# Patient Record
Sex: FEMALE | Race: WHITE | NOT HISPANIC OR LATINO | Employment: OTHER | URBAN - METROPOLITAN AREA
[De-identification: names, ages, dates, MRNs, and addresses within clinical notes are randomized per-mention and may not be internally consistent; named-entity substitution may affect disease eponyms.]

---

## 2017-03-10 ENCOUNTER — TRANSCRIBE ORDERS (OUTPATIENT)
Dept: ADMINISTRATIVE | Facility: HOSPITAL | Age: 72
End: 2017-03-10

## 2017-03-10 DIAGNOSIS — N18.31 CHRONIC KIDNEY DISEASE (CKD) STAGE G3A/A1, MODERATELY DECREASED GLOMERULAR FILTRATION RATE (GFR) BETWEEN 45-59 ML/MIN/1.73 SQUARE METER AND ALBUMINURIA CREATININE RATIO LESS THAN 30 MG/G (CMS/H* (HCC): Primary | ICD-10-CM

## 2017-03-20 ENCOUNTER — HOSPITAL ENCOUNTER (OUTPATIENT)
Dept: ULTRASOUND IMAGING | Facility: HOSPITAL | Age: 72
Discharge: HOME | End: 2017-03-20
Attending: INTERNAL MEDICINE | Admitting: INTERNAL MEDICINE

## 2017-03-20 DIAGNOSIS — N18.31 CHRONIC KIDNEY DISEASE (CKD) STAGE G3A/A1, MODERATELY DECREASED GLOMERULAR FILTRATION RATE (GFR) BETWEEN 45-59 ML/MIN/1.73 SQUARE METER AND ALBUMINURIA CREATININE RATIO LESS THAN 30 MG/G (CMS/H* (HCC): ICD-10-CM

## 2017-03-20 PROCEDURE — 76775 US EXAM ABDO BACK WALL LIM: CPT

## 2018-04-23 ENCOUNTER — APPOINTMENT (OUTPATIENT)
Dept: WOMENS IMAGING | Facility: HOSPITAL | Age: 73
End: 2018-04-23

## 2018-04-23 PROCEDURE — 77063 BREAST TOMOSYNTHESIS BI: CPT | Performed by: RADIOLOGY

## 2018-04-23 PROCEDURE — 77067 SCR MAMMO BI INCL CAD: CPT | Performed by: RADIOLOGY

## 2019-01-01 ENCOUNTER — HOSPITAL ENCOUNTER (OUTPATIENT)
Dept: SPEECH THERAPY | Facility: HOSPITAL | Age: 74
Setting detail: THERAPIES SERIES
Discharge: HOME | End: 2019-10-25

## 2019-01-01 ENCOUNTER — INFUSION (OUTPATIENT)
Dept: ONCOLOGY | Facility: HOSPITAL | Age: 74
End: 2019-01-01

## 2019-01-01 ENCOUNTER — HOSPITAL ENCOUNTER (OUTPATIENT)
Dept: SPEECH THERAPY | Facility: HOSPITAL | Age: 74
Setting detail: THERAPIES SERIES
Discharge: HOME | End: 2019-10-04

## 2019-01-01 ENCOUNTER — APPOINTMENT (OUTPATIENT)
Dept: ONCOLOGY | Facility: HOSPITAL | Age: 74
End: 2019-01-01

## 2019-01-01 ENCOUNTER — RADIATION ONCOLOGY WEEKLY ASSESSMENT (OUTPATIENT)
Dept: RADIATION ONCOLOGY | Facility: HOSPITAL | Age: 74
End: 2019-01-01

## 2019-01-01 ENCOUNTER — HOSPITAL ENCOUNTER (OUTPATIENT)
Dept: SPEECH THERAPY | Facility: HOSPITAL | Age: 74
Setting detail: THERAPIES SERIES
Discharge: HOME | End: 2019-10-07

## 2019-01-01 ENCOUNTER — TELEPHONE (OUTPATIENT)
Dept: ONCOLOGY | Facility: CLINIC | Age: 74
End: 2019-01-01

## 2019-01-01 ENCOUNTER — APPOINTMENT (OUTPATIENT)
Dept: PHYSICAL THERAPY | Facility: HOSPITAL | Age: 74
End: 2019-01-01

## 2019-01-01 ENCOUNTER — OFFICE VISIT (OUTPATIENT)
Dept: ONCOLOGY | Facility: CLINIC | Age: 74
End: 2019-01-01

## 2019-01-01 ENCOUNTER — CLINICAL SUPPORT (OUTPATIENT)
Dept: ONCOLOGY | Facility: HOSPITAL | Age: 74
End: 2019-01-01

## 2019-01-01 ENCOUNTER — HOSPITAL ENCOUNTER (OUTPATIENT)
Dept: SPEECH THERAPY | Facility: HOSPITAL | Age: 74
Setting detail: THERAPIES SERIES
Discharge: HOME | End: 2019-09-20

## 2019-01-01 ENCOUNTER — HOSPITAL ENCOUNTER (OUTPATIENT)
Dept: PHYSICAL THERAPY | Facility: HOSPITAL | Age: 74
Setting detail: THERAPIES SERIES
Discharge: HOME | End: 2019-10-21

## 2019-01-01 ENCOUNTER — HOSPITAL ENCOUNTER (OUTPATIENT)
Dept: PET IMAGING | Facility: HOSPITAL | Age: 74
Discharge: HOME | End: 2019-10-29
Admitting: INTERNAL MEDICINE

## 2019-01-01 ENCOUNTER — HOSPITAL ENCOUNTER (OUTPATIENT)
Dept: PHYSICAL THERAPY | Facility: HOSPITAL | Age: 74
Setting detail: THERAPIES SERIES
Discharge: HOME | End: 2019-10-11

## 2019-01-01 ENCOUNTER — APPOINTMENT (OUTPATIENT)
Dept: SPEECH THERAPY | Facility: HOSPITAL | Age: 74
End: 2019-01-01

## 2019-01-01 ENCOUNTER — HOSPITAL ENCOUNTER (OUTPATIENT)
Dept: PHYSICAL THERAPY | Facility: HOSPITAL | Age: 74
Setting detail: THERAPIES SERIES
Discharge: HOME | End: 2019-11-05

## 2019-01-01 ENCOUNTER — HOSPITAL ENCOUNTER (OUTPATIENT)
Dept: PHYSICAL THERAPY | Facility: HOSPITAL | Age: 74
Setting detail: THERAPIES SERIES
Discharge: HOME | End: 2019-10-04

## 2019-01-01 ENCOUNTER — APPOINTMENT (OUTPATIENT)
Dept: LAB | Facility: HOSPITAL | Age: 74
End: 2019-01-01

## 2019-01-01 ENCOUNTER — LAB (OUTPATIENT)
Dept: LAB | Facility: HOSPITAL | Age: 74
End: 2019-01-01

## 2019-01-01 ENCOUNTER — HOSPITAL ENCOUNTER (OUTPATIENT)
Dept: SPEECH THERAPY | Facility: HOSPITAL | Age: 74
Setting detail: THERAPIES SERIES
Discharge: HOME | End: 2019-10-21

## 2019-01-01 ENCOUNTER — HOSPITAL ENCOUNTER (OUTPATIENT)
Dept: SPEECH THERAPY | Facility: HOSPITAL | Age: 74
Setting detail: THERAPIES SERIES
Discharge: HOME | End: 2019-09-27

## 2019-01-01 ENCOUNTER — OFFICE VISIT (OUTPATIENT)
Dept: NEUROSURGERY | Facility: CLINIC | Age: 74
End: 2019-01-01

## 2019-01-01 ENCOUNTER — TRANSCRIBE ORDERS (OUTPATIENT)
Dept: PHYSICAL THERAPY | Facility: HOSPITAL | Age: 74
End: 2019-01-01

## 2019-01-01 ENCOUNTER — TELEPHONE (OUTPATIENT)
Dept: ONCOLOGY | Facility: HOSPITAL | Age: 74
End: 2019-01-01

## 2019-01-01 ENCOUNTER — OFFICE VISIT (OUTPATIENT)
Dept: FAMILY MEDICINE CLINIC | Facility: CLINIC | Age: 74
End: 2019-01-01

## 2019-01-01 ENCOUNTER — APPOINTMENT (OUTPATIENT)
Dept: RADIATION ONCOLOGY | Facility: HOSPITAL | Age: 74
End: 2019-01-01

## 2019-01-01 ENCOUNTER — HOSPITAL ENCOUNTER (OUTPATIENT)
Dept: PHYSICAL THERAPY | Facility: HOSPITAL | Age: 74
Setting detail: THERAPIES SERIES
Discharge: HOME | End: 2019-11-01

## 2019-01-01 ENCOUNTER — HOSPITAL ENCOUNTER (OUTPATIENT)
Dept: PHYSICAL THERAPY | Facility: HOSPITAL | Age: 74
Setting detail: THERAPIES SERIES
Discharge: HOME | End: 2019-11-08

## 2019-01-01 ENCOUNTER — APPOINTMENT (OUTPATIENT)
Dept: GENERAL RADIOLOGY | Facility: HOSPITAL | Age: 74
End: 2019-01-01

## 2019-01-01 ENCOUNTER — HOSPITAL ENCOUNTER (OUTPATIENT)
Facility: HOSPITAL | Age: 74
Setting detail: SURGERY ADMIT
End: 2019-09-06
Attending: THORACIC SURGERY (CARDIOTHORACIC VASCULAR SURGERY) | Admitting: THORACIC SURGERY (CARDIOTHORACIC VASCULAR SURGERY)

## 2019-01-01 ENCOUNTER — HOSPITAL ENCOUNTER (OUTPATIENT)
Dept: SPEECH THERAPY | Facility: HOSPITAL | Age: 74
Setting detail: THERAPIES SERIES
Discharge: HOME | End: 2019-10-11

## 2019-01-01 ENCOUNTER — APPOINTMENT (OUTPATIENT)
Dept: ONCOLOGY | Facility: CLINIC | Age: 74
End: 2019-01-01

## 2019-01-01 ENCOUNTER — DOCUMENTATION (OUTPATIENT)
Dept: ONCOLOGY | Facility: CLINIC | Age: 74
End: 2019-01-01

## 2019-01-01 ENCOUNTER — HOSPITAL ENCOUNTER (OUTPATIENT)
Dept: PHYSICAL THERAPY | Facility: HOSPITAL | Age: 74
Setting detail: THERAPIES SERIES
Discharge: HOME | End: 2019-09-20

## 2019-01-01 ENCOUNTER — HOSPITAL ENCOUNTER (OUTPATIENT)
Dept: SPEECH THERAPY | Facility: HOSPITAL | Age: 74
Setting detail: THERAPIES SERIES
Discharge: HOME | End: 2019-10-18

## 2019-01-01 ENCOUNTER — HOSPITAL ENCOUNTER (OUTPATIENT)
Dept: PHYSICAL THERAPY | Facility: HOSPITAL | Age: 74
Setting detail: THERAPIES SERIES
Discharge: HOME | End: 2019-09-27

## 2019-01-01 ENCOUNTER — HOSPITAL ENCOUNTER (OUTPATIENT)
Dept: SPEECH THERAPY | Facility: HOSPITAL | Age: 74
Setting detail: THERAPIES SERIES
Discharge: HOME | End: 2019-09-17

## 2019-01-01 ENCOUNTER — HOSPITAL ENCOUNTER (OUTPATIENT)
Dept: PHYSICAL THERAPY | Facility: HOSPITAL | Age: 74
Setting detail: THERAPIES SERIES
Discharge: HOME | End: 2019-10-07

## 2019-01-01 ENCOUNTER — HOSPITAL ENCOUNTER (OUTPATIENT)
Dept: MRI IMAGING | Facility: HOSPITAL | Age: 74
Discharge: HOME | End: 2019-10-08
Admitting: PHYSICIAN ASSISTANT

## 2019-01-01 ENCOUNTER — APPOINTMENT (OUTPATIENT)
Dept: PET IMAGING | Facility: HOSPITAL | Age: 74
End: 2019-01-01

## 2019-01-01 ENCOUNTER — HOSPITAL ENCOUNTER (OUTPATIENT)
Dept: PHYSICAL THERAPY | Facility: HOSPITAL | Age: 74
Setting detail: THERAPIES SERIES
Discharge: HOME | End: 2019-09-30

## 2019-01-01 ENCOUNTER — ANESTHESIA EVENT (OUTPATIENT)
Dept: PERIOP | Facility: HOSPITAL | Age: 74
End: 2019-01-01

## 2019-01-01 ENCOUNTER — HOSPITAL ENCOUNTER (OUTPATIENT)
Dept: PET IMAGING | Facility: HOSPITAL | Age: 74
Discharge: HOME | End: 2019-12-18
Admitting: INTERNAL MEDICINE

## 2019-01-01 ENCOUNTER — HOSPITAL ENCOUNTER (OUTPATIENT)
Dept: NEUROLOGY | Facility: HOSPITAL | Age: 74
Discharge: HOME | End: 2019-12-26

## 2019-01-01 ENCOUNTER — DOCUMENTATION (OUTPATIENT)
Dept: RADIATION ONCOLOGY | Facility: HOSPITAL | Age: 74
End: 2019-01-01

## 2019-01-01 ENCOUNTER — ANESTHESIA (OUTPATIENT)
Dept: PERIOP | Facility: HOSPITAL | Age: 74
End: 2019-01-01

## 2019-01-01 ENCOUNTER — HOSPITAL ENCOUNTER (OUTPATIENT)
Dept: PHYSICAL THERAPY | Facility: HOSPITAL | Age: 74
Setting detail: THERAPIES SERIES
Discharge: HOME | End: 2019-10-25

## 2019-01-01 ENCOUNTER — HOSPITAL ENCOUNTER (OUTPATIENT)
Dept: PHYSICAL THERAPY | Facility: HOSPITAL | Age: 74
Setting detail: THERAPIES SERIES
Discharge: HOME | End: 2019-10-29

## 2019-01-01 ENCOUNTER — HOSPITAL ENCOUNTER (OUTPATIENT)
Dept: PHYSICAL THERAPY | Facility: HOSPITAL | Age: 74
Setting detail: THERAPIES SERIES
Discharge: HOME | End: 2019-10-18

## 2019-01-01 ENCOUNTER — HOSPITAL ENCOUNTER (OUTPATIENT)
Dept: PHYSICAL THERAPY | Facility: HOSPITAL | Age: 74
Setting detail: THERAPIES SERIES
Discharge: HOME | End: 2019-11-15

## 2019-01-01 ENCOUNTER — HOSPITAL ENCOUNTER (OUTPATIENT)
Dept: PET IMAGING | Facility: HOSPITAL | Age: 74
End: 2019-01-01

## 2019-01-01 ENCOUNTER — HOSPITAL ENCOUNTER (OUTPATIENT)
Dept: MRI IMAGING | Facility: HOSPITAL | Age: 74
Discharge: HOME | End: 2019-12-26
Admitting: NEUROLOGICAL SURGERY

## 2019-01-01 ENCOUNTER — HOSPITAL ENCOUNTER (OUTPATIENT)
Dept: PET IMAGING | Facility: HOSPITAL | Age: 74
Discharge: HOME | End: 2019-09-12

## 2019-01-01 ENCOUNTER — HOSPITAL ENCOUNTER (OUTPATIENT)
Dept: GENERAL RADIOLOGY | Facility: HOSPITAL | Age: 74
Discharge: HOME | End: 2019-12-30
Admitting: INTERNAL MEDICINE

## 2019-01-01 ENCOUNTER — HOSPITAL ENCOUNTER (OUTPATIENT)
Dept: PET IMAGING | Facility: HOSPITAL | Age: 74
Discharge: HOME | End: 2019-09-16

## 2019-01-01 ENCOUNTER — HOSPITAL ENCOUNTER (OUTPATIENT)
Dept: PET IMAGING | Facility: HOSPITAL | Age: 74
Discharge: HOME | End: 2019-09-16
Admitting: RADIOLOGY

## 2019-01-01 ENCOUNTER — HOSPITAL ENCOUNTER (OUTPATIENT)
Dept: SPEECH THERAPY | Facility: HOSPITAL | Age: 74
Setting detail: THERAPIES SERIES
Discharge: HOME | End: 2019-09-30

## 2019-01-01 VITALS
WEIGHT: 190 LBS | DIASTOLIC BLOOD PRESSURE: 64 MMHG | HEIGHT: 64 IN | BODY MASS INDEX: 32.44 KG/M2 | SYSTOLIC BLOOD PRESSURE: 120 MMHG | HEART RATE: 59 BPM

## 2019-01-01 VITALS
HEART RATE: 72 BPM | BODY MASS INDEX: 31.91 KG/M2 | SYSTOLIC BLOOD PRESSURE: 137 MMHG | TEMPERATURE: 98.4 F | OXYGEN SATURATION: 98 % | WEIGHT: 186 LBS | DIASTOLIC BLOOD PRESSURE: 81 MMHG

## 2019-01-01 VITALS
OXYGEN SATURATION: 96 % | TEMPERATURE: 97.4 F | HEART RATE: 55 BPM | RESPIRATION RATE: 16 BRPM | SYSTOLIC BLOOD PRESSURE: 131 MMHG | DIASTOLIC BLOOD PRESSURE: 65 MMHG

## 2019-01-01 VITALS
HEIGHT: 64 IN | SYSTOLIC BLOOD PRESSURE: 112 MMHG | DIASTOLIC BLOOD PRESSURE: 70 MMHG | RESPIRATION RATE: 18 BRPM | BODY MASS INDEX: 31.16 KG/M2 | OXYGEN SATURATION: 96 % | TEMPERATURE: 98 F | WEIGHT: 182.5 LBS | HEART RATE: 69 BPM

## 2019-01-01 VITALS
OXYGEN SATURATION: 99 % | SYSTOLIC BLOOD PRESSURE: 129 MMHG | HEART RATE: 71 BPM | TEMPERATURE: 97.6 F | DIASTOLIC BLOOD PRESSURE: 77 MMHG | WEIGHT: 187 LBS | BODY MASS INDEX: 32.08 KG/M2

## 2019-01-01 VITALS
HEART RATE: 76 BPM | SYSTOLIC BLOOD PRESSURE: 112 MMHG | WEIGHT: 183 LBS | OXYGEN SATURATION: 99 % | DIASTOLIC BLOOD PRESSURE: 69 MMHG | BODY MASS INDEX: 31.4 KG/M2 | TEMPERATURE: 98.7 F

## 2019-01-01 VITALS
HEART RATE: 61 BPM | SYSTOLIC BLOOD PRESSURE: 134 MMHG | BODY MASS INDEX: 31.6 KG/M2 | DIASTOLIC BLOOD PRESSURE: 80 MMHG | OXYGEN SATURATION: 98 % | TEMPERATURE: 97.6 F | WEIGHT: 184.2 LBS

## 2019-01-01 VITALS
HEART RATE: 82 BPM | SYSTOLIC BLOOD PRESSURE: 118 MMHG | DIASTOLIC BLOOD PRESSURE: 65 MMHG | BODY MASS INDEX: 31.57 KG/M2 | OXYGEN SATURATION: 99 % | WEIGHT: 184 LBS

## 2019-01-01 VITALS
HEART RATE: 77 BPM | OXYGEN SATURATION: 98 % | RESPIRATION RATE: 16 BRPM | TEMPERATURE: 97.5 F | DIASTOLIC BLOOD PRESSURE: 75 MMHG | BODY MASS INDEX: 30.73 KG/M2 | WEIGHT: 180 LBS | HEIGHT: 64 IN | SYSTOLIC BLOOD PRESSURE: 131 MMHG

## 2019-01-01 VITALS
SYSTOLIC BLOOD PRESSURE: 114 MMHG | WEIGHT: 182.4 LBS | BODY MASS INDEX: 31.14 KG/M2 | DIASTOLIC BLOOD PRESSURE: 77 MMHG | HEIGHT: 64 IN | RESPIRATION RATE: 16 BRPM | TEMPERATURE: 98.5 F | OXYGEN SATURATION: 97 % | HEART RATE: 74 BPM

## 2019-01-01 VITALS
HEIGHT: 64 IN | OXYGEN SATURATION: 97 % | DIASTOLIC BLOOD PRESSURE: 62 MMHG | WEIGHT: 188.9 LBS | HEART RATE: 87 BPM | SYSTOLIC BLOOD PRESSURE: 103 MMHG | TEMPERATURE: 98.4 F | BODY MASS INDEX: 32.25 KG/M2 | RESPIRATION RATE: 12 BRPM

## 2019-01-01 VITALS
HEIGHT: 64 IN | SYSTOLIC BLOOD PRESSURE: 118 MMHG | BODY MASS INDEX: 32.08 KG/M2 | HEART RATE: 78 BPM | DIASTOLIC BLOOD PRESSURE: 61 MMHG | WEIGHT: 187.9 LBS | OXYGEN SATURATION: 97 % | TEMPERATURE: 97.7 F | RESPIRATION RATE: 18 BRPM

## 2019-01-01 VITALS
BODY MASS INDEX: 30.97 KG/M2 | WEIGHT: 180.4 LBS | DIASTOLIC BLOOD PRESSURE: 70 MMHG | HEART RATE: 59 BPM | OXYGEN SATURATION: 99 % | SYSTOLIC BLOOD PRESSURE: 114 MMHG

## 2019-01-01 VITALS
OXYGEN SATURATION: 98 % | SYSTOLIC BLOOD PRESSURE: 135 MMHG | DIASTOLIC BLOOD PRESSURE: 72 MMHG | HEART RATE: 87 BPM | BODY MASS INDEX: 32.25 KG/M2 | WEIGHT: 188 LBS

## 2019-01-01 VITALS — SYSTOLIC BLOOD PRESSURE: 108 MMHG | DIASTOLIC BLOOD PRESSURE: 68 MMHG | HEART RATE: 76 BPM

## 2019-01-01 VITALS
HEART RATE: 83 BPM | RESPIRATION RATE: 18 BRPM | BODY MASS INDEX: 31.74 KG/M2 | DIASTOLIC BLOOD PRESSURE: 77 MMHG | WEIGHT: 185.9 LBS | HEIGHT: 64 IN | OXYGEN SATURATION: 98 % | TEMPERATURE: 97.7 F | SYSTOLIC BLOOD PRESSURE: 130 MMHG

## 2019-01-01 VITALS
DIASTOLIC BLOOD PRESSURE: 59 MMHG | OXYGEN SATURATION: 98 % | BODY MASS INDEX: 31.77 KG/M2 | SYSTOLIC BLOOD PRESSURE: 106 MMHG | HEART RATE: 69 BPM | WEIGHT: 185.2 LBS

## 2019-01-01 VITALS
HEART RATE: 80 BPM | SYSTOLIC BLOOD PRESSURE: 133 MMHG | OXYGEN SATURATION: 96 % | DIASTOLIC BLOOD PRESSURE: 76 MMHG | BODY MASS INDEX: 29.85 KG/M2 | TEMPERATURE: 97.8 F | WEIGHT: 174 LBS

## 2019-01-01 VITALS
WEIGHT: 184 LBS | SYSTOLIC BLOOD PRESSURE: 106 MMHG | OXYGEN SATURATION: 99 % | HEART RATE: 76 BPM | BODY MASS INDEX: 31.57 KG/M2 | TEMPERATURE: 98 F | DIASTOLIC BLOOD PRESSURE: 67 MMHG

## 2019-01-01 VITALS
OXYGEN SATURATION: 100 % | WEIGHT: 187 LBS | DIASTOLIC BLOOD PRESSURE: 79 MMHG | BODY MASS INDEX: 32.08 KG/M2 | HEART RATE: 69 BPM | SYSTOLIC BLOOD PRESSURE: 131 MMHG

## 2019-01-01 VITALS
SYSTOLIC BLOOD PRESSURE: 143 MMHG | HEIGHT: 64 IN | DIASTOLIC BLOOD PRESSURE: 79 MMHG | RESPIRATION RATE: 16 BRPM | BODY MASS INDEX: 32.42 KG/M2 | TEMPERATURE: 98 F | OXYGEN SATURATION: 97 % | WEIGHT: 189.9 LBS | HEART RATE: 82 BPM

## 2019-01-01 VITALS
DIASTOLIC BLOOD PRESSURE: 67 MMHG | HEART RATE: 60 BPM | TEMPERATURE: 97.1 F | OXYGEN SATURATION: 98 % | RESPIRATION RATE: 18 BRPM | SYSTOLIC BLOOD PRESSURE: 119 MMHG

## 2019-01-01 VITALS
DIASTOLIC BLOOD PRESSURE: 73 MMHG | TEMPERATURE: 97.5 F | WEIGHT: 187.6 LBS | SYSTOLIC BLOOD PRESSURE: 115 MMHG | OXYGEN SATURATION: 100 % | BODY MASS INDEX: 32.19 KG/M2 | HEART RATE: 71 BPM

## 2019-01-01 VITALS — DIASTOLIC BLOOD PRESSURE: 66 MMHG | SYSTOLIC BLOOD PRESSURE: 120 MMHG | HEART RATE: 73 BPM

## 2019-01-01 VITALS
BODY MASS INDEX: 31.16 KG/M2 | TEMPERATURE: 98.1 F | DIASTOLIC BLOOD PRESSURE: 66 MMHG | OXYGEN SATURATION: 94 % | WEIGHT: 181.6 LBS | HEART RATE: 80 BPM | SYSTOLIC BLOOD PRESSURE: 116 MMHG

## 2019-01-01 VITALS
BODY MASS INDEX: 31.74 KG/M2 | DIASTOLIC BLOOD PRESSURE: 80 MMHG | HEART RATE: 81 BPM | OXYGEN SATURATION: 95 % | WEIGHT: 185 LBS | SYSTOLIC BLOOD PRESSURE: 136 MMHG

## 2019-01-01 VITALS
HEIGHT: 64 IN | BODY MASS INDEX: 31.58 KG/M2 | TEMPERATURE: 98.1 F | RESPIRATION RATE: 16 BRPM | DIASTOLIC BLOOD PRESSURE: 50 MMHG | OXYGEN SATURATION: 97 % | HEART RATE: 79 BPM | SYSTOLIC BLOOD PRESSURE: 100 MMHG | WEIGHT: 185 LBS

## 2019-01-01 VITALS
TEMPERATURE: 97 F | SYSTOLIC BLOOD PRESSURE: 124 MMHG | OXYGEN SATURATION: 95 % | DIASTOLIC BLOOD PRESSURE: 69 MMHG | HEART RATE: 72 BPM | BODY MASS INDEX: 32.25 KG/M2 | WEIGHT: 188 LBS

## 2019-01-01 VITALS
TEMPERATURE: 98 F | WEIGHT: 184 LBS | HEART RATE: 90 BPM | OXYGEN SATURATION: 100 % | BODY MASS INDEX: 31.57 KG/M2 | SYSTOLIC BLOOD PRESSURE: 118 MMHG | DIASTOLIC BLOOD PRESSURE: 69 MMHG

## 2019-01-01 VITALS
WEIGHT: 184.8 LBS | HEIGHT: 64 IN | RESPIRATION RATE: 16 BRPM | BODY MASS INDEX: 31.55 KG/M2 | SYSTOLIC BLOOD PRESSURE: 134 MMHG | HEART RATE: 82 BPM | OXYGEN SATURATION: 98 % | DIASTOLIC BLOOD PRESSURE: 77 MMHG | TEMPERATURE: 98 F

## 2019-01-01 VITALS
HEART RATE: 81 BPM | SYSTOLIC BLOOD PRESSURE: 117 MMHG | RESPIRATION RATE: 14 BRPM | OXYGEN SATURATION: 95 % | DIASTOLIC BLOOD PRESSURE: 68 MMHG | TEMPERATURE: 98.6 F

## 2019-01-01 VITALS
OXYGEN SATURATION: 98 % | SYSTOLIC BLOOD PRESSURE: 144 MMHG | DIASTOLIC BLOOD PRESSURE: 78 MMHG | HEART RATE: 76 BPM | WEIGHT: 186.9 LBS | HEIGHT: 64 IN | BODY MASS INDEX: 31.91 KG/M2 | RESPIRATION RATE: 14 BRPM | TEMPERATURE: 97.9 F

## 2019-01-01 VITALS
DIASTOLIC BLOOD PRESSURE: 55 MMHG | RESPIRATION RATE: 18 BRPM | SYSTOLIC BLOOD PRESSURE: 91 MMHG | TEMPERATURE: 97.4 F | OXYGEN SATURATION: 100 % | HEART RATE: 75 BPM

## 2019-01-01 VITALS — DIASTOLIC BLOOD PRESSURE: 63 MMHG | HEART RATE: 90 BPM | SYSTOLIC BLOOD PRESSURE: 125 MMHG

## 2019-01-01 VITALS
TEMPERATURE: 98.5 F | BODY MASS INDEX: 31.05 KG/M2 | HEART RATE: 89 BPM | OXYGEN SATURATION: 98 % | SYSTOLIC BLOOD PRESSURE: 126 MMHG | WEIGHT: 181 LBS | DIASTOLIC BLOOD PRESSURE: 72 MMHG

## 2019-01-01 VITALS
OXYGEN SATURATION: 97 % | SYSTOLIC BLOOD PRESSURE: 114 MMHG | HEART RATE: 77 BPM | TEMPERATURE: 97.7 F | WEIGHT: 184.7 LBS | BODY MASS INDEX: 31.53 KG/M2 | RESPIRATION RATE: 16 BRPM | DIASTOLIC BLOOD PRESSURE: 71 MMHG | HEIGHT: 64 IN

## 2019-01-01 VITALS
BODY MASS INDEX: 31.57 KG/M2 | DIASTOLIC BLOOD PRESSURE: 62 MMHG | OXYGEN SATURATION: 99 % | WEIGHT: 184 LBS | RESPIRATION RATE: 16 BRPM | TEMPERATURE: 97.6 F | HEART RATE: 67 BPM | SYSTOLIC BLOOD PRESSURE: 100 MMHG

## 2019-01-01 VITALS
WEIGHT: 189 LBS | TEMPERATURE: 98.9 F | SYSTOLIC BLOOD PRESSURE: 133 MMHG | DIASTOLIC BLOOD PRESSURE: 88 MMHG | OXYGEN SATURATION: 95 % | BODY MASS INDEX: 32.43 KG/M2 | HEART RATE: 65 BPM

## 2019-01-01 VITALS
OXYGEN SATURATION: 99 % | SYSTOLIC BLOOD PRESSURE: 80 MMHG | HEART RATE: 99 BPM | TEMPERATURE: 97.7 F | DIASTOLIC BLOOD PRESSURE: 48 MMHG

## 2019-01-01 VITALS
DIASTOLIC BLOOD PRESSURE: 56 MMHG | BODY MASS INDEX: 31.91 KG/M2 | HEART RATE: 87 BPM | WEIGHT: 186 LBS | SYSTOLIC BLOOD PRESSURE: 118 MMHG | OXYGEN SATURATION: 97 %

## 2019-01-01 DIAGNOSIS — C34.90 SMALL CELL LUNG CANCER (HCC): Primary | ICD-10-CM

## 2019-01-01 DIAGNOSIS — Z45.2 ENCOUNTER FOR FITTING AND ADJUSTMENT OF VASCULAR CATHETER: ICD-10-CM

## 2019-01-01 DIAGNOSIS — D49.6 BRAIN TUMOR (HCC): ICD-10-CM

## 2019-01-01 DIAGNOSIS — C34.90 SMALL CELL LUNG CANCER (HCC): ICD-10-CM

## 2019-01-01 DIAGNOSIS — C34.11 MALIGNANT NEOPLASM OF UPPER LOBE OF RIGHT LUNG (HCC): ICD-10-CM

## 2019-01-01 DIAGNOSIS — D49.6 BRAIN TUMOR (HCC): Primary | ICD-10-CM

## 2019-01-01 DIAGNOSIS — T82.898A OTHER COMPLICATION OF VASCULAR DEVICE, INITIAL ENCOUNTER (HCC): Primary | ICD-10-CM

## 2019-01-01 DIAGNOSIS — K21.9 GASTROESOPHAGEAL REFLUX DISEASE WITHOUT ESOPHAGITIS: ICD-10-CM

## 2019-01-01 DIAGNOSIS — Z79.899 HIGH RISK MEDICATION USE: ICD-10-CM

## 2019-01-01 DIAGNOSIS — K20.80 RADIATION ESOPHAGITIS: ICD-10-CM

## 2019-01-01 DIAGNOSIS — C79.31 BRAIN METASTASES: ICD-10-CM

## 2019-01-01 DIAGNOSIS — K20.80 RADIATION ESOPHAGITIS: Primary | ICD-10-CM

## 2019-01-01 DIAGNOSIS — G93.89 BRAIN MASS: ICD-10-CM

## 2019-01-01 DIAGNOSIS — Z98.890 STATUS POST CRANIOTOMY: ICD-10-CM

## 2019-01-01 DIAGNOSIS — R41.841 COGNITIVE COMMUNICATION DEFICIT: ICD-10-CM

## 2019-01-01 DIAGNOSIS — R63.8 DECREASED ORAL INTAKE: ICD-10-CM

## 2019-01-01 DIAGNOSIS — T45.1X5A CHEMOTHERAPY-INDUCED THROMBOCYTOPENIA: ICD-10-CM

## 2019-01-01 DIAGNOSIS — T66.XXXA RADIATION ESOPHAGITIS: ICD-10-CM

## 2019-01-01 DIAGNOSIS — G81.91 HEMIPARESIS OF RIGHT DOMINANT SIDE DUE TO NON-CEREBROVASCULAR ETIOLOGY (HCC): ICD-10-CM

## 2019-01-01 DIAGNOSIS — C34.11 MALIGNANT NEOPLASM OF UPPER LOBE OF RIGHT LUNG (HCC): Primary | ICD-10-CM

## 2019-01-01 DIAGNOSIS — Z09 SURGERY FOLLOW-UP EXAMINATION: ICD-10-CM

## 2019-01-01 DIAGNOSIS — R26.89 FUNCTIONAL GAIT ABNORMALITY: ICD-10-CM

## 2019-01-01 DIAGNOSIS — Z79.899 HIGH RISK MEDICATION USE: Primary | ICD-10-CM

## 2019-01-01 DIAGNOSIS — R91.8 LUNG MASS: Primary | ICD-10-CM

## 2019-01-01 DIAGNOSIS — T66.XXXA RADIATION ESOPHAGITIS: Primary | ICD-10-CM

## 2019-01-01 DIAGNOSIS — Z98.890 STATUS POST CRANIOTOMY: Primary | ICD-10-CM

## 2019-01-01 DIAGNOSIS — Z45.2 ENCOUNTER FOR FITTING AND ADJUSTMENT OF VASCULAR CATHETER: Primary | ICD-10-CM

## 2019-01-01 DIAGNOSIS — C79.31 METASTATIC CANCER TO BRAIN (HCC): Primary | ICD-10-CM

## 2019-01-01 DIAGNOSIS — D69.59 CHEMOTHERAPY-INDUCED THROMBOCYTOPENIA: ICD-10-CM

## 2019-01-01 DIAGNOSIS — R91.8 LUNG MASS: ICD-10-CM

## 2019-01-01 DIAGNOSIS — R21 RASH: ICD-10-CM

## 2019-01-01 DIAGNOSIS — C79.31 BRAIN METASTASES: Primary | ICD-10-CM

## 2019-01-01 DIAGNOSIS — I10 ESSENTIAL HYPERTENSION: ICD-10-CM

## 2019-01-01 DIAGNOSIS — R63.8 DECREASED ORAL INTAKE: Primary | ICD-10-CM

## 2019-01-01 DIAGNOSIS — T82.898A OTHER COMPLICATION OF VASCULAR DEVICE, INITIAL ENCOUNTER (HCC): ICD-10-CM

## 2019-01-01 DIAGNOSIS — R05.9 COUGH: Primary | ICD-10-CM

## 2019-01-01 DIAGNOSIS — Z09 SURGERY FOLLOW-UP EXAMINATION: Primary | ICD-10-CM

## 2019-01-01 DIAGNOSIS — F32.A ANXIETY AND DEPRESSION: ICD-10-CM

## 2019-01-01 DIAGNOSIS — G93.89 BRAIN MASS: Primary | ICD-10-CM

## 2019-01-01 DIAGNOSIS — Z45.2 ENCOUNTER FOR ADJUSTMENT OR MANAGEMENT OF VASCULAR ACCESS DEVICE: Primary | ICD-10-CM

## 2019-01-01 DIAGNOSIS — F41.9 ANXIETY AND DEPRESSION: ICD-10-CM

## 2019-01-01 DIAGNOSIS — R21 RASH: Primary | ICD-10-CM

## 2019-01-01 LAB
ABO + RH BLD: NORMAL
ABO + RH BLD: NORMAL
ABO GROUP BLD: NORMAL
ABO GROUP BLD: NORMAL
ALBUMIN SERPL-MCNC: 3.4 G/DL (ref 3.5–5.2)
ALBUMIN SERPL-MCNC: 3.5 G/DL (ref 3.5–5.2)
ALBUMIN SERPL-MCNC: 3.5 G/DL (ref 3.5–5.2)
ALBUMIN SERPL-MCNC: 3.6 G/DL (ref 3.5–5.2)
ALBUMIN SERPL-MCNC: 3.7 G/DL (ref 3.5–5.2)
ALBUMIN SERPL-MCNC: 3.7 G/DL (ref 3.5–5.2)
ALBUMIN SERPL-MCNC: 3.8 G/DL (ref 3.5–5.2)
ALBUMIN SERPL-MCNC: 3.9 G/DL (ref 3.5–5.2)
ALBUMIN SERPL-MCNC: 3.9 G/DL (ref 3.5–5.2)
ALBUMIN SERPL-MCNC: 4 G/DL (ref 3.5–5.2)
ALBUMIN/GLOB SERPL: 1.2 G/DL (ref 1.1–2.4)
ALBUMIN/GLOB SERPL: 1.3 G/DL (ref 1.1–2.4)
ALBUMIN/GLOB SERPL: 1.4 G/DL (ref 1.1–2.4)
ALBUMIN/GLOB SERPL: 1.5 G/DL (ref 1.1–2.4)
ALP SERPL-CCNC: 102 U/L (ref 38–116)
ALP SERPL-CCNC: 111 U/L (ref 38–116)
ALP SERPL-CCNC: 71 U/L (ref 38–116)
ALP SERPL-CCNC: 72 U/L (ref 38–116)
ALP SERPL-CCNC: 75 U/L (ref 38–116)
ALP SERPL-CCNC: 75 U/L (ref 38–116)
ALP SERPL-CCNC: 76 U/L (ref 38–116)
ALP SERPL-CCNC: 77 U/L (ref 38–116)
ALP SERPL-CCNC: 81 U/L (ref 38–116)
ALP SERPL-CCNC: 81 U/L (ref 38–116)
ALP SERPL-CCNC: 88 U/L (ref 38–116)
ALP SERPL-CCNC: 90 U/L (ref 38–116)
ALP SERPL-CCNC: 96 U/L (ref 38–116)
ALP SERPL-CCNC: 96 U/L (ref 38–116)
ALT SERPL W P-5'-P-CCNC: 11 U/L (ref 0–33)
ALT SERPL W P-5'-P-CCNC: 12 U/L (ref 0–33)
ALT SERPL W P-5'-P-CCNC: 12 U/L (ref 0–33)
ALT SERPL W P-5'-P-CCNC: 13 U/L (ref 0–33)
ALT SERPL W P-5'-P-CCNC: 13 U/L (ref 0–33)
ALT SERPL W P-5'-P-CCNC: 16 U/L (ref 0–33)
ALT SERPL W P-5'-P-CCNC: 16 U/L (ref 0–33)
ALT SERPL W P-5'-P-CCNC: 17 U/L (ref 0–33)
ALT SERPL W P-5'-P-CCNC: 17 U/L (ref 0–33)
ALT SERPL W P-5'-P-CCNC: 18 U/L (ref 0–33)
ALT SERPL W P-5'-P-CCNC: 18 U/L (ref 0–33)
ALT SERPL W P-5'-P-CCNC: 20 U/L (ref 0–33)
ALT SERPL W P-5'-P-CCNC: 21 U/L (ref 0–33)
ALT SERPL W P-5'-P-CCNC: 21 U/L (ref 0–33)
AMYLASE SERPL-CCNC: 66 U/L (ref 28–100)
ANION GAP SERPL CALCULATED.3IONS-SCNC: 10.1 MMOL/L (ref 5–15)
ANION GAP SERPL CALCULATED.3IONS-SCNC: 10.1 MMOL/L (ref 5–15)
ANION GAP SERPL CALCULATED.3IONS-SCNC: 10.5 MMOL/L (ref 5–15)
ANION GAP SERPL CALCULATED.3IONS-SCNC: 10.6 MMOL/L (ref 5–15)
ANION GAP SERPL CALCULATED.3IONS-SCNC: 11 MMOL/L (ref 5–15)
ANION GAP SERPL CALCULATED.3IONS-SCNC: 11.1 MMOL/L (ref 5–15)
ANION GAP SERPL CALCULATED.3IONS-SCNC: 11.7 MMOL/L (ref 5–15)
ANION GAP SERPL CALCULATED.3IONS-SCNC: 11.8 MMOL/L (ref 5–15)
ANION GAP SERPL CALCULATED.3IONS-SCNC: 11.8 MMOL/L (ref 5–15)
ANION GAP SERPL CALCULATED.3IONS-SCNC: 12.1 MMOL/L (ref 5–15)
ANION GAP SERPL CALCULATED.3IONS-SCNC: 12.3 MMOL/L (ref 5–15)
ANION GAP SERPL CALCULATED.3IONS-SCNC: 12.3 MMOL/L (ref 5–15)
ANION GAP SERPL CALCULATED.3IONS-SCNC: 13.5 MMOL/L (ref 5–15)
ANION GAP SERPL CALCULATED.3IONS-SCNC: 13.6 MMOL/L (ref 5–15)
ANION GAP SERPL CALCULATED.3IONS-SCNC: 9.9 MMOL/L (ref 5–15)
ANION GAP SERPL CALCULATED.3IONS-SCNC: 9.9 MMOL/L (ref 5–15)
APTT PPP: 26.9 SECONDS (ref 22.7–35.4)
AST SERPL-CCNC: 13 U/L (ref 0–32)
AST SERPL-CCNC: 14 U/L (ref 0–32)
AST SERPL-CCNC: 15 U/L (ref 0–32)
AST SERPL-CCNC: 16 U/L (ref 0–32)
AST SERPL-CCNC: 17 U/L (ref 0–32)
AST SERPL-CCNC: 18 U/L (ref 0–32)
AST SERPL-CCNC: 18 U/L (ref 0–32)
AST SERPL-CCNC: 19 U/L (ref 0–32)
AST SERPL-CCNC: 21 U/L (ref 0–32)
BACTERIA SPEC AEROBE CULT: NORMAL
BACTERIA UR QL AUTO: ABNORMAL /HPF
BASOPHILS # BLD AUTO: 0.01 10*3/MM3 (ref 0–0.2)
BASOPHILS # BLD AUTO: 0.02 10*3/MM3 (ref 0–0.2)
BASOPHILS # BLD AUTO: 0.03 10*3/MM3 (ref 0–0.2)
BASOPHILS # BLD AUTO: 0.04 10*3/MM3 (ref 0–0.2)
BASOPHILS # BLD AUTO: 0.04 10*3/MM3 (ref 0–0.2)
BASOPHILS # BLD AUTO: 0.06 10*3/MM3 (ref 0–0.2)
BASOPHILS # BLD AUTO: 0.07 10*3/MM3 (ref 0–0.2)
BASOPHILS # BLD AUTO: 0.1 10*3/MM3 (ref 0–0.2)
BASOPHILS NFR BLD AUTO: 0.3 % (ref 0–1.5)
BASOPHILS NFR BLD AUTO: 0.4 % (ref 0–1.5)
BASOPHILS NFR BLD AUTO: 0.5 % (ref 0–1.5)
BASOPHILS NFR BLD AUTO: 0.6 % (ref 0–1.5)
BASOPHILS NFR BLD AUTO: 0.6 % (ref 0–1.5)
BASOPHILS NFR BLD AUTO: 0.7 % (ref 0–1.5)
BASOPHILS NFR BLD AUTO: 0.7 % (ref 0–1.5)
BASOPHILS NFR BLD AUTO: 0.9 % (ref 0–1.5)
BASOPHILS NFR BLD AUTO: 1.1 % (ref 0–1.5)
BASOPHILS NFR BLD AUTO: 1.3 % (ref 0–1.5)
BH BB BLOOD EXPIRATION DATE: NORMAL
BH BB BLOOD EXPIRATION DATE: NORMAL
BH BB BLOOD TYPE BARCODE: 9500
BH BB BLOOD TYPE BARCODE: 9500
BH BB DISPENSE STATUS: NORMAL
BH BB DISPENSE STATUS: NORMAL
BH BB PRODUCT CODE: NORMAL
BH BB PRODUCT CODE: NORMAL
BH BB UNIT NUMBER: NORMAL
BH BB UNIT NUMBER: NORMAL
BILIRUB SERPL-MCNC: 0.2 MG/DL (ref 0.2–1.2)
BILIRUB SERPL-MCNC: 0.3 MG/DL (ref 0.2–1.2)
BILIRUB SERPL-MCNC: 0.3 MG/DL (ref 0.2–1.2)
BILIRUB SERPL-MCNC: 0.4 MG/DL (ref 0.2–1.2)
BILIRUB SERPL-MCNC: 0.5 MG/DL (ref 0.2–1.2)
BILIRUB UR QL STRIP: NEGATIVE
BLD GP AB SCN SERPL QL: NEGATIVE
BLD GP AB SCN SERPL QL: NEGATIVE
BUN BLD-MCNC: 10 MG/DL (ref 6–20)
BUN BLD-MCNC: 11 MG/DL (ref 6–20)
BUN BLD-MCNC: 11 MG/DL (ref 6–20)
BUN BLD-MCNC: 13 MG/DL (ref 6–20)
BUN BLD-MCNC: 14 MG/DL (ref 6–20)
BUN BLD-MCNC: 15 MG/DL (ref 6–20)
BUN BLD-MCNC: 15 MG/DL (ref 6–20)
BUN BLD-MCNC: 17 MG/DL (ref 6–20)
BUN BLD-MCNC: 18 MG/DL (ref 6–20)
BUN BLD-MCNC: 19 MG/DL (ref 6–20)
BUN BLD-MCNC: 20 MG/DL (ref 6–20)
BUN BLD-MCNC: 22 MG/DL (ref 8–23)
BUN BLD-MCNC: 23 MG/DL (ref 6–20)
BUN BLD-MCNC: 9 MG/DL (ref 6–20)
BUN/CREAT SERPL: 10.6 (ref 7.3–30)
BUN/CREAT SERPL: 11.2 (ref 7.3–30)
BUN/CREAT SERPL: 11.5 (ref 7.3–30)
BUN/CREAT SERPL: 11.9 (ref 7.3–30)
BUN/CREAT SERPL: 13.7 (ref 7.3–30)
BUN/CREAT SERPL: 14.3 (ref 7.3–30)
BUN/CREAT SERPL: 15 (ref 7.3–30)
BUN/CREAT SERPL: 16.2 (ref 7.3–30)
BUN/CREAT SERPL: 20.4 (ref 7.3–30)
BUN/CREAT SERPL: 22.4 (ref 7.3–30)
BUN/CREAT SERPL: 22.5 (ref 7.3–30)
BUN/CREAT SERPL: 25.9 (ref 7–25)
BUN/CREAT SERPL: 9.4 (ref 7.3–30)
BUN/CREAT SERPL: 9.8 (ref 7.3–30)
CALCIUM SPEC-SCNC: 8.6 MG/DL (ref 8.5–10.2)
CALCIUM SPEC-SCNC: 8.7 MG/DL (ref 8.5–10.2)
CALCIUM SPEC-SCNC: 8.7 MG/DL (ref 8.6–10.5)
CALCIUM SPEC-SCNC: 8.8 MG/DL (ref 8.5–10.2)
CALCIUM SPEC-SCNC: 8.9 MG/DL (ref 8.5–10.2)
CALCIUM SPEC-SCNC: 8.9 MG/DL (ref 8.5–10.2)
CALCIUM SPEC-SCNC: 9 MG/DL (ref 8.5–10.2)
CALCIUM SPEC-SCNC: 9.1 MG/DL (ref 8.5–10.2)
CALCIUM SPEC-SCNC: 9.1 MG/DL (ref 8.5–10.2)
CALCIUM SPEC-SCNC: 9.2 MG/DL (ref 8.5–10.2)
CALCIUM SPEC-SCNC: 9.3 MG/DL (ref 8.5–10.2)
CALCIUM SPEC-SCNC: 9.4 MG/DL (ref 8.5–10.2)
CALCIUM SPEC-SCNC: 9.5 MG/DL (ref 8.5–10.2)
CHLORIDE SERPL-SCNC: 100 MMOL/L (ref 98–107)
CHLORIDE SERPL-SCNC: 102 MMOL/L (ref 98–107)
CHLORIDE SERPL-SCNC: 102 MMOL/L (ref 98–107)
CHLORIDE SERPL-SCNC: 103 MMOL/L (ref 98–107)
CHLORIDE SERPL-SCNC: 104 MMOL/L (ref 98–107)
CHLORIDE SERPL-SCNC: 104 MMOL/L (ref 98–107)
CHLORIDE SERPL-SCNC: 105 MMOL/L (ref 98–107)
CHLORIDE SERPL-SCNC: 106 MMOL/L (ref 98–107)
CHLORIDE SERPL-SCNC: 106 MMOL/L (ref 98–107)
CHLORIDE SERPL-SCNC: 107 MMOL/L (ref 98–107)
CHLORIDE SERPL-SCNC: 97 MMOL/L (ref 98–107)
CHLORIDE SERPL-SCNC: 97 MMOL/L (ref 98–107)
CLARITY UR: CLEAR
CO2 SERPL-SCNC: 23.4 MMOL/L (ref 22–29)
CO2 SERPL-SCNC: 26.5 MMOL/L (ref 22–29)
CO2 SERPL-SCNC: 26.7 MMOL/L (ref 22–29)
CO2 SERPL-SCNC: 26.7 MMOL/L (ref 22–29)
CO2 SERPL-SCNC: 26.9 MMOL/L (ref 22–29)
CO2 SERPL-SCNC: 26.9 MMOL/L (ref 22–29)
CO2 SERPL-SCNC: 27.2 MMOL/L (ref 22–29)
CO2 SERPL-SCNC: 27.2 MMOL/L (ref 22–29)
CO2 SERPL-SCNC: 27.3 MMOL/L (ref 22–29)
CO2 SERPL-SCNC: 27.5 MMOL/L (ref 22–29)
CO2 SERPL-SCNC: 27.9 MMOL/L (ref 22–29)
CO2 SERPL-SCNC: 27.9 MMOL/L (ref 22–29)
CO2 SERPL-SCNC: 28.1 MMOL/L (ref 22–29)
CO2 SERPL-SCNC: 28.4 MMOL/L (ref 22–29)
CO2 SERPL-SCNC: 29 MMOL/L (ref 22–29)
CO2 SERPL-SCNC: 29.1 MMOL/L (ref 22–29)
COLOR UR: YELLOW
CREAT BLD-MCNC: 0.85 MG/DL (ref 0.57–1)
CREAT BLD-MCNC: 0.85 MG/DL (ref 0.6–1.1)
CREAT BLD-MCNC: 0.91 MG/DL (ref 0.6–1.1)
CREAT BLD-MCNC: 0.92 MG/DL (ref 0.6–1.1)
CREAT BLD-MCNC: 0.96 MG/DL (ref 0.6–1.1)
CREAT BLD-MCNC: 0.98 MG/DL (ref 0.6–1.1)
CREAT BLD-MCNC: 1 MG/DL (ref 0.6–1.1)
CREAT BLD-MCNC: 1.02 MG/DL (ref 0.6–1.1)
CREAT BLD-MCNC: 1.02 MG/DL (ref 0.6–1.1)
CREAT BLD-MCNC: 1.05 MG/DL (ref 0.6–1.1)
CREAT BLD-MCNC: 1.06 MG/DL (ref 0.6–1.1)
CREAT BLD-MCNC: 1.11 MG/DL (ref 0.6–1.1)
CREAT BLD-MCNC: 1.32 MG/DL (ref 0.6–1.1)
CREAT BLD-MCNC: 1.43 MG/DL (ref 0.6–1.1)
CREAT BLDA-MCNC: 0.8 MG/DL (ref 0.6–1.3)
CREAT BLDA-MCNC: 0.9 MG/DL (ref 0.6–1.3)
CREAT BLDA-MCNC: 1 MG/DL (ref 0.6–1.3)
DEPRECATED RDW RBC AUTO: 42.6 FL (ref 37–54)
DEPRECATED RDW RBC AUTO: 43.2 FL (ref 37–54)
DEPRECATED RDW RBC AUTO: 43.8 FL (ref 37–54)
DEPRECATED RDW RBC AUTO: 43.8 FL (ref 37–54)
DEPRECATED RDW RBC AUTO: 44 FL (ref 37–54)
DEPRECATED RDW RBC AUTO: 44.1 FL (ref 37–54)
DEPRECATED RDW RBC AUTO: 44.5 FL (ref 37–54)
DEPRECATED RDW RBC AUTO: 45 FL (ref 37–54)
DEPRECATED RDW RBC AUTO: 45.1 FL (ref 37–54)
DEPRECATED RDW RBC AUTO: 45.8 FL (ref 37–54)
DEPRECATED RDW RBC AUTO: 46.1 FL (ref 37–54)
DEPRECATED RDW RBC AUTO: 47.6 FL (ref 37–54)
DEPRECATED RDW RBC AUTO: 48.3 FL (ref 37–54)
DEPRECATED RDW RBC AUTO: 48.8 FL (ref 37–54)
DEPRECATED RDW RBC AUTO: 50.2 FL (ref 37–54)
DEPRECATED RDW RBC AUTO: 52.9 FL (ref 37–54)
DEPRECATED RDW RBC AUTO: 54.3 FL (ref 37–54)
DEPRECATED RDW RBC AUTO: 55.9 FL (ref 37–54)
DEPRECATED RDW RBC AUTO: 56.1 FL (ref 37–54)
DEPRECATED RDW RBC AUTO: 62.9 FL (ref 37–54)
EOSINOPHIL # BLD AUTO: 0 10*3/MM3 (ref 0–0.4)
EOSINOPHIL # BLD AUTO: 0.02 10*3/MM3 (ref 0–0.4)
EOSINOPHIL NFR BLD AUTO: 0 % (ref 0.3–6.2)
EOSINOPHIL NFR BLD AUTO: 0.2 % (ref 0.3–6.2)
ERYTHROCYTE [DISTWIDTH] IN BLOOD BY AUTOMATED COUNT: 12.9 % (ref 12.3–15.4)
ERYTHROCYTE [DISTWIDTH] IN BLOOD BY AUTOMATED COUNT: 12.9 % (ref 12.3–15.4)
ERYTHROCYTE [DISTWIDTH] IN BLOOD BY AUTOMATED COUNT: 13 % (ref 12.3–15.4)
ERYTHROCYTE [DISTWIDTH] IN BLOOD BY AUTOMATED COUNT: 13.2 % (ref 12.3–15.4)
ERYTHROCYTE [DISTWIDTH] IN BLOOD BY AUTOMATED COUNT: 13.6 % (ref 12.3–15.4)
ERYTHROCYTE [DISTWIDTH] IN BLOOD BY AUTOMATED COUNT: 14 % (ref 12.3–15.4)
ERYTHROCYTE [DISTWIDTH] IN BLOOD BY AUTOMATED COUNT: 14 % (ref 12.3–15.4)
ERYTHROCYTE [DISTWIDTH] IN BLOOD BY AUTOMATED COUNT: 14.1 % (ref 12.3–15.4)
ERYTHROCYTE [DISTWIDTH] IN BLOOD BY AUTOMATED COUNT: 14.6 % (ref 12.3–15.4)
ERYTHROCYTE [DISTWIDTH] IN BLOOD BY AUTOMATED COUNT: 14.8 % (ref 12.3–15.4)
ERYTHROCYTE [DISTWIDTH] IN BLOOD BY AUTOMATED COUNT: 14.9 % (ref 12.3–15.4)
ERYTHROCYTE [DISTWIDTH] IN BLOOD BY AUTOMATED COUNT: 15 % (ref 12.3–15.4)
ERYTHROCYTE [DISTWIDTH] IN BLOOD BY AUTOMATED COUNT: 15.2 % (ref 12.3–15.4)
ERYTHROCYTE [DISTWIDTH] IN BLOOD BY AUTOMATED COUNT: 16.1 % (ref 12.3–15.4)
ERYTHROCYTE [DISTWIDTH] IN BLOOD BY AUTOMATED COUNT: 16.5 % (ref 12.3–15.4)
ERYTHROCYTE [DISTWIDTH] IN BLOOD BY AUTOMATED COUNT: 17.2 % (ref 12.3–15.4)
ERYTHROCYTE [DISTWIDTH] IN BLOOD BY AUTOMATED COUNT: 17.3 % (ref 12.3–15.4)
GFR SERPL CREATININE-BSD FRML MDRD: 36 ML/MIN/1.73
GFR SERPL CREATININE-BSD FRML MDRD: 40 ML/MIN/1.73
GFR SERPL CREATININE-BSD FRML MDRD: 48 ML/MIN/1.73
GFR SERPL CREATININE-BSD FRML MDRD: 51 ML/MIN/1.73
GFR SERPL CREATININE-BSD FRML MDRD: 52 ML/MIN/1.73
GFR SERPL CREATININE-BSD FRML MDRD: 53 ML/MIN/1.73
GFR SERPL CREATININE-BSD FRML MDRD: 53 ML/MIN/1.73
GFR SERPL CREATININE-BSD FRML MDRD: 55 ML/MIN/1.73
GFR SERPL CREATININE-BSD FRML MDRD: 56 ML/MIN/1.73
GFR SERPL CREATININE-BSD FRML MDRD: 57 ML/MIN/1.73
GFR SERPL CREATININE-BSD FRML MDRD: 60 ML/MIN/1.73
GFR SERPL CREATININE-BSD FRML MDRD: 61 ML/MIN/1.73
GFR SERPL CREATININE-BSD FRML MDRD: 66 ML/MIN/1.73
GFR SERPL CREATININE-BSD FRML MDRD: 66 ML/MIN/1.73
GLOBULIN UR ELPH-MCNC: 2.4 GM/DL (ref 1.8–3.5)
GLOBULIN UR ELPH-MCNC: 2.5 GM/DL (ref 1.8–3.5)
GLOBULIN UR ELPH-MCNC: 2.6 GM/DL (ref 1.8–3.5)
GLOBULIN UR ELPH-MCNC: 2.6 GM/DL (ref 1.8–3.5)
GLOBULIN UR ELPH-MCNC: 2.7 GM/DL (ref 1.8–3.5)
GLOBULIN UR ELPH-MCNC: 2.8 GM/DL (ref 1.8–3.5)
GLOBULIN UR ELPH-MCNC: 2.9 GM/DL (ref 1.8–3.5)
GLOBULIN UR ELPH-MCNC: 2.9 GM/DL (ref 1.8–3.5)
GLUCOSE BLD-MCNC: 103 MG/DL (ref 74–124)
GLUCOSE BLD-MCNC: 103 MG/DL (ref 74–124)
GLUCOSE BLD-MCNC: 106 MG/DL (ref 74–124)
GLUCOSE BLD-MCNC: 109 MG/DL (ref 74–124)
GLUCOSE BLD-MCNC: 109 MG/DL (ref 74–124)
GLUCOSE BLD-MCNC: 113 MG/DL (ref 74–124)
GLUCOSE BLD-MCNC: 115 MG/DL (ref 65–99)
GLUCOSE BLD-MCNC: 122 MG/DL (ref 74–124)
GLUCOSE BLD-MCNC: 77 MG/DL (ref 74–124)
GLUCOSE BLD-MCNC: 89 MG/DL (ref 74–124)
GLUCOSE BLD-MCNC: 89 MG/DL (ref 74–124)
GLUCOSE BLD-MCNC: 94 MG/DL (ref 74–124)
GLUCOSE BLD-MCNC: 97 MG/DL (ref 74–124)
GLUCOSE BLD-MCNC: 98 MG/DL (ref 74–124)
GLUCOSE BLDC GLUCOMTR-MCNC: 90 MG/DL (ref 70–130)
GLUCOSE UR STRIP-MCNC: NEGATIVE MG/DL
GRAN CASTS URNS QL MICRO: ABNORMAL /LPF
HCT VFR BLD AUTO: 23.5 % (ref 34–46.6)
HCT VFR BLD AUTO: 24 % (ref 34–46.6)
HCT VFR BLD AUTO: 26.1 % (ref 34–46.6)
HCT VFR BLD AUTO: 26.5 % (ref 34–46.6)
HCT VFR BLD AUTO: 26.5 % (ref 34–46.6)
HCT VFR BLD AUTO: 27.3 % (ref 34–46.6)
HCT VFR BLD AUTO: 27.4 % (ref 34–46.6)
HCT VFR BLD AUTO: 27.8 % (ref 34–46.6)
HCT VFR BLD AUTO: 28.2 % (ref 34–46.6)
HCT VFR BLD AUTO: 29.4 % (ref 34–46.6)
HCT VFR BLD AUTO: 30.6 % (ref 34–46.6)
HCT VFR BLD AUTO: 31.2 % (ref 34–46.6)
HCT VFR BLD AUTO: 31.7 % (ref 34–46.6)
HCT VFR BLD AUTO: 32.6 % (ref 34–46.6)
HCT VFR BLD AUTO: 33 % (ref 34–46.6)
HCT VFR BLD AUTO: 33.4 % (ref 34–46.6)
HCT VFR BLD AUTO: 34.2 % (ref 34–46.6)
HCT VFR BLD AUTO: 38.7 % (ref 34–46.6)
HCT VFR BLD AUTO: 39.2 % (ref 34–46.6)
HCT VFR BLD AUTO: 41.8 % (ref 34–46.6)
HGB BLD-MCNC: 10 G/DL (ref 12–15.9)
HGB BLD-MCNC: 10.4 G/DL (ref 12–15.9)
HGB BLD-MCNC: 10.7 G/DL (ref 12–15.9)
HGB BLD-MCNC: 11.1 G/DL (ref 12–15.9)
HGB BLD-MCNC: 11.2 G/DL (ref 12–15.9)
HGB BLD-MCNC: 11.2 G/DL (ref 12–15.9)
HGB BLD-MCNC: 12.5 G/DL (ref 12–15.9)
HGB BLD-MCNC: 12.7 G/DL (ref 12–15.9)
HGB BLD-MCNC: 13.7 G/DL (ref 12–15.9)
HGB BLD-MCNC: 7.8 G/DL (ref 12–15.9)
HGB BLD-MCNC: 8.1 G/DL (ref 12–15.9)
HGB BLD-MCNC: 8.5 G/DL (ref 12–15.9)
HGB BLD-MCNC: 8.8 G/DL (ref 12–15.9)
HGB BLD-MCNC: 9 G/DL (ref 12–15.9)
HGB BLD-MCNC: 9.2 G/DL (ref 12–15.9)
HGB BLD-MCNC: 9.2 G/DL (ref 12–15.9)
HGB BLD-MCNC: 9.4 G/DL (ref 12–15.9)
HGB BLD-MCNC: 9.5 G/DL (ref 12–15.9)
HGB BLD-MCNC: 9.6 G/DL (ref 12–15.9)
HGB BLD-MCNC: 9.8 G/DL (ref 12–15.9)
HGB UR QL STRIP.AUTO: NEGATIVE
HYALINE CASTS UR QL AUTO: ABNORMAL /LPF
IMM GRANULOCYTES # BLD AUTO: 0 10*3/MM3 (ref 0–0.05)
IMM GRANULOCYTES # BLD AUTO: 0.01 10*3/MM3 (ref 0–0.05)
IMM GRANULOCYTES # BLD AUTO: 0.03 10*3/MM3 (ref 0–0.05)
IMM GRANULOCYTES # BLD AUTO: 0.03 10*3/MM3 (ref 0–0.05)
IMM GRANULOCYTES # BLD AUTO: 0.04 10*3/MM3 (ref 0–0.05)
IMM GRANULOCYTES # BLD AUTO: 0.06 10*3/MM3 (ref 0–0.05)
IMM GRANULOCYTES # BLD AUTO: 0.07 10*3/MM3 (ref 0–0.05)
IMM GRANULOCYTES # BLD AUTO: 0.08 10*3/MM3 (ref 0–0.05)
IMM GRANULOCYTES # BLD AUTO: 0.09 10*3/MM3 (ref 0–0.05)
IMM GRANULOCYTES # BLD AUTO: 0.11 10*3/MM3 (ref 0–0.05)
IMM GRANULOCYTES # BLD AUTO: 0.11 10*3/MM3 (ref 0–0.05)
IMM GRANULOCYTES # BLD AUTO: 0.12 10*3/MM3 (ref 0–0.05)
IMM GRANULOCYTES # BLD AUTO: 1.24 10*3/MM3 (ref 0–0.05)
IMM GRANULOCYTES # BLD AUTO: 1.26 10*3/MM3 (ref 0–0.05)
IMM GRANULOCYTES # BLD AUTO: 3.3 10*3/MM3 (ref 0–0.05)
IMM GRANULOCYTES NFR BLD AUTO: 0 % (ref 0–0.5)
IMM GRANULOCYTES NFR BLD AUTO: 0.3 % (ref 0–0.5)
IMM GRANULOCYTES NFR BLD AUTO: 0.4 % (ref 0–0.5)
IMM GRANULOCYTES NFR BLD AUTO: 0.4 % (ref 0–0.5)
IMM GRANULOCYTES NFR BLD AUTO: 0.7 % (ref 0–0.5)
IMM GRANULOCYTES NFR BLD AUTO: 0.8 % (ref 0–0.5)
IMM GRANULOCYTES NFR BLD AUTO: 0.9 % (ref 0–0.5)
IMM GRANULOCYTES NFR BLD AUTO: 1.1 % (ref 0–0.5)
IMM GRANULOCYTES NFR BLD AUTO: 1.3 % (ref 0–0.5)
IMM GRANULOCYTES NFR BLD AUTO: 1.4 % (ref 0–0.5)
IMM GRANULOCYTES NFR BLD AUTO: 1.6 % (ref 0–0.5)
IMM GRANULOCYTES NFR BLD AUTO: 10.8 % (ref 0–0.5)
IMM GRANULOCYTES NFR BLD AUTO: 15.1 % (ref 0–0.5)
IMM GRANULOCYTES NFR BLD AUTO: 2.3 % (ref 0–0.5)
IMM GRANULOCYTES NFR BLD AUTO: 20.7 % (ref 0–0.5)
IMM GRANULOCYTES NFR BLD AUTO: 3.9 % (ref 0–0.5)
IMM GRANULOCYTES NFR BLD AUTO: 4.6 % (ref 0–0.5)
INR PPP: 1.01 (ref 0.9–1.1)
KETONES UR QL STRIP: NEGATIVE
LEUKOCYTE ESTERASE UR QL STRIP.AUTO: ABNORMAL
LIPASE SERPL-CCNC: 46 U/L (ref 13–60)
LYMPHOCYTES # BLD AUTO: 0.19 10*3/MM3 (ref 0.7–3.1)
LYMPHOCYTES # BLD AUTO: 0.36 10*3/MM3 (ref 0.7–3.1)
LYMPHOCYTES # BLD AUTO: 0.47 10*3/MM3 (ref 0.7–3.1)
LYMPHOCYTES # BLD AUTO: 0.47 10*3/MM3 (ref 0.7–3.1)
LYMPHOCYTES # BLD AUTO: 0.53 10*3/MM3 (ref 0.7–3.1)
LYMPHOCYTES # BLD AUTO: 0.54 10*3/MM3 (ref 0.7–3.1)
LYMPHOCYTES # BLD AUTO: 0.55 10*3/MM3 (ref 0.7–3.1)
LYMPHOCYTES # BLD AUTO: 0.59 10*3/MM3 (ref 0.7–3.1)
LYMPHOCYTES # BLD AUTO: 0.6 10*3/MM3 (ref 0.7–3.1)
LYMPHOCYTES # BLD AUTO: 0.61 10*3/MM3 (ref 0.7–3.1)
LYMPHOCYTES # BLD AUTO: 0.62 10*3/MM3 (ref 0.7–3.1)
LYMPHOCYTES # BLD AUTO: 0.67 10*3/MM3 (ref 0.7–3.1)
LYMPHOCYTES # BLD AUTO: 0.73 10*3/MM3 (ref 0.7–3.1)
LYMPHOCYTES # BLD AUTO: 0.83 10*3/MM3 (ref 0.7–3.1)
LYMPHOCYTES # BLD AUTO: 0.88 10*3/MM3 (ref 0.7–3.1)
LYMPHOCYTES # BLD AUTO: 0.92 10*3/MM3 (ref 0.7–3.1)
LYMPHOCYTES # BLD AUTO: 1.05 10*3/MM3 (ref 0.7–3.1)
LYMPHOCYTES # BLD AUTO: 1.43 10*3/MM3 (ref 0.7–3.1)
LYMPHOCYTES # BLD AUTO: 1.51 10*3/MM3 (ref 0.7–3.1)
LYMPHOCYTES NFR BLD AUTO: 10.3 % (ref 19.6–45.3)
LYMPHOCYTES NFR BLD AUTO: 10.7 % (ref 19.6–45.3)
LYMPHOCYTES NFR BLD AUTO: 11.3 % (ref 19.6–45.3)
LYMPHOCYTES NFR BLD AUTO: 12.9 % (ref 19.6–45.3)
LYMPHOCYTES NFR BLD AUTO: 14.7 % (ref 19.6–45.3)
LYMPHOCYTES NFR BLD AUTO: 14.9 % (ref 19.6–45.3)
LYMPHOCYTES NFR BLD AUTO: 15.2 % (ref 19.6–45.3)
LYMPHOCYTES NFR BLD AUTO: 15.2 % (ref 19.6–45.3)
LYMPHOCYTES NFR BLD AUTO: 15.4 % (ref 19.6–45.3)
LYMPHOCYTES NFR BLD AUTO: 16.6 % (ref 19.6–45.3)
LYMPHOCYTES NFR BLD AUTO: 17 % (ref 19.6–45.3)
LYMPHOCYTES NFR BLD AUTO: 19.6 % (ref 19.6–45.3)
LYMPHOCYTES NFR BLD AUTO: 20.5 % (ref 19.6–45.3)
LYMPHOCYTES NFR BLD AUTO: 21.8 % (ref 19.6–45.3)
LYMPHOCYTES NFR BLD AUTO: 22.8 % (ref 19.6–45.3)
LYMPHOCYTES NFR BLD AUTO: 26 % (ref 19.6–45.3)
LYMPHOCYTES NFR BLD AUTO: 43.9 % (ref 19.6–45.3)
LYMPHOCYTES NFR BLD AUTO: 44.1 % (ref 19.6–45.3)
LYMPHOCYTES NFR BLD AUTO: 8.9 % (ref 19.6–45.3)
MCH RBC QN AUTO: 28.7 PG (ref 26.6–33)
MCH RBC QN AUTO: 29.3 PG (ref 26.6–33)
MCH RBC QN AUTO: 29.5 PG (ref 26.6–33)
MCH RBC QN AUTO: 30 PG (ref 26.6–33)
MCH RBC QN AUTO: 30.2 PG (ref 26.6–33)
MCH RBC QN AUTO: 30.3 PG (ref 26.6–33)
MCH RBC QN AUTO: 30.4 PG (ref 26.6–33)
MCH RBC QN AUTO: 30.6 PG (ref 26.6–33)
MCH RBC QN AUTO: 30.8 PG (ref 26.6–33)
MCH RBC QN AUTO: 30.8 PG (ref 26.6–33)
MCH RBC QN AUTO: 30.9 PG (ref 26.6–33)
MCH RBC QN AUTO: 31 PG (ref 26.6–33)
MCH RBC QN AUTO: 31.7 PG (ref 26.6–33)
MCH RBC QN AUTO: 32.4 PG (ref 26.6–33)
MCH RBC QN AUTO: 32.6 PG (ref 26.6–33)
MCHC RBC AUTO-ENTMCNC: 31.4 G/DL (ref 31.5–35.7)
MCHC RBC AUTO-ENTMCNC: 31.9 G/DL (ref 31.5–35.7)
MCHC RBC AUTO-ENTMCNC: 31.9 G/DL (ref 31.5–35.7)
MCHC RBC AUTO-ENTMCNC: 32.1 G/DL (ref 31.5–35.7)
MCHC RBC AUTO-ENTMCNC: 32.3 G/DL (ref 31.5–35.7)
MCHC RBC AUTO-ENTMCNC: 32.4 G/DL (ref 31.5–35.7)
MCHC RBC AUTO-ENTMCNC: 32.6 G/DL (ref 31.5–35.7)
MCHC RBC AUTO-ENTMCNC: 32.7 G/DL (ref 31.5–35.7)
MCHC RBC AUTO-ENTMCNC: 32.7 G/DL (ref 31.5–35.7)
MCHC RBC AUTO-ENTMCNC: 32.8 G/DL (ref 31.5–35.7)
MCHC RBC AUTO-ENTMCNC: 32.8 G/DL (ref 31.5–35.7)
MCHC RBC AUTO-ENTMCNC: 33.2 G/DL (ref 31.5–35.7)
MCHC RBC AUTO-ENTMCNC: 33.5 G/DL (ref 31.5–35.7)
MCHC RBC AUTO-ENTMCNC: 33.7 G/DL (ref 31.5–35.7)
MCHC RBC AUTO-ENTMCNC: 33.7 G/DL (ref 31.5–35.7)
MCHC RBC AUTO-ENTMCNC: 33.8 G/DL (ref 31.5–35.7)
MCHC RBC AUTO-ENTMCNC: 34 G/DL (ref 31.5–35.7)
MCHC RBC AUTO-ENTMCNC: 34.3 G/DL (ref 31.5–35.7)
MCHC RBC AUTO-ENTMCNC: 34.5 G/DL (ref 31.5–35.7)
MCHC RBC AUTO-ENTMCNC: 35 G/DL (ref 31.5–35.7)
MCV RBC AUTO: 101.1 FL (ref 79–97)
MCV RBC AUTO: 86.9 FL (ref 79–97)
MCV RBC AUTO: 87.5 FL (ref 79–97)
MCV RBC AUTO: 88.3 FL (ref 79–97)
MCV RBC AUTO: 90.1 FL (ref 79–97)
MCV RBC AUTO: 90.1 FL (ref 79–97)
MCV RBC AUTO: 90.8 FL (ref 79–97)
MCV RBC AUTO: 90.9 FL (ref 79–97)
MCV RBC AUTO: 91.3 FL (ref 79–97)
MCV RBC AUTO: 91.5 FL (ref 79–97)
MCV RBC AUTO: 91.9 FL (ref 79–97)
MCV RBC AUTO: 92.3 FL (ref 79–97)
MCV RBC AUTO: 92.7 FL (ref 79–97)
MCV RBC AUTO: 92.7 FL (ref 79–97)
MCV RBC AUTO: 92.9 FL (ref 79–97)
MCV RBC AUTO: 93.4 FL (ref 79–97)
MCV RBC AUTO: 93.9 FL (ref 79–97)
MCV RBC AUTO: 93.9 FL (ref 79–97)
MCV RBC AUTO: 96.7 FL (ref 79–97)
MCV RBC AUTO: 97.2 FL (ref 79–97)
MONOCYTES # BLD AUTO: 0.08 10*3/MM3 (ref 0.1–0.9)
MONOCYTES # BLD AUTO: 0.24 10*3/MM3 (ref 0.1–0.9)
MONOCYTES # BLD AUTO: 0.25 10*3/MM3 (ref 0.1–0.9)
MONOCYTES # BLD AUTO: 0.29 10*3/MM3 (ref 0.1–0.9)
MONOCYTES # BLD AUTO: 0.3 10*3/MM3 (ref 0.1–0.9)
MONOCYTES # BLD AUTO: 0.32 10*3/MM3 (ref 0.1–0.9)
MONOCYTES # BLD AUTO: 0.35 10*3/MM3 (ref 0.1–0.9)
MONOCYTES # BLD AUTO: 0.35 10*3/MM3 (ref 0.1–0.9)
MONOCYTES # BLD AUTO: 0.37 10*3/MM3 (ref 0.1–0.9)
MONOCYTES # BLD AUTO: 0.41 10*3/MM3 (ref 0.1–0.9)
MONOCYTES # BLD AUTO: 0.42 10*3/MM3 (ref 0.1–0.9)
MONOCYTES # BLD AUTO: 0.42 10*3/MM3 (ref 0.1–0.9)
MONOCYTES # BLD AUTO: 0.43 10*3/MM3 (ref 0.1–0.9)
MONOCYTES # BLD AUTO: 0.45 10*3/MM3 (ref 0.1–0.9)
MONOCYTES # BLD AUTO: 0.53 10*3/MM3 (ref 0.1–0.9)
MONOCYTES # BLD AUTO: 0.66 10*3/MM3 (ref 0.1–0.9)
MONOCYTES # BLD AUTO: 0.67 10*3/MM3 (ref 0.1–0.9)
MONOCYTES # BLD AUTO: 0.93 10*3/MM3 (ref 0.1–0.9)
MONOCYTES # BLD AUTO: 1 10*3/MM3 (ref 0.1–0.9)
MONOCYTES NFR BLD AUTO: 10.3 % (ref 5–12)
MONOCYTES NFR BLD AUTO: 11 % (ref 5–12)
MONOCYTES NFR BLD AUTO: 11.7 % (ref 5–12)
MONOCYTES NFR BLD AUTO: 12 % (ref 5–12)
MONOCYTES NFR BLD AUTO: 13.2 % (ref 5–12)
MONOCYTES NFR BLD AUTO: 19 % (ref 5–12)
MONOCYTES NFR BLD AUTO: 2.5 % (ref 5–12)
MONOCYTES NFR BLD AUTO: 21.1 % (ref 5–12)
MONOCYTES NFR BLD AUTO: 23.3 % (ref 5–12)
MONOCYTES NFR BLD AUTO: 25.2 % (ref 5–12)
MONOCYTES NFR BLD AUTO: 27.6 % (ref 5–12)
MONOCYTES NFR BLD AUTO: 4.6 % (ref 5–12)
MONOCYTES NFR BLD AUTO: 6.1 % (ref 5–12)
MONOCYTES NFR BLD AUTO: 6.3 % (ref 5–12)
MONOCYTES NFR BLD AUTO: 7.2 % (ref 5–12)
MONOCYTES NFR BLD AUTO: 7.9 % (ref 5–12)
MONOCYTES NFR BLD AUTO: 8.2 % (ref 5–12)
MONOCYTES NFR BLD AUTO: 9.3 % (ref 5–12)
MONOCYTES NFR BLD AUTO: 9.4 % (ref 5–12)
NEUTROPHILS # BLD AUTO: 0.41 10*3/MM3 (ref 1.7–7)
NEUTROPHILS # BLD AUTO: 0.42 10*3/MM3 (ref 1.7–7)
NEUTROPHILS # BLD AUTO: 0.51 10*3/MM3 (ref 1.7–7)
NEUTROPHILS # BLD AUTO: 1.33 10*3/MM3 (ref 1.7–7)
NEUTROPHILS # BLD AUTO: 1.44 10*3/MM3 (ref 1.7–7)
NEUTROPHILS # BLD AUTO: 1.58 10*3/MM3 (ref 1.7–7)
NEUTROPHILS # BLD AUTO: 1.7 10*3/MM3 (ref 1.7–7)
NEUTROPHILS # BLD AUTO: 1.85 10*3/MM3 (ref 1.7–7)
NEUTROPHILS # BLD AUTO: 10.19 10*3/MM3 (ref 1.7–7)
NEUTROPHILS # BLD AUTO: 2.2 10*3/MM3 (ref 1.7–7)
NEUTROPHILS # BLD AUTO: 2.44 10*3/MM3 (ref 1.7–7)
NEUTROPHILS # BLD AUTO: 2.58 10*3/MM3 (ref 1.7–7)
NEUTROPHILS # BLD AUTO: 2.59 10*3/MM3 (ref 1.7–7)
NEUTROPHILS # BLD AUTO: 3.03 10*3/MM3 (ref 1.7–7)
NEUTROPHILS # BLD AUTO: 5.23 10*3/MM3 (ref 1.7–7)
NEUTROPHILS # BLD AUTO: 5.31 10*3/MM3 (ref 1.7–7)
NEUTROPHILS # BLD AUTO: 5.81 10*3/MM3 (ref 1.7–7)
NEUTROPHILS # BLD AUTO: 7.49 10*3/MM3 (ref 1.7–7)
NEUTROPHILS # BLD AUTO: 7.91 10*3/MM3 (ref 1.7–7)
NEUTROPHILS NFR BLD AUTO: 29.5 % (ref 42.7–76)
NEUTROPHILS NFR BLD AUTO: 33.5 % (ref 42.7–76)
NEUTROPHILS NFR BLD AUTO: 48.4 % (ref 42.7–76)
NEUTROPHILS NFR BLD AUTO: 55.8 % (ref 42.7–76)
NEUTROPHILS NFR BLD AUTO: 58.6 % (ref 42.7–76)
NEUTROPHILS NFR BLD AUTO: 60.8 % (ref 42.7–76)
NEUTROPHILS NFR BLD AUTO: 63.7 % (ref 42.7–76)
NEUTROPHILS NFR BLD AUTO: 64.7 % (ref 42.7–76)
NEUTROPHILS NFR BLD AUTO: 64.9 % (ref 42.7–76)
NEUTROPHILS NFR BLD AUTO: 67.5 % (ref 42.7–76)
NEUTROPHILS NFR BLD AUTO: 69.1 % (ref 42.7–76)
NEUTROPHILS NFR BLD AUTO: 69.5 % (ref 42.7–76)
NEUTROPHILS NFR BLD AUTO: 69.6 % (ref 42.7–76)
NEUTROPHILS NFR BLD AUTO: 69.9 % (ref 42.7–76)
NEUTROPHILS NFR BLD AUTO: 72.9 % (ref 42.7–76)
NEUTROPHILS NFR BLD AUTO: 76.6 % (ref 42.7–76)
NEUTROPHILS NFR BLD AUTO: 79.5 % (ref 42.7–76)
NEUTROPHILS NFR BLD AUTO: 80.6 % (ref 42.7–76)
NEUTROPHILS NFR BLD AUTO: 84 % (ref 42.7–76)
NITRITE UR QL STRIP: NEGATIVE
NRBC BLD AUTO-RTO: 0 /100 WBC (ref 0–0.2)
NRBC BLD AUTO-RTO: 0.2 /100 WBC (ref 0–0.2)
NRBC BLD AUTO-RTO: 0.3 /100 WBC (ref 0–0.2)
NRBC BLD AUTO-RTO: 0.4 /100 WBC (ref 0–0.2)
PH UR STRIP.AUTO: <=5 [PH] (ref 5–8)
PLATELET # BLD AUTO: 111 10*3/MM3 (ref 140–450)
PLATELET # BLD AUTO: 128 10*3/MM3 (ref 140–450)
PLATELET # BLD AUTO: 182 10*3/MM3 (ref 140–450)
PLATELET # BLD AUTO: 220 10*3/MM3 (ref 140–450)
PLATELET # BLD AUTO: 231 10*3/MM3 (ref 140–450)
PLATELET # BLD AUTO: 236 10*3/MM3 (ref 140–450)
PLATELET # BLD AUTO: 242 10*3/MM3 (ref 140–450)
PLATELET # BLD AUTO: 280 10*3/MM3 (ref 140–450)
PLATELET # BLD AUTO: 33 10*3/MM3 (ref 140–450)
PLATELET # BLD AUTO: 57 10*3/MM3 (ref 140–450)
PLATELET # BLD AUTO: 58 10*3/MM3 (ref 140–450)
PLATELET # BLD AUTO: 59 10*3/MM3 (ref 140–450)
PLATELET # BLD AUTO: 61 10*3/MM3 (ref 140–450)
PLATELET # BLD AUTO: 63 10*3/MM3 (ref 140–450)
PLATELET # BLD AUTO: 66 10*3/MM3 (ref 140–450)
PLATELET # BLD AUTO: 66 10*3/MM3 (ref 140–450)
PLATELET # BLD AUTO: 67 10*3/MM3 (ref 140–450)
PLATELET # BLD AUTO: 77 10*3/MM3 (ref 140–450)
PLATELET # BLD AUTO: 91 10*3/MM3 (ref 140–450)
PLATELET # BLD AUTO: 92 10*3/MM3 (ref 140–450)
PMV BLD AUTO: 10.1 FL (ref 6–12)
PMV BLD AUTO: 8.2 FL (ref 6–12)
PMV BLD AUTO: 8.4 FL (ref 6–12)
PMV BLD AUTO: 8.4 FL (ref 6–12)
PMV BLD AUTO: 8.5 FL (ref 6–12)
PMV BLD AUTO: 8.6 FL (ref 6–12)
PMV BLD AUTO: 8.7 FL (ref 6–12)
PMV BLD AUTO: 8.8 FL (ref 6–12)
PMV BLD AUTO: 8.9 FL (ref 6–12)
PMV BLD AUTO: 9 FL (ref 6–12)
PMV BLD AUTO: 9.1 FL (ref 6–12)
PMV BLD AUTO: 9.7 FL (ref 6–12)
PMV BLD AUTO: 9.8 FL (ref 6–12)
POTASSIUM BLD-SCNC: 3.6 MMOL/L (ref 3.5–4.7)
POTASSIUM BLD-SCNC: 3.7 MMOL/L (ref 3.5–4.7)
POTASSIUM BLD-SCNC: 3.9 MMOL/L (ref 3.5–4.7)
POTASSIUM BLD-SCNC: 4 MMOL/L (ref 3.5–4.7)
POTASSIUM BLD-SCNC: 4.1 MMOL/L (ref 3.5–4.7)
POTASSIUM BLD-SCNC: 4.1 MMOL/L (ref 3.5–5.2)
POTASSIUM BLD-SCNC: 4.4 MMOL/L (ref 3.5–4.7)
PROT SERPL-MCNC: 5.8 G/DL (ref 6.3–8)
PROT SERPL-MCNC: 6.1 G/DL (ref 6.3–8)
PROT SERPL-MCNC: 6.1 G/DL (ref 6.3–8)
PROT SERPL-MCNC: 6.2 G/DL (ref 6.3–8)
PROT SERPL-MCNC: 6.2 G/DL (ref 6.3–8)
PROT SERPL-MCNC: 6.3 G/DL (ref 6.3–8)
PROT SERPL-MCNC: 6.4 G/DL (ref 6.3–8)
PROT SERPL-MCNC: 6.5 G/DL (ref 6.3–8)
PROT SERPL-MCNC: 6.6 G/DL (ref 6.3–8)
PROT SERPL-MCNC: 6.6 G/DL (ref 6.3–8)
PROT SERPL-MCNC: 6.7 G/DL (ref 6.3–8)
PROT SERPL-MCNC: 6.7 G/DL (ref 6.3–8)
PROT UR QL STRIP: NEGATIVE
PROTHROMBIN TIME: 13 SECONDS (ref 11.7–14.2)
RBC # BLD AUTO: 2.53 10*6/MM3 (ref 3.77–5.28)
RBC # BLD AUTO: 2.62 10*6/MM3 (ref 3.77–5.28)
RBC # BLD AUTO: 2.63 10*6/MM3 (ref 3.77–5.28)
RBC # BLD AUTO: 2.7 10*6/MM3 (ref 3.77–5.28)
RBC # BLD AUTO: 2.9 10*6/MM3 (ref 3.77–5.28)
RBC # BLD AUTO: 2.94 10*6/MM3 (ref 3.77–5.28)
RBC # BLD AUTO: 2.97 10*6/MM3 (ref 3.77–5.28)
RBC # BLD AUTO: 3.13 10*6/MM3 (ref 3.77–5.28)
RBC # BLD AUTO: 3.13 10*6/MM3 (ref 3.77–5.28)
RBC # BLD AUTO: 3.2 10*6/MM3 (ref 3.77–5.28)
RBC # BLD AUTO: 3.3 10*6/MM3 (ref 3.77–5.28)
RBC # BLD AUTO: 3.34 10*6/MM3 (ref 3.77–5.28)
RBC # BLD AUTO: 3.47 10*6/MM3 (ref 3.77–5.28)
RBC # BLD AUTO: 3.59 10*6/MM3 (ref 3.77–5.28)
RBC # BLD AUTO: 3.63 10*6/MM3 (ref 3.77–5.28)
RBC # BLD AUTO: 3.68 10*6/MM3 (ref 3.77–5.28)
RBC # BLD AUTO: 3.69 10*6/MM3 (ref 3.77–5.28)
RBC # BLD AUTO: 4.23 10*6/MM3 (ref 3.77–5.28)
RBC # BLD AUTO: 4.35 10*6/MM3 (ref 3.77–5.28)
RBC # BLD AUTO: 4.53 10*6/MM3 (ref 3.77–5.28)
RBC # UR: ABNORMAL /HPF
REF LAB TEST METHOD: ABNORMAL
RH BLD: NEGATIVE
RH BLD: NEGATIVE
SODIUM BLD-SCNC: 137 MMOL/L (ref 134–145)
SODIUM BLD-SCNC: 140 MMOL/L (ref 136–145)
SODIUM BLD-SCNC: 141 MMOL/L (ref 134–145)
SODIUM BLD-SCNC: 142 MMOL/L (ref 134–145)
SODIUM BLD-SCNC: 143 MMOL/L (ref 134–145)
SODIUM BLD-SCNC: 144 MMOL/L (ref 134–145)
SODIUM BLD-SCNC: 145 MMOL/L (ref 134–145)
SODIUM BLD-SCNC: 146 MMOL/L (ref 134–145)
SP GR UR STRIP: 1.01 (ref 1–1.03)
SQUAMOUS #/AREA URNS HPF: ABNORMAL /HPF
T&S EXPIRATION DATE: NORMAL
T&S EXPIRATION DATE: NORMAL
T3FREE SERPL-MCNC: 2.63 PG/ML (ref 2–4.4)
T3FREE SERPL-MCNC: 2.89 PG/ML (ref 2–4.4)
T3FREE SERPL-MCNC: 3.28 PG/ML (ref 2–4.4)
T4 FREE SERPL-MCNC: 1.13 NG/DL (ref 0.93–1.7)
T4 FREE SERPL-MCNC: 1.25 NG/DL (ref 0.93–1.7)
T4 FREE SERPL-MCNC: 1.3 NG/DL (ref 0.93–1.7)
TRANS CELLS #/AREA URNS HPF: ABNORMAL /HPF
TSH SERPL DL<=0.05 MIU/L-ACNC: 1.92 UIU/ML (ref 0.27–4.2)
TSH SERPL DL<=0.05 MIU/L-ACNC: 2.24 UIU/ML (ref 0.27–4.2)
TSH SERPL DL<=0.05 MIU/L-ACNC: 4.32 UIU/ML (ref 0.27–4.2)
UNIT  ABO: NORMAL
UNIT  ABO: NORMAL
UNIT  RH: NORMAL
UNIT  RH: NORMAL
UROBILINOGEN UR QL STRIP: ABNORMAL
WBC CASTS #/AREA URNS LPF: ABNORMAL /LPF
WBC NRBC COR # BLD: 0.87 10*3/MM3 (ref 3.4–10.8)
WBC NRBC COR # BLD: 1.39 10*3/MM3 (ref 3.4–10.8)
WBC NRBC COR # BLD: 1.52 10*3/MM3 (ref 3.4–10.8)
WBC NRBC COR # BLD: 11.71 10*3/MM3 (ref 3.4–10.8)
WBC NRBC COR # BLD: 15.98 10*3/MM3 (ref 3.4–10.8)
WBC NRBC COR # BLD: 2.27 10*3/MM3 (ref 3.4–10.8)
WBC NRBC COR # BLD: 2.37 10*3/MM3 (ref 3.4–10.8)
WBC NRBC COR # BLD: 2.63 10*3/MM3 (ref 3.4–10.8)
WBC NRBC COR # BLD: 2.68 10*3/MM3 (ref 3.4–10.8)
WBC NRBC COR # BLD: 2.83 10*3/MM3 (ref 3.4–10.8)
WBC NRBC COR # BLD: 3.17 10*3/MM3 (ref 3.4–10.8)
WBC NRBC COR # BLD: 3.2 10*3/MM3 (ref 3.4–10.8)
WBC NRBC COR # BLD: 3.49 10*3/MM3 (ref 3.4–10.8)
WBC NRBC COR # BLD: 3.72 10*3/MM3 (ref 3.4–10.8)
WBC NRBC COR # BLD: 4.16 10*3/MM3 (ref 3.4–10.8)
WBC NRBC COR # BLD: 6.83 10*3/MM3 (ref 3.4–10.8)
WBC NRBC COR # BLD: 7.32 10*3/MM3 (ref 3.4–10.8)
WBC NRBC COR # BLD: 8.19 10*3/MM3 (ref 3.4–10.8)
WBC NRBC COR # BLD: 8.92 10*3/MM3 (ref 3.4–10.8)
WBC NRBC COR # BLD: 9.79 10*3/MM3 (ref 3.4–10.8)
WBC UR QL AUTO: ABNORMAL /HPF

## 2019-01-01 PROCEDURE — 94799 UNLISTED PULMONARY SVC/PX: CPT

## 2019-01-01 PROCEDURE — 25010000002 ATEZOLIZUMAB 1200 MG/20ML SOLUTION 20 ML VIAL: Performed by: INTERNAL MEDICINE

## 2019-01-01 PROCEDURE — 84481 FREE ASSAY (FT-3): CPT | Performed by: INTERNAL MEDICINE

## 2019-01-01 PROCEDURE — 63710000001 PROCHLORPERAZINE MALEATE PER 5 MG: Performed by: NURSE PRACTITIONER

## 2019-01-01 PROCEDURE — 77386: CPT | Performed by: RADIOLOGY

## 2019-01-01 PROCEDURE — 80053 COMPREHEN METABOLIC PANEL: CPT

## 2019-01-01 PROCEDURE — 77386 CHG INTENSITY MODULATED RADIATION TX DLVR COMPLEX: CPT | Performed by: RADIOLOGY

## 2019-01-01 PROCEDURE — 96375 TX/PRO/DX INJ NEW DRUG ADDON: CPT

## 2019-01-01 PROCEDURE — 85025 COMPLETE CBC W/AUTO DIFF WBC: CPT | Performed by: NURSE PRACTITIONER

## 2019-01-01 PROCEDURE — 97112 NEUROMUSCULAR REEDUCATION: CPT

## 2019-01-01 PROCEDURE — G0515 COGNITIVE SKILLS DEVELOPMENT: HCPCS

## 2019-01-01 PROCEDURE — 0 IOPAMIDOL PER 1 ML: Performed by: INTERNAL MEDICINE

## 2019-01-01 PROCEDURE — 96367 TX/PROPH/DG ADDL SEQ IV INF: CPT | Performed by: NURSE PRACTITIONER

## 2019-01-01 PROCEDURE — 77427 RADIATION TX MANAGEMENT X5: CPT | Performed by: RADIOLOGY

## 2019-01-01 PROCEDURE — 77336 RADIATION PHYSICS CONSULT: CPT | Performed by: RADIOLOGY

## 2019-01-01 PROCEDURE — 36415 COLL VENOUS BLD VENIPUNCTURE: CPT

## 2019-01-01 PROCEDURE — 82565 ASSAY OF CREATININE: CPT

## 2019-01-01 PROCEDURE — 85025 COMPLETE CBC W/AUTO DIFF WBC: CPT

## 2019-01-01 PROCEDURE — A9577 INJ MULTIHANCE: HCPCS | Performed by: PHYSICIAN ASSISTANT

## 2019-01-01 PROCEDURE — 77338 DESIGN MLC DEVICE FOR IMRT: CPT | Performed by: RADIOLOGY

## 2019-01-01 PROCEDURE — 96125 COGNITIVE TEST BY HC PRO: CPT

## 2019-01-01 PROCEDURE — 80048 BASIC METABOLIC PNL TOTAL CA: CPT | Performed by: NURSE PRACTITIONER

## 2019-01-01 PROCEDURE — 77263 THER RADIOLOGY TX PLNG CPLX: CPT | Performed by: RADIOLOGY

## 2019-01-01 PROCEDURE — C1788 PORT, INDWELLING, IMP: HCPCS | Performed by: THORACIC SURGERY (CARDIOTHORACIC VASCULAR SURGERY)

## 2019-01-01 PROCEDURE — 25010000002 ETOPOSIDE 1 GM/50ML SOLUTION 50 ML VIAL: Performed by: NURSE PRACTITIONER

## 2019-01-01 PROCEDURE — 77293 RESPIRATOR MOTION MGMT SIMUL: CPT | Performed by: RADIOLOGY

## 2019-01-01 PROCEDURE — 92507 TX SP LANG VOICE COMM INDIV: CPT | Performed by: SPEECH-LANGUAGE PATHOLOGIST

## 2019-01-01 PROCEDURE — 96360 HYDRATION IV INFUSION INIT: CPT | Performed by: NURSE PRACTITIONER

## 2019-01-01 PROCEDURE — 77014 CHG CT GUIDANCE RADIATION THERAPY FLDS PLACEMENT: CPT | Performed by: RADIOLOGY

## 2019-01-01 PROCEDURE — 76000 FLUOROSCOPY <1 HR PHYS/QHP: CPT

## 2019-01-01 PROCEDURE — 99213 OFFICE O/P EST LOW 20 MIN: CPT | Performed by: NURSE PRACTITIONER

## 2019-01-01 PROCEDURE — 77300 RADIATION THERAPY DOSE PLAN: CPT | Performed by: RADIOLOGY

## 2019-01-01 PROCEDURE — 97110 THERAPEUTIC EXERCISES: CPT

## 2019-01-01 PROCEDURE — 85025 COMPLETE CBC W/AUTO DIFF WBC: CPT | Performed by: INTERNAL MEDICINE

## 2019-01-01 PROCEDURE — 25010000002 PALONOSETRON PER 25 MCG: Performed by: NURSE PRACTITIONER

## 2019-01-01 PROCEDURE — FACE2FACE: Performed by: RADIOLOGY

## 2019-01-01 PROCEDURE — 25010000002 MIDAZOLAM PER 1 MG: Performed by: ANESTHESIOLOGY

## 2019-01-01 PROCEDURE — 77435 SBRT MANAGEMENT: CPT | Performed by: RADIOLOGY

## 2019-01-01 PROCEDURE — 92507 TX SP LANG VOICE COMM INDIV: CPT

## 2019-01-01 PROCEDURE — G0463 HOSPITAL OUTPT CLINIC VISIT: HCPCS | Performed by: NURSE PRACTITIONER

## 2019-01-01 PROCEDURE — 86900 BLOOD TYPING SEROLOGIC ABO: CPT

## 2019-01-01 PROCEDURE — 81001 URINALYSIS AUTO W/SCOPE: CPT | Performed by: RADIOLOGY

## 2019-01-01 PROCEDURE — 99024 POSTOP FOLLOW-UP VISIT: CPT | Performed by: NEUROLOGICAL SURGERY

## 2019-01-01 PROCEDURE — 97535 SELF CARE MNGMENT TRAINING: CPT

## 2019-01-01 PROCEDURE — 96360 HYDRATION IV INFUSION INIT: CPT | Performed by: INTERNAL MEDICINE

## 2019-01-01 PROCEDURE — 0 DIATRIZOATE MEGLUMINE & SODIUM PER 1 ML: Performed by: INTERNAL MEDICINE

## 2019-01-01 PROCEDURE — 96523 IRRIG DRUG DELIVERY DEVICE: CPT

## 2019-01-01 PROCEDURE — 96417 CHEMO IV INFUS EACH ADDL SEQ: CPT | Performed by: NURSE PRACTITIONER

## 2019-01-01 PROCEDURE — 99214 OFFICE O/P EST MOD 30 MIN: CPT | Performed by: NURSE PRACTITIONER

## 2019-01-01 PROCEDURE — 99024 POSTOP FOLLOW-UP VISIT: CPT | Performed by: NURSE PRACTITIONER

## 2019-01-01 PROCEDURE — 96413 CHEMO IV INFUSION 1 HR: CPT | Performed by: NURSE PRACTITIONER

## 2019-01-01 PROCEDURE — 74177 CT ABD & PELVIS W/CONTRAST: CPT

## 2019-01-01 PROCEDURE — 77301 RADIOTHERAPY DOSE PLAN IMRT: CPT | Performed by: RADIOLOGY

## 2019-01-01 PROCEDURE — 96413 CHEMO IV INFUSION 1 HR: CPT | Performed by: INTERNAL MEDICINE

## 2019-01-01 PROCEDURE — 78815 PET IMAGE W/CT SKULL-THIGH: CPT

## 2019-01-01 PROCEDURE — 99213 OFFICE O/P EST LOW 20 MIN: CPT | Performed by: NEUROLOGICAL SURGERY

## 2019-01-01 PROCEDURE — 25010000002 DEXAMETHASONE SODIUM PHOSPHATE 100 MG/10ML SOLUTION: Performed by: INTERNAL MEDICINE

## 2019-01-01 PROCEDURE — 25010000002 CARBOPLATIN PER 50 MG: Performed by: NURSE PRACTITIONER

## 2019-01-01 PROCEDURE — 84443 ASSAY THYROID STIM HORMONE: CPT | Performed by: INTERNAL MEDICINE

## 2019-01-01 PROCEDURE — 96523 IRRIG DRUG DELIVERY DEVICE: CPT | Performed by: INTERNAL MEDICINE

## 2019-01-01 PROCEDURE — 85610 PROTHROMBIN TIME: CPT | Performed by: NURSE PRACTITIONER

## 2019-01-01 PROCEDURE — 63710000001 DIPHENHYDRAMINE PER 50 MG: Performed by: NURSE PRACTITIONER

## 2019-01-01 PROCEDURE — 0JH60WZ INSERTION OF TOTALLY IMPLANTABLE VASCULAR ACCESS DEVICE INTO CHEST SUBCUTANEOUS TISSUE AND FASCIA, OPEN APPROACH: ICD-10-PCS | Performed by: THORACIC SURGERY (CARDIOTHORACIC VASCULAR SURGERY)

## 2019-01-01 PROCEDURE — 99214 OFFICE O/P EST MOD 30 MIN: CPT | Performed by: INTERNAL MEDICINE

## 2019-01-01 PROCEDURE — 82962 GLUCOSE BLOOD TEST: CPT

## 2019-01-01 PROCEDURE — 36561 INSERT TUNNELED CV CATH: CPT | Performed by: THORACIC SURGERY (CARDIOTHORACIC VASCULAR SURGERY)

## 2019-01-01 PROCEDURE — 25010000003 CEFAZOLIN IN DEXTROSE 2-4 GM/100ML-% SOLUTION: Performed by: THORACIC SURGERY (CARDIOTHORACIC VASCULAR SURGERY)

## 2019-01-01 PROCEDURE — 71260 CT THORAX DX C+: CPT

## 2019-01-01 PROCEDURE — 99024 POSTOP FOLLOW-UP VISIT: CPT | Performed by: THORACIC SURGERY (CARDIOTHORACIC VASCULAR SURGERY)

## 2019-01-01 PROCEDURE — G0463 HOSPITAL OUTPT CLINIC VISIT: HCPCS | Performed by: INTERNAL MEDICINE

## 2019-01-01 PROCEDURE — 25010000002 PROPOFOL 10 MG/ML EMULSION: Performed by: NURSE ANESTHETIST, CERTIFIED REGISTERED

## 2019-01-01 PROCEDURE — 25010000002 ALTEPLASE 2 MG RECONSTITUTED SOLUTION: Performed by: NURSE PRACTITIONER

## 2019-01-01 PROCEDURE — 77334 RADIATION TREATMENT AID(S): CPT | Performed by: RADIOLOGY

## 2019-01-01 PROCEDURE — 95816 EEG AWAKE AND DROWSY: CPT | Performed by: PSYCHIATRY & NEUROLOGY

## 2019-01-01 PROCEDURE — 25010000002 ETOPOSIDE 1 GM/50ML SOLUTION 50 ML VIAL: Performed by: INTERNAL MEDICINE

## 2019-01-01 PROCEDURE — 63710000001 PROCHLORPERAZINE MALEATE PER 5 MG: Performed by: INTERNAL MEDICINE

## 2019-01-01 PROCEDURE — 77470 SPECIAL RADIATION TREATMENT: CPT | Performed by: RADIOLOGY

## 2019-01-01 PROCEDURE — 63710000001 DEXAMETHASONE PER 0.25 MG: Performed by: HOSPITALIST

## 2019-01-01 PROCEDURE — 70553 MRI BRAIN STEM W/O & W/DYE: CPT

## 2019-01-01 PROCEDURE — 86850 RBC ANTIBODY SCREEN: CPT | Performed by: NURSE PRACTITIONER

## 2019-01-01 PROCEDURE — 25010000002 PALONOSETRON 0.25 MG/5ML SOLUTION PREFILLED SYRINGE: Performed by: INTERNAL MEDICINE

## 2019-01-01 PROCEDURE — A9577 INJ MULTIHANCE: HCPCS | Performed by: NEUROLOGICAL SURGERY

## 2019-01-01 PROCEDURE — 97162 PT EVAL MOD COMPLEX 30 MIN: CPT

## 2019-01-01 PROCEDURE — 36415 COLL VENOUS BLD VENIPUNCTURE: CPT | Performed by: NURSE PRACTITIONER

## 2019-01-01 PROCEDURE — 25010000002 HEPARIN (PORCINE) PER 1000 UNITS: Performed by: THORACIC SURGERY (CARDIOTHORACIC VASCULAR SURGERY)

## 2019-01-01 PROCEDURE — 25010000002 DEXAMETHASONE SODIUM PHOSPHATE 100 MG/10ML SOLUTION: Performed by: NURSE PRACTITIONER

## 2019-01-01 PROCEDURE — 96377 APPLICATON ON-BODY INJECTOR: CPT | Performed by: NURSE PRACTITIONER

## 2019-01-01 PROCEDURE — 96361 HYDRATE IV INFUSION ADD-ON: CPT | Performed by: NURSE PRACTITIONER

## 2019-01-01 PROCEDURE — 77373 STRTCTC BDY RAD THER TX DLVR: CPT | Performed by: RADIOLOGY

## 2019-01-01 PROCEDURE — 02HV33Z INSERTION OF INFUSION DEVICE INTO SUPERIOR VENA CAVA, PERCUTANEOUS APPROACH: ICD-10-PCS | Performed by: THORACIC SURGERY (CARDIOTHORACIC VASCULAR SURGERY)

## 2019-01-01 PROCEDURE — 25010000002 PEGFILGRASTIM 6 MG/0.6ML PREFILLED SYRINGE KIT: Performed by: NURSE PRACTITIONER

## 2019-01-01 PROCEDURE — 96361 HYDRATE IV INFUSION ADD-ON: CPT

## 2019-01-01 PROCEDURE — 96375 TX/PRO/DX INJ NEW DRUG ADDON: CPT | Performed by: NURSE PRACTITIONER

## 2019-01-01 PROCEDURE — 95816 EEG AWAKE AND DROWSY: CPT

## 2019-01-01 PROCEDURE — 86900 BLOOD TYPING SEROLOGIC ABO: CPT | Performed by: NURSE PRACTITIONER

## 2019-01-01 PROCEDURE — 36430 TRANSFUSION BLD/BLD COMPNT: CPT

## 2019-01-01 PROCEDURE — 0 FLUDEOXYGLUCOSE F18 SOLUTION: Performed by: RADIOLOGY

## 2019-01-01 PROCEDURE — 80053 COMPREHEN METABOLIC PANEL: CPT | Performed by: INTERNAL MEDICINE

## 2019-01-01 PROCEDURE — B5181ZA FLUOROSCOPY OF SUPERIOR VENA CAVA USING LOW OSMOLAR CONTRAST, GUIDANCE: ICD-10-PCS | Performed by: THORACIC SURGERY (CARDIOTHORACIC VASCULAR SURGERY)

## 2019-01-01 PROCEDURE — 84439 ASSAY OF FREE THYROXINE: CPT | Performed by: INTERNAL MEDICINE

## 2019-01-01 PROCEDURE — 85730 THROMBOPLASTIN TIME PARTIAL: CPT | Performed by: NURSE PRACTITIONER

## 2019-01-01 PROCEDURE — 25010000002 IOPAMIDOL 61 % SOLUTION: Performed by: INTERNAL MEDICINE

## 2019-01-01 PROCEDURE — 99215 OFFICE O/P EST HI 40 MIN: CPT | Performed by: INTERNAL MEDICINE

## 2019-01-01 PROCEDURE — 25010000002 FOSAPREPITANT PER 1 MG: Performed by: NURSE PRACTITIONER

## 2019-01-01 PROCEDURE — 86923 COMPATIBILITY TEST ELECTRIC: CPT

## 2019-01-01 PROCEDURE — 77001 FLUOROGUIDE FOR VEIN DEVICE: CPT | Performed by: THORACIC SURGERY (CARDIOTHORACIC VASCULAR SURGERY)

## 2019-01-01 PROCEDURE — 96413 CHEMO IV INFUSION 1 HR: CPT

## 2019-01-01 PROCEDURE — 82150 ASSAY OF AMYLASE: CPT | Performed by: INTERNAL MEDICINE

## 2019-01-01 PROCEDURE — 99215 OFFICE O/P EST HI 40 MIN: CPT | Performed by: NURSE PRACTITIONER

## 2019-01-01 PROCEDURE — 87086 URINE CULTURE/COLONY COUNT: CPT | Performed by: RADIOLOGY

## 2019-01-01 PROCEDURE — P9016 RBC LEUKOCYTES REDUCED: HCPCS

## 2019-01-01 PROCEDURE — 36598 INJ W/FLUOR EVAL CV DEVICE: CPT

## 2019-01-01 PROCEDURE — 86901 BLOOD TYPING SEROLOGIC RH(D): CPT | Performed by: NURSE PRACTITIONER

## 2019-01-01 PROCEDURE — 96361 HYDRATE IV INFUSION ADD-ON: CPT | Performed by: INTERNAL MEDICINE

## 2019-01-01 PROCEDURE — 25010000002 FOSAPREPITANT PER 1 MG: Performed by: INTERNAL MEDICINE

## 2019-01-01 PROCEDURE — 0 GADOBENATE DIMEGLUMINE 529 MG/ML SOLUTION: Performed by: NEUROLOGICAL SURGERY

## 2019-01-01 PROCEDURE — 83690 ASSAY OF LIPASE: CPT | Performed by: INTERNAL MEDICINE

## 2019-01-01 PROCEDURE — 71045 X-RAY EXAM CHEST 1 VIEW: CPT

## 2019-01-01 PROCEDURE — 85027 COMPLETE CBC AUTOMATED: CPT | Performed by: NURSE PRACTITIONER

## 2019-01-01 PROCEDURE — 96367 TX/PROPH/DG ADDL SEQ IV INF: CPT | Performed by: INTERNAL MEDICINE

## 2019-01-01 PROCEDURE — 25010000003 LIDOCAINE 1 % SOLUTION: Performed by: THORACIC SURGERY (CARDIOTHORACIC VASCULAR SURGERY)

## 2019-01-01 PROCEDURE — A9552 F18 FDG: HCPCS | Performed by: RADIOLOGY

## 2019-01-01 PROCEDURE — 80053 COMPREHEN METABOLIC PANEL: CPT | Performed by: NURSE PRACTITIONER

## 2019-01-01 PROCEDURE — 97110 THERAPEUTIC EXERCISES: CPT | Performed by: PHYSICAL THERAPIST

## 2019-01-01 PROCEDURE — 96377 APPLICATON ON-BODY INJECTOR: CPT | Performed by: INTERNAL MEDICINE

## 2019-01-01 PROCEDURE — 99024 POSTOP FOLLOW-UP VISIT: CPT | Performed by: PHYSICIAN ASSISTANT

## 2019-01-01 PROCEDURE — 96417 CHEMO IV INFUS EACH ADDL SEQ: CPT

## 2019-01-01 PROCEDURE — 86850 RBC ANTIBODY SCREEN: CPT

## 2019-01-01 PROCEDURE — 86901 BLOOD TYPING SEROLOGIC RH(D): CPT

## 2019-01-01 PROCEDURE — 25010000002 PEGFILGRASTIM 6 MG/0.6ML PREFILLED SYRINGE KIT: Performed by: INTERNAL MEDICINE

## 2019-01-01 PROCEDURE — 0 GADOBENATE DIMEGLUMINE 529 MG/ML SOLUTION: Performed by: PHYSICIAN ASSISTANT

## 2019-01-01 PROCEDURE — 25010000002 CARBOPLATIN PER 50 MG: Performed by: INTERNAL MEDICINE

## 2019-01-01 PROCEDURE — 97530 THERAPEUTIC ACTIVITIES: CPT

## 2019-01-01 DEVICE — POWERPORT M.R.I. IMPLANTABLE PORT WITH ATTACHABLE 8F CHRONOFLEX OPEN-ENDED SINGLE-LUMEN VENOUS CATHETER INTERMEDIATE KIT (WITH SUTURE PLUGS)
Type: IMPLANTABLE DEVICE | Status: FUNCTIONAL
Brand: POWERPORT M.R.I., CHRONOFLEX

## 2019-01-01 RX ORDER — NALOXONE HCL 0.4 MG/ML
0.2 VIAL (ML) INJECTION AS NEEDED
Status: DISCONTINUED | OUTPATIENT
Start: 2019-01-01 | End: 2019-01-01 | Stop reason: HOSPADM

## 2019-01-01 RX ORDER — LEVOFLOXACIN 500 MG/1
500 TABLET, FILM COATED ORAL DAILY
Qty: 7 TABLET | Refills: 0 | Status: SHIPPED | OUTPATIENT
Start: 2019-01-01 | End: 2019-01-01

## 2019-01-01 RX ORDER — PALONOSETRON 0.05 MG/ML
0.25 INJECTION, SOLUTION INTRAVENOUS ONCE
Status: COMPLETED | OUTPATIENT
Start: 2019-01-01 | End: 2019-01-01

## 2019-01-01 RX ORDER — SODIUM CHLORIDE 9 MG/ML
250 INJECTION, SOLUTION INTRAVENOUS ONCE
Status: COMPLETED | OUTPATIENT
Start: 2019-01-01 | End: 2019-01-01

## 2019-01-01 RX ORDER — PROCHLORPERAZINE MALEATE 10 MG
10 TABLET ORAL ONCE
Status: CANCELLED | OUTPATIENT
Start: 2019-01-01 | End: 2019-01-01

## 2019-01-01 RX ORDER — PROMETHAZINE HYDROCHLORIDE 25 MG/ML
12.5 INJECTION, SOLUTION INTRAMUSCULAR; INTRAVENOUS ONCE AS NEEDED
Status: DISCONTINUED | OUTPATIENT
Start: 2019-01-01 | End: 2019-01-01 | Stop reason: HOSPADM

## 2019-01-01 RX ORDER — DIPHENHYDRAMINE HYDROCHLORIDE 50 MG/ML
12.5 INJECTION INTRAMUSCULAR; INTRAVENOUS
Status: DISCONTINUED | OUTPATIENT
Start: 2019-01-01 | End: 2019-01-01 | Stop reason: HOSPADM

## 2019-01-01 RX ORDER — SODIUM CHLORIDE 0.9 % (FLUSH) 0.9 %
10 SYRINGE (ML) INJECTION AS NEEDED
Status: DISCONTINUED | OUTPATIENT
Start: 2019-01-01 | End: 2019-01-01 | Stop reason: HOSPADM

## 2019-01-01 RX ORDER — FAMOTIDINE 10 MG/ML
20 INJECTION, SOLUTION INTRAVENOUS ONCE
Status: COMPLETED | OUTPATIENT
Start: 2019-01-01 | End: 2019-01-01

## 2019-01-01 RX ORDER — MIDAZOLAM HYDROCHLORIDE 1 MG/ML
2 INJECTION INTRAMUSCULAR; INTRAVENOUS
Status: DISCONTINUED | OUTPATIENT
Start: 2019-01-01 | End: 2019-01-01 | Stop reason: HOSPADM

## 2019-01-01 RX ORDER — FENTANYL CITRATE 50 UG/ML
50 INJECTION, SOLUTION INTRAMUSCULAR; INTRAVENOUS
Status: DISCONTINUED | OUTPATIENT
Start: 2019-01-01 | End: 2019-01-01 | Stop reason: HOSPADM

## 2019-01-01 RX ORDER — SUCRALFATE ORAL 1 G/10ML
1 SUSPENSION ORAL 4 TIMES DAILY
Qty: 420 ML | Refills: 1 | Status: SHIPPED | OUTPATIENT
Start: 2019-01-01 | End: 2019-01-01 | Stop reason: SDUPTHER

## 2019-01-01 RX ORDER — DIPHENHYDRAMINE HYDROCHLORIDE 50 MG/ML
50 INJECTION INTRAMUSCULAR; INTRAVENOUS AS NEEDED
Status: CANCELLED | OUTPATIENT
Start: 2019-01-01

## 2019-01-01 RX ORDER — NALOXONE HYDROCHLORIDE 4 MG/.1ML
SPRAY NASAL
Refills: 0 | COMMUNITY
Start: 2019-01-01 | End: 2020-01-01 | Stop reason: HOSPADM

## 2019-01-01 RX ORDER — PALONOSETRON 0.05 MG/ML
0.25 INJECTION, SOLUTION INTRAVENOUS ONCE
Status: CANCELLED | OUTPATIENT
Start: 2019-01-01

## 2019-01-01 RX ORDER — SODIUM CHLORIDE 0.9 % (FLUSH) 0.9 %
10 SYRINGE (ML) INJECTION AS NEEDED
Status: CANCELLED | OUTPATIENT
Start: 2019-01-01

## 2019-01-01 RX ORDER — OXYCODONE AND ACETAMINOPHEN 7.5; 325 MG/1; MG/1
1 TABLET ORAL ONCE AS NEEDED
Status: DISCONTINUED | OUTPATIENT
Start: 2019-01-01 | End: 2019-01-01 | Stop reason: HOSPADM

## 2019-01-01 RX ORDER — LIDOCAINE AND PRILOCAINE 25; 25 MG/G; MG/G
CREAM TOPICAL AS NEEDED
Qty: 1 EACH | Refills: 1 | Status: SHIPPED | OUTPATIENT
Start: 2019-01-01 | End: 2019-01-01

## 2019-01-01 RX ORDER — DEXAMETHASONE 2 MG/1
2 TABLET ORAL EVERY 8 HOURS SCHEDULED
Status: DISCONTINUED | OUTPATIENT
Start: 2019-01-01 | End: 2019-01-01 | Stop reason: HOSPADM

## 2019-01-01 RX ORDER — FAMOTIDINE 10 MG/ML
20 INJECTION, SOLUTION INTRAVENOUS AS NEEDED
Status: CANCELLED | OUTPATIENT
Start: 2019-01-01

## 2019-01-01 RX ORDER — SODIUM CHLORIDE 9 MG/ML
1000 INJECTION, SOLUTION INTRAVENOUS ONCE
Status: CANCELLED | OUTPATIENT
Start: 2019-01-01

## 2019-01-01 RX ORDER — ACETAMINOPHEN 325 MG/1
650 TABLET ORAL ONCE
Status: COMPLETED | OUTPATIENT
Start: 2019-01-01 | End: 2019-01-01

## 2019-01-01 RX ORDER — SODIUM CHLORIDE 0.9 % (FLUSH) 0.9 %
3 SYRINGE (ML) INJECTION EVERY 12 HOURS SCHEDULED
Status: DISCONTINUED | OUTPATIENT
Start: 2019-01-01 | End: 2019-01-01 | Stop reason: HOSPADM

## 2019-01-01 RX ORDER — PROCHLORPERAZINE MALEATE 5 MG/1
10 TABLET ORAL ONCE
Status: COMPLETED | OUTPATIENT
Start: 2019-01-01 | End: 2019-01-01

## 2019-01-01 RX ORDER — PANTOPRAZOLE SODIUM 40 MG/1
40 TABLET, DELAYED RELEASE ORAL DAILY
Qty: 30 TABLET | Refills: 2 | Status: SHIPPED | OUTPATIENT
Start: 2019-01-01 | End: 2019-01-01 | Stop reason: SDUPTHER

## 2019-01-01 RX ORDER — ONDANSETRON HYDROCHLORIDE 8 MG/1
8 TABLET, FILM COATED ORAL EVERY 8 HOURS PRN
Qty: 30 TABLET | Refills: 2 | Status: SHIPPED | OUTPATIENT
Start: 2019-01-01 | End: 2019-01-01

## 2019-01-01 RX ORDER — SODIUM CHLORIDE 9 MG/ML
1000 INJECTION, SOLUTION INTRAVENOUS ONCE
Status: CANCELLED
Start: 2019-01-01 | End: 2019-01-01

## 2019-01-01 RX ORDER — SODIUM CHLORIDE 0.9 % (FLUSH) 0.9 %
3-10 SYRINGE (ML) INJECTION AS NEEDED
Status: DISCONTINUED | OUTPATIENT
Start: 2019-01-01 | End: 2019-01-01 | Stop reason: HOSPADM

## 2019-01-01 RX ORDER — LEVETIRACETAM 500 MG/1
500 TABLET ORAL EVERY 12 HOURS SCHEDULED
Qty: 60 TABLET | Refills: 2 | Status: SHIPPED | OUTPATIENT
Start: 2019-01-01 | End: 2019-01-01 | Stop reason: SDUPTHER

## 2019-01-01 RX ORDER — TEMAZEPAM 30 MG/1
30 CAPSULE ORAL NIGHTLY PRN
Qty: 15 CAPSULE | Refills: 0 | Status: SHIPPED | OUTPATIENT
Start: 2019-01-01 | End: 2019-01-01

## 2019-01-01 RX ORDER — TEMAZEPAM 7.5 MG/1
30 CAPSULE ORAL NIGHTLY PRN
Status: DISCONTINUED | OUTPATIENT
Start: 2019-01-01 | End: 2019-01-01 | Stop reason: HOSPADM

## 2019-01-01 RX ORDER — SODIUM CHLORIDE 9 MG/ML
250 INJECTION, SOLUTION INTRAVENOUS ONCE
Status: CANCELLED | OUTPATIENT
Start: 2019-01-01

## 2019-01-01 RX ORDER — SUCRALFATE 1 G/1
1 TABLET ORAL 4 TIMES DAILY
COMMUNITY
End: 2019-01-01

## 2019-01-01 RX ORDER — SODIUM CHLORIDE 9 MG/ML
250 INJECTION, SOLUTION INTRAVENOUS AS NEEDED
Status: DISCONTINUED | OUTPATIENT
Start: 2019-01-01 | End: 2019-01-01 | Stop reason: HOSPADM

## 2019-01-01 RX ORDER — SODIUM CHLORIDE, SODIUM LACTATE, POTASSIUM CHLORIDE, CALCIUM CHLORIDE 600; 310; 30; 20 MG/100ML; MG/100ML; MG/100ML; MG/100ML
9 INJECTION, SOLUTION INTRAVENOUS CONTINUOUS
Status: DISCONTINUED | OUTPATIENT
Start: 2019-01-01 | End: 2019-01-01 | Stop reason: HOSPADM

## 2019-01-01 RX ORDER — LIDOCAINE HYDROCHLORIDE 10 MG/ML
INJECTION, SOLUTION INFILTRATION; PERINEURAL AS NEEDED
Status: DISCONTINUED | OUTPATIENT
Start: 2019-01-01 | End: 2019-01-01 | Stop reason: HOSPADM

## 2019-01-01 RX ORDER — SODIUM CHLORIDE 9 MG/ML
1000 INJECTION, SOLUTION INTRAVENOUS ONCE
Status: COMPLETED | OUTPATIENT
Start: 2019-01-01 | End: 2019-01-01

## 2019-01-01 RX ORDER — FLUMAZENIL 0.1 MG/ML
0.2 INJECTION INTRAVENOUS AS NEEDED
Status: DISCONTINUED | OUTPATIENT
Start: 2019-01-01 | End: 2019-01-01 | Stop reason: HOSPADM

## 2019-01-01 RX ORDER — ACETAMINOPHEN 325 MG/1
650 TABLET ORAL EVERY 4 HOURS PRN
Start: 2019-01-01 | End: 2019-01-01

## 2019-01-01 RX ORDER — DIPHENHYDRAMINE HCL 25 MG
25 CAPSULE ORAL ONCE
Status: CANCELLED | OUTPATIENT
Start: 2019-01-01 | End: 2019-01-01

## 2019-01-01 RX ORDER — SODIUM CHLORIDE 9 MG/ML
1000 INJECTION, SOLUTION INTRAVENOUS ONCE
Status: DISCONTINUED | OUTPATIENT
Start: 2019-01-01 | End: 2019-01-01 | Stop reason: HOSPADM

## 2019-01-01 RX ORDER — LIDOCAINE HYDROCHLORIDE 10 MG/ML
0.5 INJECTION, SOLUTION EPIDURAL; INFILTRATION; INTRACAUDAL; PERINEURAL ONCE AS NEEDED
Status: DISCONTINUED | OUTPATIENT
Start: 2019-01-01 | End: 2019-01-01 | Stop reason: HOSPADM

## 2019-01-01 RX ORDER — ACETAMINOPHEN 325 MG/1
650 TABLET ORAL ONCE AS NEEDED
Status: DISCONTINUED | OUTPATIENT
Start: 2019-01-01 | End: 2019-01-01 | Stop reason: HOSPADM

## 2019-01-01 RX ORDER — SODIUM CHLORIDE 9 MG/ML
1000 INJECTION, SOLUTION INTRAVENOUS ONCE
Status: DISCONTINUED | OUTPATIENT
Start: 2019-01-01 | End: 2020-01-01 | Stop reason: HOSPADM

## 2019-01-01 RX ORDER — MIDAZOLAM HYDROCHLORIDE 1 MG/ML
1 INJECTION INTRAMUSCULAR; INTRAVENOUS
Status: DISCONTINUED | OUTPATIENT
Start: 2019-01-01 | End: 2019-01-01 | Stop reason: HOSPADM

## 2019-01-01 RX ORDER — MORPHINE SULFATE 20 MG/ML
15 SOLUTION ORAL EVERY 4 HOURS PRN
Qty: 60 ML | Refills: 0 | Status: SHIPPED | OUTPATIENT
Start: 2019-01-01 | End: 2020-01-01 | Stop reason: HOSPADM

## 2019-01-01 RX ORDER — LEVETIRACETAM 500 MG/1
500 TABLET ORAL EVERY 12 HOURS SCHEDULED
Qty: 60 TABLET | Refills: 2 | Status: SHIPPED | OUTPATIENT
Start: 2019-01-01

## 2019-01-01 RX ORDER — SODIUM CHLORIDE 9 MG/ML
250 INJECTION, SOLUTION INTRAVENOUS AS NEEDED
Status: CANCELLED | OUTPATIENT
Start: 2019-01-01

## 2019-01-01 RX ORDER — PREDNISONE 10 MG/1
30 TABLET ORAL DAILY
Qty: 60 TABLET | Refills: 0 | Status: SHIPPED | OUTPATIENT
Start: 2019-01-01 | End: 2019-01-01

## 2019-01-01 RX ORDER — ACETAMINOPHEN 325 MG/1
650 TABLET ORAL ONCE
Status: CANCELLED | OUTPATIENT
Start: 2019-01-01 | End: 2019-01-01

## 2019-01-01 RX ORDER — PROMETHAZINE HYDROCHLORIDE 25 MG/1
25 SUPPOSITORY RECTAL ONCE AS NEEDED
Status: DISCONTINUED | OUTPATIENT
Start: 2019-01-01 | End: 2019-01-01 | Stop reason: HOSPADM

## 2019-01-01 RX ORDER — PROPOFOL 10 MG/ML
VIAL (ML) INTRAVENOUS CONTINUOUS PRN
Status: DISCONTINUED | OUTPATIENT
Start: 2019-01-01 | End: 2019-01-01 | Stop reason: SURG

## 2019-01-01 RX ORDER — PROMETHAZINE HYDROCHLORIDE 25 MG/ML
6.25 INJECTION, SOLUTION INTRAMUSCULAR; INTRAVENOUS
Status: DISCONTINUED | OUTPATIENT
Start: 2019-01-01 | End: 2019-01-01 | Stop reason: HOSPADM

## 2019-01-01 RX ORDER — PANTOPRAZOLE SODIUM 40 MG/1
40 TABLET, DELAYED RELEASE ORAL DAILY
Qty: 30 TABLET | Refills: 1 | Status: SHIPPED | OUTPATIENT
Start: 2019-01-01 | End: 2019-01-01 | Stop reason: SDUPTHER

## 2019-01-01 RX ORDER — HYDROCODONE BITARTRATE AND ACETAMINOPHEN 7.5; 325 MG/1; MG/1
1 TABLET ORAL ONCE AS NEEDED
Status: DISCONTINUED | OUTPATIENT
Start: 2019-01-01 | End: 2019-01-01 | Stop reason: HOSPADM

## 2019-01-01 RX ORDER — LABETALOL HYDROCHLORIDE 5 MG/ML
5 INJECTION, SOLUTION INTRAVENOUS
Status: DISCONTINUED | OUTPATIENT
Start: 2019-01-01 | End: 2019-01-01 | Stop reason: HOSPADM

## 2019-01-01 RX ORDER — HEPARIN SODIUM (PORCINE) LOCK FLUSH IV SOLN 100 UNIT/ML 100 UNIT/ML
500 SOLUTION INTRAVENOUS AS NEEDED
Status: CANCELLED | OUTPATIENT
Start: 2019-01-01

## 2019-01-01 RX ORDER — HYDROCODONE BITARTRATE AND ACETAMINOPHEN 5; 325 MG/1; MG/1
1 TABLET ORAL EVERY 4 HOURS PRN
Status: DISCONTINUED | OUTPATIENT
Start: 2019-01-01 | End: 2019-01-01

## 2019-01-01 RX ORDER — LOSARTAN POTASSIUM 50 MG/1
50 TABLET ORAL DAILY
Refills: 1 | COMMUNITY
Start: 2019-01-01 | End: 2019-01-01 | Stop reason: SDUPTHER

## 2019-01-01 RX ORDER — CEFAZOLIN SODIUM 2 G/100ML
2 INJECTION, SOLUTION INTRAVENOUS
Status: DISPENSED | OUTPATIENT
Start: 2019-01-01 | End: 2019-01-01

## 2019-01-01 RX ORDER — PANTOPRAZOLE SODIUM 40 MG/1
40 TABLET, DELAYED RELEASE ORAL DAILY
Qty: 90 TABLET | Refills: 1 | Status: SHIPPED | OUTPATIENT
Start: 2019-01-01

## 2019-01-01 RX ORDER — SUCRALFATE 1 G/1
1 TABLET ORAL 4 TIMES DAILY
Qty: 90 TABLET | Refills: 1 | Status: SHIPPED | OUTPATIENT
Start: 2019-01-01 | End: 2019-01-01 | Stop reason: SDUPTHER

## 2019-01-01 RX ORDER — POLYETHYLENE GLYCOL 3350 17 G/17G
17 POWDER, FOR SOLUTION ORAL DAILY PRN
Start: 2019-01-01 | End: 2019-01-01

## 2019-01-01 RX ORDER — PROCHLORPERAZINE MALEATE 10 MG
10 TABLET ORAL ONCE
Status: CANCELLED | OUTPATIENT
Start: 2019-01-01

## 2019-01-01 RX ORDER — POT SOR/HE-CELLULOS/POV/HYALUR
15 GEL IN PACKET (ML) MUCOUS MEMBRANE 3 TIMES DAILY
Qty: 90 EACH | Refills: 2 | Status: SHIPPED | OUTPATIENT
Start: 2019-01-01 | End: 2019-01-01

## 2019-01-01 RX ORDER — FLUOXETINE HYDROCHLORIDE 20 MG/1
20 CAPSULE ORAL DAILY
Qty: 90 CAPSULE | Refills: 1 | Status: SHIPPED | OUTPATIENT
Start: 2019-01-01 | End: 2020-01-01 | Stop reason: SDUPTHER

## 2019-01-01 RX ORDER — SODIUM CHLORIDE 9 MG/ML
75 INJECTION, SOLUTION INTRAVENOUS ONCE
Status: COMPLETED | OUTPATIENT
Start: 2019-01-01 | End: 2019-01-01

## 2019-01-01 RX ORDER — LOSARTAN POTASSIUM 50 MG/1
50 TABLET ORAL
Qty: 30 TABLET | Refills: 1 | Status: SHIPPED | OUTPATIENT
Start: 2019-01-01 | End: 2019-01-01

## 2019-01-01 RX ORDER — PREDNISONE 10 MG/1
40 TABLET ORAL DAILY
Qty: 40 TABLET | Refills: 0 | Status: SHIPPED | OUTPATIENT
Start: 2019-01-01 | End: 2019-01-01 | Stop reason: SDUPTHER

## 2019-01-01 RX ORDER — LOSARTAN POTASSIUM 50 MG/1
50 TABLET ORAL DAILY
Qty: 90 TABLET | Refills: 1 | Status: SHIPPED | OUTPATIENT
Start: 2019-01-01 | End: 2020-01-01

## 2019-01-01 RX ORDER — PSEUDOEPHEDRINE HCL 30 MG
200 TABLET ORAL DAILY PRN
Start: 2019-01-01 | End: 2019-01-01

## 2019-01-01 RX ORDER — ONDANSETRON 2 MG/ML
4 INJECTION INTRAMUSCULAR; INTRAVENOUS ONCE AS NEEDED
Status: DISCONTINUED | OUTPATIENT
Start: 2019-01-01 | End: 2019-01-01 | Stop reason: HOSPADM

## 2019-01-01 RX ORDER — HYDRALAZINE HYDROCHLORIDE 20 MG/ML
5 INJECTION INTRAMUSCULAR; INTRAVENOUS
Status: DISCONTINUED | OUTPATIENT
Start: 2019-01-01 | End: 2019-01-01 | Stop reason: HOSPADM

## 2019-01-01 RX ORDER — FAMOTIDINE 40 MG/1
40 TABLET, FILM COATED ORAL DAILY
Refills: 3 | COMMUNITY
Start: 2019-06-27 | End: 2019-01-01 | Stop reason: HOSPADM

## 2019-01-01 RX ORDER — CEFAZOLIN SODIUM 2 G/100ML
2 INJECTION, SOLUTION INTRAVENOUS EVERY 8 HOURS
Status: DISCONTINUED | OUTPATIENT
Start: 2019-01-01 | End: 2019-01-01 | Stop reason: HOSPADM

## 2019-01-01 RX ORDER — ALBUTEROL SULFATE 90 UG/1
2 AEROSOL, METERED RESPIRATORY (INHALATION) EVERY 4 HOURS PRN
Qty: 1 INHALER | Refills: 0 | Status: SHIPPED | OUTPATIENT
Start: 2019-01-01 | End: 2020-01-01 | Stop reason: SDUPTHER

## 2019-01-01 RX ORDER — HYDROMORPHONE HYDROCHLORIDE 1 MG/ML
0.5 INJECTION, SOLUTION INTRAMUSCULAR; INTRAVENOUS; SUBCUTANEOUS
Status: DISCONTINUED | OUTPATIENT
Start: 2019-01-01 | End: 2019-01-01 | Stop reason: HOSPADM

## 2019-01-01 RX ORDER — PROMETHAZINE HYDROCHLORIDE 25 MG/1
25 TABLET ORAL ONCE AS NEEDED
Status: DISCONTINUED | OUTPATIENT
Start: 2019-01-01 | End: 2019-01-01 | Stop reason: HOSPADM

## 2019-01-01 RX ORDER — LOSARTAN POTASSIUM 50 MG/1
50 TABLET ORAL DAILY
Qty: 30 TABLET | Refills: 2 | Status: SHIPPED | OUTPATIENT
Start: 2019-01-01 | End: 2019-01-01 | Stop reason: SDUPTHER

## 2019-01-01 RX ORDER — EPHEDRINE SULFATE 50 MG/ML
5 INJECTION, SOLUTION INTRAVENOUS ONCE AS NEEDED
Status: DISCONTINUED | OUTPATIENT
Start: 2019-01-01 | End: 2019-01-01 | Stop reason: HOSPADM

## 2019-01-01 RX ORDER — DOCUSATE SODIUM 100 MG/1
200 CAPSULE, LIQUID FILLED ORAL DAILY PRN
Status: DISCONTINUED | OUTPATIENT
Start: 2019-01-01 | End: 2019-01-01 | Stop reason: HOSPADM

## 2019-01-01 RX ORDER — DEXAMETHASONE 2 MG/1
2 TABLET ORAL EVERY 8 HOURS SCHEDULED
Qty: 60 TABLET | Refills: 0 | Status: SHIPPED | OUTPATIENT
Start: 2019-01-01 | End: 2019-01-01

## 2019-01-01 RX ORDER — SUCRALFATE 1 G/1
1 TABLET ORAL
Qty: 120 TABLET | Refills: 0 | Status: SHIPPED | OUTPATIENT
Start: 2019-01-01 | End: 2019-01-01

## 2019-01-01 RX ORDER — DIPHENHYDRAMINE HCL 25 MG
25 CAPSULE ORAL
Status: DISCONTINUED | OUTPATIENT
Start: 2019-01-01 | End: 2019-01-01 | Stop reason: HOSPADM

## 2019-01-01 RX ORDER — SODIUM CHLORIDE 9 MG/ML
500 INJECTION, SOLUTION INTRAVENOUS ONCE
Status: COMPLETED | OUTPATIENT
Start: 2019-01-01 | End: 2019-01-01

## 2019-01-01 RX ORDER — DIPHENHYDRAMINE HCL 25 MG
25 CAPSULE ORAL ONCE
Status: COMPLETED | OUTPATIENT
Start: 2019-01-01 | End: 2019-01-01

## 2019-01-01 RX ADMIN — LEVETIRACETAM 500 MG: 500 TABLET, FILM COATED ORAL at 07:58

## 2019-01-01 RX ADMIN — ETOPOSIDE 190 MG: 20 INJECTION, SOLUTION, CONCENTRATE INTRAVENOUS at 10:31

## 2019-01-01 RX ADMIN — SODIUM CHLORIDE 250 ML: 9 INJECTION, SOLUTION INTRAVENOUS at 12:50

## 2019-01-01 RX ADMIN — ALTEPLASE: 2.2 INJECTION, POWDER, LYOPHILIZED, FOR SOLUTION INTRAVENOUS at 11:16

## 2019-01-01 RX ADMIN — LOSARTAN POTASSIUM 50 MG: 50 TABLET, FILM COATED ORAL at 08:06

## 2019-01-01 RX ADMIN — ARFORMOTEROL TARTRATE 15 MCG: 15 SOLUTION RESPIRATORY (INHALATION) at 06:51

## 2019-01-01 RX ADMIN — PROPOFOL 100 MCG/KG/MIN: 10 INJECTION, EMULSION INTRAVENOUS at 10:00

## 2019-01-01 RX ADMIN — DIPHENHYDRAMINE HYDROCHLORIDE 25 MG: 25 CAPSULE ORAL at 09:36

## 2019-01-01 RX ADMIN — DEXAMETHASONE 2 MG: 2 TABLET ORAL at 06:12

## 2019-01-01 RX ADMIN — ETOPOSIDE 190 MG: 20 INJECTION, SOLUTION, CONCENTRATE INTRAVENOUS at 13:50

## 2019-01-01 RX ADMIN — LEVETIRACETAM 500 MG: 500 TABLET, FILM COATED ORAL at 08:29

## 2019-01-01 RX ADMIN — SODIUM CHLORIDE 100 ML: 9 INJECTION, SOLUTION INTRAVENOUS at 12:02

## 2019-01-01 RX ADMIN — ETOPOSIDE 190 MG: 20 INJECTION, SOLUTION, CONCENTRATE INTRAVENOUS at 13:01

## 2019-01-01 RX ADMIN — PALONOSETRON 0.25 MG: 0.05 INJECTION, SOLUTION INTRAVENOUS at 11:49

## 2019-01-01 RX ADMIN — SODIUM CHLORIDE, PRESERVATIVE FREE 10 ML: 5 INJECTION INTRAVENOUS at 14:30

## 2019-01-01 RX ADMIN — ARFORMOTEROL TARTRATE 15 MCG: 15 SOLUTION RESPIRATORY (INHALATION) at 21:00

## 2019-01-01 RX ADMIN — SODIUM CHLORIDE 250 ML: 900 INJECTION, SOLUTION INTRAVENOUS at 14:58

## 2019-01-01 RX ADMIN — LEVETIRACETAM 500 MG: 500 TABLET, FILM COATED ORAL at 22:15

## 2019-01-01 RX ADMIN — SODIUM CHLORIDE 250 ML: 9 INJECTION, SOLUTION INTRAVENOUS at 13:08

## 2019-01-01 RX ADMIN — ETOPOSIDE 190 MG: 20 INJECTION, SOLUTION, CONCENTRATE INTRAVENOUS at 13:39

## 2019-01-01 RX ADMIN — POLYETHYLENE GLYCOL 3350 17 G: 17 POWDER, FOR SOLUTION ORAL at 08:06

## 2019-01-01 RX ADMIN — Medication 10 ML: at 14:07

## 2019-01-01 RX ADMIN — BUDESONIDE 0.5 MG: 0.5 SUSPENSION RESPIRATORY (INHALATION) at 19:37

## 2019-01-01 RX ADMIN — DOCUSATE SODIUM 200 MG: 100 CAPSULE, LIQUID FILLED ORAL at 21:09

## 2019-01-01 RX ADMIN — PANTOPRAZOLE SODIUM 40 MG: 40 TABLET, DELAYED RELEASE ORAL at 05:26

## 2019-01-01 RX ADMIN — LEVETIRACETAM 500 MG: 500 TABLET, FILM COATED ORAL at 07:43

## 2019-01-01 RX ADMIN — PROCHLORPERAZINE MALEATE 10 MG: 5 TABLET ORAL at 15:15

## 2019-01-01 RX ADMIN — Medication 500 UNITS: at 14:47

## 2019-01-01 RX ADMIN — GADOBENATE DIMEGLUMINE 17 ML: 529 INJECTION, SOLUTION INTRAVENOUS at 13:45

## 2019-01-01 RX ADMIN — DEXAMETHASONE 2 MG: 2 TABLET ORAL at 06:02

## 2019-01-01 RX ADMIN — PROCHLORPERAZINE MALEATE 10 MG: 5 TABLET ORAL at 12:57

## 2019-01-01 RX ADMIN — PEGFILGRASTIM 6 MG: KIT SUBCUTANEOUS at 16:26

## 2019-01-01 RX ADMIN — SODIUM CHLORIDE 1000 ML: 900 INJECTION, SOLUTION INTRAVENOUS at 14:45

## 2019-01-01 RX ADMIN — FLUOXETINE HYDROCHLORIDE 20 MG: 20 CAPSULE ORAL at 17:17

## 2019-01-01 RX ADMIN — MIDAZOLAM 1 MG: 1 INJECTION INTRAMUSCULAR; INTRAVENOUS at 09:24

## 2019-01-01 RX ADMIN — DEXAMETHASONE 2 MG: 2 TABLET ORAL at 22:20

## 2019-01-01 RX ADMIN — SODIUM CHLORIDE 1000 ML: 900 INJECTION, SOLUTION INTRAVENOUS at 12:28

## 2019-01-01 RX ADMIN — Medication 500 UNITS: at 11:33

## 2019-01-01 RX ADMIN — SODIUM CHLORIDE, PRESERVATIVE FREE 500 UNITS: 5 INJECTION INTRAVENOUS at 13:52

## 2019-01-01 RX ADMIN — LOSARTAN POTASSIUM 50 MG: 50 TABLET, FILM COATED ORAL at 08:01

## 2019-01-01 RX ADMIN — DEXAMETHASONE SODIUM PHOSPHATE 12 MG: 10 INJECTION, SOLUTION INTRAMUSCULAR; INTRAVENOUS at 12:33

## 2019-01-01 RX ADMIN — SODIUM CHLORIDE 250 ML: 9 INJECTION, SOLUTION INTRAVENOUS at 11:49

## 2019-01-01 RX ADMIN — Medication 500 UNITS: at 14:40

## 2019-01-01 RX ADMIN — FAMOTIDINE 20 MG: 10 INJECTION INTRAVENOUS at 09:24

## 2019-01-01 RX ADMIN — PANTOPRAZOLE SODIUM 40 MG: 40 TABLET, DELAYED RELEASE ORAL at 06:02

## 2019-01-01 RX ADMIN — ETOPOSIDE 150 MG: 20 INJECTION, SOLUTION, CONCENTRATE INTRAVENOUS at 15:14

## 2019-01-01 RX ADMIN — LOSARTAN POTASSIUM 50 MG: 50 TABLET, FILM COATED ORAL at 08:29

## 2019-01-01 RX ADMIN — CARBOPLATIN 460 MG: 10 INJECTION, SOLUTION INTRAVENOUS at 12:08

## 2019-01-01 RX ADMIN — SODIUM CHLORIDE 250 ML: 9 INJECTION, SOLUTION INTRAVENOUS at 08:50

## 2019-01-01 RX ADMIN — Medication 500 UNITS: at 14:07

## 2019-01-01 RX ADMIN — LEVETIRACETAM 500 MG: 500 TABLET, FILM COATED ORAL at 22:20

## 2019-01-01 RX ADMIN — DEXAMETHASONE 2 MG: 2 TABLET ORAL at 06:09

## 2019-01-01 RX ADMIN — SODIUM CHLORIDE, PRESERVATIVE FREE 10 ML: 5 INJECTION INTRAVENOUS at 15:11

## 2019-01-01 RX ADMIN — Medication 500 UNITS: at 14:56

## 2019-01-01 RX ADMIN — LEVETIRACETAM 500 MG: 500 TABLET, FILM COATED ORAL at 07:47

## 2019-01-01 RX ADMIN — DIATRIZOATE MEGLUMINE AND DIATRIZOATE SODIUM 30 ML: 660; 100 LIQUID ORAL; RECTAL at 12:13

## 2019-01-01 RX ADMIN — FLUOXETINE HYDROCHLORIDE 20 MG: 20 CAPSULE ORAL at 16:16

## 2019-01-01 RX ADMIN — PANTOPRAZOLE SODIUM 40 MG: 40 TABLET, DELAYED RELEASE ORAL at 06:09

## 2019-01-01 RX ADMIN — POLYETHYLENE GLYCOL 3350 17 G: 17 POWDER, FOR SOLUTION ORAL at 07:43

## 2019-01-01 RX ADMIN — ARFORMOTEROL TARTRATE 15 MCG: 15 SOLUTION RESPIRATORY (INHALATION) at 07:49

## 2019-01-01 RX ADMIN — SODIUM CHLORIDE 1000 ML: 900 INJECTION, SOLUTION INTRAVENOUS at 09:58

## 2019-01-01 RX ADMIN — PANTOPRAZOLE SODIUM 40 MG: 40 TABLET, DELAYED RELEASE ORAL at 05:56

## 2019-01-01 RX ADMIN — LEVETIRACETAM 500 MG: 500 TABLET, FILM COATED ORAL at 20:34

## 2019-01-01 RX ADMIN — ARFORMOTEROL TARTRATE 15 MCG: 15 SOLUTION RESPIRATORY (INHALATION) at 19:36

## 2019-01-01 RX ADMIN — LEVETIRACETAM 500 MG: 500 TABLET, FILM COATED ORAL at 08:02

## 2019-01-01 RX ADMIN — FLUOXETINE HYDROCHLORIDE 20 MG: 20 CAPSULE ORAL at 16:05

## 2019-01-01 RX ADMIN — DEXAMETHASONE SODIUM PHOSPHATE 12 MG: 10 INJECTION, SOLUTION INTRAMUSCULAR; INTRAVENOUS at 11:39

## 2019-01-01 RX ADMIN — ARFORMOTEROL TARTRATE 15 MCG: 15 SOLUTION RESPIRATORY (INHALATION) at 20:49

## 2019-01-01 RX ADMIN — SODIUM CHLORIDE 250 ML: 9 INJECTION, SOLUTION INTRAVENOUS at 15:06

## 2019-01-01 RX ADMIN — DEXAMETHASONE 2 MG: 2 TABLET ORAL at 14:28

## 2019-01-01 RX ADMIN — SODIUM CHLORIDE 100 ML: 9 INJECTION, SOLUTION INTRAVENOUS at 12:06

## 2019-01-01 RX ADMIN — LEVETIRACETAM 500 MG: 500 TABLET, FILM COATED ORAL at 20:30

## 2019-01-01 RX ADMIN — SODIUM CHLORIDE 500 ML: 900 INJECTION, SOLUTION INTRAVENOUS at 14:57

## 2019-01-01 RX ADMIN — DEXAMETHASONE 4 MG: 4 TABLET ORAL at 22:15

## 2019-01-01 RX ADMIN — BUDESONIDE 0.5 MG: 0.5 SUSPENSION RESPIRATORY (INHALATION) at 06:53

## 2019-01-01 RX ADMIN — PALONOSETRON 0.25 MG: 0.05 INJECTION, SOLUTION INTRAVENOUS at 08:50

## 2019-01-01 RX ADMIN — GADOBENATE DIMEGLUMINE 17 ML: 529 INJECTION, SOLUTION INTRAVENOUS at 17:17

## 2019-01-01 RX ADMIN — Medication 500 UNITS: at 15:55

## 2019-01-01 RX ADMIN — FLUOXETINE HYDROCHLORIDE 20 MG: 20 CAPSULE ORAL at 15:56

## 2019-01-01 RX ADMIN — SODIUM CHLORIDE 75 ML/HR: 9 INJECTION, SOLUTION INTRAVENOUS at 00:27

## 2019-01-01 RX ADMIN — PALONOSETRON 0.25 MG: 0.25 INJECTION, SOLUTION INTRAVENOUS at 09:44

## 2019-01-01 RX ADMIN — SODIUM CHLORIDE 100 ML: 9 INJECTION, SOLUTION INTRAVENOUS at 08:51

## 2019-01-01 RX ADMIN — BUDESONIDE 0.5 MG: 0.5 SUSPENSION RESPIRATORY (INHALATION) at 20:48

## 2019-01-01 RX ADMIN — BUDESONIDE 0.5 MG: 0.5 SUSPENSION RESPIRATORY (INHALATION) at 12:29

## 2019-01-01 RX ADMIN — DEXAMETHASONE 2 MG: 2 TABLET ORAL at 14:34

## 2019-01-01 RX ADMIN — LEVETIRACETAM 500 MG: 500 TABLET, FILM COATED ORAL at 08:06

## 2019-01-01 RX ADMIN — DEXAMETHASONE 2 MG: 2 TABLET ORAL at 20:34

## 2019-01-01 RX ADMIN — FLUOXETINE HYDROCHLORIDE 20 MG: 20 CAPSULE ORAL at 16:51

## 2019-01-01 RX ADMIN — BUDESONIDE 0.5 MG: 0.5 SUSPENSION RESPIRATORY (INHALATION) at 07:01

## 2019-01-01 RX ADMIN — SODIUM CHLORIDE 500 ML: 9 INJECTION, SOLUTION INTRAVENOUS at 14:02

## 2019-01-01 RX ADMIN — ETOPOSIDE 190 MG: 20 INJECTION, SOLUTION, CONCENTRATE INTRAVENOUS at 12:45

## 2019-01-01 RX ADMIN — PROCHLORPERAZINE MALEATE 10 MG: 5 TABLET ORAL at 13:02

## 2019-01-01 RX ADMIN — SODIUM CHLORIDE, PRESERVATIVE FREE 500 UNITS: 5 INJECTION INTRAVENOUS at 15:41

## 2019-01-01 RX ADMIN — FLUOXETINE HYDROCHLORIDE 20 MG: 20 CAPSULE ORAL at 16:55

## 2019-01-01 RX ADMIN — DEXAMETHASONE 2 MG: 2 TABLET ORAL at 05:59

## 2019-01-01 RX ADMIN — SODIUM CHLORIDE 250 ML: 9 INJECTION, SOLUTION INTRAVENOUS at 15:15

## 2019-01-01 RX ADMIN — Medication 500 UNITS: at 15:11

## 2019-01-01 RX ADMIN — FLUDEOXYGLUCOSE F18 1 DOSE: 300 INJECTION INTRAVENOUS at 08:10

## 2019-01-01 RX ADMIN — DOCUSATE SODIUM 200 MG: 100 CAPSULE, LIQUID FILLED ORAL at 22:15

## 2019-01-01 RX ADMIN — ATEZOLIZUMAB 1200 MG: 1200 INJECTION, SOLUTION INTRAVENOUS at 10:04

## 2019-01-01 RX ADMIN — ACETAMINOPHEN 650 MG: 325 TABLET, FILM COATED ORAL at 09:36

## 2019-01-01 RX ADMIN — DEXAMETHASONE 2 MG: 2 TABLET ORAL at 21:10

## 2019-01-01 RX ADMIN — PALONOSETRON 0.25 MG: 0.05 INJECTION, SOLUTION INTRAVENOUS at 12:00

## 2019-01-01 RX ADMIN — ARFORMOTEROL TARTRATE 15 MCG: 15 SOLUTION RESPIRATORY (INHALATION) at 20:57

## 2019-01-01 RX ADMIN — LEVETIRACETAM 500 MG: 500 TABLET, FILM COATED ORAL at 09:15

## 2019-01-01 RX ADMIN — BUDESONIDE 0.5 MG: 0.5 SUSPENSION RESPIRATORY (INHALATION) at 07:14

## 2019-01-01 RX ADMIN — ARFORMOTEROL TARTRATE 15 MCG: 15 SOLUTION RESPIRATORY (INHALATION) at 07:01

## 2019-01-01 RX ADMIN — BUDESONIDE 0.5 MG: 0.5 SUSPENSION RESPIRATORY (INHALATION) at 07:02

## 2019-01-01 RX ADMIN — SODIUM CHLORIDE 1000 ML: 9 INJECTION, SOLUTION INTRAVENOUS at 10:00

## 2019-01-01 RX ADMIN — IOPAMIDOL 15 ML: 755 INJECTION, SOLUTION INTRAVENOUS at 13:57

## 2019-01-01 RX ADMIN — Medication 10 ML: at 12:23

## 2019-01-01 RX ADMIN — PROCHLORPERAZINE MALEATE 10 MG: 5 TABLET ORAL at 12:50

## 2019-01-01 RX ADMIN — LOSARTAN POTASSIUM 50 MG: 50 TABLET, FILM COATED ORAL at 07:43

## 2019-01-01 RX ADMIN — ARFORMOTEROL TARTRATE 15 MCG: 15 SOLUTION RESPIRATORY (INHALATION) at 07:14

## 2019-01-01 RX ADMIN — TEMAZEPAM 30 MG: 7.5 CAPSULE ORAL at 20:25

## 2019-01-01 RX ADMIN — SODIUM CHLORIDE 250 ML: 9 INJECTION, SOLUTION INTRAVENOUS at 13:02

## 2019-01-01 RX ADMIN — PROCHLORPERAZINE MALEATE 10 MG: 5 TABLET ORAL at 14:31

## 2019-01-01 RX ADMIN — DEXAMETHASONE 2 MG: 2 TABLET ORAL at 15:41

## 2019-01-01 RX ADMIN — DEXAMETHASONE 2 MG: 2 TABLET ORAL at 20:26

## 2019-01-01 RX ADMIN — BUDESONIDE 0.5 MG: 0.5 SUSPENSION RESPIRATORY (INHALATION) at 06:51

## 2019-01-01 RX ADMIN — DEXAMETHASONE SODIUM PHOSPHATE 12 MG: 10 INJECTION, SOLUTION INTRAMUSCULAR; INTRAVENOUS at 11:49

## 2019-01-01 RX ADMIN — DEXAMETHASONE 2 MG: 2 TABLET ORAL at 21:21

## 2019-01-01 RX ADMIN — BUDESONIDE 0.5 MG: 0.5 SUSPENSION RESPIRATORY (INHALATION) at 19:51

## 2019-01-01 RX ADMIN — ARFORMOTEROL TARTRATE 15 MCG: 15 SOLUTION RESPIRATORY (INHALATION) at 07:02

## 2019-01-01 RX ADMIN — LOSARTAN POTASSIUM 50 MG: 50 TABLET, FILM COATED ORAL at 09:15

## 2019-01-01 RX ADMIN — ETOPOSIDE 150 MG: 20 INJECTION, SOLUTION, CONCENTRATE INTRAVENOUS at 13:38

## 2019-01-01 RX ADMIN — DEXAMETHASONE 4 MG: 4 TABLET ORAL at 13:55

## 2019-01-01 RX ADMIN — DOCUSATE SODIUM 200 MG: 100 CAPSULE, LIQUID FILLED ORAL at 22:20

## 2019-01-01 RX ADMIN — Medication 500 UNITS: at 16:29

## 2019-01-01 RX ADMIN — Medication 500 UNITS: at 11:31

## 2019-01-01 RX ADMIN — SODIUM CHLORIDE 250 ML: 9 INJECTION, SOLUTION INTRAVENOUS at 09:44

## 2019-01-01 RX ADMIN — TEMAZEPAM 30 MG: 7.5 CAPSULE ORAL at 22:33

## 2019-01-01 RX ADMIN — DOCUSATE SODIUM 200 MG: 100 CAPSULE, LIQUID FILLED ORAL at 08:29

## 2019-01-01 RX ADMIN — SODIUM CHLORIDE 1000 ML: 900 INJECTION, SOLUTION INTRAVENOUS at 12:55

## 2019-01-01 RX ADMIN — SODIUM CHLORIDE, POTASSIUM CHLORIDE, SODIUM LACTATE AND CALCIUM CHLORIDE 9 ML/HR: 600; 310; 30; 20 INJECTION, SOLUTION INTRAVENOUS at 08:40

## 2019-01-01 RX ADMIN — BUDESONIDE 0.5 MG: 0.5 SUSPENSION RESPIRATORY (INHALATION) at 21:01

## 2019-01-01 RX ADMIN — BUDESONIDE 0.5 MG: 0.5 SUSPENSION RESPIRATORY (INHALATION) at 07:49

## 2019-01-01 RX ADMIN — SODIUM CHLORIDE, PRESERVATIVE FREE 500 UNITS: 5 INJECTION INTRAVENOUS at 14:14

## 2019-01-01 RX ADMIN — PROCHLORPERAZINE MALEATE 10 MG: 5 TABLET ORAL at 13:41

## 2019-01-01 RX ADMIN — POLYETHYLENE GLYCOL 3350 17 G: 17 POWDER, FOR SOLUTION ORAL at 08:29

## 2019-01-01 RX ADMIN — DEXAMETHASONE 4 MG: 4 TABLET ORAL at 05:26

## 2019-01-01 RX ADMIN — LOSARTAN POTASSIUM 50 MG: 50 TABLET, FILM COATED ORAL at 07:47

## 2019-01-01 RX ADMIN — FLUOXETINE HYDROCHLORIDE 20 MG: 20 CAPSULE ORAL at 16:50

## 2019-01-01 RX ADMIN — PANTOPRAZOLE SODIUM 40 MG: 40 TABLET, DELAYED RELEASE ORAL at 06:11

## 2019-01-01 RX ADMIN — ARFORMOTEROL TARTRATE 15 MCG: 15 SOLUTION RESPIRATORY (INHALATION) at 20:27

## 2019-01-01 RX ADMIN — SODIUM CHLORIDE 250 ML: 9 INJECTION, SOLUTION INTRAVENOUS at 12:00

## 2019-01-01 RX ADMIN — LEVETIRACETAM 500 MG: 500 TABLET, FILM COATED ORAL at 21:09

## 2019-01-01 RX ADMIN — Medication 10 ML: at 15:41

## 2019-01-01 RX ADMIN — DIATRIZOATE MEGLUMINE AND DIATRIZOATE SODIUM 30 ML: 660; 100 LIQUID ORAL; RECTAL at 10:40

## 2019-01-01 RX ADMIN — ETOPOSIDE 110 MG: 20 INJECTION, SOLUTION, CONCENTRATE INTRAVENOUS at 12:40

## 2019-01-01 RX ADMIN — ETOPOSIDE 110 MG: 20 INJECTION, SOLUTION, CONCENTRATE INTRAVENOUS at 15:28

## 2019-01-01 RX ADMIN — PEGFILGRASTIM 6 MG: KIT SUBCUTANEOUS at 14:27

## 2019-01-01 RX ADMIN — SODIUM CHLORIDE 100 ML: 9 INJECTION, SOLUTION INTRAVENOUS at 11:08

## 2019-01-01 RX ADMIN — DEXAMETHASONE 2 MG: 2 TABLET ORAL at 20:30

## 2019-01-01 RX ADMIN — DEXAMETHASONE 2 MG: 2 TABLET ORAL at 13:12

## 2019-01-01 RX ADMIN — BUDESONIDE 0.5 MG: 0.5 SUSPENSION RESPIRATORY (INHALATION) at 20:49

## 2019-01-01 RX ADMIN — CARBOPLATIN 270 MG: 10 INJECTION, SOLUTION INTRAVENOUS at 13:53

## 2019-01-01 RX ADMIN — DOCUSATE SODIUM 200 MG: 100 CAPSULE, LIQUID FILLED ORAL at 07:43

## 2019-01-01 RX ADMIN — CARBOPLATIN 340 MG: 10 INJECTION, SOLUTION INTRAVENOUS at 13:35

## 2019-01-01 RX ADMIN — DEXAMETHASONE 4 MG: 4 TABLET ORAL at 15:30

## 2019-01-01 RX ADMIN — ARFORMOTEROL TARTRATE 15 MCG: 15 SOLUTION RESPIRATORY (INHALATION) at 20:48

## 2019-01-01 RX ADMIN — Medication 500 UNITS: at 14:30

## 2019-01-01 RX ADMIN — IOPAMIDOL 85 ML: 612 INJECTION, SOLUTION INTRAVENOUS at 11:25

## 2019-01-01 RX ADMIN — ETOPOSIDE 110 MG: 20 INJECTION, SOLUTION, CONCENTRATE INTRAVENOUS at 14:52

## 2019-01-01 RX ADMIN — LEVETIRACETAM 500 MG: 500 TABLET, FILM COATED ORAL at 21:21

## 2019-01-01 RX ADMIN — DEXAMETHASONE 2 MG: 2 TABLET ORAL at 13:16

## 2019-01-01 RX ADMIN — BUDESONIDE 0.5 MG: 0.5 SUSPENSION RESPIRATORY (INHALATION) at 20:56

## 2019-01-01 RX ADMIN — PANTOPRAZOLE SODIUM 40 MG: 40 TABLET, DELAYED RELEASE ORAL at 06:12

## 2019-01-01 RX ADMIN — SODIUM CHLORIDE 250 ML: 9 INJECTION, SOLUTION INTRAVENOUS at 13:24

## 2019-01-01 RX ADMIN — SODIUM CHLORIDE 500 ML: 9 INJECTION, SOLUTION INTRAVENOUS at 13:48

## 2019-01-01 RX ADMIN — SODIUM CHLORIDE, PRESERVATIVE FREE 10 ML: 5 INJECTION INTRAVENOUS at 11:31

## 2019-01-01 RX ADMIN — SODIUM CHLORIDE, PRESERVATIVE FREE 500 UNITS: 5 INJECTION INTRAVENOUS at 12:23

## 2019-01-01 RX ADMIN — ARFORMOTEROL TARTRATE 15 MCG: 15 SOLUTION RESPIRATORY (INHALATION) at 12:31

## 2019-01-01 RX ADMIN — LEVETIRACETAM 500 MG: 500 TABLET, FILM COATED ORAL at 20:26

## 2019-01-01 RX ADMIN — DOCUSATE SODIUM 200 MG: 100 CAPSULE, LIQUID FILLED ORAL at 07:46

## 2019-01-01 RX ADMIN — SODIUM CHLORIDE 250 ML: 9 INJECTION, SOLUTION INTRAVENOUS at 13:41

## 2019-01-01 RX ADMIN — TEMAZEPAM 15 MG: 7.5 CAPSULE ORAL at 22:15

## 2019-01-01 RX ADMIN — DEXAMETHASONE 4 MG: 4 TABLET ORAL at 05:56

## 2019-01-01 RX ADMIN — TEMAZEPAM 30 MG: 7.5 CAPSULE ORAL at 20:33

## 2019-01-01 RX ADMIN — PROCHLORPERAZINE MALEATE 10 MG: 5 TABLET ORAL at 15:05

## 2019-01-01 RX ADMIN — CEFAZOLIN SODIUM 2 G: 2 INJECTION, SOLUTION INTRAVENOUS at 11:06

## 2019-01-01 RX ADMIN — IOPAMIDOL 85 ML: 612 INJECTION, SOLUTION INTRAVENOUS at 13:11

## 2019-01-01 RX ADMIN — TEMAZEPAM 30 MG: 7.5 CAPSULE ORAL at 21:09

## 2019-01-01 RX ADMIN — CARBOPLATIN 430 MG: 10 INJECTION, SOLUTION INTRAVENOUS at 09:53

## 2019-01-01 RX ADMIN — ETOPOSIDE 190 MG: 20 INJECTION, SOLUTION, CONCENTRATE INTRAVENOUS at 13:24

## 2019-01-01 RX ADMIN — SODIUM CHLORIDE 1000 ML: 900 INJECTION, SOLUTION INTRAVENOUS at 14:30

## 2019-01-01 RX ADMIN — DEXAMETHASONE SODIUM PHOSPHATE 12 MG: 10 INJECTION, SOLUTION INTRAMUSCULAR; INTRAVENOUS at 09:23

## 2019-01-01 RX ADMIN — DOCUSATE SODIUM 200 MG: 100 CAPSULE, LIQUID FILLED ORAL at 08:06

## 2019-01-01 RX ADMIN — ETOPOSIDE 150 MG: 20 INJECTION, SOLUTION, CONCENTRATE INTRAVENOUS at 14:09

## 2019-01-01 RX ADMIN — BUDESONIDE 0.5 MG: 0.5 SUSPENSION RESPIRATORY (INHALATION) at 20:28

## 2019-01-01 RX ADMIN — SODIUM CHLORIDE 500 ML: 900 INJECTION, SOLUTION INTRAVENOUS at 15:07

## 2019-01-01 RX ADMIN — PANTOPRAZOLE SODIUM 40 MG: 40 TABLET, DELAYED RELEASE ORAL at 05:59

## 2019-01-01 RX ADMIN — ARFORMOTEROL TARTRATE 15 MCG: 15 SOLUTION RESPIRATORY (INHALATION) at 19:51

## 2019-01-01 RX ADMIN — TEMAZEPAM 30 MG: 7.5 CAPSULE ORAL at 20:30

## 2019-01-01 RX ADMIN — DEXAMETHASONE 2 MG: 2 TABLET ORAL at 06:11

## 2019-01-01 RX ADMIN — PROCHLORPERAZINE MALEATE 10 MG: 5 TABLET ORAL at 13:13

## 2019-03-04 ENCOUNTER — TRANSCRIBE ORDERS (OUTPATIENT)
Dept: ADMINISTRATIVE | Facility: HOSPITAL | Age: 74
End: 2019-03-04

## 2019-03-04 DIAGNOSIS — J32.4 CHRONIC PANSINUSITIS: Primary | ICD-10-CM

## 2019-03-07 ENCOUNTER — HOSPITAL ENCOUNTER (OUTPATIENT)
Dept: CT IMAGING | Facility: HOSPITAL | Age: 74
Discharge: HOME | End: 2019-03-07
Admitting: OTOLARYNGOLOGY

## 2019-03-07 DIAGNOSIS — J32.4 CHRONIC PANSINUSITIS: ICD-10-CM

## 2019-03-07 PROCEDURE — 70486 CT MAXILLOFACIAL W/O DYE: CPT

## 2019-08-24 ENCOUNTER — APPOINTMENT (OUTPATIENT)
Dept: GENERAL RADIOLOGY | Facility: HOSPITAL | Age: 74
End: 2019-08-24

## 2019-08-24 ENCOUNTER — HOSPITAL ENCOUNTER (INPATIENT)
Facility: HOSPITAL | Age: 74
LOS: 9 days | Discharge: NURSING, REHAB OR LONG-TERM CARE | End: 2019-09-02
Attending: EMERGENCY MEDICINE | Admitting: HOSPITALIST

## 2019-08-24 ENCOUNTER — APPOINTMENT (OUTPATIENT)
Dept: CT IMAGING | Facility: HOSPITAL | Age: 74
End: 2019-08-24

## 2019-08-24 DIAGNOSIS — G93.89 BRAIN MASS: Primary | ICD-10-CM

## 2019-08-24 DIAGNOSIS — R91.8 LUNG MASS: ICD-10-CM

## 2019-08-24 DIAGNOSIS — N39.0 ACUTE UTI: ICD-10-CM

## 2019-08-24 LAB
ALBUMIN SERPL-MCNC: 4.5 G/DL (ref 3.5–5.2)
ALBUMIN/GLOB SERPL: 1.7 G/DL
ALP SERPL-CCNC: 77 U/L (ref 39–117)
ALT SERPL W P-5'-P-CCNC: 14 U/L (ref 1–33)
ANION GAP SERPL CALCULATED.3IONS-SCNC: 9.7 MMOL/L (ref 5–15)
AST SERPL-CCNC: 21 U/L (ref 1–32)
BACTERIA UR QL AUTO: ABNORMAL /HPF
BASOPHILS # BLD AUTO: 0.06 10*3/MM3 (ref 0–0.2)
BASOPHILS NFR BLD AUTO: 0.8 % (ref 0–1.5)
BILIRUB SERPL-MCNC: 0.2 MG/DL (ref 0.2–1.2)
BILIRUB UR QL STRIP: NEGATIVE
BUN BLD-MCNC: 20 MG/DL (ref 8–23)
BUN/CREAT SERPL: 14.3 (ref 7–25)
CALCIUM SPEC-SCNC: 9.5 MG/DL (ref 8.6–10.5)
CHLORIDE SERPL-SCNC: 106 MMOL/L (ref 98–107)
CLARITY UR: CLEAR
CO2 SERPL-SCNC: 29.3 MMOL/L (ref 22–29)
COLOR UR: YELLOW
CREAT BLD-MCNC: 1.4 MG/DL (ref 0.57–1)
DEPRECATED RDW RBC AUTO: 42 FL (ref 37–54)
EOSINOPHIL # BLD AUTO: 0 10*3/MM3 (ref 0–0.4)
EOSINOPHIL NFR BLD AUTO: 0 % (ref 0.3–6.2)
ERYTHROCYTE [DISTWIDTH] IN BLOOD BY AUTOMATED COUNT: 12.7 % (ref 12.3–15.4)
GFR SERPL CREATININE-BSD FRML MDRD: 37 ML/MIN/1.73
GLOBULIN UR ELPH-MCNC: 2.6 GM/DL
GLUCOSE BLD-MCNC: 92 MG/DL (ref 65–99)
GLUCOSE BLDC GLUCOMTR-MCNC: 93 MG/DL (ref 70–130)
GLUCOSE UR STRIP-MCNC: NEGATIVE MG/DL
HCT VFR BLD AUTO: 41.3 % (ref 34–46.6)
HGB BLD-MCNC: 13.3 G/DL (ref 12–15.9)
HGB UR QL STRIP.AUTO: NEGATIVE
HOLD SPECIMEN: NORMAL
HOLD SPECIMEN: NORMAL
HYALINE CASTS UR QL AUTO: ABNORMAL /LPF
IMM GRANULOCYTES # BLD AUTO: 0.02 10*3/MM3 (ref 0–0.05)
IMM GRANULOCYTES NFR BLD AUTO: 0.3 % (ref 0–0.5)
KETONES UR QL STRIP: ABNORMAL
LEUKOCYTE ESTERASE UR QL STRIP.AUTO: ABNORMAL
LYMPHOCYTES # BLD AUTO: 1.72 10*3/MM3 (ref 0.7–3.1)
LYMPHOCYTES NFR BLD AUTO: 24.2 % (ref 19.6–45.3)
MAGNESIUM SERPL-MCNC: 2.3 MG/DL (ref 1.6–2.4)
MCH RBC QN AUTO: 29 PG (ref 26.6–33)
MCHC RBC AUTO-ENTMCNC: 32.2 G/DL (ref 31.5–35.7)
MCV RBC AUTO: 90 FL (ref 79–97)
MONOCYTES # BLD AUTO: 0.55 10*3/MM3 (ref 0.1–0.9)
MONOCYTES NFR BLD AUTO: 7.7 % (ref 5–12)
NEUTROPHILS # BLD AUTO: 4.76 10*3/MM3 (ref 1.7–7)
NEUTROPHILS NFR BLD AUTO: 67 % (ref 42.7–76)
NITRITE UR QL STRIP: NEGATIVE
NRBC BLD AUTO-RTO: 0 /100 WBC (ref 0–0.2)
PH UR STRIP.AUTO: 7.5 [PH] (ref 5–8)
PLATELET # BLD AUTO: 282 10*3/MM3 (ref 140–450)
PMV BLD AUTO: 9.3 FL (ref 6–12)
POTASSIUM BLD-SCNC: 4.2 MMOL/L (ref 3.5–5.2)
PROT SERPL-MCNC: 7.1 G/DL (ref 6–8.5)
PROT UR QL STRIP: NEGATIVE
RBC # BLD AUTO: 4.59 10*6/MM3 (ref 3.77–5.28)
RBC # UR: ABNORMAL /HPF
REF LAB TEST METHOD: ABNORMAL
SODIUM BLD-SCNC: 145 MMOL/L (ref 136–145)
SP GR UR STRIP: 1.02 (ref 1–1.03)
SQUAMOUS #/AREA URNS HPF: ABNORMAL /HPF
TROPONIN T SERPL-MCNC: <0.01 NG/ML (ref 0–0.03)
UROBILINOGEN UR QL STRIP: ABNORMAL
WBC NRBC COR # BLD: 7.11 10*3/MM3 (ref 3.4–10.8)
WBC UR QL AUTO: ABNORMAL /HPF
WHOLE BLOOD HOLD SPECIMEN: NORMAL
WHOLE BLOOD HOLD SPECIMEN: NORMAL

## 2019-08-24 PROCEDURE — 80053 COMPREHEN METABOLIC PANEL: CPT

## 2019-08-24 PROCEDURE — 87186 SC STD MICRODIL/AGAR DIL: CPT | Performed by: NEUROLOGICAL SURGERY

## 2019-08-24 PROCEDURE — 71046 X-RAY EXAM CHEST 2 VIEWS: CPT

## 2019-08-24 PROCEDURE — 82962 GLUCOSE BLOOD TEST: CPT

## 2019-08-24 PROCEDURE — 87077 CULTURE AEROBIC IDENTIFY: CPT | Performed by: NEUROLOGICAL SURGERY

## 2019-08-24 PROCEDURE — 83735 ASSAY OF MAGNESIUM: CPT

## 2019-08-24 PROCEDURE — 85025 COMPLETE CBC W/AUTO DIFF WBC: CPT

## 2019-08-24 PROCEDURE — 70450 CT HEAD/BRAIN W/O DYE: CPT

## 2019-08-24 PROCEDURE — 25010000002 CEFTRIAXONE PER 250 MG: Performed by: EMERGENCY MEDICINE

## 2019-08-24 PROCEDURE — 84484 ASSAY OF TROPONIN QUANT: CPT

## 2019-08-24 PROCEDURE — 81001 URINALYSIS AUTO W/SCOPE: CPT

## 2019-08-24 PROCEDURE — 87086 URINE CULTURE/COLONY COUNT: CPT | Performed by: NEUROLOGICAL SURGERY

## 2019-08-24 PROCEDURE — 99284 EMERGENCY DEPT VISIT MOD MDM: CPT

## 2019-08-24 RX ORDER — SODIUM CHLORIDE 0.9 % (FLUSH) 0.9 %
10 SYRINGE (ML) INJECTION AS NEEDED
Status: DISCONTINUED | OUTPATIENT
Start: 2019-08-24 | End: 2019-09-02 | Stop reason: HOSPADM

## 2019-08-24 RX ORDER — CEFTRIAXONE SODIUM 1 G/50ML
1 INJECTION, SOLUTION INTRAVENOUS ONCE
Status: COMPLETED | OUTPATIENT
Start: 2019-08-24 | End: 2019-08-24

## 2019-08-24 RX ADMIN — CEFTRIAXONE SODIUM 1 G: 1 INJECTION, SOLUTION INTRAVENOUS at 20:45

## 2019-08-25 ENCOUNTER — APPOINTMENT (OUTPATIENT)
Dept: MRI IMAGING | Facility: HOSPITAL | Age: 74
End: 2019-08-25

## 2019-08-25 LAB
ALBUMIN SERPL-MCNC: 3.8 G/DL (ref 3.5–5.2)
ALBUMIN/GLOB SERPL: 1.7 G/DL
ALP SERPL-CCNC: 67 U/L (ref 39–117)
ALT SERPL W P-5'-P-CCNC: 11 U/L (ref 1–33)
ANION GAP SERPL CALCULATED.3IONS-SCNC: 9.6 MMOL/L (ref 5–15)
AST SERPL-CCNC: 17 U/L (ref 1–32)
BILIRUB SERPL-MCNC: 0.4 MG/DL (ref 0.2–1.2)
BUN BLD-MCNC: 17 MG/DL (ref 8–23)
BUN/CREAT SERPL: 17.5 (ref 7–25)
CALCIUM SPEC-SCNC: 8.9 MG/DL (ref 8.6–10.5)
CHLORIDE SERPL-SCNC: 107 MMOL/L (ref 98–107)
CO2 SERPL-SCNC: 26.4 MMOL/L (ref 22–29)
CREAT BLD-MCNC: 0.97 MG/DL (ref 0.57–1)
DEPRECATED RDW RBC AUTO: 41.4 FL (ref 37–54)
ERYTHROCYTE [DISTWIDTH] IN BLOOD BY AUTOMATED COUNT: 12.6 % (ref 12.3–15.4)
GFR SERPL CREATININE-BSD FRML MDRD: 56 ML/MIN/1.73
GLOBULIN UR ELPH-MCNC: 2.3 GM/DL
GLUCOSE BLD-MCNC: 93 MG/DL (ref 65–99)
HCT VFR BLD AUTO: 40 % (ref 34–46.6)
HGB BLD-MCNC: 12.8 G/DL (ref 12–15.9)
MCH RBC QN AUTO: 28.6 PG (ref 26.6–33)
MCHC RBC AUTO-ENTMCNC: 32 G/DL (ref 31.5–35.7)
MCV RBC AUTO: 89.5 FL (ref 79–97)
PLATELET # BLD AUTO: 265 10*3/MM3 (ref 140–450)
PMV BLD AUTO: 9.5 FL (ref 6–12)
POTASSIUM BLD-SCNC: 3.6 MMOL/L (ref 3.5–5.2)
PROT SERPL-MCNC: 6.1 G/DL (ref 6–8.5)
RBC # BLD AUTO: 4.47 10*6/MM3 (ref 3.77–5.28)
SODIUM BLD-SCNC: 143 MMOL/L (ref 136–145)
WBC NRBC COR # BLD: 5.73 10*3/MM3 (ref 3.4–10.8)

## 2019-08-25 PROCEDURE — 80053 COMPREHEN METABOLIC PANEL: CPT | Performed by: HOSPITALIST

## 2019-08-25 PROCEDURE — 25010000002 LEVETIRACETAM IN NACL 0.82% 500 MG/100ML SOLUTION: Performed by: HOSPITALIST

## 2019-08-25 PROCEDURE — 99222 1ST HOSP IP/OBS MODERATE 55: CPT | Performed by: PSYCHIATRY & NEUROLOGY

## 2019-08-25 PROCEDURE — 85027 COMPLETE CBC AUTOMATED: CPT | Performed by: HOSPITALIST

## 2019-08-25 PROCEDURE — 70553 MRI BRAIN STEM W/O & W/DYE: CPT

## 2019-08-25 PROCEDURE — 25010000002 DEXAMETHASONE PER 1 MG: Performed by: HOSPITALIST

## 2019-08-25 PROCEDURE — A9577 INJ MULTIHANCE: HCPCS | Performed by: HOSPITALIST

## 2019-08-25 PROCEDURE — 25010000002 CEFTRIAXONE PER 250 MG: Performed by: HOSPITALIST

## 2019-08-25 PROCEDURE — 0 GADOBENATE DIMEGLUMINE 529 MG/ML SOLUTION: Performed by: HOSPITALIST

## 2019-08-25 RX ORDER — CEFTRIAXONE SODIUM 1 G/50ML
1 INJECTION, SOLUTION INTRAVENOUS EVERY 24 HOURS
Status: DISCONTINUED | OUTPATIENT
Start: 2019-08-25 | End: 2019-08-25

## 2019-08-25 RX ORDER — PANTOPRAZOLE SODIUM 40 MG/1
40 TABLET, DELAYED RELEASE ORAL
Status: DISCONTINUED | OUTPATIENT
Start: 2019-08-25 | End: 2019-08-31

## 2019-08-25 RX ORDER — CEFTRIAXONE SODIUM 1 G/50ML
1 INJECTION, SOLUTION INTRAVENOUS EVERY 24 HOURS
Status: DISCONTINUED | OUTPATIENT
Start: 2019-08-25 | End: 2019-08-31

## 2019-08-25 RX ORDER — DEXAMETHASONE SODIUM PHOSPHATE 10 MG/ML
4 INJECTION INTRAMUSCULAR; INTRAVENOUS EVERY 6 HOURS
Status: DISCONTINUED | OUTPATIENT
Start: 2019-08-25 | End: 2019-08-25

## 2019-08-25 RX ORDER — DEXAMETHASONE SODIUM PHOSPHATE 10 MG/ML
2 INJECTION INTRAMUSCULAR; INTRAVENOUS EVERY 6 HOURS
Status: DISCONTINUED | OUTPATIENT
Start: 2019-08-25 | End: 2019-08-26

## 2019-08-25 RX ORDER — FLUOXETINE HYDROCHLORIDE 20 MG/1
20 CAPSULE ORAL EVERY EVENING
Status: DISCONTINUED | OUTPATIENT
Start: 2019-08-25 | End: 2019-08-31

## 2019-08-25 RX ORDER — LEVETIRACETAM 5 MG/ML
500 INJECTION INTRAVASCULAR EVERY 12 HOURS SCHEDULED
Status: DISCONTINUED | OUTPATIENT
Start: 2019-08-25 | End: 2019-08-29

## 2019-08-25 RX ORDER — SODIUM CHLORIDE 450 MG/100ML
50 INJECTION, SOLUTION INTRAVENOUS CONTINUOUS
Status: DISCONTINUED | OUTPATIENT
Start: 2019-08-25 | End: 2019-08-27

## 2019-08-25 RX ADMIN — CEFTRIAXONE SODIUM 1 G: 1 INJECTION, SOLUTION INTRAVENOUS at 20:50

## 2019-08-25 RX ADMIN — DEXAMETHASONE SODIUM PHOSPHATE 4 MG: 10 INJECTION INTRAMUSCULAR; INTRAVENOUS at 10:16

## 2019-08-25 RX ADMIN — GADOBENATE DIMEGLUMINE 17 ML: 529 INJECTION, SOLUTION INTRAVENOUS at 16:12

## 2019-08-25 RX ADMIN — LEVETIRACETAM 500 MG: 5 INJECTION INTRAVENOUS at 14:00

## 2019-08-25 RX ADMIN — PANTOPRAZOLE SODIUM 40 MG: 40 TABLET, DELAYED RELEASE ORAL at 05:57

## 2019-08-25 RX ADMIN — DEXAMETHASONE SODIUM PHOSPHATE 4 MG: 10 INJECTION INTRAMUSCULAR; INTRAVENOUS at 04:36

## 2019-08-25 RX ADMIN — SODIUM CHLORIDE 75 ML/HR: 4.5 INJECTION, SOLUTION INTRAVENOUS at 03:04

## 2019-08-25 RX ADMIN — LEVETIRACETAM 500 MG: 5 INJECTION INTRAVENOUS at 20:50

## 2019-08-25 RX ADMIN — DEXAMETHASONE SODIUM PHOSPHATE 2 MG: 10 INJECTION INTRAMUSCULAR; INTRAVENOUS at 22:25

## 2019-08-25 RX ADMIN — DEXAMETHASONE SODIUM PHOSPHATE 2 MG: 10 INJECTION INTRAMUSCULAR; INTRAVENOUS at 17:15

## 2019-08-26 ENCOUNTER — APPOINTMENT (OUTPATIENT)
Dept: NUCLEAR MEDICINE | Facility: HOSPITAL | Age: 74
End: 2019-08-26

## 2019-08-26 ENCOUNTER — APPOINTMENT (OUTPATIENT)
Dept: CT IMAGING | Facility: HOSPITAL | Age: 74
End: 2019-08-26

## 2019-08-26 LAB
ALBUMIN SERPL-MCNC: 4 G/DL (ref 3.5–5.2)
ALBUMIN/GLOB SERPL: 1.7 G/DL
ALP SERPL-CCNC: 74 U/L (ref 39–117)
ALT SERPL W P-5'-P-CCNC: 10 U/L (ref 1–33)
ANION GAP SERPL CALCULATED.3IONS-SCNC: 11.6 MMOL/L (ref 5–15)
AST SERPL-CCNC: 14 U/L (ref 1–32)
BASOPHILS # BLD AUTO: 0.01 10*3/MM3 (ref 0–0.2)
BASOPHILS NFR BLD AUTO: 0.1 % (ref 0–1.5)
BILIRUB SERPL-MCNC: 0.2 MG/DL (ref 0.2–1.2)
BUN BLD-MCNC: 18 MG/DL (ref 8–23)
BUN/CREAT SERPL: 20.2 (ref 7–25)
CALCIUM SPEC-SCNC: 8.9 MG/DL (ref 8.6–10.5)
CHLORIDE SERPL-SCNC: 106 MMOL/L (ref 98–107)
CHOLEST SERPL-MCNC: 212 MG/DL (ref 0–200)
CO2 SERPL-SCNC: 23.4 MMOL/L (ref 22–29)
CREAT BLD-MCNC: 0.89 MG/DL (ref 0.57–1)
CRP SERPL-MCNC: 0.36 MG/DL (ref 0–0.5)
DEPRECATED RDW RBC AUTO: 41 FL (ref 37–54)
EOSINOPHIL # BLD AUTO: 0 10*3/MM3 (ref 0–0.4)
EOSINOPHIL NFR BLD AUTO: 0 % (ref 0.3–6.2)
ERYTHROCYTE [DISTWIDTH] IN BLOOD BY AUTOMATED COUNT: 12.6 % (ref 12.3–15.4)
ERYTHROCYTE [SEDIMENTATION RATE] IN BLOOD: 10 MM/HR (ref 0–30)
GFR SERPL CREATININE-BSD FRML MDRD: 62 ML/MIN/1.73
GLOBULIN UR ELPH-MCNC: 2.3 GM/DL
GLUCOSE BLD-MCNC: 133 MG/DL (ref 65–99)
HBA1C MFR BLD: 5.2 % (ref 4.8–5.6)
HCT VFR BLD AUTO: 37.7 % (ref 34–46.6)
HDLC SERPL-MCNC: 45 MG/DL (ref 40–60)
HGB BLD-MCNC: 12.3 G/DL (ref 12–15.9)
IMM GRANULOCYTES # BLD AUTO: 0.04 10*3/MM3 (ref 0–0.05)
IMM GRANULOCYTES NFR BLD AUTO: 0.5 % (ref 0–0.5)
LDLC SERPL CALC-MCNC: 154 MG/DL (ref 0–100)
LDLC/HDLC SERPL: 3.43 {RATIO}
LYMPHOCYTES # BLD AUTO: 0.79 10*3/MM3 (ref 0.7–3.1)
LYMPHOCYTES NFR BLD AUTO: 9.8 % (ref 19.6–45.3)
MCH RBC QN AUTO: 29.1 PG (ref 26.6–33)
MCHC RBC AUTO-ENTMCNC: 32.6 G/DL (ref 31.5–35.7)
MCV RBC AUTO: 89.1 FL (ref 79–97)
MONOCYTES # BLD AUTO: 0.55 10*3/MM3 (ref 0.1–0.9)
MONOCYTES NFR BLD AUTO: 6.8 % (ref 5–12)
NEUTROPHILS # BLD AUTO: 6.65 10*3/MM3 (ref 1.7–7)
NEUTROPHILS NFR BLD AUTO: 82.8 % (ref 42.7–76)
NRBC BLD AUTO-RTO: 0 /100 WBC (ref 0–0.2)
NT-PROBNP SERPL-MCNC: 65.9 PG/ML (ref 5–900)
PLATELET # BLD AUTO: 269 10*3/MM3 (ref 140–450)
PMV BLD AUTO: 9.6 FL (ref 6–12)
POTASSIUM BLD-SCNC: 3.9 MMOL/L (ref 3.5–5.2)
PROT SERPL-MCNC: 6.3 G/DL (ref 6–8.5)
RBC # BLD AUTO: 4.23 10*6/MM3 (ref 3.77–5.28)
SODIUM BLD-SCNC: 141 MMOL/L (ref 136–145)
TRIGL SERPL-MCNC: 63 MG/DL (ref 0–150)
TSH SERPL DL<=0.05 MIU/L-ACNC: 1.1 MIU/ML (ref 0.27–4.2)
VLDLC SERPL-MCNC: 12.6 MG/DL (ref 5–40)
WBC NRBC COR # BLD: 8.04 10*3/MM3 (ref 3.4–10.8)

## 2019-08-26 PROCEDURE — 25010000002 DEXAMETHASONE PER 1 MG: Performed by: PHYSICIAN ASSISTANT

## 2019-08-26 PROCEDURE — 85025 COMPLETE CBC W/AUTO DIFF WBC: CPT | Performed by: HOSPITALIST

## 2019-08-26 PROCEDURE — 99232 SBSQ HOSP IP/OBS MODERATE 35: CPT | Performed by: NURSE PRACTITIONER

## 2019-08-26 PROCEDURE — 25010000002 IOPAMIDOL 61 % SOLUTION: Performed by: HOSPITALIST

## 2019-08-26 PROCEDURE — 85652 RBC SED RATE AUTOMATED: CPT | Performed by: HOSPITALIST

## 2019-08-26 PROCEDURE — 86140 C-REACTIVE PROTEIN: CPT | Performed by: HOSPITALIST

## 2019-08-26 PROCEDURE — 25010000002 DEXAMETHASONE PER 1 MG: Performed by: HOSPITALIST

## 2019-08-26 PROCEDURE — 0 TECHNETIUM MEDRONATE KIT: Performed by: NEUROLOGICAL SURGERY

## 2019-08-26 PROCEDURE — 84443 ASSAY THYROID STIM HORMONE: CPT | Performed by: HOSPITALIST

## 2019-08-26 PROCEDURE — A9503 TC99M MEDRONATE: HCPCS | Performed by: NEUROLOGICAL SURGERY

## 2019-08-26 PROCEDURE — 74177 CT ABD & PELVIS W/CONTRAST: CPT

## 2019-08-26 PROCEDURE — 83880 ASSAY OF NATRIURETIC PEPTIDE: CPT | Performed by: HOSPITALIST

## 2019-08-26 PROCEDURE — 80053 COMPREHEN METABOLIC PANEL: CPT | Performed by: HOSPITALIST

## 2019-08-26 PROCEDURE — 71260 CT THORAX DX C+: CPT

## 2019-08-26 PROCEDURE — 80061 LIPID PANEL: CPT | Performed by: HOSPITALIST

## 2019-08-26 PROCEDURE — 99223 1ST HOSP IP/OBS HIGH 75: CPT | Performed by: INTERNAL MEDICINE

## 2019-08-26 PROCEDURE — 25010000002 CEFTRIAXONE PER 250 MG: Performed by: HOSPITALIST

## 2019-08-26 PROCEDURE — 83036 HEMOGLOBIN GLYCOSYLATED A1C: CPT | Performed by: HOSPITALIST

## 2019-08-26 PROCEDURE — 25010000002 LEVETIRACETAM IN NACL 0.82% 500 MG/100ML SOLUTION: Performed by: HOSPITALIST

## 2019-08-26 PROCEDURE — 99222 1ST HOSP IP/OBS MODERATE 55: CPT | Performed by: RADIOLOGY

## 2019-08-26 PROCEDURE — 99232 SBSQ HOSP IP/OBS MODERATE 35: CPT | Performed by: PSYCHIATRY & NEUROLOGY

## 2019-08-26 PROCEDURE — 78306 BONE IMAGING WHOLE BODY: CPT

## 2019-08-26 RX ORDER — TC 99M MEDRONATE 20 MG/10ML
22 INJECTION, POWDER, LYOPHILIZED, FOR SOLUTION INTRAVENOUS
Status: COMPLETED | OUTPATIENT
Start: 2019-08-26 | End: 2019-08-26

## 2019-08-26 RX ORDER — ACETAMINOPHEN 325 MG/1
650 TABLET ORAL EVERY 4 HOURS PRN
Status: DISCONTINUED | OUTPATIENT
Start: 2019-08-26 | End: 2019-09-02 | Stop reason: HOSPADM

## 2019-08-26 RX ORDER — DEXAMETHASONE SODIUM PHOSPHATE 10 MG/ML
4 INJECTION INTRAMUSCULAR; INTRAVENOUS EVERY 6 HOURS
Status: DISCONTINUED | OUTPATIENT
Start: 2019-08-26 | End: 2019-08-31

## 2019-08-26 RX ADMIN — DEXAMETHASONE SODIUM PHOSPHATE 2 MG: 10 INJECTION INTRAMUSCULAR; INTRAVENOUS at 11:55

## 2019-08-26 RX ADMIN — FLUOXETINE HYDROCHLORIDE 20 MG: 20 CAPSULE ORAL at 17:11

## 2019-08-26 RX ADMIN — DEXAMETHASONE SODIUM PHOSPHATE 4 MG: 10 INJECTION INTRAMUSCULAR; INTRAVENOUS at 17:11

## 2019-08-26 RX ADMIN — CEFTRIAXONE SODIUM 1 G: 1 INJECTION, SOLUTION INTRAVENOUS at 21:23

## 2019-08-26 RX ADMIN — ACETAMINOPHEN 650 MG: 325 TABLET, FILM COATED ORAL at 13:34

## 2019-08-26 RX ADMIN — IOPAMIDOL 85 ML: 612 INJECTION, SOLUTION INTRAVENOUS at 10:45

## 2019-08-26 RX ADMIN — SODIUM CHLORIDE 50 ML/HR: 4.5 INJECTION, SOLUTION INTRAVENOUS at 21:23

## 2019-08-26 RX ADMIN — PANTOPRAZOLE SODIUM 40 MG: 40 TABLET, DELAYED RELEASE ORAL at 05:14

## 2019-08-26 RX ADMIN — Medication 22 MILLICURIE: at 06:45

## 2019-08-26 RX ADMIN — DEXAMETHASONE SODIUM PHOSPHATE 2 MG: 10 INJECTION INTRAMUSCULAR; INTRAVENOUS at 05:14

## 2019-08-26 RX ADMIN — LEVETIRACETAM 500 MG: 5 INJECTION INTRAVENOUS at 21:23

## 2019-08-26 RX ADMIN — LEVETIRACETAM 500 MG: 5 INJECTION INTRAVENOUS at 09:03

## 2019-08-26 RX ADMIN — DEXAMETHASONE SODIUM PHOSPHATE 4 MG: 10 INJECTION INTRAMUSCULAR; INTRAVENOUS at 21:32

## 2019-08-27 ENCOUNTER — APPOINTMENT (OUTPATIENT)
Dept: NEUROLOGY | Facility: HOSPITAL | Age: 74
End: 2019-08-27

## 2019-08-27 ENCOUNTER — APPOINTMENT (OUTPATIENT)
Dept: CT IMAGING | Facility: HOSPITAL | Age: 74
End: 2019-08-27

## 2019-08-27 LAB
ANION GAP SERPL CALCULATED.3IONS-SCNC: 9.9 MMOL/L (ref 5–15)
BACTERIA SPEC AEROBE CULT: ABNORMAL
BASOPHILS # BLD AUTO: 0.01 10*3/MM3 (ref 0–0.2)
BASOPHILS NFR BLD AUTO: 0.1 % (ref 0–1.5)
BUN BLD-MCNC: 17 MG/DL (ref 8–23)
BUN/CREAT SERPL: 18.7 (ref 7–25)
CALCIUM SPEC-SCNC: 9 MG/DL (ref 8.6–10.5)
CHLORIDE SERPL-SCNC: 108 MMOL/L (ref 98–107)
CO2 SERPL-SCNC: 25.1 MMOL/L (ref 22–29)
CREAT BLD-MCNC: 0.91 MG/DL (ref 0.57–1)
DEPRECATED RDW RBC AUTO: 42.3 FL (ref 37–54)
EOSINOPHIL # BLD AUTO: 0 10*3/MM3 (ref 0–0.4)
EOSINOPHIL NFR BLD AUTO: 0 % (ref 0.3–6.2)
ERYTHROCYTE [DISTWIDTH] IN BLOOD BY AUTOMATED COUNT: 12.9 % (ref 12.3–15.4)
GFR SERPL CREATININE-BSD FRML MDRD: 61 ML/MIN/1.73
GLUCOSE BLD-MCNC: 133 MG/DL (ref 65–99)
HCT VFR BLD AUTO: 38.6 % (ref 34–46.6)
HGB BLD-MCNC: 12.4 G/DL (ref 12–15.9)
IMM GRANULOCYTES # BLD AUTO: 0.05 10*3/MM3 (ref 0–0.05)
IMM GRANULOCYTES NFR BLD AUTO: 0.5 % (ref 0–0.5)
INR PPP: 1.07 (ref 0.9–1.1)
LYMPHOCYTES # BLD AUTO: 0.82 10*3/MM3 (ref 0.7–3.1)
LYMPHOCYTES NFR BLD AUTO: 8.5 % (ref 19.6–45.3)
MCH RBC QN AUTO: 28.8 PG (ref 26.6–33)
MCHC RBC AUTO-ENTMCNC: 32.1 G/DL (ref 31.5–35.7)
MCV RBC AUTO: 89.8 FL (ref 79–97)
MONOCYTES # BLD AUTO: 0.38 10*3/MM3 (ref 0.1–0.9)
MONOCYTES NFR BLD AUTO: 4 % (ref 5–12)
NEUTROPHILS # BLD AUTO: 8.35 10*3/MM3 (ref 1.7–7)
NEUTROPHILS NFR BLD AUTO: 86.9 % (ref 42.7–76)
NRBC BLD AUTO-RTO: 0 /100 WBC (ref 0–0.2)
PLATELET # BLD AUTO: 282 10*3/MM3 (ref 140–450)
PMV BLD AUTO: 9.9 FL (ref 6–12)
POTASSIUM BLD-SCNC: 4.2 MMOL/L (ref 3.5–5.2)
PROTHROMBIN TIME: 13.6 SECONDS (ref 11.7–14.2)
RBC # BLD AUTO: 4.3 10*6/MM3 (ref 3.77–5.28)
SODIUM BLD-SCNC: 143 MMOL/L (ref 136–145)
WBC NRBC COR # BLD: 9.61 10*3/MM3 (ref 3.4–10.8)

## 2019-08-27 PROCEDURE — 99222 1ST HOSP IP/OBS MODERATE 55: CPT | Performed by: NURSE PRACTITIONER

## 2019-08-27 PROCEDURE — 99232 SBSQ HOSP IP/OBS MODERATE 35: CPT | Performed by: PSYCHIATRY & NEUROLOGY

## 2019-08-27 PROCEDURE — 99233 SBSQ HOSP IP/OBS HIGH 50: CPT | Performed by: INTERNAL MEDICINE

## 2019-08-27 PROCEDURE — 25010000002 DEXAMETHASONE PER 1 MG: Performed by: PHYSICIAN ASSISTANT

## 2019-08-27 PROCEDURE — 99232 SBSQ HOSP IP/OBS MODERATE 35: CPT | Performed by: PHYSICIAN ASSISTANT

## 2019-08-27 PROCEDURE — 93005 ELECTROCARDIOGRAM TRACING: CPT | Performed by: HOSPITALIST

## 2019-08-27 PROCEDURE — 93010 ELECTROCARDIOGRAM REPORT: CPT | Performed by: INTERNAL MEDICINE

## 2019-08-27 PROCEDURE — 25010000002 LEVETIRACETAM IN NACL 0.82% 500 MG/100ML SOLUTION: Performed by: HOSPITALIST

## 2019-08-27 PROCEDURE — 85025 COMPLETE CBC W/AUTO DIFF WBC: CPT | Performed by: HOSPITALIST

## 2019-08-27 PROCEDURE — 25010000002 CEFTRIAXONE PER 250 MG: Performed by: HOSPITALIST

## 2019-08-27 PROCEDURE — 95816 EEG AWAKE AND DROWSY: CPT

## 2019-08-27 PROCEDURE — 70460 CT HEAD/BRAIN W/DYE: CPT

## 2019-08-27 PROCEDURE — 85610 PROTHROMBIN TIME: CPT | Performed by: PHYSICIAN ASSISTANT

## 2019-08-27 PROCEDURE — 80048 BASIC METABOLIC PNL TOTAL CA: CPT | Performed by: HOSPITALIST

## 2019-08-27 PROCEDURE — 95816 EEG AWAKE AND DROWSY: CPT | Performed by: PSYCHIATRY & NEUROLOGY

## 2019-08-27 PROCEDURE — 0 IOPAMIDOL PER 1 ML: Performed by: HOSPITALIST

## 2019-08-27 RX ADMIN — DEXAMETHASONE SODIUM PHOSPHATE 4 MG: 10 INJECTION INTRAMUSCULAR; INTRAVENOUS at 17:01

## 2019-08-27 RX ADMIN — FLUOXETINE HYDROCHLORIDE 20 MG: 20 CAPSULE ORAL at 17:01

## 2019-08-27 RX ADMIN — LEVETIRACETAM 500 MG: 5 INJECTION INTRAVENOUS at 09:24

## 2019-08-27 RX ADMIN — DEXAMETHASONE SODIUM PHOSPHATE 4 MG: 10 INJECTION INTRAMUSCULAR; INTRAVENOUS at 05:35

## 2019-08-27 RX ADMIN — DEXAMETHASONE SODIUM PHOSPHATE 4 MG: 10 INJECTION INTRAMUSCULAR; INTRAVENOUS at 21:02

## 2019-08-27 RX ADMIN — CEFTRIAXONE SODIUM 1 G: 1 INJECTION, SOLUTION INTRAVENOUS at 21:02

## 2019-08-27 RX ADMIN — PANTOPRAZOLE SODIUM 40 MG: 40 TABLET, DELAYED RELEASE ORAL at 05:35

## 2019-08-27 RX ADMIN — IOPAMIDOL 95 ML: 755 INJECTION, SOLUTION INTRAVENOUS at 08:38

## 2019-08-27 RX ADMIN — DEXAMETHASONE SODIUM PHOSPHATE 4 MG: 10 INJECTION INTRAMUSCULAR; INTRAVENOUS at 10:21

## 2019-08-27 RX ADMIN — LEVETIRACETAM 500 MG: 5 INJECTION INTRAVENOUS at 22:39

## 2019-08-28 ENCOUNTER — ANESTHESIA (OUTPATIENT)
Dept: PERIOP | Facility: HOSPITAL | Age: 74
End: 2019-08-28

## 2019-08-28 ENCOUNTER — APPOINTMENT (OUTPATIENT)
Dept: CT IMAGING | Facility: HOSPITAL | Age: 74
End: 2019-08-28

## 2019-08-28 ENCOUNTER — ANESTHESIA EVENT (OUTPATIENT)
Dept: PERIOP | Facility: HOSPITAL | Age: 74
End: 2019-08-28

## 2019-08-28 LAB
ABO GROUP BLD: NORMAL
ANION GAP SERPL CALCULATED.3IONS-SCNC: 11.1 MMOL/L (ref 5–15)
ANION GAP SERPL CALCULATED.3IONS-SCNC: 11.4 MMOL/L (ref 5–15)
BASOPHILS # BLD AUTO: 0 10*3/MM3 (ref 0–0.2)
BASOPHILS # BLD AUTO: 0.01 10*3/MM3 (ref 0–0.2)
BASOPHILS NFR BLD AUTO: 0 % (ref 0–1.5)
BASOPHILS NFR BLD AUTO: 0.1 % (ref 0–1.5)
BLD GP AB SCN SERPL QL: NEGATIVE
BUN BLD-MCNC: 16 MG/DL (ref 8–23)
BUN BLD-MCNC: 18 MG/DL (ref 8–23)
BUN/CREAT SERPL: 19 (ref 7–25)
BUN/CREAT SERPL: 20.9 (ref 7–25)
CALCIUM SPEC-SCNC: 8.5 MG/DL (ref 8.6–10.5)
CALCIUM SPEC-SCNC: 8.8 MG/DL (ref 8.6–10.5)
CHLORIDE SERPL-SCNC: 108 MMOL/L (ref 98–107)
CHLORIDE SERPL-SCNC: 110 MMOL/L (ref 98–107)
CO2 SERPL-SCNC: 23.6 MMOL/L (ref 22–29)
CO2 SERPL-SCNC: 25.9 MMOL/L (ref 22–29)
CREAT BLD-MCNC: 0.84 MG/DL (ref 0.57–1)
CREAT BLD-MCNC: 0.86 MG/DL (ref 0.57–1)
DEPRECATED RDW RBC AUTO: 41.5 FL (ref 37–54)
DEPRECATED RDW RBC AUTO: 42.3 FL (ref 37–54)
EOSINOPHIL # BLD AUTO: 0 10*3/MM3 (ref 0–0.4)
EOSINOPHIL # BLD AUTO: 0 10*3/MM3 (ref 0–0.4)
EOSINOPHIL NFR BLD AUTO: 0 % (ref 0.3–6.2)
EOSINOPHIL NFR BLD AUTO: 0 % (ref 0.3–6.2)
ERYTHROCYTE [DISTWIDTH] IN BLOOD BY AUTOMATED COUNT: 12.8 % (ref 12.3–15.4)
ERYTHROCYTE [DISTWIDTH] IN BLOOD BY AUTOMATED COUNT: 12.9 % (ref 12.3–15.4)
GFR SERPL CREATININE-BSD FRML MDRD: 65 ML/MIN/1.73
GFR SERPL CREATININE-BSD FRML MDRD: 67 ML/MIN/1.73
GLUCOSE BLD-MCNC: 121 MG/DL (ref 65–99)
GLUCOSE BLD-MCNC: 126 MG/DL (ref 65–99)
GLUCOSE BLDC GLUCOMTR-MCNC: 125 MG/DL (ref 70–130)
HCT VFR BLD AUTO: 37.1 % (ref 34–46.6)
HCT VFR BLD AUTO: 38 % (ref 34–46.6)
HGB BLD-MCNC: 12.1 G/DL (ref 12–15.9)
HGB BLD-MCNC: 12.4 G/DL (ref 12–15.9)
IMM GRANULOCYTES # BLD AUTO: 0.04 10*3/MM3 (ref 0–0.05)
IMM GRANULOCYTES # BLD AUTO: 0.15 10*3/MM3 (ref 0–0.05)
IMM GRANULOCYTES NFR BLD AUTO: 0.5 % (ref 0–0.5)
IMM GRANULOCYTES NFR BLD AUTO: 1.2 % (ref 0–0.5)
LYMPHOCYTES # BLD AUTO: 0.47 10*3/MM3 (ref 0.7–3.1)
LYMPHOCYTES # BLD AUTO: 0.64 10*3/MM3 (ref 0.7–3.1)
LYMPHOCYTES NFR BLD AUTO: 3.7 % (ref 19.6–45.3)
LYMPHOCYTES NFR BLD AUTO: 7.6 % (ref 19.6–45.3)
MCH RBC QN AUTO: 29.2 PG (ref 26.6–33)
MCH RBC QN AUTO: 29.4 PG (ref 26.6–33)
MCHC RBC AUTO-ENTMCNC: 32.6 G/DL (ref 31.5–35.7)
MCHC RBC AUTO-ENTMCNC: 32.6 G/DL (ref 31.5–35.7)
MCV RBC AUTO: 89.6 FL (ref 79–97)
MCV RBC AUTO: 90 FL (ref 79–97)
MONOCYTES # BLD AUTO: 0.27 10*3/MM3 (ref 0.1–0.9)
MONOCYTES # BLD AUTO: 0.66 10*3/MM3 (ref 0.1–0.9)
MONOCYTES NFR BLD AUTO: 3.2 % (ref 5–12)
MONOCYTES NFR BLD AUTO: 5.2 % (ref 5–12)
NEUTROPHILS # BLD AUTO: 11.52 10*3/MM3 (ref 1.7–7)
NEUTROPHILS # BLD AUTO: 7.51 10*3/MM3 (ref 1.7–7)
NEUTROPHILS NFR BLD AUTO: 88.7 % (ref 42.7–76)
NEUTROPHILS NFR BLD AUTO: 89.8 % (ref 42.7–76)
NRBC BLD AUTO-RTO: 0 /100 WBC (ref 0–0.2)
NRBC BLD AUTO-RTO: 0 /100 WBC (ref 0–0.2)
PLATELET # BLD AUTO: 260 10*3/MM3 (ref 140–450)
PLATELET # BLD AUTO: 270 10*3/MM3 (ref 140–450)
PMV BLD AUTO: 9.6 FL (ref 6–12)
PMV BLD AUTO: 9.9 FL (ref 6–12)
POTASSIUM BLD-SCNC: 3.5 MMOL/L (ref 3.5–5.2)
POTASSIUM BLD-SCNC: 4.1 MMOL/L (ref 3.5–5.2)
RBC # BLD AUTO: 4.14 10*6/MM3 (ref 3.77–5.28)
RBC # BLD AUTO: 4.22 10*6/MM3 (ref 3.77–5.28)
RH BLD: NEGATIVE
SODIUM BLD-SCNC: 145 MMOL/L (ref 136–145)
SODIUM BLD-SCNC: 145 MMOL/L (ref 136–145)
T&S EXPIRATION DATE: NORMAL
WBC NRBC COR # BLD: 12.81 10*3/MM3 (ref 3.4–10.8)
WBC NRBC COR # BLD: 8.46 10*3/MM3 (ref 3.4–10.8)

## 2019-08-28 PROCEDURE — 69990 MICROSURGERY ADD-ON: CPT | Performed by: NEUROLOGICAL SURGERY

## 2019-08-28 PROCEDURE — 88307 TISSUE EXAM BY PATHOLOGIST: CPT | Performed by: NEUROLOGICAL SURGERY

## 2019-08-28 PROCEDURE — 25010000002 DEXAMETHASONE PER 1 MG: Performed by: PHYSICIAN ASSISTANT

## 2019-08-28 PROCEDURE — 25010000002 LEVETIRACETAM IN NACL 0.82% 500 MG/100ML SOLUTION: Performed by: HOSPITALIST

## 2019-08-28 PROCEDURE — 88342 IMHCHEM/IMCYTCHM 1ST ANTB: CPT | Performed by: NEUROLOGICAL SURGERY

## 2019-08-28 PROCEDURE — 25010000002 FENTANYL CITRATE (PF) 100 MCG/2ML SOLUTION: Performed by: NURSE ANESTHETIST, CERTIFIED REGISTERED

## 2019-08-28 PROCEDURE — 88334 PATH CONSLTJ SURG CYTO XM EA: CPT | Performed by: NEUROLOGICAL SURGERY

## 2019-08-28 PROCEDURE — 61781 SCAN PROC CRANIAL INTRA: CPT | Performed by: NEUROLOGICAL SURGERY

## 2019-08-28 PROCEDURE — C1713 ANCHOR/SCREW BN/BN,TIS/BN: HCPCS | Performed by: NEUROLOGICAL SURGERY

## 2019-08-28 PROCEDURE — 25010000002 CEFTRIAXONE PER 250 MG: Performed by: HOSPITALIST

## 2019-08-28 PROCEDURE — 25010000002 PROPOFOL 10 MG/ML EMULSION: Performed by: NURSE ANESTHETIST, CERTIFIED REGISTERED

## 2019-08-28 PROCEDURE — 99233 SBSQ HOSP IP/OBS HIGH 50: CPT | Performed by: INTERNAL MEDICINE

## 2019-08-28 PROCEDURE — 25010000002 NEOSTIGMINE PER 0.5 MG: Performed by: NURSE ANESTHETIST, CERTIFIED REGISTERED

## 2019-08-28 PROCEDURE — 80048 BASIC METABOLIC PNL TOTAL CA: CPT | Performed by: NEUROLOGICAL SURGERY

## 2019-08-28 PROCEDURE — 88360 TUMOR IMMUNOHISTOCHEM/MANUAL: CPT | Performed by: NEUROLOGICAL SURGERY

## 2019-08-28 PROCEDURE — 82962 GLUCOSE BLOOD TEST: CPT

## 2019-08-28 PROCEDURE — 80048 BASIC METABOLIC PNL TOTAL CA: CPT | Performed by: HOSPITALIST

## 2019-08-28 PROCEDURE — 25010000002 DEXAMETHASONE PER 1 MG: Performed by: NURSE ANESTHETIST, CERTIFIED REGISTERED

## 2019-08-28 PROCEDURE — 03HY32Z INSERTION OF MONITORING DEVICE INTO UPPER ARTERY, PERCUTANEOUS APPROACH: ICD-10-PCS | Performed by: ANESTHESIOLOGY

## 2019-08-28 PROCEDURE — 86901 BLOOD TYPING SEROLOGIC RH(D): CPT | Performed by: ANESTHESIOLOGY

## 2019-08-28 PROCEDURE — 25810000003 SODIUM CHLORIDE 0.9 % WITH KCL 20 MEQ 20-0.9 MEQ/L-% SOLUTION: Performed by: NEUROLOGICAL SURGERY

## 2019-08-28 PROCEDURE — 85025 COMPLETE CBC W/AUTO DIFF WBC: CPT | Performed by: NEUROLOGICAL SURGERY

## 2019-08-28 PROCEDURE — 25010000002 ONDANSETRON PER 1 MG: Performed by: NURSE ANESTHETIST, CERTIFIED REGISTERED

## 2019-08-28 PROCEDURE — 25010000002 FENTANYL CITRATE (PF) 100 MCG/2ML SOLUTION: Performed by: ANESTHESIOLOGY

## 2019-08-28 PROCEDURE — 00B70ZZ EXCISION OF CEREBRAL HEMISPHERE, OPEN APPROACH: ICD-10-PCS | Performed by: NEUROLOGICAL SURGERY

## 2019-08-28 PROCEDURE — 88331 PATH CONSLTJ SURG 1 BLK 1SPC: CPT | Performed by: NEUROLOGICAL SURGERY

## 2019-08-28 PROCEDURE — 88341 IMHCHEM/IMCYTCHM EA ADD ANTB: CPT | Performed by: NEUROLOGICAL SURGERY

## 2019-08-28 PROCEDURE — 86900 BLOOD TYPING SEROLOGIC ABO: CPT | Performed by: ANESTHESIOLOGY

## 2019-08-28 PROCEDURE — 85025 COMPLETE CBC W/AUTO DIFF WBC: CPT | Performed by: HOSPITALIST

## 2019-08-28 PROCEDURE — 25010000002 PHENYLEPHRINE PER 1 ML: Performed by: NURSE ANESTHETIST, CERTIFIED REGISTERED

## 2019-08-28 PROCEDURE — 61510 CRNEC TREPH EXC BRN TUM STTL: CPT | Performed by: NEUROLOGICAL SURGERY

## 2019-08-28 PROCEDURE — 25010000002 MIDAZOLAM PER 1 MG: Performed by: ANESTHESIOLOGY

## 2019-08-28 PROCEDURE — 70450 CT HEAD/BRAIN W/O DYE: CPT

## 2019-08-28 PROCEDURE — 86850 RBC ANTIBODY SCREEN: CPT | Performed by: ANESTHESIOLOGY

## 2019-08-28 DEVICE — CRANIOFIX 2 TITANIUM CLAMP 11MM
Type: IMPLANTABLE DEVICE | Site: CRANIAL | Status: FUNCTIONAL
Brand: CRANIOFIX2

## 2019-08-28 DEVICE — CRANIOFIX 2 TITANIUM CLAMP 16MM
Type: IMPLANTABLE DEVICE | Site: CRANIAL | Status: FUNCTIONAL
Brand: CRANIOFIX2

## 2019-08-28 DEVICE — DURAGEN® PLUS DURAL REGENERATION MATRIX, 2 IN X 2 IN (5 CM X 5 CM)
Type: IMPLANTABLE DEVICE | Site: BRAIN | Status: FUNCTIONAL
Brand: DURAGEN® PLUS

## 2019-08-28 RX ORDER — SODIUM CHLORIDE 9 MG/ML
INJECTION, SOLUTION INTRAVENOUS AS NEEDED
Status: DISCONTINUED | OUTPATIENT
Start: 2019-08-28 | End: 2019-08-28 | Stop reason: HOSPADM

## 2019-08-28 RX ORDER — DEXAMETHASONE SODIUM PHOSPHATE 10 MG/ML
INJECTION INTRAMUSCULAR; INTRAVENOUS AS NEEDED
Status: DISCONTINUED | OUTPATIENT
Start: 2019-08-28 | End: 2019-08-28 | Stop reason: SURG

## 2019-08-28 RX ORDER — ROCURONIUM BROMIDE 10 MG/ML
INJECTION, SOLUTION INTRAVENOUS AS NEEDED
Status: DISCONTINUED | OUTPATIENT
Start: 2019-08-28 | End: 2019-08-28 | Stop reason: SURG

## 2019-08-28 RX ORDER — FENTANYL CITRATE 50 UG/ML
INJECTION, SOLUTION INTRAMUSCULAR; INTRAVENOUS
Status: COMPLETED | OUTPATIENT
Start: 2019-08-28 | End: 2019-08-28

## 2019-08-28 RX ORDER — HYDRALAZINE HYDROCHLORIDE 20 MG/ML
5 INJECTION INTRAMUSCULAR; INTRAVENOUS
Status: DISCONTINUED | OUTPATIENT
Start: 2019-08-28 | End: 2019-08-28 | Stop reason: HOSPADM

## 2019-08-28 RX ORDER — LIDOCAINE HYDROCHLORIDE 20 MG/ML
INJECTION, SOLUTION INFILTRATION; PERINEURAL AS NEEDED
Status: DISCONTINUED | OUTPATIENT
Start: 2019-08-28 | End: 2019-08-28 | Stop reason: SURG

## 2019-08-28 RX ORDER — SODIUM CHLORIDE 0.9 % (FLUSH) 0.9 %
10 SYRINGE (ML) INJECTION AS NEEDED
Status: DISCONTINUED | OUTPATIENT
Start: 2019-08-28 | End: 2019-08-28 | Stop reason: HOSPADM

## 2019-08-28 RX ORDER — HYDROCODONE BITARTRATE AND ACETAMINOPHEN 7.5; 325 MG/1; MG/1
1 TABLET ORAL ONCE AS NEEDED
Status: DISCONTINUED | OUTPATIENT
Start: 2019-08-28 | End: 2019-08-28 | Stop reason: HOSPADM

## 2019-08-28 RX ORDER — FENTANYL CITRATE 50 UG/ML
INJECTION, SOLUTION INTRAMUSCULAR; INTRAVENOUS AS NEEDED
Status: DISCONTINUED | OUTPATIENT
Start: 2019-08-28 | End: 2019-08-28 | Stop reason: SURG

## 2019-08-28 RX ORDER — METOPROLOL TARTRATE 5 MG/5ML
INJECTION INTRAVENOUS AS NEEDED
Status: DISCONTINUED | OUTPATIENT
Start: 2019-08-28 | End: 2019-08-28 | Stop reason: SURG

## 2019-08-28 RX ORDER — HYDROMORPHONE HYDROCHLORIDE 1 MG/ML
0.5 INJECTION, SOLUTION INTRAMUSCULAR; INTRAVENOUS; SUBCUTANEOUS
Status: DISCONTINUED | OUTPATIENT
Start: 2019-08-28 | End: 2019-08-30

## 2019-08-28 RX ORDER — ONDANSETRON 2 MG/ML
INJECTION INTRAMUSCULAR; INTRAVENOUS AS NEEDED
Status: DISCONTINUED | OUTPATIENT
Start: 2019-08-28 | End: 2019-08-28 | Stop reason: SURG

## 2019-08-28 RX ORDER — MIDAZOLAM HYDROCHLORIDE 1 MG/ML
INJECTION INTRAMUSCULAR; INTRAVENOUS
Status: COMPLETED | OUTPATIENT
Start: 2019-08-28 | End: 2019-08-28

## 2019-08-28 RX ORDER — ALBUTEROL SULFATE 2.5 MG/3ML
2.5 SOLUTION RESPIRATORY (INHALATION) ONCE AS NEEDED
Status: DISCONTINUED | OUTPATIENT
Start: 2019-08-28 | End: 2019-08-28 | Stop reason: HOSPADM

## 2019-08-28 RX ORDER — FENTANYL CITRATE 50 UG/ML
50 INJECTION, SOLUTION INTRAMUSCULAR; INTRAVENOUS
Status: DISCONTINUED | OUTPATIENT
Start: 2019-08-28 | End: 2019-08-28 | Stop reason: HOSPADM

## 2019-08-28 RX ORDER — FLUMAZENIL 0.1 MG/ML
0.2 INJECTION INTRAVENOUS AS NEEDED
Status: DISCONTINUED | OUTPATIENT
Start: 2019-08-28 | End: 2019-08-28 | Stop reason: HOSPADM

## 2019-08-28 RX ORDER — ACETAMINOPHEN 325 MG/1
650 TABLET ORAL ONCE AS NEEDED
Status: DISCONTINUED | OUTPATIENT
Start: 2019-08-28 | End: 2019-08-28 | Stop reason: HOSPADM

## 2019-08-28 RX ORDER — MIDAZOLAM HYDROCHLORIDE 1 MG/ML
2 INJECTION INTRAMUSCULAR; INTRAVENOUS
Status: DISCONTINUED | OUTPATIENT
Start: 2019-08-28 | End: 2019-08-28 | Stop reason: HOSPADM

## 2019-08-28 RX ORDER — PROPOFOL 10 MG/ML
VIAL (ML) INTRAVENOUS AS NEEDED
Status: DISCONTINUED | OUTPATIENT
Start: 2019-08-28 | End: 2019-08-28 | Stop reason: SURG

## 2019-08-28 RX ORDER — EPHEDRINE SULFATE 50 MG/ML
INJECTION, SOLUTION INTRAVENOUS AS NEEDED
Status: DISCONTINUED | OUTPATIENT
Start: 2019-08-28 | End: 2019-08-28 | Stop reason: SURG

## 2019-08-28 RX ORDER — ONDANSETRON 2 MG/ML
4 INJECTION INTRAMUSCULAR; INTRAVENOUS ONCE AS NEEDED
Status: DISCONTINUED | OUTPATIENT
Start: 2019-08-28 | End: 2019-08-28 | Stop reason: HOSPADM

## 2019-08-28 RX ORDER — SODIUM CHLORIDE, SODIUM ACETATE ANHYDROUS, SODIUM GLUCONATE, POTASSIUM CHLORIDE, AND MAGNESIUM CHLORIDE 526; 222; 502; 37; 30 MG/100ML; MG/100ML; MG/100ML; MG/100ML; MG/100ML
IRRIGANT IRRIGATION AS NEEDED
Status: DISCONTINUED | OUTPATIENT
Start: 2019-08-28 | End: 2019-08-28 | Stop reason: HOSPADM

## 2019-08-28 RX ORDER — FAMOTIDINE 10 MG/ML
20 INJECTION, SOLUTION INTRAVENOUS ONCE
Status: COMPLETED | OUTPATIENT
Start: 2019-08-28 | End: 2019-08-28

## 2019-08-28 RX ORDER — HYDROCODONE BITARTRATE AND ACETAMINOPHEN 5; 325 MG/1; MG/1
1 TABLET ORAL EVERY 4 HOURS PRN
Status: DISCONTINUED | OUTPATIENT
Start: 2019-08-28 | End: 2019-09-02 | Stop reason: HOSPADM

## 2019-08-28 RX ORDER — SODIUM CHLORIDE, SODIUM LACTATE, POTASSIUM CHLORIDE, CALCIUM CHLORIDE 600; 310; 30; 20 MG/100ML; MG/100ML; MG/100ML; MG/100ML
9 INJECTION, SOLUTION INTRAVENOUS CONTINUOUS
Status: DISCONTINUED | OUTPATIENT
Start: 2019-08-28 | End: 2019-08-28

## 2019-08-28 RX ORDER — GLYCOPYRROLATE 0.2 MG/ML
INJECTION INTRAMUSCULAR; INTRAVENOUS AS NEEDED
Status: DISCONTINUED | OUTPATIENT
Start: 2019-08-28 | End: 2019-08-28 | Stop reason: SURG

## 2019-08-28 RX ORDER — LABETALOL HYDROCHLORIDE 5 MG/ML
5 INJECTION, SOLUTION INTRAVENOUS
Status: DISCONTINUED | OUTPATIENT
Start: 2019-08-28 | End: 2019-08-28 | Stop reason: HOSPADM

## 2019-08-28 RX ORDER — SODIUM CHLORIDE 0.9 % (FLUSH) 0.9 %
3 SYRINGE (ML) INJECTION EVERY 12 HOURS SCHEDULED
Status: DISCONTINUED | OUTPATIENT
Start: 2019-08-28 | End: 2019-08-28 | Stop reason: HOSPADM

## 2019-08-28 RX ORDER — HYDROMORPHONE HYDROCHLORIDE 1 MG/ML
0.5 INJECTION, SOLUTION INTRAMUSCULAR; INTRAVENOUS; SUBCUTANEOUS
Status: DISCONTINUED | OUTPATIENT
Start: 2019-08-28 | End: 2019-08-28 | Stop reason: HOSPADM

## 2019-08-28 RX ORDER — NALOXONE HCL 0.4 MG/ML
0.4 VIAL (ML) INJECTION
Status: DISCONTINUED | OUTPATIENT
Start: 2019-08-28 | End: 2019-08-30

## 2019-08-28 RX ORDER — NALOXONE HCL 0.4 MG/ML
0.2 VIAL (ML) INJECTION AS NEEDED
Status: DISCONTINUED | OUTPATIENT
Start: 2019-08-28 | End: 2019-08-28 | Stop reason: HOSPADM

## 2019-08-28 RX ORDER — SODIUM CHLORIDE AND POTASSIUM CHLORIDE 150; 900 MG/100ML; MG/100ML
75 INJECTION, SOLUTION INTRAVENOUS CONTINUOUS
Status: DISCONTINUED | OUTPATIENT
Start: 2019-08-28 | End: 2019-08-30

## 2019-08-28 RX ADMIN — FENTANYL CITRATE 50 MCG: 50 INJECTION INTRAMUSCULAR; INTRAVENOUS at 11:15

## 2019-08-28 RX ADMIN — ROCURONIUM BROMIDE 50 MG: 10 INJECTION INTRAVENOUS at 12:03

## 2019-08-28 RX ADMIN — METOPROLOL TARTRATE 5 MG: 1 INJECTION, SOLUTION INTRAVENOUS at 14:35

## 2019-08-28 RX ADMIN — FAMOTIDINE 20 MG: 10 INJECTION INTRAVENOUS at 11:16

## 2019-08-28 RX ADMIN — Medication 1 MG: at 11:15

## 2019-08-28 RX ADMIN — DEXAMETHASONE SODIUM PHOSPHATE 4 MG: 10 INJECTION INTRAMUSCULAR; INTRAVENOUS at 22:12

## 2019-08-28 RX ADMIN — METOPROLOL TARTRATE 5 MG: 1 INJECTION, SOLUTION INTRAVENOUS at 14:32

## 2019-08-28 RX ADMIN — DEXAMETHASONE SODIUM PHOSPHATE 6 MG: 10 INJECTION INTRAMUSCULAR; INTRAVENOUS at 12:35

## 2019-08-28 RX ADMIN — POTASSIUM CHLORIDE AND SODIUM CHLORIDE 100 ML/HR: 900; 150 INJECTION, SOLUTION INTRAVENOUS at 18:47

## 2019-08-28 RX ADMIN — GLYCOPYRROLATE 0.3 MG: 0.2 INJECTION INTRAMUSCULAR; INTRAVENOUS at 14:20

## 2019-08-28 RX ADMIN — METOPROLOL TARTRATE 5 MG: 1 INJECTION, SOLUTION INTRAVENOUS at 14:29

## 2019-08-28 RX ADMIN — EPHEDRINE SULFATE 20 MG: 50 INJECTION INTRAMUSCULAR; INTRAVENOUS; SUBCUTANEOUS at 13:30

## 2019-08-28 RX ADMIN — ONDANSETRON 4 MG: 2 INJECTION INTRAMUSCULAR; INTRAVENOUS at 14:00

## 2019-08-28 RX ADMIN — ROCURONIUM BROMIDE 20 MG: 10 INJECTION INTRAVENOUS at 13:30

## 2019-08-28 RX ADMIN — PHENYLEPHRINE HYDROCHLORIDE 100 MCG: 10 INJECTION INTRAVENOUS at 12:50

## 2019-08-28 RX ADMIN — SODIUM CHLORIDE 5 MG/HR: 9 INJECTION, SOLUTION INTRAVENOUS at 20:35

## 2019-08-28 RX ADMIN — LIDOCAINE HYDROCHLORIDE 100 MG: 20 INJECTION, SOLUTION INFILTRATION; PERINEURAL at 12:03

## 2019-08-28 RX ADMIN — DEXAMETHASONE SODIUM PHOSPHATE 4 MG: 10 INJECTION INTRAMUSCULAR; INTRAVENOUS at 06:18

## 2019-08-28 RX ADMIN — NEOSTIGMINE METHYLSULFATE 4 MG: 1 INJECTION INTRAMUSCULAR; INTRAVENOUS; SUBCUTANEOUS at 14:20

## 2019-08-28 RX ADMIN — LEVETIRACETAM 500 MG: 5 INJECTION INTRAVENOUS at 08:33

## 2019-08-28 RX ADMIN — SODIUM CHLORIDE, POTASSIUM CHLORIDE, SODIUM LACTATE AND CALCIUM CHLORIDE: 600; 310; 30; 20 INJECTION, SOLUTION INTRAVENOUS at 13:55

## 2019-08-28 RX ADMIN — LEVETIRACETAM 500 MG: 5 INJECTION INTRAVENOUS at 20:37

## 2019-08-28 RX ADMIN — FENTANYL CITRATE 50 MCG: 50 INJECTION INTRAMUSCULAR; INTRAVENOUS at 14:45

## 2019-08-28 RX ADMIN — FENTANYL CITRATE 50 MCG: 50 INJECTION INTRAMUSCULAR; INTRAVENOUS at 14:29

## 2019-08-28 RX ADMIN — METOPROLOL TARTRATE 5 MG: 1 INJECTION, SOLUTION INTRAVENOUS at 14:24

## 2019-08-28 RX ADMIN — PHENYLEPHRINE HYDROCHLORIDE 100 MCG: 10 INJECTION INTRAVENOUS at 12:42

## 2019-08-28 RX ADMIN — PROPOFOL 150 MG: 10 INJECTION, EMULSION INTRAVENOUS at 12:03

## 2019-08-28 RX ADMIN — METOPROLOL TARTRATE 5 MG: 1 INJECTION, SOLUTION INTRAVENOUS at 14:42

## 2019-08-28 RX ADMIN — PROPOFOL 50 MG: 10 INJECTION, EMULSION INTRAVENOUS at 12:18

## 2019-08-28 RX ADMIN — CEFTRIAXONE SODIUM 1 G: 1 INJECTION, SOLUTION INTRAVENOUS at 20:43

## 2019-08-28 RX ADMIN — EPHEDRINE SULFATE 10 MG: 50 INJECTION INTRAMUSCULAR; INTRAVENOUS; SUBCUTANEOUS at 13:02

## 2019-08-28 RX ADMIN — PHENYLEPHRINE HYDROCHLORIDE 100 MCG: 10 INJECTION INTRAVENOUS at 12:23

## 2019-08-28 RX ADMIN — SODIUM CHLORIDE, POTASSIUM CHLORIDE, SODIUM LACTATE AND CALCIUM CHLORIDE: 600; 310; 30; 20 INJECTION, SOLUTION INTRAVENOUS at 11:47

## 2019-08-28 RX ADMIN — EPHEDRINE SULFATE 10 MG: 50 INJECTION INTRAMUSCULAR; INTRAVENOUS; SUBCUTANEOUS at 13:26

## 2019-08-28 RX ADMIN — SODIUM CHLORIDE 5 MG/HR: 9 INJECTION, SOLUTION INTRAVENOUS at 15:07

## 2019-08-28 RX ADMIN — FENTANYL CITRATE 50 MCG: 50 INJECTION INTRAMUSCULAR; INTRAVENOUS at 12:00

## 2019-08-28 RX ADMIN — DEXAMETHASONE SODIUM PHOSPHATE 4 MG: 10 INJECTION INTRAMUSCULAR; INTRAVENOUS at 10:10

## 2019-08-28 NOTE — ANESTHESIA PREPROCEDURE EVALUATION
Anesthesia Evaluation     Patient summary reviewed and Nursing notes reviewed   no history of anesthetic complications:  NPO Solid Status: > 8 hours  NPO Liquid Status: > 8 hours           Airway   Mallampati: II  Dental - normal exam     Pulmonary - normal exam   (+) a smoker Former,   Cardiovascular - normal exam    (+) hyperlipidemia,       Neuro/Psych  (+) psychiatric history Depression,     GI/Hepatic/Renal/Endo    (+) obesity,  GERD,      Musculoskeletal     Abdominal    Substance History      OB/GYN          Other                        Anesthesia Plan    ASA 3     general     intravenous induction   Anesthetic plan, all risks, benefits, and alternatives have been provided, discussed and informed consent has been obtained with: patient.

## 2019-08-28 NOTE — ANESTHESIA POSTPROCEDURE EVALUATION
"Patient: Karen Pineda    Procedure Summary     Date:  08/28/19 Room / Location:  [facility] OR  / [facility] MAIN OR    Anesthesia Start:  1147 Anesthesia Stop:  1450    Procedure:  Left frontal craniotomy for tumor (Left Head) Diagnosis:       Brain mass      (Brain mass [G93.9])    Surgeon:  Ruben Thibodeaux MD Provider:  Vladimir Mckay MD    Anesthesia Type:  general ASA Status:  3          Anesthesia Type: general  Last vitals  BP   104/65 (08/28/19 1550)   Temp   36.7 °C (98 °F) (08/28/19 1440)   Pulse   73 (08/28/19 1550)   Resp   12 (08/28/19 1550)     SpO2   91 % (08/28/19 1550)     Post Anesthesia Care and Evaluation    Patient location during evaluation: bedside  Patient participation: complete - patient participated  Level of consciousness: awake and alert  Pain management: adequate  Airway patency: patent  Anesthetic complications: No anesthetic complications    Cardiovascular status: acceptable  Respiratory status: acceptable  Hydration status: acceptable    Comments: /65   Pulse 73   Temp 36.7 °C (98 °F) (Oral)   Resp 12   Ht 162.6 cm (64\")   Wt 83.6 kg (184 lb 4.9 oz)   LMP  (LMP Unknown)   SpO2 91%   Breastfeeding? No   BMI 31.64 kg/m²       "

## 2019-08-28 NOTE — ANESTHESIA PROCEDURE NOTES
Airway  Urgency: elective    Airway not difficult    General Information and Staff    Patient location during procedure: OR  Anesthesiologist: Vladimir Mckay MD  CRNA: Symone Orozco CRNA    Indications and Patient Condition  Indications for airway management: airway protection    Preoxygenated: yes (pt pre-O2 with 100% O2)  Mask difficulty assessment: 2 - vent by mask + OA or adjuvant +/- NMBA (easy BMV )    Final Airway Details  Final airway type: endotracheal airway      Successful airway: ETT  Cuffed: yes   Successful intubation technique: direct laryngoscopy  Facilitating devices/methods: anterior pressure/BURP  Endotracheal tube insertion site: oral  Blade: Kirill  Blade size: 3  ETT size (mm): 7.0  Cormack-Lehane Classification: grade IIb - view of arytenoids or posterior of glottis only  Placement verified by: chest auscultation and capnometry   Cuff volume (mL): 6  Measured from: lips  ETT to lips (cm): 21  Number of attempts at approach: 1    Additional Comments  ATOETx1. No change in dentition.

## 2019-08-28 NOTE — ANESTHESIA PROCEDURE NOTES
Arterial Line      Patient reassessed immediately prior to procedure    Patient location during procedure: pre-op  Start time: 8/28/2019 11:12 AM  Stop Time:8/28/2019 11:19 AM       Line placed for hemodynamic monitoring.  Performed By   Anesthesiologist: Stefano Segura MD  Preanesthetic Checklist  Completed: patient identified, surgical consent, pre-op evaluation, timeout performed, IV checked, risks and benefits discussed and monitors and equipment checked  Arterial Line Prep   Sterile Tech: gloves and cap  Prep: ChloraPrep  Patient monitoring: blood pressure monitoring, continuous pulse oximetry and EKG  Arterial Line Procedure   Laterality:right  Location:  radial artery  Catheter size: 20 G   Guidance: landmark technique  Number of attempts: 1  Successful placement: yes  Post Assessment   Dressing Type: occlusive dressing applied and wrist guard applied.   Complications no  Circ/Move/Sens Assessment: normal and unchanged.   Patient Tolerance: patient tolerated the procedure well with no apparent complications

## 2019-08-29 ENCOUNTER — APPOINTMENT (OUTPATIENT)
Dept: CT IMAGING | Facility: HOSPITAL | Age: 74
End: 2019-08-29

## 2019-08-29 ENCOUNTER — APPOINTMENT (OUTPATIENT)
Dept: MRI IMAGING | Facility: HOSPITAL | Age: 74
End: 2019-08-29

## 2019-08-29 LAB
ANION GAP SERPL CALCULATED.3IONS-SCNC: 7.8 MMOL/L (ref 5–15)
APTT PPP: 27.4 SECONDS (ref 22.7–35.4)
BASOPHILS # BLD AUTO: 0.01 10*3/MM3 (ref 0–0.2)
BASOPHILS NFR BLD AUTO: 0.1 % (ref 0–1.5)
BUN BLD-MCNC: 19 MG/DL (ref 8–23)
BUN/CREAT SERPL: 25 (ref 7–25)
CALCIUM SPEC-SCNC: 8.3 MG/DL (ref 8.6–10.5)
CHLORIDE SERPL-SCNC: 108 MMOL/L (ref 98–107)
CO2 SERPL-SCNC: 24.2 MMOL/L (ref 22–29)
CREAT BLD-MCNC: 0.76 MG/DL (ref 0.57–1)
DEPRECATED RDW RBC AUTO: 42.7 FL (ref 37–54)
EOSINOPHIL # BLD AUTO: 0 10*3/MM3 (ref 0–0.4)
EOSINOPHIL NFR BLD AUTO: 0 % (ref 0.3–6.2)
ERYTHROCYTE [DISTWIDTH] IN BLOOD BY AUTOMATED COUNT: 12.9 % (ref 12.3–15.4)
GFR SERPL CREATININE-BSD FRML MDRD: 75 ML/MIN/1.73
GLUCOSE BLD-MCNC: 114 MG/DL (ref 65–99)
GLUCOSE BLDC GLUCOMTR-MCNC: 108 MG/DL (ref 70–130)
GLUCOSE BLDC GLUCOMTR-MCNC: 121 MG/DL (ref 70–130)
GLUCOSE BLDC GLUCOMTR-MCNC: 96 MG/DL (ref 70–130)
HCT VFR BLD AUTO: 36.4 % (ref 34–46.6)
HGB BLD-MCNC: 11.6 G/DL (ref 12–15.9)
IMM GRANULOCYTES # BLD AUTO: 0.06 10*3/MM3 (ref 0–0.05)
IMM GRANULOCYTES NFR BLD AUTO: 0.6 % (ref 0–0.5)
INR PPP: 1.01 (ref 0.9–1.1)
LYMPHOCYTES # BLD AUTO: 0.57 10*3/MM3 (ref 0.7–3.1)
LYMPHOCYTES NFR BLD AUTO: 5.2 % (ref 19.6–45.3)
MCH RBC QN AUTO: 28.9 PG (ref 26.6–33)
MCHC RBC AUTO-ENTMCNC: 31.9 G/DL (ref 31.5–35.7)
MCV RBC AUTO: 90.5 FL (ref 79–97)
MONOCYTES # BLD AUTO: 0.6 10*3/MM3 (ref 0.1–0.9)
MONOCYTES NFR BLD AUTO: 5.5 % (ref 5–12)
NEUTROPHILS # BLD AUTO: 9.64 10*3/MM3 (ref 1.7–7)
NEUTROPHILS NFR BLD AUTO: 88.6 % (ref 42.7–76)
NRBC BLD AUTO-RTO: 0 /100 WBC (ref 0–0.2)
PLATELET # BLD AUTO: 276 10*3/MM3 (ref 140–450)
PMV BLD AUTO: 10.2 FL (ref 6–12)
POTASSIUM BLD-SCNC: 4 MMOL/L (ref 3.5–5.2)
PROTHROMBIN TIME: 13 SECONDS (ref 11.7–14.2)
RBC # BLD AUTO: 4.02 10*6/MM3 (ref 3.77–5.28)
SODIUM BLD-SCNC: 140 MMOL/L (ref 136–145)
WBC NRBC COR # BLD: 10.88 10*3/MM3 (ref 3.4–10.8)

## 2019-08-29 PROCEDURE — A9577 INJ MULTIHANCE: HCPCS | Performed by: HOSPITALIST

## 2019-08-29 PROCEDURE — 70553 MRI BRAIN STEM W/O & W/DYE: CPT

## 2019-08-29 PROCEDURE — 99232 SBSQ HOSP IP/OBS MODERATE 35: CPT | Performed by: NURSE PRACTITIONER

## 2019-08-29 PROCEDURE — 85730 THROMBOPLASTIN TIME PARTIAL: CPT | Performed by: NEUROLOGICAL SURGERY

## 2019-08-29 PROCEDURE — 99024 POSTOP FOLLOW-UP VISIT: CPT | Performed by: PHYSICIAN ASSISTANT

## 2019-08-29 PROCEDURE — 94640 AIRWAY INHALATION TREATMENT: CPT

## 2019-08-29 PROCEDURE — 94799 UNLISTED PULMONARY SVC/PX: CPT

## 2019-08-29 PROCEDURE — 85025 COMPLETE CBC W/AUTO DIFF WBC: CPT | Performed by: NEUROLOGICAL SURGERY

## 2019-08-29 PROCEDURE — 99231 SBSQ HOSP IP/OBS SF/LOW 25: CPT | Performed by: PSYCHIATRY & NEUROLOGY

## 2019-08-29 PROCEDURE — 97110 THERAPEUTIC EXERCISES: CPT

## 2019-08-29 PROCEDURE — 80048 BASIC METABOLIC PNL TOTAL CA: CPT | Performed by: NEUROLOGICAL SURGERY

## 2019-08-29 PROCEDURE — 25010000002 CEFTRIAXONE PER 250 MG: Performed by: HOSPITALIST

## 2019-08-29 PROCEDURE — 0 GADOBENATE DIMEGLUMINE 529 MG/ML SOLUTION: Performed by: HOSPITALIST

## 2019-08-29 PROCEDURE — 70450 CT HEAD/BRAIN W/O DYE: CPT

## 2019-08-29 PROCEDURE — 92610 EVALUATE SWALLOWING FUNCTION: CPT | Performed by: SPEECH-LANGUAGE PATHOLOGIST

## 2019-08-29 PROCEDURE — 97162 PT EVAL MOD COMPLEX 30 MIN: CPT

## 2019-08-29 PROCEDURE — 25010000002 DEXAMETHASONE PER 1 MG: Performed by: PHYSICIAN ASSISTANT

## 2019-08-29 PROCEDURE — 25010000002 LEVETIRACETAM IN NACL 0.82% 500 MG/100ML SOLUTION: Performed by: HOSPITALIST

## 2019-08-29 PROCEDURE — 25010000003 LEVETIRACETAM IN NACL 0.75% 1000 MG/100ML SOLUTION: Performed by: PSYCHIATRY & NEUROLOGY

## 2019-08-29 PROCEDURE — 85610 PROTHROMBIN TIME: CPT | Performed by: NEUROLOGICAL SURGERY

## 2019-08-29 PROCEDURE — 99233 SBSQ HOSP IP/OBS HIGH 50: CPT | Performed by: INTERNAL MEDICINE

## 2019-08-29 PROCEDURE — 82962 GLUCOSE BLOOD TEST: CPT

## 2019-08-29 PROCEDURE — 25810000003 SODIUM CHLORIDE 0.9 % WITH KCL 20 MEQ 20-0.9 MEQ/L-% SOLUTION: Performed by: NEUROLOGICAL SURGERY

## 2019-08-29 RX ORDER — ARFORMOTEROL TARTRATE 15 UG/2ML
15 SOLUTION RESPIRATORY (INHALATION)
Status: DISCONTINUED | OUTPATIENT
Start: 2019-08-29 | End: 2019-08-31

## 2019-08-29 RX ORDER — LEVETIRACETAM 10 MG/ML
1000 INJECTION INTRAVASCULAR EVERY 12 HOURS SCHEDULED
Status: DISCONTINUED | OUTPATIENT
Start: 2019-08-29 | End: 2019-08-31

## 2019-08-29 RX ORDER — LOSARTAN POTASSIUM 50 MG/1
50 TABLET ORAL
Status: DISCONTINUED | OUTPATIENT
Start: 2019-08-29 | End: 2019-08-31

## 2019-08-29 RX ORDER — BUDESONIDE 0.5 MG/2ML
0.5 INHALANT ORAL
Status: DISCONTINUED | OUTPATIENT
Start: 2019-08-29 | End: 2019-08-31

## 2019-08-29 RX ADMIN — GADOBENATE DIMEGLUMINE 17 ML: 529 INJECTION, SOLUTION INTRAVENOUS at 19:43

## 2019-08-29 RX ADMIN — FLUOXETINE HYDROCHLORIDE 20 MG: 20 CAPSULE ORAL at 16:48

## 2019-08-29 RX ADMIN — ARFORMOTEROL TARTRATE 15 MCG: 15 SOLUTION RESPIRATORY (INHALATION) at 12:14

## 2019-08-29 RX ADMIN — DEXAMETHASONE SODIUM PHOSPHATE 4 MG: 10 INJECTION INTRAMUSCULAR; INTRAVENOUS at 15:40

## 2019-08-29 RX ADMIN — CEFTRIAXONE SODIUM 1 G: 1 INJECTION, SOLUTION INTRAVENOUS at 20:30

## 2019-08-29 RX ADMIN — DEXAMETHASONE SODIUM PHOSPHATE 4 MG: 10 INJECTION INTRAMUSCULAR; INTRAVENOUS at 22:08

## 2019-08-29 RX ADMIN — LEVETIRACETAM 500 MG: 5 INJECTION INTRAVENOUS at 10:16

## 2019-08-29 RX ADMIN — BUDESONIDE 0.5 MG: 0.5 INHALANT RESPIRATORY (INHALATION) at 20:38

## 2019-08-29 RX ADMIN — POTASSIUM CHLORIDE AND SODIUM CHLORIDE 100 ML/HR: 900; 150 INJECTION, SOLUTION INTRAVENOUS at 05:36

## 2019-08-29 RX ADMIN — LEVETIRACETAM 1000 MG: 10 INJECTION INTRAVENOUS at 20:30

## 2019-08-29 RX ADMIN — SODIUM CHLORIDE 10 MG/HR: 9 INJECTION, SOLUTION INTRAVENOUS at 02:03

## 2019-08-29 RX ADMIN — DEXAMETHASONE SODIUM PHOSPHATE 4 MG: 10 INJECTION INTRAMUSCULAR; INTRAVENOUS at 04:01

## 2019-08-29 RX ADMIN — ARFORMOTEROL TARTRATE 15 MCG: 15 SOLUTION RESPIRATORY (INHALATION) at 20:38

## 2019-08-29 RX ADMIN — BUDESONIDE 0.5 MG: 0.5 INHALANT RESPIRATORY (INHALATION) at 12:14

## 2019-08-29 RX ADMIN — DEXAMETHASONE SODIUM PHOSPHATE 4 MG: 10 INJECTION INTRAMUSCULAR; INTRAVENOUS at 10:16

## 2019-08-29 RX ADMIN — SODIUM CHLORIDE 25 MG: 9 INJECTION, SOLUTION INTRAVENOUS at 07:41

## 2019-08-29 RX ADMIN — LOSARTAN POTASSIUM 50 MG: 50 TABLET, FILM COATED ORAL at 10:22

## 2019-08-29 RX ADMIN — SODIUM CHLORIDE 5 MG/HR: 9 INJECTION, SOLUTION INTRAVENOUS at 08:47

## 2019-08-30 LAB
ANION GAP SERPL CALCULATED.3IONS-SCNC: 8.9 MMOL/L (ref 5–15)
BASOPHILS # BLD AUTO: 0 10*3/MM3 (ref 0–0.2)
BASOPHILS NFR BLD AUTO: 0 % (ref 0–1.5)
BUN BLD-MCNC: 18 MG/DL (ref 8–23)
BUN/CREAT SERPL: 22.8 (ref 7–25)
CALCIUM SPEC-SCNC: 8 MG/DL (ref 8.6–10.5)
CHLORIDE SERPL-SCNC: 108 MMOL/L (ref 98–107)
CO2 SERPL-SCNC: 22.1 MMOL/L (ref 22–29)
CREAT BLD-MCNC: 0.79 MG/DL (ref 0.57–1)
CYTO UR: NORMAL
DEPRECATED RDW RBC AUTO: 40.9 FL (ref 37–54)
EOSINOPHIL # BLD AUTO: 0 10*3/MM3 (ref 0–0.4)
EOSINOPHIL NFR BLD AUTO: 0 % (ref 0.3–6.2)
ERYTHROCYTE [DISTWIDTH] IN BLOOD BY AUTOMATED COUNT: 12.4 % (ref 12.3–15.4)
GFR SERPL CREATININE-BSD FRML MDRD: 72 ML/MIN/1.73
GLUCOSE BLD-MCNC: 130 MG/DL (ref 65–99)
GLUCOSE BLDC GLUCOMTR-MCNC: 138 MG/DL (ref 70–130)
HCT VFR BLD AUTO: 36.3 % (ref 34–46.6)
HGB BLD-MCNC: 11.9 G/DL (ref 12–15.9)
IMM GRANULOCYTES # BLD AUTO: 0.11 10*3/MM3 (ref 0–0.05)
IMM GRANULOCYTES NFR BLD AUTO: 1.2 % (ref 0–0.5)
LAB AP CASE REPORT: NORMAL
LAB AP CLINICAL INFORMATION: NORMAL
LAB AP DIAGNOSIS COMMENT: NORMAL
LYMPHOCYTES # BLD AUTO: 0.59 10*3/MM3 (ref 0.7–3.1)
LYMPHOCYTES NFR BLD AUTO: 6.7 % (ref 19.6–45.3)
Lab: NORMAL
MCH RBC QN AUTO: 29.5 PG (ref 26.6–33)
MCHC RBC AUTO-ENTMCNC: 32.8 G/DL (ref 31.5–35.7)
MCV RBC AUTO: 89.9 FL (ref 79–97)
MONOCYTES # BLD AUTO: 0.32 10*3/MM3 (ref 0.1–0.9)
MONOCYTES NFR BLD AUTO: 3.6 % (ref 5–12)
NEUTROPHILS # BLD AUTO: 7.83 10*3/MM3 (ref 1.7–7)
NEUTROPHILS NFR BLD AUTO: 88.5 % (ref 42.7–76)
NRBC BLD AUTO-RTO: 0.1 /100 WBC (ref 0–0.2)
PATH REPORT.FINAL DX SPEC: NORMAL
PATH REPORT.GROSS SPEC: NORMAL
PLATELET # BLD AUTO: 243 10*3/MM3 (ref 140–450)
PMV BLD AUTO: 10.2 FL (ref 6–12)
POTASSIUM BLD-SCNC: 4.4 MMOL/L (ref 3.5–5.2)
RBC # BLD AUTO: 4.04 10*6/MM3 (ref 3.77–5.28)
SODIUM BLD-SCNC: 139 MMOL/L (ref 136–145)
WBC NRBC COR # BLD: 8.85 10*3/MM3 (ref 3.4–10.8)

## 2019-08-30 PROCEDURE — 82962 GLUCOSE BLOOD TEST: CPT

## 2019-08-30 PROCEDURE — 25010000003 LEVETIRACETAM IN NACL 0.75% 1000 MG/100ML SOLUTION: Performed by: PSYCHIATRY & NEUROLOGY

## 2019-08-30 PROCEDURE — 99233 SBSQ HOSP IP/OBS HIGH 50: CPT | Performed by: INTERNAL MEDICINE

## 2019-08-30 PROCEDURE — 97110 THERAPEUTIC EXERCISES: CPT

## 2019-08-30 PROCEDURE — 97112 NEUROMUSCULAR REEDUCATION: CPT

## 2019-08-30 PROCEDURE — 25010000002 DEXAMETHASONE PER 1 MG: Performed by: PHYSICIAN ASSISTANT

## 2019-08-30 PROCEDURE — 99024 POSTOP FOLLOW-UP VISIT: CPT | Performed by: NURSE PRACTITIONER

## 2019-08-30 PROCEDURE — 80048 BASIC METABOLIC PNL TOTAL CA: CPT | Performed by: NEUROLOGICAL SURGERY

## 2019-08-30 PROCEDURE — 25010000002 CEFTRIAXONE PER 250 MG: Performed by: HOSPITALIST

## 2019-08-30 PROCEDURE — 97165 OT EVAL LOW COMPLEX 30 MIN: CPT

## 2019-08-30 PROCEDURE — 85025 COMPLETE CBC W/AUTO DIFF WBC: CPT | Performed by: NEUROLOGICAL SURGERY

## 2019-08-30 PROCEDURE — 94799 UNLISTED PULMONARY SVC/PX: CPT

## 2019-08-30 PROCEDURE — 99231 SBSQ HOSP IP/OBS SF/LOW 25: CPT | Performed by: PSYCHIATRY & NEUROLOGY

## 2019-08-30 PROCEDURE — 25810000003 SODIUM CHLORIDE 0.9 % WITH KCL 20 MEQ 20-0.9 MEQ/L-% SOLUTION: Performed by: HOSPITALIST

## 2019-08-30 RX ADMIN — FLUOXETINE HYDROCHLORIDE 20 MG: 20 CAPSULE ORAL at 16:57

## 2019-08-30 RX ADMIN — ARFORMOTEROL TARTRATE 15 MCG: 15 SOLUTION RESPIRATORY (INHALATION) at 07:08

## 2019-08-30 RX ADMIN — DEXAMETHASONE SODIUM PHOSPHATE 4 MG: 10 INJECTION INTRAMUSCULAR; INTRAVENOUS at 22:26

## 2019-08-30 RX ADMIN — DEXAMETHASONE SODIUM PHOSPHATE 4 MG: 10 INJECTION INTRAMUSCULAR; INTRAVENOUS at 10:20

## 2019-08-30 RX ADMIN — CEFTRIAXONE SODIUM 1 G: 1 INJECTION, SOLUTION INTRAVENOUS at 20:50

## 2019-08-30 RX ADMIN — POTASSIUM CHLORIDE AND SODIUM CHLORIDE 75 ML/HR: 900; 150 INJECTION, SOLUTION INTRAVENOUS at 20:50

## 2019-08-30 RX ADMIN — PANTOPRAZOLE SODIUM 40 MG: 40 TABLET, DELAYED RELEASE ORAL at 07:18

## 2019-08-30 RX ADMIN — DEXAMETHASONE SODIUM PHOSPHATE 4 MG: 10 INJECTION INTRAMUSCULAR; INTRAVENOUS at 16:57

## 2019-08-30 RX ADMIN — LEVETIRACETAM 1000 MG: 10 INJECTION INTRAVENOUS at 22:30

## 2019-08-30 RX ADMIN — POTASSIUM CHLORIDE AND SODIUM CHLORIDE 75 ML/HR: 900; 150 INJECTION, SOLUTION INTRAVENOUS at 11:09

## 2019-08-30 RX ADMIN — DEXAMETHASONE SODIUM PHOSPHATE 4 MG: 10 INJECTION INTRAMUSCULAR; INTRAVENOUS at 04:08

## 2019-08-30 RX ADMIN — BUDESONIDE 0.5 MG: 0.5 INHALANT RESPIRATORY (INHALATION) at 07:08

## 2019-08-30 RX ADMIN — LOSARTAN POTASSIUM 50 MG: 50 TABLET, FILM COATED ORAL at 08:35

## 2019-08-30 RX ADMIN — LEVETIRACETAM 1000 MG: 10 INJECTION INTRAVENOUS at 08:35

## 2019-08-31 LAB
ANION GAP SERPL CALCULATED.3IONS-SCNC: 11.2 MMOL/L (ref 5–15)
BASOPHILS # BLD AUTO: 0.01 10*3/MM3 (ref 0–0.2)
BASOPHILS NFR BLD AUTO: 0.1 % (ref 0–1.5)
BUN BLD-MCNC: 21 MG/DL (ref 8–23)
BUN/CREAT SERPL: 28.4 (ref 7–25)
CALCIUM SPEC-SCNC: 8.8 MG/DL (ref 8.6–10.5)
CHLORIDE SERPL-SCNC: 105 MMOL/L (ref 98–107)
CO2 SERPL-SCNC: 23.8 MMOL/L (ref 22–29)
CREAT BLD-MCNC: 0.74 MG/DL (ref 0.57–1)
DEPRECATED RDW RBC AUTO: 40.7 FL (ref 37–54)
EOSINOPHIL # BLD AUTO: 0 10*3/MM3 (ref 0–0.4)
EOSINOPHIL NFR BLD AUTO: 0 % (ref 0.3–6.2)
ERYTHROCYTE [DISTWIDTH] IN BLOOD BY AUTOMATED COUNT: 12.4 % (ref 12.3–15.4)
GFR SERPL CREATININE-BSD FRML MDRD: 77 ML/MIN/1.73
GLUCOSE BLD-MCNC: 126 MG/DL (ref 65–99)
HCT VFR BLD AUTO: 38.5 % (ref 34–46.6)
HGB BLD-MCNC: 12.6 G/DL (ref 12–15.9)
IMM GRANULOCYTES # BLD AUTO: 0.14 10*3/MM3 (ref 0–0.05)
IMM GRANULOCYTES NFR BLD AUTO: 1.4 % (ref 0–0.5)
LYMPHOCYTES # BLD AUTO: 0.58 10*3/MM3 (ref 0.7–3.1)
LYMPHOCYTES NFR BLD AUTO: 5.7 % (ref 19.6–45.3)
MCH RBC QN AUTO: 29.2 PG (ref 26.6–33)
MCHC RBC AUTO-ENTMCNC: 32.7 G/DL (ref 31.5–35.7)
MCV RBC AUTO: 89.3 FL (ref 79–97)
MONOCYTES # BLD AUTO: 0.5 10*3/MM3 (ref 0.1–0.9)
MONOCYTES NFR BLD AUTO: 4.9 % (ref 5–12)
NEUTROPHILS # BLD AUTO: 8.97 10*3/MM3 (ref 1.7–7)
NEUTROPHILS NFR BLD AUTO: 87.9 % (ref 42.7–76)
NRBC BLD AUTO-RTO: 0 /100 WBC (ref 0–0.2)
PLATELET # BLD AUTO: 260 10*3/MM3 (ref 140–450)
PMV BLD AUTO: 10 FL (ref 6–12)
POTASSIUM BLD-SCNC: 4.3 MMOL/L (ref 3.5–5.2)
RBC # BLD AUTO: 4.31 10*6/MM3 (ref 3.77–5.28)
SODIUM BLD-SCNC: 140 MMOL/L (ref 136–145)
WBC NRBC COR # BLD: 10.2 10*3/MM3 (ref 3.4–10.8)

## 2019-08-31 PROCEDURE — 25010000003 LEVETIRACETAM IN NACL 0.75% 1000 MG/100ML SOLUTION: Performed by: PSYCHIATRY & NEUROLOGY

## 2019-08-31 PROCEDURE — 25010000002 DEXAMETHASONE PER 1 MG: Performed by: HOSPITALIST

## 2019-08-31 PROCEDURE — 80048 BASIC METABOLIC PNL TOTAL CA: CPT | Performed by: NEUROLOGICAL SURGERY

## 2019-08-31 PROCEDURE — 97110 THERAPEUTIC EXERCISES: CPT

## 2019-08-31 PROCEDURE — 25010000002 DEXAMETHASONE PER 1 MG: Performed by: PHYSICIAN ASSISTANT

## 2019-08-31 PROCEDURE — 94799 UNLISTED PULMONARY SVC/PX: CPT

## 2019-08-31 PROCEDURE — 63710000001 DEXAMETHASONE PER 0.25 MG: Performed by: NEUROLOGICAL SURGERY

## 2019-08-31 PROCEDURE — 99233 SBSQ HOSP IP/OBS HIGH 50: CPT | Performed by: INTERNAL MEDICINE

## 2019-08-31 PROCEDURE — 99024 POSTOP FOLLOW-UP VISIT: CPT | Performed by: NEUROLOGICAL SURGERY

## 2019-08-31 PROCEDURE — 85025 COMPLETE CBC W/AUTO DIFF WBC: CPT | Performed by: NEUROLOGICAL SURGERY

## 2019-08-31 RX ORDER — PANTOPRAZOLE SODIUM 40 MG/1
40 TABLET, DELAYED RELEASE ORAL
Status: DISCONTINUED | OUTPATIENT
Start: 2019-09-01 | End: 2019-09-02 | Stop reason: HOSPADM

## 2019-08-31 RX ORDER — ARFORMOTEROL TARTRATE 15 UG/2ML
15 SOLUTION RESPIRATORY (INHALATION)
Status: DISCONTINUED | OUTPATIENT
Start: 2019-08-31 | End: 2019-09-02 | Stop reason: HOSPADM

## 2019-08-31 RX ORDER — LEVETIRACETAM 500 MG/1
1000 TABLET ORAL EVERY 12 HOURS SCHEDULED
Status: DISCONTINUED | OUTPATIENT
Start: 2019-08-31 | End: 2019-08-31

## 2019-08-31 RX ORDER — CEFTRIAXONE SODIUM 1 G/50ML
1 INJECTION, SOLUTION INTRAVENOUS EVERY 24 HOURS
Status: DISCONTINUED | OUTPATIENT
Start: 2019-08-31 | End: 2019-08-31

## 2019-08-31 RX ORDER — LEVETIRACETAM 500 MG/1
500 TABLET ORAL EVERY 12 HOURS SCHEDULED
Status: DISCONTINUED | OUTPATIENT
Start: 2019-08-31 | End: 2019-09-02 | Stop reason: HOSPADM

## 2019-08-31 RX ORDER — DEXAMETHASONE SODIUM PHOSPHATE 10 MG/ML
4 INJECTION INTRAMUSCULAR; INTRAVENOUS EVERY 6 HOURS
Status: DISCONTINUED | OUTPATIENT
Start: 2019-08-31 | End: 2019-08-31

## 2019-08-31 RX ORDER — LOSARTAN POTASSIUM 50 MG/1
50 TABLET ORAL
Status: DISCONTINUED | OUTPATIENT
Start: 2019-08-31 | End: 2019-09-02 | Stop reason: HOSPADM

## 2019-08-31 RX ORDER — LEVETIRACETAM 10 MG/ML
1000 INJECTION INTRAVASCULAR EVERY 12 HOURS SCHEDULED
Status: DISCONTINUED | OUTPATIENT
Start: 2019-08-31 | End: 2019-08-31

## 2019-08-31 RX ORDER — FLUOXETINE HYDROCHLORIDE 20 MG/1
20 CAPSULE ORAL EVERY EVENING
Status: DISCONTINUED | OUTPATIENT
Start: 2019-08-31 | End: 2019-09-02 | Stop reason: HOSPADM

## 2019-08-31 RX ORDER — TEMAZEPAM 15 MG/1
15 CAPSULE ORAL NIGHTLY PRN
Status: DISCONTINUED | OUTPATIENT
Start: 2019-08-31 | End: 2019-09-02 | Stop reason: HOSPADM

## 2019-08-31 RX ORDER — DEXAMETHASONE 4 MG/1
4 TABLET ORAL EVERY 8 HOURS SCHEDULED
Status: DISCONTINUED | OUTPATIENT
Start: 2019-08-31 | End: 2019-09-02 | Stop reason: HOSPADM

## 2019-08-31 RX ORDER — BUDESONIDE 0.5 MG/2ML
0.5 INHALANT ORAL
Status: DISCONTINUED | OUTPATIENT
Start: 2019-08-31 | End: 2019-09-02 | Stop reason: HOSPADM

## 2019-08-31 RX ADMIN — PANTOPRAZOLE SODIUM 40 MG: 40 TABLET, DELAYED RELEASE ORAL at 09:02

## 2019-08-31 RX ADMIN — LOSARTAN POTASSIUM 50 MG: 50 TABLET, FILM COATED ORAL at 09:00

## 2019-08-31 RX ADMIN — DEXAMETHASONE SODIUM PHOSPHATE 4 MG: 10 INJECTION INTRAMUSCULAR; INTRAVENOUS at 05:09

## 2019-08-31 RX ADMIN — LEVETIRACETAM 500 MG: 500 TABLET, FILM COATED ORAL at 21:10

## 2019-08-31 RX ADMIN — DEXAMETHASONE 4 MG: 4 TABLET ORAL at 21:10

## 2019-08-31 RX ADMIN — DEXAMETHASONE SODIUM PHOSPHATE 4 MG: 10 INJECTION INTRAMUSCULAR; INTRAVENOUS at 10:13

## 2019-08-31 RX ADMIN — BUDESONIDE 0.5 MG: 0.5 INHALANT RESPIRATORY (INHALATION) at 12:27

## 2019-08-31 RX ADMIN — FLUOXETINE HYDROCHLORIDE 20 MG: 20 CAPSULE ORAL at 17:24

## 2019-08-31 RX ADMIN — ARFORMOTEROL TARTRATE 15 MCG: 15 SOLUTION RESPIRATORY (INHALATION) at 12:27

## 2019-08-31 RX ADMIN — DEXAMETHASONE SODIUM PHOSPHATE 4 MG: 10 INJECTION INTRAMUSCULAR; INTRAVENOUS at 17:24

## 2019-08-31 RX ADMIN — LEVETIRACETAM 1000 MG: 10 INJECTION INTRAVENOUS at 09:01

## 2019-08-31 RX ADMIN — ARFORMOTEROL TARTRATE 15 MCG: 15 SOLUTION RESPIRATORY (INHALATION) at 19:59

## 2019-08-31 RX ADMIN — TEMAZEPAM 15 MG: 15 CAPSULE ORAL at 21:10

## 2019-08-31 RX ADMIN — BUDESONIDE 0.5 MG: 0.5 INHALANT RESPIRATORY (INHALATION) at 19:59

## 2019-09-01 LAB
ANION GAP SERPL CALCULATED.3IONS-SCNC: 10 MMOL/L (ref 5–15)
BASOPHILS # BLD AUTO: 0 10*3/MM3 (ref 0–0.2)
BASOPHILS NFR BLD AUTO: 0 % (ref 0–1.5)
BUN BLD-MCNC: 22 MG/DL (ref 8–23)
BUN/CREAT SERPL: 27.2 (ref 7–25)
CALCIUM SPEC-SCNC: 8.7 MG/DL (ref 8.6–10.5)
CHLORIDE SERPL-SCNC: 102 MMOL/L (ref 98–107)
CO2 SERPL-SCNC: 25 MMOL/L (ref 22–29)
CREAT BLD-MCNC: 0.81 MG/DL (ref 0.57–1)
DEPRECATED RDW RBC AUTO: 40.6 FL (ref 37–54)
EOSINOPHIL # BLD AUTO: 0 10*3/MM3 (ref 0–0.4)
EOSINOPHIL NFR BLD AUTO: 0 % (ref 0.3–6.2)
ERYTHROCYTE [DISTWIDTH] IN BLOOD BY AUTOMATED COUNT: 12.5 % (ref 12.3–15.4)
GFR SERPL CREATININE-BSD FRML MDRD: 70 ML/MIN/1.73
GLUCOSE BLD-MCNC: 129 MG/DL (ref 65–99)
HCT VFR BLD AUTO: 37.6 % (ref 34–46.6)
HGB BLD-MCNC: 12.3 G/DL (ref 12–15.9)
IMM GRANULOCYTES # BLD AUTO: 0.16 10*3/MM3 (ref 0–0.05)
IMM GRANULOCYTES NFR BLD AUTO: 2.1 % (ref 0–0.5)
LYMPHOCYTES # BLD AUTO: 0.51 10*3/MM3 (ref 0.7–3.1)
LYMPHOCYTES NFR BLD AUTO: 6.6 % (ref 19.6–45.3)
MCH RBC QN AUTO: 29 PG (ref 26.6–33)
MCHC RBC AUTO-ENTMCNC: 32.7 G/DL (ref 31.5–35.7)
MCV RBC AUTO: 88.7 FL (ref 79–97)
MONOCYTES # BLD AUTO: 0.4 10*3/MM3 (ref 0.1–0.9)
MONOCYTES NFR BLD AUTO: 5.2 % (ref 5–12)
NEUTROPHILS # BLD AUTO: 6.64 10*3/MM3 (ref 1.7–7)
NEUTROPHILS NFR BLD AUTO: 86.1 % (ref 42.7–76)
NRBC BLD AUTO-RTO: 0 /100 WBC (ref 0–0.2)
PLATELET # BLD AUTO: 251 10*3/MM3 (ref 140–450)
PMV BLD AUTO: 9.8 FL (ref 6–12)
POTASSIUM BLD-SCNC: 4.3 MMOL/L (ref 3.5–5.2)
RBC # BLD AUTO: 4.24 10*6/MM3 (ref 3.77–5.28)
SODIUM BLD-SCNC: 137 MMOL/L (ref 136–145)
WBC NRBC COR # BLD: 7.71 10*3/MM3 (ref 3.4–10.8)

## 2019-09-01 PROCEDURE — 94799 UNLISTED PULMONARY SVC/PX: CPT

## 2019-09-01 PROCEDURE — 97110 THERAPEUTIC EXERCISES: CPT

## 2019-09-01 PROCEDURE — 99024 POSTOP FOLLOW-UP VISIT: CPT | Performed by: NEUROLOGICAL SURGERY

## 2019-09-01 PROCEDURE — 99233 SBSQ HOSP IP/OBS HIGH 50: CPT | Performed by: INTERNAL MEDICINE

## 2019-09-01 PROCEDURE — 63710000001 DEXAMETHASONE PER 0.25 MG: Performed by: NEUROLOGICAL SURGERY

## 2019-09-01 PROCEDURE — 80048 BASIC METABOLIC PNL TOTAL CA: CPT | Performed by: NEUROLOGICAL SURGERY

## 2019-09-01 PROCEDURE — 85025 COMPLETE CBC W/AUTO DIFF WBC: CPT | Performed by: NEUROLOGICAL SURGERY

## 2019-09-01 RX ORDER — DOCUSATE SODIUM 100 MG/1
200 CAPSULE, LIQUID FILLED ORAL 2 TIMES DAILY
Status: DISCONTINUED | OUTPATIENT
Start: 2019-09-01 | End: 2019-09-02 | Stop reason: HOSPADM

## 2019-09-01 RX ORDER — BISACODYL 10 MG
10 SUPPOSITORY, RECTAL RECTAL DAILY
Status: DISCONTINUED | OUTPATIENT
Start: 2019-09-01 | End: 2019-09-02 | Stop reason: HOSPADM

## 2019-09-01 RX ORDER — POLYETHYLENE GLYCOL 3350 17 G/17G
17 POWDER, FOR SOLUTION ORAL DAILY
Status: DISCONTINUED | OUTPATIENT
Start: 2019-09-01 | End: 2019-09-02 | Stop reason: HOSPADM

## 2019-09-01 RX ADMIN — ARFORMOTEROL TARTRATE 15 MCG: 15 SOLUTION RESPIRATORY (INHALATION) at 22:21

## 2019-09-01 RX ADMIN — ARFORMOTEROL TARTRATE 15 MCG: 15 SOLUTION RESPIRATORY (INHALATION) at 09:41

## 2019-09-01 RX ADMIN — DEXAMETHASONE 4 MG: 4 TABLET ORAL at 06:17

## 2019-09-01 RX ADMIN — PANTOPRAZOLE SODIUM 40 MG: 40 TABLET, DELAYED RELEASE ORAL at 06:17

## 2019-09-01 RX ADMIN — BUDESONIDE 0.5 MG: 0.5 INHALANT RESPIRATORY (INHALATION) at 22:21

## 2019-09-01 RX ADMIN — LOSARTAN POTASSIUM 50 MG: 50 TABLET, FILM COATED ORAL at 08:41

## 2019-09-01 RX ADMIN — LEVETIRACETAM 500 MG: 500 TABLET, FILM COATED ORAL at 08:41

## 2019-09-01 RX ADMIN — DOCUSATE SODIUM 200 MG: 100 CAPSULE, LIQUID FILLED ORAL at 20:43

## 2019-09-01 RX ADMIN — LEVETIRACETAM 500 MG: 500 TABLET, FILM COATED ORAL at 20:43

## 2019-09-01 RX ADMIN — DEXAMETHASONE 4 MG: 4 TABLET ORAL at 20:43

## 2019-09-01 RX ADMIN — FLUOXETINE HYDROCHLORIDE 20 MG: 20 CAPSULE ORAL at 18:17

## 2019-09-01 RX ADMIN — BUDESONIDE 0.5 MG: 0.5 INHALANT RESPIRATORY (INHALATION) at 09:41

## 2019-09-01 RX ADMIN — DEXAMETHASONE 4 MG: 4 TABLET ORAL at 14:35

## 2019-09-01 NOTE — PROGRESS NOTES
POD 3  Covering for Dr. Thibodeaux  S/p left frontal craniotomy for met  Vitals:    08/31/19 1312 08/31/19 1722 08/31/19 1945 08/31/19 1959   BP: 146/73 153/80 159/76    BP Location: Left arm Left arm Left arm    Patient Position: Sitting Sitting Lying    Pulse: 64 66 75 65   Resp: 18 18 18 18   Temp: 97.8 °F (36.6 °C) 98 °F (36.7 °C) 98 °F (36.7 °C)    TempSrc:  Oral Oral    SpO2: 95% 97% 98% 97%   Weight:       Height:       Right arm and hand still weak  Incision looks fine  Will need inpatient rehab  We will wean steroids  PERRLA  AAOx3  Lab Results   Component Value Date    GLUCOSE 126 (H) 08/31/2019    BUN 21 08/31/2019    CREATININE 0.74 08/31/2019    EGFRIFNONA 77 08/31/2019    BCR 28.4 (H) 08/31/2019    K 4.3 08/31/2019    CO2 23.8 08/31/2019    CALCIUM 8.8 08/31/2019    ALBUMIN 4.00 08/26/2019    AST 14 08/26/2019    ALT 10 08/26/2019     WBC   Date Value Ref Range Status   08/31/2019 10.20 3.40 - 10.80 10*3/mm3 Final     RBC   Date Value Ref Range Status   08/31/2019 4.31 3.77 - 5.28 10*6/mm3 Final     Hemoglobin   Date Value Ref Range Status   08/31/2019 12.6 12.0 - 15.9 g/dL Final     Hematocrit   Date Value Ref Range Status   08/31/2019 38.5 34.0 - 46.6 % Final     MCV   Date Value Ref Range Status   08/31/2019 89.3 79.0 - 97.0 fL Final     MCH   Date Value Ref Range Status   08/31/2019 29.2 26.6 - 33.0 pg Final     MCHC   Date Value Ref Range Status   08/31/2019 32.7 31.5 - 35.7 g/dL Final     RDW   Date Value Ref Range Status   08/31/2019 12.4 12.3 - 15.4 % Final     RDW-SD   Date Value Ref Range Status   08/31/2019 40.7 37.0 - 54.0 fl Final     MPV   Date Value Ref Range Status   08/31/2019 10.0 6.0 - 12.0 fL Final     Platelets   Date Value Ref Range Status   08/31/2019 260 140 - 450 10*3/mm3 Final     Neutrophil %   Date Value Ref Range Status   08/31/2019 87.9 (H) 42.7 - 76.0 % Final     Lymphocyte %   Date Value Ref Range Status   08/31/2019 5.7 (L) 19.6 - 45.3 % Final     Monocyte %   Date Value Ref  Range Status   08/31/2019 4.9 (L) 5.0 - 12.0 % Final     Eosinophil %   Date Value Ref Range Status   08/31/2019 0.0 (L) 0.3 - 6.2 % Final     Basophil %   Date Value Ref Range Status   08/31/2019 0.1 0.0 - 1.5 % Final     Immature Grans %   Date Value Ref Range Status   08/31/2019 1.4 (H) 0.0 - 0.5 % Final     Neutrophils, Absolute   Date Value Ref Range Status   08/31/2019 8.97 (H) 1.70 - 7.00 10*3/mm3 Final     Lymphocytes, Absolute   Date Value Ref Range Status   08/31/2019 0.58 (L) 0.70 - 3.10 10*3/mm3 Final     Monocytes, Absolute   Date Value Ref Range Status   08/31/2019 0.50 0.10 - 0.90 10*3/mm3 Final     Eosinophils, Absolute   Date Value Ref Range Status   08/31/2019 0.00 0.00 - 0.40 10*3/mm3 Final     Basophils, Absolute   Date Value Ref Range Status   08/31/2019 0.01 0.00 - 0.20 10*3/mm3 Final     Immature Grans, Absolute   Date Value Ref Range Status   08/31/2019 0.14 (H) 0.00 - 0.05 10*3/mm3 Final     nRBC   Date Value Ref Range Status   08/31/2019 0.0 0.0 - 0.2 /100 WBC Final   MRI OF BRAIN WITH AND WITHOUT CONTRAST 08/29/2019     CLINICAL HISTORY: Postop craniotomy yesterday for resection of brain  metastases.     TECHNIQUE: Axial T1, FLAIR, fat-suppressed T2, axial diffusion gradient  echo T2, sagittal T1 and postcontrast axial fat-suppressed T1, sagittal  and coronal T1 and thin cut 1.5 mm thick axial postcontrast spoiled  gradient echo T1 weighted images were obtained of the entire head. This  is correlated to a preoperative MRI 08/25/2019.     FINDINGS: There has been and interval 6 cm anterior bifrontal craniotomy  for resection of a 3 x 3 x 2.4 cm enhancing mass from the posterior  medial left frontal lobe extending into the left cingulate gyrus. There  is a postoperative resection cavity that measures 1.9 x 1.9 x 1.4 cm.  There is a thin crescent of enhancement along the posterior superior  lateral margin of the resection cavity that measures 11 x 3 x 3 mm which  is suspicious for a thin  crescent of residual enhancing tumor. There is  stable moderate amount of surrounding vasogenic edema tracking  throughout the left frontal white matter into the anterior left parietal  white matter. It tracks up to 6.7 x 3.5 x 4 cm. There is some minimal  diffusion hyperintensity just anterosuperior to the resection cavity in  the posterior superior medial left frontal lobe that tracks up to 2 x  1.4 cm consistent with some cytotoxic edema adjacent to the resection  cavity, postoperative change or small acute infarct in the left anterior  cerebral artery territory. There is some mass effect on the superior  body of the left lateral ventricle and there is focal 4 mm left-to-right  midline shift at the level of the superior aspect of the septum  pellucidum and central falx cerebri. There is a 1.6 x 1.5 x 1.5 cm  rim-enhancing lesion in the anterior superior lateral left frontal lobe  with 2 cm surrounding edema compatible with a metastatic lesion and  there is a 1.1 x 1 x 1 cm rim-enhancing metastasis to the anterior  inferior lateral right frontal lobe extending to the dural surface along  the lateral aspect of the right orbital roof. The ventricles are normal  in size. There is some expected postoperative fluid and a thin rind of  postoperative air in the epidural space deep to the craniotomy flap  between the craniotomy flap and the anterior superior aspect of the  superior sagittal sinus measuring maximally 4-5 mm in thickness.  Otherwise no extra-axial fluid collections are identified. Paranasal  sinuses are clear. There is a small amount of fluid in the mastoid air  cells. Good flow-voids are demonstrated within the cerebral vessels and  in the dural venous sinuses.     IMPRESSION:     1. Patient is status post 6 cm anterior bifrontal craniotomy for  resection of a 3 x 3 x 2.4 cm enhancing mass from the posterior superior  medial left frontal lobe extending into the left cingulate gyrus. There  is postoperative  1.9 x 1.9 x 1.4 cm resection cavity and there is a thin  crescent of enhancement along the posterior superolateral margin of the  resection cavity measuring 11 x 3 x 3 mm which is suspicious for a tiny  crescent of residual enhancing tumor and can be reevaluated on follow-up  exams. There is a stable 6.7 x 3.5 x 4 cm area of vasogenic edema in the  superior left frontal white matter tracking into the anterior left  parietal white matter and there is a new 2 cm area of cytotoxic edema  adjacent to anterosuperior margin of the resection cavity that is either  postoperative or a small acute infarct. There is minimal expected  postoperative fluid and air deep to the craniotomy flap in the epidural  space between the craniotomy flap and the anterior superior sagittal  sinus. There is stable mild mass effect, 4 mm left-to-right midline  shift at the level of the superior falx and septum pellucidum.     2. The remainder of MRI of the head is unchanged. There is a 1.6 x 1.5 x  1.5 cm ring-enhancing metastatic lesion in the anterior superior lateral  left frontal lobe with 2 cm of surrounding vasogenic edema and an  additional 1.1 x 1 x 1 cm metastatic lesion in the anterior-inferior  right frontal lobe extending to the dural surface along the lateral  aspect of the right orbital roof. There is mild small vessel disease in  the cerebral white matter and the remainder of the MRI of the brain is  normal.     This report was finalized on 8/30/2019 11:15 AM by Dr. Saroj Fleming M.D.

## 2019-09-01 NOTE — PLAN OF CARE
Problem: Patient Care Overview  Goal: Plan of Care Review  Outcome: Ongoing (interventions implemented as appropriate)   09/01/19 1846   Plan of Care Review   Progress improving   OTHER   Outcome Summary Stronger today. Still weak on right side, dressing D/I. Ambulates to doorway and back. Continues to deny pain. To inpatient rehab tomorrow.    Coping/Psychosocial   Plan of Care Reviewed With patient;spouse;daughter       Problem: Fall Risk (Adult)  Goal: Identify Related Risk Factors and Signs and Symptoms  Outcome: Ongoing (interventions implemented as appropriate)   09/01/19 1846   Fall Risk (Adult)   Related Risk Factors (Fall Risk) bladder function altered;depression/anxiety;confusion/agitation;gait/mobility problems       Problem: Skin Injury Risk (Adult)  Goal: Identify Related Risk Factors and Signs and Symptoms  Outcome: Ongoing (interventions implemented as appropriate)   09/01/19 1846   Skin Injury Risk (Adult)   Related Risk Factors (Skin Injury Risk) cognitive impairment;mobility impaired       Problem: Craniotomy/Craniectomy/Cranioplasty (Adult)  Goal: Signs and Symptoms of Listed Potential Problems Will be Absent, Minimized or Managed (Craniotomy/Craniectomy/Cranioplasty)  Outcome: Ongoing (interventions implemented as appropriate)   09/01/19 1846   Goal/Outcome Evaluation   Problems Assessed (Craniotomy/Craniectomy/Cranioplasty) bowel motility decreased;neurologic deterioration   Problems Present (Craniotomy/ectomy) bowel motility decreased

## 2019-09-01 NOTE — PROGRESS NOTES
"Daily progress note    Chief complaint  Doing same  No new complaints  Family at bedside    History of present illness  74-year-old white female with history of depression and gastroesophageal reflux disease presented to [facility] emergency room with difficulty thinking occasional confusion followed by right-sided weakness and numbness tingling for last 2 weeks.  Patient denies any headache dizziness lightheadedness chest pain shortness of breath abdominal pain nausea vomiting diarrhea.  Patient denies any fever chills weight loss weight gain.  Patient evaluated in ER with a CT head found to have possible brain mass with vasogenic edema admitted for management.  Patient remained awake and alert has some jerking movement but denies any seizure-like activity.  Patient also denies any vision problems speech problem.  Patient admitted for management.     REVIEW OF SYSTEMS  Remarkable for generalized weakness especially on the right side     PHYSICAL EXAM  Blood pressure 150/69, pulse 69, temperature 97.9 °F (36.6 °C), temperature source Oral, resp. rate 20, height 162.6 cm (64\"), weight 86.6 kg (190 lb 14.7 oz), SpO2 97 %, not currently breastfeeding.    Constitutional: She is oriented to person, place, and time. No distress.   Head: Normocephalic and atraumatic.   Eyes: EOM are normal. Pupils are equal, round, and reactive to light.   Neck: Normal range of motion. Neck supple.   Cardiovascular: Normal rate, regular rhythm and normal heart sounds.   No murmur heard.  Pulmonary/Chest: Effort normal and breath sounds normal. No respiratory distress. She has no decreased breath sounds. She has no wheezes. She has no rhonchi. She has no rales.   Abdominal: Soft. There is no tenderness. There is no rebound and no guarding.   Musculoskeletal: Normal range of motion. She exhibits no edema.   Neurological: She is alert and oriented to person, place, and time. She has normal sensation and normal strength.   Skin: " Skin is warm and dry. No rash noted.   Psychiatric: Mood and affect normal.      LAB RESULTS  Lab Results (last 24 hours)     Procedure Component Value Units Date/Time    Basic Metabolic Panel [[id number]]  (Abnormal) Collected:  09/01/19 0407    Specimen:  Blood Updated:  09/01/19 0511     Glucose 129 mg/dL      BUN 22 mg/dL      Creatinine 0.81 mg/dL      Sodium 137 mmol/L      Potassium 4.3 mmol/L      Chloride 102 mmol/L      CO2 25.0 mmol/L      Calcium 8.7 mg/dL      eGFR Non African Amer 70 mL/min/1.73      BUN/Creatinine Ratio 27.2     Anion Gap 10.0 mmol/L     Narrative:       GFR Normal >60  Chronic Kidney Disease <60  Kidney Failure <15    CBC & Differential [[id number]] Collected:  09/01/19 0407    Specimen:  Blood Updated:  09/01/19 0451    Narrative:       The following orders were created for panel order CBC & Differential.  Procedure                               Abnormality         Status                     ---------                               -----------         ------                     CBC Auto Differential[[id number]]        Abnormal            Final result                 Please view results for these tests on the individual orders.    CBC Auto Differential [[id number]]  (Abnormal) Collected:  09/01/19 0407    Specimen:  Blood Updated:  09/01/19 0451     WBC 7.71 10*3/mm3      RBC 4.24 10*6/mm3      Hemoglobin 12.3 g/dL      Hematocrit 37.6 %      MCV 88.7 fL      MCH 29.0 pg      MCHC 32.7 g/dL      RDW 12.5 %      RDW-SD 40.6 fl      MPV 9.8 fL      Platelets 251 10*3/mm3      Neutrophil % 86.1 %      Lymphocyte % 6.6 %      Monocyte % 5.2 %      Eosinophil % 0.0 %      Basophil % 0.0 %      Immature Grans % 2.1 %      Neutrophils, Absolute 6.64 10*3/mm3      Lymphocytes, Absolute 0.51 10*3/mm3      Monocytes, Absolute 0.40 10*3/mm3      Eosinophils, Absolute 0.00 10*3/mm3      Basophils, Absolute 0.00 10*3/mm3      Immature Grans, Absolute 0.16 10*3/mm3      nRBC 0.0 /100 WBC          Imaging Results (last 24 hours)     ** No results found for the last 24 hours. **          Current Facility-Administered Medications:   •  acetaminophen (TYLENOL) tablet 650 mg, 650 mg, Oral, Q4H PRN, Andres Garcia MD, 650 mg at 08/26/19 1334  •  arformoterol (BROVANA) nebulizer solution 15 mcg, 15 mcg, Nebulization, BID - RT, Femi Giron MD, 15 mcg at 09/01/19 0941  •  budesonide (PULMICORT) nebulizer solution 0.5 mg, 0.5 mg, Nebulization, BID - RT, Femi Giron MD, 0.5 mg at 09/01/19 0941  •  dexamethasone (DECADRON) tablet 4 mg, 4 mg, Oral, Q8H, Kaden Albrecht MD, 4 mg at 09/01/19 0617  •  FLUoxetine (PROzac) capsule 20 mg, 20 mg, Oral, Q PM, Femi Giron MD, 20 mg at 08/31/19 1724  •  HYDROcodone-acetaminophen (NORCO) 5-325 MG per tablet 1 tablet, 1 tablet, Oral, Q4H PRN, Ruben Thibodeaux MD  •  levETIRAcetam (KEPPRA) tablet 500 mg, 500 mg, Oral, Q12H, Kaden Albrecht MD, 500 mg at 09/01/19 0841  •  losartan (COZAAR) tablet 50 mg, 50 mg, Oral, Q24H, Femi Giron MD, 50 mg at 09/01/19 0841  •  pantoprazole (PROTONIX) EC tablet 40 mg, 40 mg, Oral, Q AM, Femi Giron MD, 40 mg at 09/01/19 0617  •  sodium chloride 0.9 % flush 10 mL, 10 mL, Intravenous, PRN, Taran Carias MD  •  [COMPLETED] Insert peripheral IV, , , Once **AND** sodium chloride 0.9 % flush 10 mL, 10 mL, Intravenous, PRN, Taran Carias MD  •  temazepam (RESTORIL) capsule 15 mg, 15 mg, Oral, Nightly PRN, Junior Acuna MD, 15 mg at 08/31/19 2110     ASSESSMENT  3 ring-enhancing brain lesion with vasogenic edema status post craniotomy  Metastatic poorly differentiated carcinoma in the brain  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   Continue IV steroids  Continue home medications  Stress ulcer DVT  prophylaxis  Supportive care  PT/OT  Discussed with family  Discharge to acute rehab in a.lior PEDERSON MD

## 2019-09-01 NOTE — PLAN OF CARE
Problem: Patient Care Overview  Goal: Plan of Care Review   09/01/19 1507   OTHER   Outcome Summary Pt walked in villalobos, Funmi and unsteady. Anticipates d/c to acute inpatient rehab.    Coping/Psychosocial   Plan of Care Reviewed With patient

## 2019-09-01 NOTE — NURSING NOTE
Continue to follow for medical stability and eventual discharge to rehab. Bed available 9/1 and 9/2. Please complete discharge-readmit in Epic when discharging to rehab. Will continue to monitor.    Shana De La Rosa RN  Rehab Admissions Nurse  [phone]

## 2019-09-01 NOTE — PROGRESS NOTES
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  She is currently sitting in a chair.   reports her right arm and right leg function are worse than prior to surgery.  However, her speech is better than prior to surgery.    Past Medical History, Past Surgical History, Social History, Family History have been reviewed and are without significant changes except as mentioned.    Review of Systems   Review of Systems   Constitutional: Negative for activity change.   HENT: Negative for nosebleeds and trouble swallowing.    Respiratory: Negative for shortness of breath and wheezing.    Cardiovascular: Negative for chest pain and palpitations.   Gastrointestinal: Negative for constipation, diarrhea and nausea.   Genitourinary: Negative for dysuria and hematuria.   Musculoskeletal: Negative for arthralgias and myalgias.   Skin: Negative for rash and wound.   Neurological: Positive for weakness. Negative for seizures and syncope.   Hematological: Negative for adenopathy. Does not bruise/bleed easily.   Psychiatric/Behavioral: Negative for confusion.       Medications:  The current medication list was reviewed in the EMR    ALLERGIES:    Allergies   Allergen Reactions   • Sulfa Antibiotics Rash              Vitals:    08/31/19 1945 08/31/19 1959 09/01/19 0624 09/01/19 0841   BP: 159/76  162/79 150/69   BP Location: Left arm  Left arm Left arm   Patient Position: Lying  Lying Sitting   Pulse: 75 65 57 60   Resp: 18 18 18 18   Temp: 98 °F (36.7 °C)  97.9 °F (36.6 °C)    TempSrc: Oral  Oral    SpO2: 98% 97% 93% 95%   Weight:       Height:         Physical Exam    CONSTITUTIONAL:  Vital signs reviewed.  No distress, looks comfortable.  EYES:  Conjunctiva and lids unremarkable.  PERRLA  EARS,NOSE,MOUTH,THROAT:  Ears and nose appear unremarkable.  Lips, teeth, gums appear unremarkable.  RESPIRATORY:  Normal respiratory effort.   Lungs clear to auscultation bilaterally.  CARDIOVASCULAR:  Normal S1, S2.  No murmurs rubs or gallops.  No significant lower extremity edema.  GASTROINTESTINAL: Abdomen appears unremarkable.  Nontender.  No hepatomegaly.  No splenomegaly.  NEURO: cranial nerves 2-12 grossly intact.  No focal deficits.  Decreased strength right side  MUSCULOSKELETAL:  Unremarkable digits/nails.  No cyanosis or clubbing.  SKIN:  Warm.  No rashes.  PSYCHIATRIC:  Normal judgment and insight.  Normal mood and affect.       RECENT LABS:  WBC   Date Value Ref Range Status   09/01/2019 7.71 3.40 - 10.80 10*3/mm3 Final   08/31/2019 10.20 3.40 - 10.80 10*3/mm3 Final   08/30/2019 8.85 3.40 - 10.80 10*3/mm3 Final     Hemoglobin   Date Value Ref Range Status   09/01/2019 12.3 12.0 - 15.9 g/dL Final   08/31/2019 12.6 12.0 - 15.9 g/dL Final   08/30/2019 11.9 (L) 12.0 - 15.9 g/dL Final     Platelets   Date Value Ref Range Status   09/01/2019 251 140 - 450 10*3/mm3 Final   08/31/2019 260 140 - 450 10*3/mm3 Final   08/30/2019 243 140 - 450 10*3/mm3 Final       ASSESSMENT/PLAN:  *Extensive stage small cell lung cancer, RUL, 2.4 x 2.3 x 1.7cm, with metastasis to 3 brain lesions (bilateral frontal lobes.)  · RUL mass first seen on CT 3/28/2016, 2 x 1.3 cm.  Decreased to 1.6 cm on 6/27/2016, and decreased again to 1.3 cm on 12/5/2016.  Therefore, this was felt at the time to be a benign resolving nodule.  · No evidence of disease otherwise  · Noted Dr. Mayo plans SRS to 2 of the 3 brain metastasis.   · Craniotomy for the medial left frontal lobe lesion (3.2 cm) , 8/28/2019  · Because the final diagnosis is small cell, Dr. Chacko does not plan lung resection as we typically treat this with chemoradiation  · Plan SRS to 2 remaining brain lesions (first was resected).  Dr. Mayo only planning whole brain XRT if future brain recurrence.  · As an outpatient will have PET scan.  · Plan concurrent lung mass XRT with  carboplatin, etoposide, Tecentriq,  followed by Tecentriq maintenance for extensive stage small cell lung cancer.  · Usually with small cell I would like to start treatment within a couple of weeks.  However, she is recovering from brain surgery.  Furthermore, this lung lesion has been there for years.  Therefore, weighing risks and benefits I think it is in her best interest to wait almost 4 weeks after craniotomy to begin chemo and radiation.     *Goals of care.  · If the 3 brain lesions are locally treated with stereotactic radiation and resection, and truly no disease is found elsewhere, I think she has a very slim but possible chance of cure.   · If this is small cell lung cancer, it makes the possibility of long-term survival less likely.     *Neurological deficits due to suspected brain metastasis:  · Some mild confusion and right-sided weakness and urinary incontinence x2 weeks prompted ER visit 8/24/2019.  · Dr. Thibodeaux states she has a 5-10% of difficulty with speech or right hemiparesis.    *Tinnitus and decreased hearing.  Not a cisplatin candidate.    *This is Ángela Pryor's mother-in-law (APRN with Dr. Thibodeaux)     Plan  · Noted plans for discharge to rehab either today or tomorrow (per ).  · I sent a message to our office requesting:   Please arrange an appointment (2 units) with me to start carboplatin etoposide Tecentriq on either Monday, September 23   (2 units)     1 week prior to that appointment, needs PET scan, CBC, CMP     Prior to the appointment with me needs chemo teaching     Please arrange a port to be placed through Dr. Chacko     Please arrange for an appointment with Dr. Mayo a few days after PET scan.  Please tell the radiation department the goal is to begin radiation on Monday, September 23     Reviewed and summarized chart including note from Dr. Albrecht stating the patient will need inpatient rehab.

## 2019-09-01 NOTE — THERAPY TREATMENT NOTE
Patient Name: Karen Pineda  : 1945    MRN: [id number]                              Today's Date: 2019       Admit Date: 2019    Visit Dx:     ICD-10-CM ICD-9-CM   1. Brain mass G93.9 348.9   2. Acute UTI N39.0 599.0   3. Lung mass R91.8 786.6     Patient Active Problem List   Diagnosis   • Hemoptysis   • Lung mass   • Cough   • Arthritis   • Brain mass     Past Medical History:   Diagnosis Date   • Arthritis    • Depression    • GERD (gastroesophageal reflux disease)    • Hemoptysis    • Hyperlipidemia    • Nodule of right lung    • Post-menopause    • Sinus problem    • Stress incontinence      Past Surgical History:   Procedure Laterality Date   • BREAST BIOPSY Right    • BRONCHOSCOPY N/A 2016    Procedure: BRONCHOSCOPY WITH ENDOBRONCHIAL ULTRASOUND, NAVIGATION, BRUSHING,BIOPSIES, LAVAGE, AND TRANSBRONCHIAL NEEDLE ASPIRATION;  Surgeon: Js Lewis MD;  Location: [facility] ENDOSCOPY;  Service:    • CRANIOTOMY FOR TUMOR Left 2019    Procedure: Left frontal craniotomy for tumor;  Surgeon: Ruben Thibodeaux MD;  Location: [facility] OR;  Service: Neurosurgery     General Information     Row Name 19 1505          PT Evaluation Time/Intention    Document Type  therapy note (daily note)  -CS     Mode of Treatment  physical therapy  -CS     Row Name 19 1505          General Information    Patient Profile Reviewed?  yes  -CS     Existing Precautions/Restrictions  fall  -CS     Row Name 19 1505          Cognitive Assessment/Intervention- PT/OT    Orientation Status (Cognition)  oriented x 3  -CS       User Key  (r) = Recorded By, (t) = Taken By, (c) = Cosigned By    Initials Name Provider Type    CS Gurvinder Lipscomb, PT Physical Therapist        Mobility     Row Name 19 1505          Bed Mobility Assessment/Treatment    Comment (Bed Mobility)  in chair  -CS     Row Name 19 1505          Sit-Stand Transfer    Sit-Stand Vancleve (Transfers)  minimum  assist (75% patient effort)  -CS     Row Name 09/01/19 1505          Gait/Stairs Assessment/Training    Harvest Level (Gait)  minimum assist (75% patient effort)  -CS     Distance in Feet (Gait)  75  -CS     Deviations/Abnormal Patterns (Gait)  gait speed decreased  -CS     Bilateral Gait Deviations  forward flexed posture  -CS     Comment (Gait/Stairs)  pt unsteady, grabs on to wall/railing while walking because of unsteadiness  -CS       User Key  (r) = Recorded By, (t) = Taken By, (c) = Cosigned By    Initials Name Provider Type    Gurvinder Palacio, PT Physical Therapist        Obj/Interventions    No documentation.       Goals/Plan    No documentation.       Clinical Impression    No documentation.       Outcome Measures     Row Name 09/01/19 1508          How much help from another person do you currently need...    Turning from your back to your side while in flat bed without using bedrails?  3  -CS     Moving from lying on back to sitting on the side of a flat bed without bedrails?  3  -CS     Moving to and from a bed to a chair (including a wheelchair)?  3  -CS     Standing up from a chair using your arms (e.g., wheelchair, bedside chair)?  3  -CS     Climbing 3-5 steps with a railing?  2  -CS     To walk in hospital room?  3  -CS     AM-PAC 6 Clicks Score (PT)  17  -CS     Row Name 09/01/19 1508          Functional Assessment    Outcome Measure Options  AM-PAC 6 Clicks Basic Mobility (PT)  -CS       User Key  (r) = Recorded By, (t) = Taken By, (c) = Cosigned By    Initials Name Provider Type    Gurvinder Palacio, PT Physical Therapist        Physical Therapy Education     Title: PT OT SLP Therapies (Done)     Topic: Physical Therapy (Done)     Point: Mobility training (Done)     Learning Progress Summary           Patient Acceptance, E,TB, VU,NR by CS at 9/1/2019  3:07 PM    Acceptance, E,D, VU,NR by  at 8/31/2019  2:25 PM    Acceptance, E,D, NR by PC at 8/30/2019  3:13 PM    Acceptance, E,TB,D,  VU,NR by  at 8/29/2019  3:48 PM                   Point: Home exercise program (Done)     Learning Progress Summary           Patient Acceptance, E,TB, VU,NR by  at 9/1/2019  3:07 PM    Acceptance, E,D, VU,NR by  at 8/31/2019  2:25 PM    Acceptance, E,D, NR by  at 8/30/2019  3:13 PM    Acceptance, E,TB,D, VU,NR by  at 8/29/2019  3:48 PM                               User Key     Initials Effective Dates Name Provider Type Discipline    PC 04/03/18 -  Mariya Lee, PT Physical Therapist PT     04/03/18 -  Janet Hui, PT Physical Therapist PT     08/19/18 -  Gloria Rocha PTA Physical Therapy Assistant PT     05/14/18 -  Gurvinder Lipscomb, PT Physical Therapist PT              PT Recommendation and Plan     Outcome Summary/Treatment Plan (PT)  Anticipated Discharge Disposition (PT): inpatient rehabilitation facility  Plan of Care Reviewed With: patient  Outcome Summary: Pt walked in villalobos, Funmi and unsteady. Anticipates d/c to acute inpatient rehab.      Time Calculation:   PT Charges     Row Name 09/01/19 1508             Time Calculation    Start Time  1445  -CS      Stop Time  1500  -      Time Calculation (min)  15 min  -      PT Received On  09/01/19  -      PT - Next Appointment  09/02/19  -        User Key  (r) = Recorded By, (t) = Taken By, (c) = Cosigned By    Initials Name Provider Type     Gurvinder Lipscomb, PT Physical Therapist        Therapy Charges for Today     Code Description Service Date Service Provider Modifiers Qty    39781383910 HC PT THER PROC EA 15 MIN 9/1/2019 Gurvinder Lipscomb, PT GP 1          PT G-Codes  Outcome Measure Options: AM-PAC 6 Clicks Basic Mobility (PT)  AM-PAC 6 Clicks Score (PT): 17  AM-PAC 6 Clicks Score (OT): 18    Gurvinder Lipscomb PT  9/1/2019

## 2019-09-01 NOTE — PLAN OF CARE
Problem: Patient Care Overview  Goal: Plan of Care Review   09/01/19 0408   Plan of Care Review   Progress improving   OTHER   Outcome Summary VSS. No acute changes noted overnight. Bed available at BAR.    Coping/Psychosocial   Plan of Care Reviewed With patient;daughter       Problem: Fall Risk (Adult)  Goal: Identify Related Risk Factors and Signs and Symptoms  Outcome: Ongoing (interventions implemented as appropriate)    Goal: Absence of Fall  Outcome: Ongoing (interventions implemented as appropriate)      Problem: Skin Injury Risk (Adult)  Goal: Skin Health and Integrity  Outcome: Ongoing (interventions implemented as appropriate)      Problem: Craniotomy/Craniectomy/Cranioplasty (Adult)  Goal: Signs and Symptoms of Listed Potential Problems Will be Absent, Minimized or Managed (Craniotomy/Craniectomy/Cranioplasty)  Outcome: Ongoing (interventions implemented as appropriate)

## 2019-09-02 ENCOUNTER — HOSPITAL ENCOUNTER (INPATIENT)
Facility: HOSPITAL | Age: 74
LOS: 10 days | Discharge: HOME | End: 2019-09-12
Attending: PHYSICAL MEDICINE & REHABILITATION | Admitting: PHYSICAL MEDICINE & REHABILITATION

## 2019-09-02 VITALS
RESPIRATION RATE: 16 BRPM | OXYGEN SATURATION: 95 % | WEIGHT: 190.92 LBS | HEART RATE: 78 BPM | DIASTOLIC BLOOD PRESSURE: 60 MMHG | HEIGHT: 64 IN | BODY MASS INDEX: 32.59 KG/M2 | TEMPERATURE: 97.6 F | SYSTOLIC BLOOD PRESSURE: 130 MMHG

## 2019-09-02 DIAGNOSIS — R91.8 LUNG MASS: ICD-10-CM

## 2019-09-02 DIAGNOSIS — D49.6 BRAIN TUMOR (HCC): Primary | ICD-10-CM

## 2019-09-02 LAB
ANION GAP SERPL CALCULATED.3IONS-SCNC: 11.4 MMOL/L (ref 5–15)
BASOPHILS # BLD AUTO: 0.01 10*3/MM3 (ref 0–0.2)
BASOPHILS NFR BLD AUTO: 0.1 % (ref 0–1.5)
BUN BLD-MCNC: 20 MG/DL (ref 8–23)
BUN/CREAT SERPL: 26.3 (ref 7–25)
CALCIUM SPEC-SCNC: 8.8 MG/DL (ref 8.6–10.5)
CHLORIDE SERPL-SCNC: 103 MMOL/L (ref 98–107)
CO2 SERPL-SCNC: 25.6 MMOL/L (ref 22–29)
CREAT BLD-MCNC: 0.76 MG/DL (ref 0.57–1)
DEPRECATED RDW RBC AUTO: 40.7 FL (ref 37–54)
EOSINOPHIL # BLD AUTO: 0 10*3/MM3 (ref 0–0.4)
EOSINOPHIL NFR BLD AUTO: 0 % (ref 0.3–6.2)
ERYTHROCYTE [DISTWIDTH] IN BLOOD BY AUTOMATED COUNT: 12.6 % (ref 12.3–15.4)
GFR SERPL CREATININE-BSD FRML MDRD: 75 ML/MIN/1.73
GLUCOSE BLD-MCNC: 132 MG/DL (ref 65–99)
HCT VFR BLD AUTO: 37.6 % (ref 34–46.6)
HGB BLD-MCNC: 12.1 G/DL (ref 12–15.9)
IMM GRANULOCYTES # BLD AUTO: 0.16 10*3/MM3 (ref 0–0.05)
IMM GRANULOCYTES NFR BLD AUTO: 2.1 % (ref 0–0.5)
LYMPHOCYTES # BLD AUTO: 0.6 10*3/MM3 (ref 0.7–3.1)
LYMPHOCYTES NFR BLD AUTO: 8 % (ref 19.6–45.3)
MCH RBC QN AUTO: 28.3 PG (ref 26.6–33)
MCHC RBC AUTO-ENTMCNC: 32.2 G/DL (ref 31.5–35.7)
MCV RBC AUTO: 88.1 FL (ref 79–97)
MONOCYTES # BLD AUTO: 0.44 10*3/MM3 (ref 0.1–0.9)
MONOCYTES NFR BLD AUTO: 5.9 % (ref 5–12)
NEUTROPHILS # BLD AUTO: 6.27 10*3/MM3 (ref 1.7–7)
NEUTROPHILS NFR BLD AUTO: 83.9 % (ref 42.7–76)
NRBC BLD AUTO-RTO: 0 /100 WBC (ref 0–0.2)
PLATELET # BLD AUTO: 243 10*3/MM3 (ref 140–450)
PMV BLD AUTO: 10.1 FL (ref 6–12)
POTASSIUM BLD-SCNC: 4.4 MMOL/L (ref 3.5–5.2)
RBC # BLD AUTO: 4.27 10*6/MM3 (ref 3.77–5.28)
SODIUM BLD-SCNC: 140 MMOL/L (ref 136–145)
WBC NRBC COR # BLD: 7.48 10*3/MM3 (ref 3.4–10.8)

## 2019-09-02 PROCEDURE — 94799 UNLISTED PULMONARY SVC/PX: CPT

## 2019-09-02 PROCEDURE — 97110 THERAPEUTIC EXERCISES: CPT

## 2019-09-02 PROCEDURE — 99233 SBSQ HOSP IP/OBS HIGH 50: CPT | Performed by: INTERNAL MEDICINE

## 2019-09-02 PROCEDURE — 85025 COMPLETE CBC W/AUTO DIFF WBC: CPT | Performed by: NEUROLOGICAL SURGERY

## 2019-09-02 PROCEDURE — 63710000001 DEXAMETHASONE PER 0.25 MG: Performed by: HOSPITALIST

## 2019-09-02 PROCEDURE — 80048 BASIC METABOLIC PNL TOTAL CA: CPT | Performed by: NEUROLOGICAL SURGERY

## 2019-09-02 PROCEDURE — 63710000001 DEXAMETHASONE PER 0.25 MG: Performed by: NEUROLOGICAL SURGERY

## 2019-09-02 RX ORDER — POLYETHYLENE GLYCOL 3350 17 G/17G
17 POWDER, FOR SOLUTION ORAL DAILY
Status: CANCELLED | OUTPATIENT
Start: 2019-09-03

## 2019-09-02 RX ORDER — TEMAZEPAM 7.5 MG/1
15 CAPSULE ORAL NIGHTLY PRN
Status: DISCONTINUED | OUTPATIENT
Start: 2019-09-02 | End: 2019-01-01

## 2019-09-02 RX ORDER — BISACODYL 10 MG
10 SUPPOSITORY, RECTAL RECTAL DAILY PRN
Status: DISCONTINUED | OUTPATIENT
Start: 2019-09-02 | End: 2019-01-01

## 2019-09-02 RX ORDER — HYDROCODONE BITARTRATE AND ACETAMINOPHEN 5; 325 MG/1; MG/1
1 TABLET ORAL EVERY 4 HOURS PRN
Status: CANCELLED | OUTPATIENT
Start: 2019-09-02 | End: 2019-01-01

## 2019-09-02 RX ORDER — PANTOPRAZOLE SODIUM 40 MG/1
40 TABLET, DELAYED RELEASE ORAL
Status: CANCELLED | OUTPATIENT
Start: 2019-09-03

## 2019-09-02 RX ORDER — ACETAMINOPHEN 325 MG/1
650 TABLET ORAL EVERY 4 HOURS PRN
Status: DISCONTINUED | OUTPATIENT
Start: 2019-09-02 | End: 2019-01-01 | Stop reason: HOSPADM

## 2019-09-02 RX ORDER — HYDROCODONE BITARTRATE AND ACETAMINOPHEN 5; 325 MG/1; MG/1
1 TABLET ORAL EVERY 4 HOURS PRN
Status: ACTIVE | OUTPATIENT
Start: 2019-09-02 | End: 2019-01-01

## 2019-09-02 RX ORDER — ACETAMINOPHEN 325 MG/1
650 TABLET ORAL EVERY 4 HOURS PRN
Status: CANCELLED | OUTPATIENT
Start: 2019-09-02

## 2019-09-02 RX ORDER — LANSOPRAZOLE 30 MG/1
30 CAPSULE, DELAYED RELEASE ORAL DAILY
Status: ON HOLD | COMMUNITY
End: 2019-09-02

## 2019-09-02 RX ORDER — ARFORMOTEROL TARTRATE 15 UG/2ML
15 SOLUTION RESPIRATORY (INHALATION)
Status: CANCELLED | OUTPATIENT
Start: 2019-09-02

## 2019-09-02 RX ORDER — POLYETHYLENE GLYCOL 3350 17 G/17G
17 POWDER, FOR SOLUTION ORAL DAILY
Status: DISCONTINUED | OUTPATIENT
Start: 2019-09-03 | End: 2019-01-01 | Stop reason: HOSPADM

## 2019-09-02 RX ORDER — SODIUM CHLORIDE 0.9 % (FLUSH) 0.9 %
10 SYRINGE (ML) INJECTION AS NEEDED
Status: CANCELLED | OUTPATIENT
Start: 2019-09-02

## 2019-09-02 RX ORDER — LOSARTAN POTASSIUM 50 MG/1
50 TABLET ORAL
Status: CANCELLED | OUTPATIENT
Start: 2019-09-03

## 2019-09-02 RX ORDER — FLUOXETINE HYDROCHLORIDE 20 MG/1
20 CAPSULE ORAL DAILY
COMMUNITY
End: 2019-01-01 | Stop reason: SDUPTHER

## 2019-09-02 RX ORDER — PANTOPRAZOLE SODIUM 40 MG/1
40 TABLET, DELAYED RELEASE ORAL
Status: DISCONTINUED | OUTPATIENT
Start: 2019-09-03 | End: 2019-01-01 | Stop reason: HOSPADM

## 2019-09-02 RX ORDER — BISACODYL 10 MG
10 SUPPOSITORY, RECTAL RECTAL DAILY
Status: CANCELLED | OUTPATIENT
Start: 2019-09-03

## 2019-09-02 RX ORDER — BUDESONIDE 0.5 MG/2ML
0.5 INHALANT ORAL
Status: CANCELLED | OUTPATIENT
Start: 2019-09-02

## 2019-09-02 RX ORDER — FLUOXETINE HYDROCHLORIDE 20 MG/1
20 CAPSULE ORAL EVERY EVENING
Status: DISCONTINUED | OUTPATIENT
Start: 2019-09-02 | End: 2019-01-01 | Stop reason: HOSPADM

## 2019-09-02 RX ORDER — TEMAZEPAM 15 MG/1
15 CAPSULE ORAL NIGHTLY PRN
Status: CANCELLED | OUTPATIENT
Start: 2019-09-02 | End: 2019-01-01

## 2019-09-02 RX ORDER — LEVETIRACETAM 500 MG/1
500 TABLET ORAL EVERY 12 HOURS SCHEDULED
Status: DISCONTINUED | OUTPATIENT
Start: 2019-09-02 | End: 2019-01-01 | Stop reason: HOSPADM

## 2019-09-02 RX ORDER — LOSARTAN POTASSIUM 50 MG/1
50 TABLET ORAL
Status: DISCONTINUED | OUTPATIENT
Start: 2019-09-03 | End: 2019-01-01 | Stop reason: HOSPADM

## 2019-09-02 RX ORDER — FLUOXETINE HYDROCHLORIDE 20 MG/1
20 CAPSULE ORAL EVERY EVENING
Status: CANCELLED | OUTPATIENT
Start: 2019-09-02

## 2019-09-02 RX ORDER — DEXAMETHASONE 4 MG/1
4 TABLET ORAL EVERY 8 HOURS SCHEDULED
Status: DISCONTINUED | OUTPATIENT
Start: 2019-09-02 | End: 2019-01-01

## 2019-09-02 RX ORDER — LEVETIRACETAM 500 MG/1
500 TABLET ORAL EVERY 12 HOURS SCHEDULED
Status: CANCELLED | OUTPATIENT
Start: 2019-09-02

## 2019-09-02 RX ORDER — OMEPRAZOLE 40 MG/1
40 CAPSULE, DELAYED RELEASE ORAL DAILY
COMMUNITY
End: 2019-01-01 | Stop reason: HOSPADM

## 2019-09-02 RX ORDER — ARFORMOTEROL TARTRATE 15 UG/2ML
15 SOLUTION RESPIRATORY (INHALATION)
Status: DISCONTINUED | OUTPATIENT
Start: 2019-09-02 | End: 2019-01-01

## 2019-09-02 RX ORDER — BUDESONIDE 0.5 MG/2ML
0.5 INHALANT ORAL
Status: DISCONTINUED | OUTPATIENT
Start: 2019-09-02 | End: 2019-01-01

## 2019-09-02 RX ORDER — DEXAMETHASONE 4 MG/1
4 TABLET ORAL EVERY 8 HOURS SCHEDULED
Status: CANCELLED | OUTPATIENT
Start: 2019-09-02

## 2019-09-02 RX ORDER — DOCUSATE SODIUM 100 MG/1
200 CAPSULE, LIQUID FILLED ORAL 2 TIMES DAILY
Status: CANCELLED | OUTPATIENT
Start: 2019-09-02

## 2019-09-02 RX ORDER — DOCUSATE SODIUM 100 MG/1
200 CAPSULE, LIQUID FILLED ORAL 2 TIMES DAILY
Status: DISCONTINUED | OUTPATIENT
Start: 2019-09-02 | End: 2019-01-01

## 2019-09-02 RX ADMIN — ARFORMOTEROL TARTRATE 15 MCG: 15 SOLUTION RESPIRATORY (INHALATION) at 21:32

## 2019-09-02 RX ADMIN — SODIUM CHLORIDE, PRESERVATIVE FREE 10 ML: 5 INJECTION INTRAVENOUS at 09:46

## 2019-09-02 RX ADMIN — LOSARTAN POTASSIUM 50 MG: 50 TABLET, FILM COATED ORAL at 09:45

## 2019-09-02 RX ADMIN — DEXAMETHASONE 4 MG: 4 TABLET ORAL at 21:12

## 2019-09-02 RX ADMIN — DEXAMETHASONE 4 MG: 4 TABLET ORAL at 06:15

## 2019-09-02 RX ADMIN — BUDESONIDE 0.5 MG: 0.5 INHALANT RESPIRATORY (INHALATION) at 07:28

## 2019-09-02 RX ADMIN — POLYETHYLENE GLYCOL 3350 17 G: 17 POWDER, FOR SOLUTION ORAL at 09:45

## 2019-09-02 RX ADMIN — DEXAMETHASONE 4 MG: 4 TABLET ORAL at 13:24

## 2019-09-02 RX ADMIN — TEMAZEPAM 15 MG: 7.5 CAPSULE ORAL at 21:14

## 2019-09-02 RX ADMIN — LEVETIRACETAM 500 MG: 500 TABLET, FILM COATED ORAL at 09:45

## 2019-09-02 RX ADMIN — BUDESONIDE 0.5 MG: 0.5 SUSPENSION RESPIRATORY (INHALATION) at 21:32

## 2019-09-02 RX ADMIN — DOCUSATE SODIUM 200 MG: 100 CAPSULE, LIQUID FILLED ORAL at 09:45

## 2019-09-02 RX ADMIN — ARFORMOTEROL TARTRATE 15 MCG: 15 SOLUTION RESPIRATORY (INHALATION) at 07:28

## 2019-09-02 RX ADMIN — BISACODYL 10 MG: 10 SUPPOSITORY RECTAL at 11:37

## 2019-09-02 RX ADMIN — DOCUSATE SODIUM 200 MG: 100 CAPSULE, LIQUID FILLED ORAL at 21:12

## 2019-09-02 RX ADMIN — LEVETIRACETAM 500 MG: 500 TABLET, FILM COATED ORAL at 21:12

## 2019-09-02 RX ADMIN — PANTOPRAZOLE SODIUM 40 MG: 40 TABLET, DELAYED RELEASE ORAL at 06:15

## 2019-09-02 RX ADMIN — FLUOXETINE HYDROCHLORIDE 20 MG: 20 CAPSULE ORAL at 16:20

## 2019-09-02 NOTE — DISCHARGE SUMMARY
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  Acute Klebsiella UTI resolved  Anxiety disorder  Depression  Gastroesophageal reflux disease    Discharge medications    Current Facility-Administered Medications:   •  acetaminophen (TYLENOL) tablet 650 mg, 650 mg, Oral, Q4H PRN, Andres Garcia MD, 650 mg at 08/26/19 1334  •  arformoterol (BROVANA) nebulizer solution 15 mcg, 15 mcg, Nebulization, BID - RT, Femi Giron MD, 15 mcg at 09/02/19 0728  •  bisacodyl (DULCOLAX) suppository 10 mg, 10 mg, Rectal, Daily, Ángela Pryor APRN, 10 mg at 09/02/19 1137  •  budesonide (PULMICORT) nebulizer solution 0.5 mg, 0.5 mg, Nebulization, BID - RT, Femi Giron MD, 0.5 mg at 09/02/19 0728  •  dexamethasone (DECADRON) tablet 4 mg, 4 mg, Oral, Q8H, Kaden Albrecht MD, 4 mg at 09/02/19 1324  •  docusate sodium (COLACE) capsule 200 mg, 200 mg, Oral, BID, Ángela Pryor APRN, 200 mg at 09/02/19 0945  •  FLUoxetine (PROzac) capsule 20 mg, 20 mg, Oral, Q PM, Femi Giron MD, 20 mg at 09/01/19 1817  •  HYDROcodone-acetaminophen (NORCO) 5-325 MG per tablet 1 tablet, 1 tablet, Oral, Q4H PRN, Ruben Thibodeaux MD  •  levETIRAcetam (KEPPRA) tablet 500 mg, 500 mg, Oral, Q12H, Kaden Albrecht MD, 500 mg at 09/02/19 0945  •  losartan (COZAAR) tablet 50 mg, 50 mg, Oral, Q24H, Femi Giron MD, 50 mg at 09/02/19 0945  •  pantoprazole (PROTONIX) EC tablet 40 mg, 40 mg, Oral, Q AM, Femi Giron MD, 40 mg at 09/02/19 0615  •  polyethylene glycol (MIRALAX) powder 17 g, 17 g, Oral, Daily, Ángela Pryor APRN, 17 g at 09/02/19 0945  •  sodium chloride 0.9 % flush 10 mL, 10 mL, Intravenous, PRN, Taran Carias MD  •  [COMPLETED] Insert peripheral IV, , , Once **AND** sodium chloride 0.9 % flush 10 mL, 10 mL, Intravenous, PRN, Jv,  Taran CALVILLO MD, 10 mL at 09/02/19 0904  •  temazepam (RESTORIL) capsule 15 mg, 15 mg, Oral, Nightly PRN, Junior Acuna MD, 15 mg at 08/31/19 2110     Consults obtained  Neurosurgery  Neurology  Pulmonary  Hematology oncology  Urology  Cardiothoracic surgery  Radiation oncology    Procedures  Craniotomy with cross total removal of left medial frontal lobe lesions    Hospital course  74-year-old white female with history of depression and gastroesophageal reflux disease admitted to emergency room with confusion difficulty thinking and right-sided weakness numbness and tingling.  Patient work-up in ER revealed brain lesions with vasogenic edema admitted for management.  Patient admitted and followed by neurosurgery and received IV steroids IV Keppra and also found to have UTI treated with Rocephin and cultures came back positive for Klebsiella.  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  As patient has pulmonary lesion no need to pursue as it is probably small cell metastasis.  Patient followed by oncology and they recommend radiation treatment followed by chemo but at this time she needs acute rehab.  Patient steroids has been tapered and Keppra changed to by mouth and she will be discharged to acute rehab.  Patient will get port placement after acute rehab.    Discharge diet regular    Activity per PT OT    Medications as above    Further care per accepting physician in acute rehab and I will follow the patient for medical problems.    RENAY PEDERSON MD

## 2019-09-02 NOTE — PLAN OF CARE
Problem: Patient Care Overview  Goal: Plan of Care Review  Outcome: Ongoing (interventions implemented as appropriate)   09/02/19 1047   Plan of Care Review   Progress improving   OTHER   Outcome Summary Pt amb 90' today with cga/HHA until lob noted and she required min ass to recover. Pt at high risk for falls at this time. Pt was able to participate with standing exercises at railing with BUE support. Pt weight shift to right impaired /[place].   Coping/Psychosocial   Plan of Care Reviewed With patient;spouse

## 2019-09-02 NOTE — PROGRESS NOTES
POD 4  S/p left frontal crani for met  Vitals:    09/01/19 0941 09/01/19 0951 09/01/19 1734 09/01/19 1933   BP:   142/86 150/79   BP Location:    Left arm   Patient Position:   Sitting Sitting   Pulse: 76 69 76 71   Resp: 20 20 20 18   Temp:   97.6 °F (36.4 °C) 98 °F (36.7 °C)   TempSrc:   Oral Oral   SpO2: 97%  94% 94%   Weight:       Height:       covering for Dr. Thibodeaux  Right arm motor function improving.  Should be able to go to rehab tomorrow  Incision fine  Will be getting a Port-A-Cath at some point  Lab Results   Component Value Date    GLUCOSE 129 (H) 09/01/2019    BUN 22 09/01/2019    CREATININE 0.81 09/01/2019    EGFRIFNONA 70 09/01/2019    BCR 27.2 (H) 09/01/2019    K 4.3 09/01/2019    CO2 25.0 09/01/2019    CALCIUM 8.7 09/01/2019    ALBUMIN 4.00 08/26/2019    AST 14 08/26/2019    ALT 10 08/26/2019     WBC   Date Value Ref Range Status   09/01/2019 7.71 3.40 - 10.80 10*3/mm3 Final     RBC   Date Value Ref Range Status   09/01/2019 4.24 3.77 - 5.28 10*6/mm3 Final     Hemoglobin   Date Value Ref Range Status   09/01/2019 12.3 12.0 - 15.9 g/dL Final     Hematocrit   Date Value Ref Range Status   09/01/2019 37.6 34.0 - 46.6 % Final     MCV   Date Value Ref Range Status   09/01/2019 88.7 79.0 - 97.0 fL Final     MCH   Date Value Ref Range Status   09/01/2019 29.0 26.6 - 33.0 pg Final     MCHC   Date Value Ref Range Status   09/01/2019 32.7 31.5 - 35.7 g/dL Final     RDW   Date Value Ref Range Status   09/01/2019 12.5 12.3 - 15.4 % Final     RDW-SD   Date Value Ref Range Status   09/01/2019 40.6 37.0 - 54.0 fl Final     MPV   Date Value Ref Range Status   09/01/2019 9.8 6.0 - 12.0 fL Final     Platelets   Date Value Ref Range Status   09/01/2019 251 140 - 450 10*3/mm3 Final     Neutrophil %   Date Value Ref Range Status   09/01/2019 86.1 (H) 42.7 - 76.0 % Final     Lymphocyte %   Date Value Ref Range Status   09/01/2019 6.6 (L) 19.6 - 45.3 % Final     Monocyte %   Date Value Ref Range Status   09/01/2019 5.2  5.0 - 12.0 % Final     Eosinophil %   Date Value Ref Range Status   09/01/2019 0.0 (L) 0.3 - 6.2 % Final     Basophil %   Date Value Ref Range Status   09/01/2019 0.0 0.0 - 1.5 % Final     Immature Grans %   Date Value Ref Range Status   09/01/2019 2.1 (H) 0.0 - 0.5 % Final     Neutrophils, Absolute   Date Value Ref Range Status   09/01/2019 6.64 1.70 - 7.00 10*3/mm3 Final     Lymphocytes, Absolute   Date Value Ref Range Status   09/01/2019 0.51 (L) 0.70 - 3.10 10*3/mm3 Final     Monocytes, Absolute   Date Value Ref Range Status   09/01/2019 0.40 0.10 - 0.90 10*3/mm3 Final     Eosinophils, Absolute   Date Value Ref Range Status   09/01/2019 0.00 0.00 - 0.40 10*3/mm3 Final     Basophils, Absolute   Date Value Ref Range Status   09/01/2019 0.00 0.00 - 0.20 10*3/mm3 Final     Immature Grans, Absolute   Date Value Ref Range Status   09/01/2019 0.16 (H) 0.00 - 0.05 10*3/mm3 Final     nRBC   Date Value Ref Range Status   09/01/2019 0.0 0.0 - 0.2 /100 WBC Final     MRI OF BRAIN WITH AND WITHOUT CONTRAST 08/29/2019     CLINICAL HISTORY: Postop craniotomy yesterday for resection of brain  metastases.     TECHNIQUE: Axial T1, FLAIR, fat-suppressed T2, axial diffusion gradient  echo T2, sagittal T1 and postcontrast axial fat-suppressed T1, sagittal  and coronal T1 and thin cut 1.5 mm thick axial postcontrast spoiled  gradient echo T1 weighted images were obtained of the entire head. This  is correlated to a preoperative MRI 08/25/2019.     FINDINGS: There has been and interval 6 cm anterior bifrontal craniotomy  for resection of a 3 x 3 x 2.4 cm enhancing mass from the posterior  medial left frontal lobe extending into the left cingulate gyrus. There  is a postoperative resection cavity that measures 1.9 x 1.9 x 1.4 cm.  There is a thin crescent of enhancement along the posterior superior  lateral margin of the resection cavity that measures 11 x 3 x 3 mm which  is suspicious for a thin crescent of residual enhancing tumor.  There is  stable moderate amount of surrounding vasogenic edema tracking  throughout the left frontal white matter into the anterior left parietal  white matter. It tracks up to 6.7 x 3.5 x 4 cm. There is some minimal  diffusion hyperintensity just anterosuperior to the resection cavity in  the posterior superior medial left frontal lobe that tracks up to 2 x  1.4 cm consistent with some cytotoxic edema adjacent to the resection  cavity, postoperative change or small acute infarct in the left anterior  cerebral artery territory. There is some mass effect on the superior  body of the left lateral ventricle and there is focal 4 mm left-to-right  midline shift at the level of the superior aspect of the septum  pellucidum and central falx cerebri. There is a 1.6 x 1.5 x 1.5 cm  rim-enhancing lesion in the anterior superior lateral left frontal lobe  with 2 cm surrounding edema compatible with a metastatic lesion and  there is a 1.1 x 1 x 1 cm rim-enhancing metastasis to the anterior  inferior lateral right frontal lobe extending to the dural surface along  the lateral aspect of the right orbital roof. The ventricles are normal  in size. There is some expected postoperative fluid and a thin rind of  postoperative air in the epidural space deep to the craniotomy flap  between the craniotomy flap and the anterior superior aspect of the  superior sagittal sinus measuring maximally 4-5 mm in thickness.  Otherwise no extra-axial fluid collections are identified. Paranasal  sinuses are clear. There is a small amount of fluid in the mastoid air  cells. Good flow-voids are demonstrated within the cerebral vessels and  in the dural venous sinuses.     IMPRESSION:     1. Patient is status post 6 cm anterior bifrontal craniotomy for  resection of a 3 x 3 x 2.4 cm enhancing mass from the posterior superior  medial left frontal lobe extending into the left cingulate gyrus. There  is postoperative 1.9 x 1.9 x 1.4 cm resection cavity  and there is a thin  crescent of enhancement along the posterior superolateral margin of the  resection cavity measuring 11 x 3 x 3 mm which is suspicious for a tiny  crescent of residual enhancing tumor and can be reevaluated on follow-up  exams. There is a stable 6.7 x 3.5 x 4 cm area of vasogenic edema in the  superior left frontal white matter tracking into the anterior left  parietal white matter and there is a new 2 cm area of cytotoxic edema  adjacent to anterosuperior margin of the resection cavity that is either  postoperative or a small acute infarct. There is minimal expected  postoperative fluid and air deep to the craniotomy flap in the epidural  space between the craniotomy flap and the anterior superior sagittal  sinus. There is stable mild mass effect, 4 mm left-to-right midline  shift at the level of the superior falx and septum pellucidum.     2. The remainder of MRI of the head is unchanged. There is a 1.6 x 1.5 x  1.5 cm ring-enhancing metastatic lesion in the anterior superior lateral  left frontal lobe with 2 cm of surrounding vasogenic edema and an  additional 1.1 x 1 x 1 cm metastatic lesion in the anterior-inferior  right frontal lobe extending to the dural surface along the lateral  aspect of the right orbital roof. There is mild small vessel disease in  the cerebral white matter and the remainder of the MRI of the brain is  normal.

## 2019-09-02 NOTE — PLAN OF CARE
Problem: Patient Care Overview  Goal: Plan of Care Review  Outcome: Ongoing (interventions implemented as appropriate)   09/02/19 0606   Plan of Care Review   Progress improving   OTHER   Outcome Summary Patient ambulating to the BR with assist of one person several times this shift. More talkative at times tonight. calls out for assistance to BR.   Coping/Psychosocial   Plan of Care Reviewed With patient

## 2019-09-02 NOTE — PLAN OF CARE
Problem: Patient Care Overview  Goal: Plan of Care Review  Outcome: Outcome(s) achieved Date Met: 09/02/19 09/02/19 6061   Plan of Care Review   Progress improving   OTHER   Outcome Summary Pt is a 74yr old female POD # 5 of left frontal crani for tumor removal mets from lung ca, small cell. Pt is alert and oriented, up with assist X1 and gait belt. Pt has flat affect. Right side weakness arm/ leg, able to move, lift but poor coordination of R hand and Leg weak. Pt had a BM today after supposit given. Continent of bowel and bladder. Plan to discharge to [facility] acute rehab.    Coping/Psychosocial   Plan of Care Reviewed With patient       Problem: Fall Risk (Adult)  Goal: Identify Related Risk Factors and Signs and Symptoms  Outcome: Outcome(s) achieved Date Met: 09/02/19 09/02/19 1794   Fall Risk (Adult)   Related Risk Factors (Fall Risk) fear of falling;neuro disease/injury;sensory deficits   Signs and Symptoms (Fall Risk) presence of risk factors

## 2019-09-02 NOTE — PROGRESS NOTES
REASON FOR FOLLOWUP/CHIEF COMPLAINT:  Small cell lung cancer with brain metastasis  HISTORY OF PRESENT ILLNESS:    The patient is doing better this morning, able to walk to the bathroom, having good bowel activity. Urination is ongoing. Still weakness on the right side of her body. No obvious pain. Ready to go to rehab today.        Past Medical History, Past Surgical History, Social History, Family History have been reviewed and are without significant changes except as mentioned.    Review of Systems   Review of Systems   Constitutional: Negative for activity change.   HENT: Negative for nosebleeds and trouble swallowing.    Respiratory: Negative for shortness of breath and wheezing.    Cardiovascular: Negative for chest pain and palpitations.   Gastrointestinal: Negative for constipation, diarrhea and nausea.   Genitourinary: Negative for dysuria and hematuria.   Musculoskeletal: Negative for arthralgias and myalgias.   Skin: Negative for rash and wound.   Neurological: Positive for weakness. Negative for seizures and syncope.   Hematological: Negative for adenopathy. Does not bruise/bleed easily.   Psychiatric/Behavioral: Negative for confusion.       Medications:  The current medication list was reviewed in the EMR    ALLERGIES:    Allergies   Allergen Reactions   • Sulfa Antibiotics Rash              Vitals:    09/01/19 2221 09/02/19 0600 09/02/19 0728 09/02/19 0918   BP:  130/75  134/90   BP Location:  Left arm  Left arm   Patient Position:  Lying  Sitting   Pulse: 63 67 58 77   Resp: 20 16 16 16   Temp:  98.7 °F (37.1 °C)  97.6 °F (36.4 °C)   TempSrc:  Oral  Oral   SpO2: 92% 94% 97% 97%   Weight:       Height:         Physical Exam    CONSTITUTIONAL:  Vital signs reviewed.  No distress, looks comfortable.  EYES:  Conjunctiva and lids unremarkable.  PERRLA  EARS,NOSE,MOUTH,THROAT:  Ears and nose appear unremarkable.  Lips, teeth, gums appear unremarkable.  RESPIRATORY:  Normal respiratory effort.   Lungs clear to auscultation bilaterally.  CARDIOVASCULAR:  Normal S1, S2.  No murmurs rubs or gallops.  No significant lower extremity edema.  GASTROINTESTINAL: Abdomen appears unremarkable.  Nontender.  No hepatomegaly.  No splenomegaly.  NEURO: cranial nerves 2-12 grossly intact.  No focal deficits.  Decreased strength right side  MUSCULOSKELETAL:  Unremarkable digits/nails.  No cyanosis or clubbing.  SKIN:  Warm.  No rashes.  PSYCHIATRIC:  Normal judgment and insight.  Normal mood and affect.       RECENT LABS:  WBC   Date Value Ref Range Status   09/02/2019 7.48 3.40 - 10.80 10*3/mm3 Final   09/01/2019 7.71 3.40 - 10.80 10*3/mm3 Final   08/31/2019 10.20 3.40 - 10.80 10*3/mm3 Final     Hemoglobin   Date Value Ref Range Status   09/02/2019 12.1 12.0 - 15.9 g/dL Final   09/01/2019 12.3 12.0 - 15.9 g/dL Final   08/31/2019 12.6 12.0 - 15.9 g/dL Final     Platelets   Date Value Ref Range Status   09/02/2019 243 140 - 450 10*3/mm3 Final   09/01/2019 251 140 - 450 10*3/mm3 Final   08/31/2019 260 140 - 450 10*3/mm3 Final       ASSESSMENT/PLAN:  *Extensive stage small cell lung cancer, RUL, 2.4 x 2.3 x 1.7cm, with metastasis to 3 brain lesions (bilateral frontal lobes.)  · RUL mass first seen on CT 3/28/2016, 2 x 1.3 cm.  Decreased to 1.6 cm on 6/27/2016, and decreased again to 1.3 cm on 12/5/2016.  Therefore, this was felt at the time to be a benign resolving nodule.  · No evidence of disease otherwise  · Noted Dr. Mayo plans SRS to 2 of the 3 brain metastasis.   · Craniotomy for the medial left frontal lobe lesion (3.2 cm) , 8/28/2019  · Because the final diagnosis is small cell, Dr. Chacko does not plan lung resection as we typically treat this with chemoradiation  · Plan SRS to 2 remaining brain lesions (first was resected).  Dr. Mayo only planning whole brain XRT if future brain recurrence.  · As an outpatient will have PET scan.  · Plan concurrent lung mass XRT with  carboplatin, etoposide, Tecentriq, followed  by Tecentriq maintenance for extensive stage small cell lung cancer.  · Usually with small cell I would like to start treatment within a couple of weeks.  However, she is recovering from brain surgery.  Furthermore, this lung lesion has been there for years.  Therefore, weighing risks and benefits I think it is in her best interest to wait almost 4 weeks after craniotomy to begin chemo and radiation.     *Goals of care.  · If the 3 brain lesions are locally treated with stereotactic radiation and resection, and truly no disease is found elsewhere, I think she has a very slim but possible chance of cure.   · If this is small cell lung cancer, it makes the possibility of long-term survival less likely.     *Neurological deficits due to suspected brain metastasis:  · Some mild confusion and right-sided weakness and urinary incontinence x2 weeks prompted ER visit 8/24/2019.  · Dr. Thibodeaux states she has a 5-10% of difficulty with speech or right hemiparesis.    *Tinnitus and decreased hearing.  Not a cisplatin candidate.    *This is Ángela Pryor's mother-in-law (APRN with Dr. Thibodeaux)     Plan  · Noted plans for discharge to rehab either today or tomorrow (per ).  · I sent a message to our office requesting:   Please arrange an appointment (2 units) with me to start carboplatin etoposide Tecentriq on either Monday, September 23   (2 units)     1 week prior to that appointment, needs PET scan, CBC, CMP     Prior to the appointment with me needs chemo teaching     Please arrange a port to be placed through Dr. Chacko     Please arrange for an appointment with Dr. Mayo a few days after PET scan.  Please tell the radiation department the goal is to begin radiation on Monday, September 23     I discussed with Dr. Katz last night in detail all the issues pertinent to the patient's right lung mass. The patient had a previous biopsy that was negative and they just pertain to her brain lesions at this time. I have reviewed  the radiological analysis as well as the MRIs of the brain. I have reviewed the CT scan of the chest. I have reviewed the pathology as well.     Overall, the patient should be ready to go to rehab, eventually initiate radiation therapy to the brain and eventually also initiate her chemotherapy as originally planned by Dr. Katz.     We will follow her in rehab in the next few days.

## 2019-09-02 NOTE — THERAPY TREATMENT NOTE
Patient Name: Karen Pineda  : 1945    MRN: [id number]                              Today's Date: 2019       Admit Date: 2019    Visit Dx:     ICD-10-CM ICD-9-CM   1. Brain mass G93.9 348.9   2. Acute UTI N39.0 599.0   3. Lung mass R91.8 786.6     Patient Active Problem List   Diagnosis   • Hemoptysis   • Lung mass   • Cough   • Arthritis   • Brain mass     Past Medical History:   Diagnosis Date   • Arthritis    • Depression    • GERD (gastroesophageal reflux disease)    • Hemoptysis    • Hyperlipidemia    • Nodule of right lung    • Post-menopause    • Sinus problem    • Stress incontinence      Past Surgical History:   Procedure Laterality Date   • BREAST BIOPSY Right    • BRONCHOSCOPY N/A 2016    Procedure: BRONCHOSCOPY WITH ENDOBRONCHIAL ULTRASOUND, NAVIGATION, BRUSHING,BIOPSIES, LAVAGE, AND TRANSBRONCHIAL NEEDLE ASPIRATION;  Surgeon: Js Lewis MD;  Location: [facility] ENDOSCOPY;  Service:    • CRANIOTOMY FOR TUMOR Left 2019    Procedure: Left frontal craniotomy for tumor;  Surgeon: Ruben Thibodeaux MD;  Location: [facility] OR;  Service: Neurosurgery     General Information     Row Name 19 1028          PT Evaluation Time/Intention    Document Type  therapy note (daily note)  -SV     Mode of Treatment  physical therapy;individual therapy  -SV     Row Name 19 1028          General Information    Patient Profile Reviewed?  yes  -SV       User Key  (r) = Recorded By, (t) = Taken By, (c) = Cosigned By    Initials Name Provider Type    SV Janet Hui, PT Physical Therapist        Mobility     Row Name 19 1042          Bed Mobility Assessment/Treatment    Bed Mobility Assessment/Treatment  supine-sit  -SV     Supine-Sit [place] (Bed Mobility)  contact guard  -SV     Row Name 19 1042          Sit-Stand Transfer    Sit-Stand [place] (Transfers)  contact guard  -SV     Row Name 19 1042          Gait/Stairs Assessment/Training     [place] Level (Gait)  contact guard  -SV     Assistive Device (Gait)  -- HHA  -SV     Distance in Feet (Gait)  90' veers right due to RLE weakness, lob x1 with min asst to recover,   -SV     Comment (Gait/Stairs)  Pt attempted minisquats x3 at rail with BUE, sidestepping left/right , w/s left/right : RLE weakness noted , [place] on right with impaired weight shift  -SV       User Key  (r) = Recorded By, (t) = Taken By, (c) = Cosigned By    Initials Name Provider Type    SV Janet Hui, PT Physical Therapist        Obj/Interventions     Row Name 09/02/19 1045          Static Sitting Balance    Level of [place] (Unsupported Sitting, Static Balance)  supervision  -SV     Sitting Position (Unsupported Sitting, Static Balance)  sitting on edge of bed  -SV     Time Able to Maintain Position (Unsupported Sitting, Static Balance)  45 to 60 seconds  -SV     Row Name 09/02/19 1045          Dynamic Sitting Balance    Level of [place], Reaches Outside Midline (Sitting, Dynamic Balance)  supervision  -SV     Sitting Position, Reaches Outside Midline (Sitting, Dynamic Balance)  sitting on edge of bed  -SV     Row Name 09/02/19 1045          Static Standing Balance    Level of [place] (Supported Standing, Static Balance)  contact guard assist  -SV       User Key  (r) = Recorded By, (t) = Taken By, (c) = Cosigned By    Initials Name Provider Type    SV Janet Hui, PT Physical Therapist        Goals/Plan    No documentation.       Clinical Impression     Row Name 09/02/19 1046          Pain Assessment    Additional Documentation  -- no signs of pain  -SV       User Key  (r) = Recorded By, (t) = Taken By, (c) = Cosigned By    Initials Name Provider Type    Janet Kohler, PT Physical Therapist        Outcome Measures     Row Name 09/02/19 1049          How much help from another person do you currently need...    Turning from your back to your side while in flat bed without using bedrails?  4   -SV     Moving from lying on back to sitting on the side of a flat bed without bedrails?  3  -SV     Moving to and from a bed to a chair (including a wheelchair)?  3  -SV     Standing up from a chair using your arms (e.g., wheelchair, bedside chair)?  3  -SV     Climbing 3-5 steps with a railing?  2  -SV     To walk in hospital room?  3  -SV     AM-PAC 6 Clicks Score (PT)  18  -SV       User Key  (r) = Recorded By, (t) = Taken By, (c) = Cosigned By    Initials Name Provider Type    SV Janet Hui, PT Physical Therapist        Physical Therapy Education     Title: PT OT SLP Therapies (Done)     Topic: Physical Therapy (Done)     Point: Mobility training (Done)     Learning Progress Summary           Patient Eager, E,TB,D, VU,NR by  at 9/2/2019 10:47 AM    Acceptance, E,TB, VU,NR by  at 9/1/2019  3:07 PM    Acceptance, E,D, VU,NR by  at 8/31/2019  2:25 PM    Acceptance, E,D, NR by  at 8/30/2019  3:13 PM    Acceptance, E,TB,D, VU,NR by  at 8/29/2019  3:48 PM   Significant Other Eager, E,TB,D, VU,NR by  at 9/2/2019 10:47 AM                   Point: Home exercise program (Done)     Learning Progress Summary           Patient Eager, E,TB,D, VU,NR by  at 9/2/2019 10:47 AM    Acceptance, E,TB, VU,NR by  at 9/1/2019  3:07 PM    Acceptance, E,D, VU,NR by  at 8/31/2019  2:25 PM    Acceptance, E,D, NR by  at 8/30/2019  3:13 PM    Acceptance, E,TB,D, VU,NR by  at 8/29/2019  3:48 PM   Significant Other Eager, E,TB,D, VU,NR by  at 9/2/2019 10:47 AM                               User Key     Initials Effective Dates Name Provider Type Discipline    PC 04/03/18 -  Mariya Lee, PT Physical Therapist PT    SV 04/03/18 -  Janet uHi, PT Physical Therapist PT     08/19/18 -  Gloria Rocha, PTA Physical Therapy Assistant PT    CS 05/14/18 -  Gurvinder Lipscomb PT Physical Therapist PT              PT Recommendation and Plan     Outcome Summary/Treatment Plan (PT)  Anticipated Discharge Disposition  (PT): skilled nursing facility, inpatient rehabilitation facility  Plan of Care Reviewed With: patient, spouse  Progress: improving  Outcome Summary: Pt amb 90' today with cga/HHA until lob noted and she required min ass to recover.  Pt at high risk for falls at this time. Pt was able to participate with standing exercises at railing with BUE support. Pt weight shift to right impaired /[place].     Time Calculation:   PT Charges     Row Name 09/02/19 1049             Time Calculation    Start Time  1020  -SV      Stop Time  1039  -SV      Time Calculation (min)  19 min  -SV      PT Received On  09/02/19  -SV      PT - Next Appointment  09/03/19  -SV        User Key  (r) = Recorded By, (t) = Taken By, (c) = Cosigned By    Initials Name Provider Type    SV Janet Hui, PT Physical Therapist        Therapy Charges for Today     Code Description Service Date Service Provider Modifiers Qty    31862736632 HC PT THER PROC EA 15 MIN 9/2/2019 Janet Hui, PT GP 1          PT G-Codes  Outcome Measure Options: AM-PAC 6 Clicks Basic Mobility (PT)  AM-PAC 6 Clicks Score (PT): 18  AM-PAC 6 Clicks Score (OT): 18    Janet Hui PT  9/2/2019

## 2019-09-02 NOTE — PROGRESS NOTES
"Daily progress note    Chief complaint  Doing better  No new complaints  Family at bedside    History of present illness  74-year-old white female with history of depression and gastroesophageal reflux disease presented to [facility] emergency room with difficulty thinking occasional confusion followed by right-sided weakness and numbness tingling for last 2 weeks.  Patient denies any headache dizziness lightheadedness chest pain shortness of breath abdominal pain nausea vomiting diarrhea.  Patient denies any fever chills weight loss weight gain.  Patient evaluated in ER with a CT head found to have possible brain mass with vasogenic edema admitted for management.  Patient remained awake and alert has some jerking movement but denies any seizure-like activity.  Patient also denies any vision problems speech problem.  Patient admitted for management.     REVIEW OF SYSTEMS  Unremarkable     PHYSICAL EXAM  Blood pressure 134/90, pulse 77, temperature 97.6 °F (36.4 °C), temperature source Oral, resp. rate 16, height 162.6 cm (64\"), weight 86.6 kg (190 lb 14.7 oz), SpO2 97 %, not currently breastfeeding.    Constitutional: She is oriented to person, place, and time. No distress.   Head: Normocephalic and atraumatic.   Eyes: EOM are normal. Pupils are equal, round, and reactive to light.   Neck: Normal range of motion. Neck supple.   Cardiovascular: Normal rate, regular rhythm and normal heart sounds.   No murmur heard.  Pulmonary/Chest: Effort normal and breath sounds normal. No respiratory distress. She has no decreased breath sounds. She has no wheezes. She has no rhonchi. She has no rales.   Abdominal: Soft. There is no tenderness. There is no rebound and no guarding.   Musculoskeletal: Normal range of motion. She exhibits no edema.   Neurological: She is alert and oriented to person, place, and time. She has normal sensation and normal strength.   Skin: Skin is warm and dry. No rash noted.   Psychiatric: " Mood and affect normal.      LAB RESULTS  Lab Results (last 24 hours)     Procedure Component Value Units Date/Time    Basic Metabolic Panel [[id number]]  (Abnormal) Collected:  09/02/19 0419    Specimen:  Blood Updated:  09/02/19 0535     Glucose 132 mg/dL      BUN 20 mg/dL      Creatinine 0.76 mg/dL      Sodium 140 mmol/L      Potassium 4.4 mmol/L      Chloride 103 mmol/L      CO2 25.6 mmol/L      Calcium 8.8 mg/dL      eGFR Non African Amer 75 mL/min/1.73      BUN/Creatinine Ratio 26.3     Anion Gap 11.4 mmol/L     Narrative:       GFR Normal >60  Chronic Kidney Disease <60  Kidney Failure <15    CBC & Differential [[id number]] Collected:  09/02/19 0419    Specimen:  Blood Updated:  09/02/19 0503    Narrative:       The following orders were created for panel order CBC & Differential.  Procedure                               Abnormality         Status                     ---------                               -----------         ------                     CBC Auto Differential[[id number]]        Abnormal            Final result                 Please view results for these tests on the individual orders.    CBC Auto Differential [[id number]]  (Abnormal) Collected:  09/02/19 0419    Specimen:  Blood Updated:  09/02/19 0503     WBC 7.48 10*3/mm3      RBC 4.27 10*6/mm3      Hemoglobin 12.1 g/dL      Hematocrit 37.6 %      MCV 88.1 fL      MCH 28.3 pg      MCHC 32.2 g/dL      RDW 12.6 %      RDW-SD 40.7 fl      MPV 10.1 fL      Platelets 243 10*3/mm3      Neutrophil % 83.9 %      Lymphocyte % 8.0 %      Monocyte % 5.9 %      Eosinophil % 0.0 %      Basophil % 0.1 %      Immature Grans % 2.1 %      Neutrophils, Absolute 6.27 10*3/mm3      Lymphocytes, Absolute 0.60 10*3/mm3      Monocytes, Absolute 0.44 10*3/mm3      Eosinophils, Absolute 0.00 10*3/mm3      Basophils, Absolute 0.01 10*3/mm3      Immature Grans, Absolute 0.16 10*3/mm3      nRBC 0.0 /100 WBC         Imaging Results (last 24 hours)     ** No results found  for the last 24 hours. **          Current Facility-Administered Medications:   •  acetaminophen (TYLENOL) tablet 650 mg, 650 mg, Oral, Q4H PRN, Andres Garcia MD, 650 mg at 08/26/19 1334  •  arformoterol (BROVANA) nebulizer solution 15 mcg, 15 mcg, Nebulization, BID - RT, Femi Giron MD, 15 mcg at 09/02/19 0728  •  bisacodyl (DULCOLAX) suppository 10 mg, 10 mg, Rectal, Daily, Ángela Pryor APRN, 10 mg at 09/02/19 1137  •  budesonide (PULMICORT) nebulizer solution 0.5 mg, 0.5 mg, Nebulization, BID - RT, Femi Giron MD, 0.5 mg at 09/02/19 0728  •  dexamethasone (DECADRON) tablet 4 mg, 4 mg, Oral, Q8H, Kaden Albrecht MD, 4 mg at 09/02/19 0615  •  docusate sodium (COLACE) capsule 200 mg, 200 mg, Oral, BID, Ángela Pryor APRN, 200 mg at 09/02/19 0945  •  FLUoxetine (PROzac) capsule 20 mg, 20 mg, Oral, Q PM, Femi Giron MD, 20 mg at 09/01/19 1817  •  HYDROcodone-acetaminophen (NORCO) 5-325 MG per tablet 1 tablet, 1 tablet, Oral, Q4H PRN, Ruben Thibodeaux MD  •  levETIRAcetam (KEPPRA) tablet 500 mg, 500 mg, Oral, Q12H, Kaden Albrecht MD, 500 mg at 09/02/19 0945  •  losartan (COZAAR) tablet 50 mg, 50 mg, Oral, Q24H, Femi Giron MD, 50 mg at 09/02/19 0945  •  pantoprazole (PROTONIX) EC tablet 40 mg, 40 mg, Oral, Q AM, Femi Giron MD, 40 mg at 09/02/19 0615  •  polyethylene glycol (MIRALAX) powder 17 g, 17 g, Oral, Daily, Ángela Pryor APRN, 17 g at 09/02/19 0945  •  sodium chloride 0.9 % flush 10 mL, 10 mL, Intravenous, PRN, Taran Carias MD  •  [COMPLETED] Insert peripheral IV, , , Once **AND** sodium chloride 0.9 % flush 10 mL, 10 mL, Intravenous, PRN, Taran Carias MD, 10 mL at 09/02/19 0946  •  temazepam (RESTORIL) capsule 15 mg, 15 mg, Oral, Nightly PRN, Junior Acuna MD, 15 mg at 08/31/19 2110     ASSESSMENT  3 ring-enhancing brain lesion with vasogenic edema status post craniotomy  Metastatic poorly differentiated carcinoma in the brain  Right upper lobe lung mass  probable small cell carcinoma with mets to the brain  Acute Klebsiella UTI resolved  Anxiety disorder  Depression  Gastroesophageal reflux disease    PLAN  Discharge to acute rehab  Discharge summary dictated    RENAY PEDERSON MD

## 2019-09-03 ENCOUNTER — PREP FOR SURGERY (OUTPATIENT)
Dept: OTHER | Facility: HOSPITAL | Age: 74
End: 2019-09-03

## 2019-09-03 DIAGNOSIS — G93.89 BRAIN MASS: ICD-10-CM

## 2019-09-03 DIAGNOSIS — R91.8 LUNG MASS: Primary | ICD-10-CM

## 2019-09-03 DIAGNOSIS — D49.6 BRAIN TUMOR (HCC): ICD-10-CM

## 2019-09-03 PROCEDURE — 97110 THERAPEUTIC EXERCISES: CPT

## 2019-09-03 PROCEDURE — 63710000001 DEXAMETHASONE PER 0.25 MG: Performed by: HOSPITALIST

## 2019-09-03 PROCEDURE — 97535 SELF CARE MNGMENT TRAINING: CPT

## 2019-09-03 PROCEDURE — 99232 SBSQ HOSP IP/OBS MODERATE 35: CPT | Performed by: INTERNAL MEDICINE

## 2019-09-03 PROCEDURE — 99231 SBSQ HOSP IP/OBS SF/LOW 25: CPT | Performed by: NURSE PRACTITIONER

## 2019-09-03 PROCEDURE — 97112 NEUROMUSCULAR REEDUCATION: CPT

## 2019-09-03 PROCEDURE — 96125 COGNITIVE TEST BY HC PRO: CPT

## 2019-09-03 PROCEDURE — 97166 OT EVAL MOD COMPLEX 45 MIN: CPT

## 2019-09-03 PROCEDURE — 94799 UNLISTED PULMONARY SVC/PX: CPT

## 2019-09-03 RX ADMIN — LEVETIRACETAM 500 MG: 500 TABLET, FILM COATED ORAL at 21:01

## 2019-09-03 RX ADMIN — ARFORMOTEROL TARTRATE 15 MCG: 15 SOLUTION RESPIRATORY (INHALATION) at 20:39

## 2019-09-03 RX ADMIN — DOCUSATE SODIUM 200 MG: 100 CAPSULE, LIQUID FILLED ORAL at 07:47

## 2019-09-03 RX ADMIN — LOSARTAN POTASSIUM 50 MG: 50 TABLET, FILM COATED ORAL at 07:44

## 2019-09-03 RX ADMIN — DEXAMETHASONE 4 MG: 4 TABLET ORAL at 21:01

## 2019-09-03 RX ADMIN — PANTOPRAZOLE SODIUM 40 MG: 40 TABLET, DELAYED RELEASE ORAL at 05:36

## 2019-09-03 RX ADMIN — DEXAMETHASONE 4 MG: 4 TABLET ORAL at 14:18

## 2019-09-03 RX ADMIN — ARFORMOTEROL TARTRATE 15 MCG: 15 SOLUTION RESPIRATORY (INHALATION) at 07:14

## 2019-09-03 RX ADMIN — LEVETIRACETAM 500 MG: 500 TABLET, FILM COATED ORAL at 07:44

## 2019-09-03 RX ADMIN — FLUOXETINE HYDROCHLORIDE 20 MG: 20 CAPSULE ORAL at 17:17

## 2019-09-03 RX ADMIN — BUDESONIDE 0.5 MG: 0.5 SUSPENSION RESPIRATORY (INHALATION) at 20:39

## 2019-09-03 RX ADMIN — POLYETHYLENE GLYCOL 3350 17 G: 17 POWDER, FOR SOLUTION ORAL at 07:45

## 2019-09-03 RX ADMIN — DOCUSATE SODIUM 200 MG: 100 CAPSULE, LIQUID FILLED ORAL at 21:01

## 2019-09-03 RX ADMIN — TEMAZEPAM 15 MG: 7.5 CAPSULE ORAL at 21:01

## 2019-09-03 RX ADMIN — DEXAMETHASONE 4 MG: 4 TABLET ORAL at 05:36

## 2019-09-03 RX ADMIN — BUDESONIDE 0.5 MG: 0.5 SUSPENSION RESPIRATORY (INHALATION) at 07:14

## 2019-09-03 NOTE — PROGRESS NOTES
Continued Stay Note  [facility]     Patient Name: Karen Pineda  MRN: [id number]  Today's Date: 9/3/2019    Admit Date: 8/24/2019    Discharge Plan     Row Name 09/03/19 1428       Plan    Final Discharge Disposition Code  62 - inpatient rehab facility    Final Note  Discharged to [facility]        Discharge Codes    No documentation.       Expected Discharge Date and Time     Expected Discharge Date Expected Discharge Time    Sep 2, 2019             Rekha Lester RN

## 2019-09-03 NOTE — PROGRESS NOTES
Orders placed for referral to Dr. Chacko for port placement, referral to Dr. Mayo for goal to start radiation and chemotherapy 9/23/19,CBC and CMP to be done one week prior to appointment with Dr. Katz on 9/23/19 per in-basket message scheduling per Dr. Katz

## 2019-09-04 ENCOUNTER — OFFICE VISIT (OUTPATIENT)
Dept: RADIATION ONCOLOGY | Facility: HOSPITAL | Age: 74
End: 2019-09-04

## 2019-09-04 DIAGNOSIS — D49.6 BRAIN TUMOR (HCC): Primary | ICD-10-CM

## 2019-09-04 PROCEDURE — G0515 COGNITIVE SKILLS DEVELOPMENT: HCPCS

## 2019-09-04 PROCEDURE — 97110 THERAPEUTIC EXERCISES: CPT

## 2019-09-04 PROCEDURE — 99212-NC PR NO CHARGE CBC OFFICE OUTPATIENT VISIT 10 MINUTES: Performed by: RADIOLOGY

## 2019-09-04 PROCEDURE — 77290 THER RAD SIMULAJ FIELD CPLX: CPT | Performed by: RADIOLOGY

## 2019-09-04 PROCEDURE — 77263 THER RADIOLOGY TX PLNG CPLX: CPT | Performed by: RADIOLOGY

## 2019-09-04 PROCEDURE — 77333 RADIATION TREATMENT AID(S): CPT | Performed by: RADIOLOGY

## 2019-09-04 PROCEDURE — 94799 UNLISTED PULMONARY SVC/PX: CPT

## 2019-09-04 PROCEDURE — 77470 SPECIAL RADIATION TREATMENT: CPT | Performed by: RADIOLOGY

## 2019-09-04 PROCEDURE — 97535 SELF CARE MNGMENT TRAINING: CPT

## 2019-09-04 PROCEDURE — 99024 POSTOP FOLLOW-UP VISIT: CPT | Performed by: PHYSICIAN ASSISTANT

## 2019-09-04 PROCEDURE — 99232 SBSQ HOSP IP/OBS MODERATE 35: CPT | Performed by: INTERNAL MEDICINE

## 2019-09-04 PROCEDURE — 63710000001 DEXAMETHASONE PER 0.25 MG: Performed by: HOSPITALIST

## 2019-09-04 RX ADMIN — DEXAMETHASONE 4 MG: 4 TABLET ORAL at 22:30

## 2019-09-04 RX ADMIN — LOSARTAN POTASSIUM 50 MG: 50 TABLET, FILM COATED ORAL at 08:29

## 2019-09-04 RX ADMIN — LEVETIRACETAM 500 MG: 500 TABLET, FILM COATED ORAL at 22:30

## 2019-09-04 RX ADMIN — FLUOXETINE HYDROCHLORIDE 20 MG: 20 CAPSULE ORAL at 17:25

## 2019-09-04 RX ADMIN — BUDESONIDE 0.5 MG: 0.5 SUSPENSION RESPIRATORY (INHALATION) at 07:18

## 2019-09-04 RX ADMIN — BUDESONIDE 0.5 MG: 0.5 SUSPENSION RESPIRATORY (INHALATION) at 20:43

## 2019-09-04 RX ADMIN — ARFORMOTEROL TARTRATE 15 MCG: 15 SOLUTION RESPIRATORY (INHALATION) at 07:18

## 2019-09-04 RX ADMIN — ARFORMOTEROL TARTRATE 15 MCG: 15 SOLUTION RESPIRATORY (INHALATION) at 20:43

## 2019-09-04 RX ADMIN — DEXAMETHASONE 4 MG: 4 TABLET ORAL at 14:48

## 2019-09-04 RX ADMIN — POLYETHYLENE GLYCOL 3350 17 G: 17 POWDER, FOR SOLUTION ORAL at 08:29

## 2019-09-04 RX ADMIN — LEVETIRACETAM 500 MG: 500 TABLET, FILM COATED ORAL at 08:29

## 2019-09-04 RX ADMIN — DOCUSATE SODIUM 200 MG: 100 CAPSULE, LIQUID FILLED ORAL at 08:30

## 2019-09-04 RX ADMIN — DEXAMETHASONE 4 MG: 4 TABLET ORAL at 06:24

## 2019-09-04 RX ADMIN — TEMAZEPAM 15 MG: 7.5 CAPSULE ORAL at 22:30

## 2019-09-04 RX ADMIN — PANTOPRAZOLE SODIUM 40 MG: 40 TABLET, DELAYED RELEASE ORAL at 06:24

## 2019-09-06 NOTE — ANESTHESIA POSTPROCEDURE EVALUATION
Patient: Karen Pineda    Procedure Summary     Date:  09/06/19 Room / Location:  [facility] OR [address] [place] [address] MAIN OR    Anesthesia Start:  0957 Anesthesia Stop:  1055    Procedure:  INSERTION VENOUS ACCESS DEVICE (Right ) Diagnosis:       Brain tumor (CMS/HCC)      Lung mass      (Brain tumor (CMS/HCC) [D49.6])      (Lung mass [R91.8])    Surgeon:  Saroj Chacko III, MD Provider:  Dc Preston MD    Anesthesia Type:  MAC ASA Status:  4          Anesthesia Type: MAC  Last vitals  BP   131/65 (09/06/19 1200)   Temp   36.3 °C (97.4 °F) (09/06/19 1145)   Pulse   55 (09/06/19 1200)   Resp   16 (09/06/19 1200)     SpO2   96 % (09/06/19 1200)     Post Anesthesia Care and Evaluation    Patient location during evaluation: bedside  Patient participation: complete - patient participated  Level of consciousness: sleepy but conscious  Pain score: 0  Pain management: adequate  Airway patency: patent  Anesthetic complications: No anesthetic complications    Cardiovascular status: acceptable  Respiratory status: acceptable  Hydration status: acceptable    Comments: /65   Pulse 55   Temp 36.3 °C (97.4 °F) (Oral)   Resp 16   LMP  (LMP Unknown)   SpO2 96%

## 2019-09-06 NOTE — ANESTHESIA PREPROCEDURE EVALUATION
Anesthesia Evaluation     Patient summary reviewed and Nursing notes reviewed   NPO Solid Status: > 8 hours  NPO Liquid Status: > 8 hours           Airway   Mallampati: II  Neck ROM: full  No difficulty expected  Dental - normal exam     Pulmonary     breath sounds clear to auscultation  (+) a smoker Former,   Cardiovascular     Rhythm: regular    (+) hyperlipidemia,       Neuro/Psych  (+) psychiatric history,       ROS Comment: Recent surgery for brain tumor  GI/Hepatic/Renal/Endo    (+)  GERD,      Musculoskeletal     Abdominal   (+) obese,    Substance History      OB/GYN          Other   (+) arthritis                     Anesthesia Plan    ASA 4     MAC     intravenous induction   Anesthetic plan, all risks, benefits, and alternatives have been provided, discussed and informed consent has been obtained with: patient.

## 2019-09-08 NOTE — PROGRESS NOTES
Ms. 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  She recently underwent craniotomy and resection of the larger of 3 metastases in the brain on 8/28, and is here today to start treatment planning for stereotactic radiosurgery to the 2 intact lesions and SRT to the resection cavity of the third.  A PET scan is planned for next week and when that is available  we will start planning for a combined modality course of therapy to the chest with tentative start date Monday/9/23.    She tolerated mask construction and CT simulation without any difficulty  And we will bring her back Monday 9/9/19 to start her course of therapy to her 3 brain metastases.

## 2019-09-10 NOTE — PROGRESS NOTES
Subjective   Patient ID: Karen Pineda is a 74 y.o. female is here today for post op appointment.Patient had surgery 8.28.19 with Dr Thibodeaux, left frontal crani for tumor. She complains of blurry vision and R knee giving out since surgery.  She denies any incision problems.  Mrs. Pineda is not taking any pain medications.  She takes Decadron 2 mg TID and Keppra BID.     Wound Check   She was originally treated more than 14 days ago.   Brain Tumor   This is a new problem. The current episode started 1 to 4 weeks ago. Pertinent negatives include no headaches.       The following portions of the patient's history were reviewed and updated as appropriate: allergies, current medications, past family history, past medical history, past social history, past surgical history and problem list.    Review of Systems   Eyes: Positive for visual disturbance.   Genitourinary: Negative for difficulty urinating.   Neurological: Negative for dizziness, light-headedness and headaches.   All other systems reviewed and are negative.      Objective   Physical Exam   Constitutional: She is oriented to person, place, and time.   Neurological: She is alert and oriented to person, place, and time.   Incision ok     Neurologic Exam     Mental Status   Oriented to person, place, and time.       Assessment/Plan   Independent Review of Radiographic Studies:      Medical Decision Making:    Ms. Pineda 2 weeks out from a left frontal/vertex craniotomy for resection of a metastases.  Pathology was consistent with small cell cancer.  She was originally discharged to acute rehab and then subsequently discharged home last week.  She is now getting outpatient speech and physical therapy.  She got her PET scan as an outpatient on September 16 which thankfully revealed only the hypermetabolic activity along the known right lung mass.  She has finished 4 out of 5 radiation treatments and has her last treatment scheduled for tomorrow.   She has a follow-up appointment with Dr. Katz next Monday.  She is on Keppra 500mg twice daily and Decadron 2 mg 3 times daily.  She has noticed some occasional blurriness in the right eye since starting radiation and states that her left leg has given out on several occasions.  Overall however she is doing extremely well and denies any headaches or nausea or vomiting or double vision or visual deficits.  Her gait is excellent and at home is often forgetting to use the walker.  She is moving all extremities well.  Her incision is healing very nicely without signs of infection.  I encouraged her to mention the blurry vision tomorrow at her radiation appointment.  She understands that we will defer to the radiation oncologist for any additional directions regarding the steroids.  She will plan on staying on the Keppra until further notice.  She will follow-up in 1 month with a repeat MRI and call sooner with any changes or questions or concerns.       Karen was seen today for post-op.    Diagnoses and all orders for this visit:    Surgery follow-up examination  -     MRI Brain With & Without Contrast; Future      Return in about 4 weeks (around 10/15/2019) for with Dr. Thibodeaux, several days after MRI.

## 2019-09-13 NOTE — PROGRESS NOTES
"  Physician Stereotactic Management Note    Diagnosis:     1. Brain tumor (CMS/HCC)        Doing well pretreatment, no problems.    Treatment Number and Dose:fx 3 15Gy    I was personally present at the machine for the delivery of the above treatment.     I provided direct supervision of the patient's set up, treatment parameters and image guidance. I provided corrections and approval of the above prior to the treatment, in real time. I was present for any adjustments required due to patient motion, target movement or equipment issues.    The ongoing images for localization, tumor tracking, gating and beam's eye view localization images will also be approved.     Notes on treatment course, films, medical progress and plan:    Doing well. Tolerated treatment without difficulty. No problems or questions.    Problem added:  None  Medications added:   None  Ancillary referrals made: None    I have reviewed and marked as \"reviewed\" the current medications, allergies and problem list in the patients EMR.    Patient's Care Team:  Patient Care Team:  Heide Wellington MD as PCP - General  Heide Wellington MD as PCP - Family Medicine  [facility] Consulting Physician (Pulmonary Disease)  Nicolas Mayo MD as Consulting Physician (Radiation Oncology)  Nicolas Katz II, MD as Consulting Physician (Hematology and Oncology)  She Garcia PA-C as Referring Physician (Neurosurgery)      "

## 2019-09-16 NOTE — PROGRESS NOTES
"  Physician Stereotactic Management Note    Diagnosis:     1. Brain tumor (CMS/HCC)        Doing well pretreatment, no problems.    Treatment Number and Dose: Treatment #4/5, resection cavity, 20/25 Gy    I was personally present at the machine for the delivery of the above treatment.     I provided direct supervision of the patient's set up, treatment parameters and image guidance. I provided corrections and approval of the above prior to the treatment, in real time. I was present for any adjustments required due to patient motion, target movement or equipment issues.    The ongoing images for localization, tumor tracking, gating and beam's eye view localization images will also be approved.     Notes on treatment course, films, medical progress and plan:    Doing well. Tolerated treatment without difficulty. No problems or questions.    Problem added:  None  Medications added:   None  Ancillary referrals made: None    I have reviewed and marked as \"reviewed\" the current medications, allergies and problem list in the patients EMR.    Patient's Care Team:  Patient Care Team:  Heide Wellington MD as PCP - General  Heide Wellington MD as PCP - Family Medicine  Js Lewis MD as Consulting Physician (Pulmonary Disease)  Nicolas Mayo MD as Consulting Physician (Radiation Oncology)  Nicolas Katz II, MD as Consulting Physician (Hematology and Oncology)  She Garcia PA-C as Referring Physician (Neurosurgery)      "

## 2019-09-17 PROBLEM — G93.89 BRAIN MASS: Status: RESOLVED | Noted: 2019-08-24 | Resolved: 2019-01-01

## 2019-09-17 PROBLEM — D49.6 BRAIN TUMOR (HCC): Status: RESOLVED | Noted: 2019-09-03 | Resolved: 2019-01-01

## 2019-09-17 PROBLEM — Z09 SURGERY FOLLOW-UP EXAMINATION: Status: ACTIVE | Noted: 2019-01-01

## 2019-09-17 NOTE — PROGRESS NOTES
Subjective     PATIENT NAME:  Karen Pineda  YOB: 1945  PATIENTS AGE:  74 y.o.  PATIENTS SEX:  female  DATE OF SERVICE:  09/17/2019  PROVIDER:  MICHAEL Sharma      ____________________PATIENT EDUCATION____________________    PATIENT EDUCATION:  Today I met with the patient to discuss the chemotherapy regimen recommended for treatment of her lung cancer.  The patient was given explanation of treatment premed side effects including office policy that prohibits patients to drive if sedating medications are administered, MD explanation given regarding benefits, side effects, toxicities and goals of treatment.  The patient received a Chemotherapy/Biotherapy Plan Summary including diagnosis and specific treatment plan.    SIDE EFFECTS:  Common side effects were discussed with the patient her   Discussion included hair loss/discoloration, anemia/fatigue, infection/chills/fever, appetite, bleeding risk/precautions, constipation, diarrhea, mouth sores, taste alteration, loss of appetite,nausea/vomiting, peripheral neuropathy, skin/nail changes, rash, muscle aches/weakness, photosensitivity, weight gain/loss, hearing loss, dizziness,high blood pressure, heart damage, liver damage, lung damage, kidney damage, DVT/PE risk, fluid retention, pleural/pericardial effusion, somnolence, electrolyte/LFT imbalance,    Discussion also included side effects specific to drugs in the treatment plan, specifically.    Reproductive risks were discussed, including appropriate use of birth control and protection during sexual relations.    A total of 45 minutes were spent with the patient, with 100% of time spent in education and counseling.

## 2019-09-17 NOTE — THERAPY EVALUATION
Outpatient Speech Language Pathology   Adult Speech Language Cognitive Initial Evaluation  [facility]     Patient Name: Karen Pineda  : 1945  MRN: [id number]  Today's Date: 2019        Visit Date: 2019   Patient Active Problem List   Diagnosis   • Hemoptysis   • Lung mass   • Cough   • Arthritis   • Brain mass   • Brain tumor (CMS/HCC)        Past Medical History:   Diagnosis Date   • Anxiety    • Arthritis    • Depression    • GERD (gastroesophageal reflux disease)    • H/O Pulmonary nodule    • Hemoptysis    • History of osteopenia    • Hyperlipidemia    • Nodule of right lung    • Post-menopause    • Sinus problem    • Stress incontinence         Past Surgical History:   Procedure Laterality Date   • BREAST BIOPSY Right    • BRONCHOSCOPY N/A 2016    Procedure: BRONCHOSCOPY WITH ENDOBRONCHIAL ULTRASOUND, NAVIGATION, BRUSHING,BIOPSIES, LAVAGE, AND TRANSBRONCHIAL NEEDLE ASPIRATION;  Surgeon: Js Lewis MD;  Location: [facility] ENDOSCOPY;  Service:    • COLONOSCOPY     • CRANIOTOMY FOR TUMOR Left 2019    Procedure: Left frontal craniotomy for tumor;  Surgeon: Ruben Thibodeaux MD;  Location: [facility] MAIN OR;  Service: Neurosurgery   • VENOUS ACCESS DEVICE (PORT) INSERTION Right 2019    Procedure: INSERTION VENOUS ACCESS DEVICE;  Surgeon: Saroj Chacko III, MD;  Location: [facility] MAIN OR;  Service: Thoracic         Visit Dx:    ICD-10-CM ICD-9-CM   1. Brain tumor (CMS/HCC) D49.6 239.6   2. Brain mass G93.9 348.9           SLP SLC Evaluation - 19 1000        Communication Assessment/Intervention    Document Type  evaluation  (Pended)    -AM    Subjective Information  no complaints  (Pended)    -AM    Patient Observations  alert;cooperative;agree to therapy  (Pended)    -AM    Patient Effort  excellent  (Pended)    -AM    Symptoms Noted During/After Treatment  none  (Pended)    -AM       General Information    Patient Profile Reviewed  yes  (Pended)    -AM     "Pertinent History Of Current Problem  Pt. 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 Pt. underwent a craniotomy on 8/28/2019 to remove the largest mass and will recieve radiation therapy and chemotherapy after recovering from craniotomy. Per MD note pt. \"has expressive receptive language defecits, right hemiparesis, motor control defecits.\" Pt. recieved inpatient ST from 9/2/2019 to 9/12/2019 at [facility]. Pt. reports that her goals are to \"be able to coordinate hands\" and improve her memory. Pt. reports that her hobbies include reading, sewing, and cleaning. Pt. reports that she completes crossword puzzles.   (Pended)    -AM    Precautions/Limitations, Vision  for purposes of eval;WFL with corrective lenses  (Pended)    -AM    Precautions/Limitations, Hearing  WFL;for purposes of eval  (Pended)    -AM    Patient Level of Education  High School Graduate   (Pended)    -AM    Prior Level of Function-Communication  WFL  (Pended)    -AM    Plans/Goals Discussed with  patient  (Pended)    -AM    Barriers to Rehab  none identified  (Pended)    -AM    Patient's Goals for Discharge  return to home;functional cognition  (Pended)    -AM    Standardized Assessment Used  CLQT  (Pended)    -AM       Standardized Tests    Cognitive/Memory Tests  CLQT: Cognitive Linguistic Quick Test  (Pended)    -AM       CLQT (The Cognitive Linguistic Quick Test)    Attention Domain Score  184  (Pended)    -AM    Attention Severity Rating  4: WNL  (Pended)    -AM    Memory Domain Score  157  (Pended)    -AM    Memory Severity Rating  4: WNL  (Pended)    -AM    Executive Function Domain Score  22  (Pended)    -AM    Executive Function Severity Rating  4: WNL  (Pended)    -AM    Language Domain Score  29  (Pended)    -AM    Language Severity Rating  4: WNL  (Pended)    -AM    Visuospatial Domain Score  81  (Pended)    -AM    Visuospatial Severity Rating  4: WNL  (Pended)    -AM    Clock Drawing Total Score  11  (Pended)    -AM    " Clock Drawing Severity Rating  WNL  (Pended)    -AM    Composite Severity Rating  4  (Pended)    -AM    Composite Severity Rating Range  4.0 - 3.5: WNL  (Pended)    -AM    CLQT Comments  Pt. scored WNL for all subtests on the CLQT. Pt's scores for executive function and language subtests was borderline close to mild impairment. Pt. scored below the criterion average scores on generative naming. During generative naming task, pt. demonstrated increased difficulty when naming items from concrete and abstract category. Pt. named 6 items in concrete category and 5 items in abstract category. Pt. named majority of items in first 30 seconds of task. In complex maze task, pt. demonstrated difficulty when completing the second complex maze. Pt. completed 3 incorrect attempts when working through the maze. Pt. was observed using decreased planning during maze tasks. In design generation task, pt. was observed preservating designs and copying the example designs. Overall, pt. demonstrated good effort throughtout assessment and was observed experiencing difficulty with memory, attention, and planning. During informal assessment, pt. was oriented to setting, time, date with 100% accuracy. Pt. completed a verbal recall task requiring repetition of words and sentences with 40% accuracy without cues or repetitions and with 70% accuracy when cues and repetitions were provided. Pt. recalled 3 unrelated words following a 10-minute delay with 33% accuracy without cues and with 100% accuracy when semantic and phonemic cues were provided. Pt. completed concrete and abstract convergent naming task with 100% accuracy without cues. Pt. completed functional math problems with 33% accuracy when problems were read to pt. requiring auditory processing and recall of verbal instructions. When pt. read instructions independently (reading comprehension and visual processing) accuracy increased to 100%. Pt. named similarities and differences of  words with 100% accuracy without cues. Pt. completed deductive reasoning task with 90% accuracy without cues and with 100% accuracy when semantic cues were provided. Pt. completed inductive reasoning task with 89% accuracy without cues. Pt. demonstrates difficulty with recall of short paragraphs, memory of words, and completion of functional math problems.   (Pended)    -AM       SLP Clinical Impressions    SLP Diagnosis  Mild cognitive communication defecit  (Pended)    -AM    Rehab Potential/Prognosis  good  (Pended)    -AM    SLC Criteria for Skilled Therapy Interventions Met  yes  (Pended)    -AM    Functional Impact  restrictions in personal and social life;difficulty completing home management task  (Pended)    -AM       Recommendations    Therapy Frequency (SLP SLC)  2 days per week  (Pended)    -AM    Predicted Duration Therapy Intervention (Days)  4 weeks  (Pended)    -AM    Anticipated Dischage Disposition  home  (Pended)    -AM      User Key  (r) = Recorded By, (t) = Taken By, (c) = Cosigned By    Initials Name Provider Type    AM Betty Lehman Speech Therapy Student Speech Therapy Student                         OP SLP Education     Row Name 09/17/19 1043       Education    Barriers to Learning  No barriers identified  (Pended)   -AM    Assessed  Learning needs;Learning motivation;Learning preferences  (Pended)   -AM    Learning Motivation  Strong  (Pended)   -AM    Learning Method  Explanation  (Pended)   -AM    Teaching Response  Verbalized understanding  (Pended)   -AM      User Key  (r) = Recorded By, (t) = Taken By, (c) = Cosigned By    Initials Name Effective Dates    AM Betty Lehman Speech Therapy Student 08/19/19 -           SLP OP Goals     Row Name 09/17/19 1000          Goal Type Needed    Goal Type Needed  Memory;Written Language Comprehension;Probelm Solving  (Pended)   -AM        Written Language Comprehension Goals    Written Language Comprehension LTG's  Patient will be able to  comprehend written material in social/avocational/work setting  (Pended)   -AM     Patient will be able to comprehend written material in social/avocational/work setting  90%:;without cues  (Pended)   -AM     Status: Patient will be able to comprehend written material in social/avocational/work setting  New  (Pended)   -AM     Written Language Comprehension STG's  Patient will improve comprehension of written language skills by answering written questions related to home management/social/work tasks (bills, recipes, tv guide, emails, procedures)  (Pended)   -AM     Patient will improve comprehension of written language skills by answering written questions related to home management/social/work tasks (bills, recipes, tv guide, emails, procedures)  90%:;without cues  (Pended)   -AM     Status: Patient will improve comprehension of written language skills by answering written questions related to home management/social/work tasks (bills, recipes, tv guide, emails, procedures)  New  (Pended)   -AM        Memory Goals    Memory LTG's  Patient will be able to remember information needed to participate in avocational activities  (Pended)   -AM     Status: Patient will be able to remember information needed to participate in avocational activities  New  (Pended)   -AM     Memory STG's  Patient will demonstrate improved ability to recall information by immediately recalling a series of words;Patient’s memory skills will be enhanced as reported by patient by using external memory aides;Patient will demonstrate improved ability to recall information by listening to paragraph and answering yes/no questions  (Pended)   -AM     Patient will demonstrate improved ability to recall information by immediately recalling a series of words  90%:;related;unrelated;with no delay  (Pended)   -AM     Status: Patient will demonstrate improved ability to recall information by immediately recalling a series of words  New  (Pended)   -AM      Patient’s memory skills will be enhanced as reported by patient by using external memory aides  90%:;without cues  (Pended)   -AM     Status: Patient’s memory skills will be enhanced as reported by patient by using external memory aides  New  (Pended)   -AM     Patient will demonstrate improved ability to recall information by listening to paragraph and answering yes/no questions  90%:;without cues  (Pended)   -AM     Status: Patient will demonstrate improved ability to recall information by listening to paragraph and answering yes/no questions  New  (Pended)   -AM        Problem Solving Goals    Problem Solving LTG's  Patient will be able to engage in avocational activities requiring high level cognitive skills  (Pended)   -AM     Patient will be able to engage in avocational activities requiring high level cognitive skills  Independently  (Pended)   -AM     Status: Patient will be able to engage in avocational activities requiring high level cognitive skills  New  (Pended)   -AM     Problem Solving STG's  Patient will improve ability to analyze problems and determine solutions by completing a visual representation/filling in a chart by following  written directions;Patient will improve ability to analyze problems and determine solutions by completing functional math problems  (Pended)   -AM     Patient will improve ability to analyze problems and determine solutions by completing a visual representation/filling in a chart by following  written directions  90%:;without cues  (Pended)   -AM     Status: Patient will improve ability to analyze problems and determine solutions by completing a visual representation/filling in a chart by following  written directions  New  (Pended)   -AM     Patient will improve ability to analyze problems and determine solutions by completing functional math problems  90%:;without cues  (Pended)   -AM     Status: Patient will improve ability to analyze problems and determine solutions  by completing functional math problems  New  (Pended)   -AM       User Key  (r) = Recorded By, (t) = Taken By, (c) = Cosigned By    Initials Name Provider Type    AM Betty Lehman Speech Therapy Student Speech Therapy Student          OP SLP Assessment/Plan - 09/17/19 1041        SLP Assessment    Functional Problems  Speech Language- Adult/Cognition  (Pended)    -AM    Impact on Function: Adult Speech Language/Cognition  Trouble learning or remembering new information;Restrictions in personal and social life;Difficulty participating in avocational activities  (Pended)    -AM    Clinical Impression: Speech Language-Adult/Congnition  Mild:;Cognitive Communication Impairment  (Pended)    -AM    Please refer to paper survey for additional self-reported information  Yes  (Pended)    -AM    Please refer to items scanned into chart for additional diagnostic informaiton and handouts as provided by clinician  Yes  (Pended)    -AM    Prognosis  Good (comment)  (Pended)    -AM    Patient/caregiver participated in establishment of treatment plan and goals  Yes  (Pended)    -AM    Patient would benefit from skilled therapy intervention  Yes  (Pended)    -AM       SLP Plan    Frequency  2x a week  (Pended)    -AM    Duration  4 weeks   (Pended)    -AM    Planned CPT's?  SLP INDIVIDUAL SPEECH THERAPY: 48442;SLP DEVEL COG SKILLS (PER 15 MINUTES): 09921  (Pended)    -AM    Expected Duration Therapy Session - minutes  45-60 minutes  (Pended)    -AM      User Key  (r) = Recorded By, (t) = Taken By, (c) = Cosigned By    Initials Name Provider Type    AM Betty Lehman Speech Therapy Student Speech Therapy Student             SLP Outcome Measures (last 72 hours)      SLP Outcome Measures     Row Name 09/17/19 1000             SLP Outcome Measures    Outcome Measure Used?  Adult NOMS  (Pended)   -AM         Adult FCM Scores    FCM Chosen  Memory  (Pended)   -AM      Memory FCM Score  4  (Pended)   -AM        User Key  (r) = Recorded  By, (t) = Taken By, (c) = Cosigned By    Initials Name Effective Dates    Betty Camar[place], Speech Therapy Student 08/19/19 -              Time Calculation:   SLP Start Time: (P) 0900  SLP Stop Time: (P) 1000  SLP Time Calculation (min): (P) 60 min    Therapy Charges for Today     Code Description Service Date Service Provider Modifiers Qty    [id number] HC ST STD COG PERF TEST PER HOUR 9/17/2019 Betty Lehman, Speech Therapy Student GN 1                   Betty Lehman, Speech Therapy Student  9/17/2019

## 2019-09-18 PROBLEM — C79.31 BRAIN METASTASES: Status: ACTIVE | Noted: 2019-01-01

## 2019-09-18 NOTE — PROGRESS NOTES
Physician Weekly Management Note    Diagnosis:  No diagnosis found.    RT Details:   3 targets:      Treatment #5/5, resection cavity   -treatment #1/1 intact metastasis #1        treatment 1/1 intact metastasis #2    Notes on Treatment course, Films, Medical progress:  Overall has demonstrated good tolerance, no headache, no neuro signs or symptoms ,no hair loss.  Will get CT simulation  of the chest today to start on 9/23.    Weekly Management:  Medication reviewed?   Yes  New medications given?   No  Problemlist reviewed?   Yes  Problem added?   No       Technical aspects reviewed:  Weekly OBI approved?   Yes  Weekly port films approved?   Yes  Change requests noted on port film?   No  Patient setup and plan reviewed?   Yes    Chart Reviewed:  Continue current treatment plan?   Yes  Treatment plan change requested?   No  CBC reviewed?   No  Concurrent Chemo?   No    Objective     Toxicities:   As above     Review of Systems   As above    Vitals:    09/18/19 1322   BP: 114/70   Pulse: 59   SpO2: 99%       No flowsheet data found.    Physical Exam  As above    Problem Summary List  Diagnosis:  No diagnosis found.  Pathology:       Past Medical History:   Diagnosis Date   • Anxiety    • Arthritis    • Depression    • GERD (gastroesophageal reflux disease)    • H/O Pulmonary nodule    • Hemoptysis    • History of osteopenia    • Hyperlipidemia    • Nodule of right lung    • Post-menopause    • Sinus problem    • Stress incontinence          Past Surgical History:   Procedure Laterality Date   • BREAST BIOPSY Right    • BRONCHOSCOPY N/A 4/21/2016    Procedure: BRONCHOSCOPY WITH ENDOBRONCHIAL ULTRASOUND, NAVIGATION, BRUSHING,BIOPSIES, LAVAGE, AND TRANSBRONCHIAL NEEDLE ASPIRATION;  Surgeon: Js Lewis MD;  Location: [facility] ENDOSCOPY;  Service:    • COLONOSCOPY     • CRANIOTOMY FOR TUMOR Left 8/28/2019    Procedure: Left frontal craniotomy for tumor;  Surgeon: Ruben Thibodeaux MD;  Location: [facility] MAIN OR;   Service: Neurosurgery   • VENOUS ACCESS DEVICE (PORT) INSERTION Right 9/6/2019    Procedure: INSERTION VENOUS ACCESS DEVICE;  Surgeon: Saroj Chacko III, MD;  Location: [facility] OR;  Service: Thoracic         Current Outpatient Medications on File Prior to Visit   Medication Sig Dispense Refill   • acetaminophen (TYLENOL) 325 MG tablet Take 2 tablets by mouth Every 4 (Four) Hours As Needed for Mild Pain .     • dexamethasone (DECADRON) 2 MG tablet Take 1 tablet by mouth Every 8 (Eight) Hours. Or as directed by Radiation Oncology/Medical Oncology (do not stop abruptly) 60 tablet 0   • docusate sodium 100 MG capsule Take 200 mg by mouth Daily As Needed for Constipation.     • FLUoxetine (PROzac) 20 MG capsule Take 20 mg by mouth Daily.     • levETIRAcetam (KEPPRA) 500 MG tablet Take 1 tablet by mouth Every 12 (Twelve) Hours. 60 tablet 2   • losartan (COZAAR) 50 MG tablet Take 1 tablet by mouth Daily. 30 tablet 1   • ondansetron (ZOFRAN) 8 MG tablet Take 1 tablet by mouth Every 8 (Eight) Hours As Needed for Nausea or Vomiting. 30 tablet 2   • pantoprazole (PROTONIX) 40 MG EC tablet Take 1 tablet by mouth Daily. 30 tablet 1   • polyethylene glycol (MIRALAX) packet Take 17 g by mouth Daily As Needed (constipation).     • temazepam (RESTORIL) 30 MG capsule Take 1 capsule by mouth At Night As Needed for Sleep for up to 15 days. 15 capsule 0     No current facility-administered medications on file prior to visit.        Allergies   Allergen Reactions   • Sulfa Antibiotics Rash         Primary care MD:    Heide Wellington MD    Oncologist:   Nelson Katz MD    Seen and approved by:  Nicolas Mayo MD  09/18/2019

## 2019-09-19 PROBLEM — Z45.2 ENCOUNTER FOR FITTING AND ADJUSTMENT OF VASCULAR CATHETER: Status: ACTIVE | Noted: 2019-01-01

## 2019-09-19 PROBLEM — C34.90 SMALL CELL LUNG CANCER (HCC): Status: ACTIVE | Noted: 2019-01-01

## 2019-09-19 NOTE — PROGRESS NOTES
.     REASONS FOR FOLLOWUP: Extensive stage small cell lung cancer    HISTORY OF PRESENT ILLNESS:  The patient is a 74 y.o. year old female  who is here for follow-up with the above-mentioned history.    No new problems since hospital discharge.  Working with physical therapy.  Gradually improving strength of right arm and right leg.  Is in a walker for mobility.  Remains a little slow to speak but this seems to gradually be improving as well.    Denies shortness of breath or pain    Past Medical History:   Diagnosis Date   • Anxiety    • Arthritis    • Brain metastases (CMS/HCC) 9/18/2019   • Depression    • GERD (gastroesophageal reflux disease)    • H/O Pulmonary nodule    • Hemoptysis    • History of osteopenia    • Hyperlipidemia    • Nodule of right lung    • Post-menopause    • Sinus problem    • Stress incontinence      Past Surgical History:   Procedure Laterality Date   • BREAST BIOPSY Right    • BRONCHOSCOPY N/A 4/21/2016    Procedure: BRONCHOSCOPY WITH ENDOBRONCHIAL ULTRASOUND, NAVIGATION, BRUSHING,BIOPSIES, LAVAGE, AND TRANSBRONCHIAL NEEDLE ASPIRATION;  Surgeon: Js Lewis MD;  Location: [facility] ENDOSCOPY;  Service:    • COLONOSCOPY     • CRANIOTOMY FOR TUMOR Left 8/28/2019    Procedure: Left frontal craniotomy for tumor;  Surgeon: Ruben Thibodeaux MD;  Location: [facility] OR;  Service: Neurosurgery   • VENOUS ACCESS DEVICE (PORT) INSERTION Right 9/6/2019    Procedure: INSERTION VENOUS ACCESS DEVICE;  Surgeon: Saroj Chacko III, MD;  Location: [facility] OR;  Service: Thoracic       MEDICATIONS    Current Outpatient Medications:   •  acetaminophen (TYLENOL) 325 MG tablet, Take 2 tablets by mouth Every 4 (Four) Hours As Needed for Mild Pain ., Disp: , Rfl:   •  dexamethasone (DECADRON) 2 MG tablet, Take 1 tablet by mouth Every 8 (Eight) Hours. Or as directed by Radiation Oncology/Medical Oncology (do not stop abruptly), Disp: 60 tablet, Rfl: 0  •  docusate sodium 100 MG capsule, Take  200 mg by mouth Daily As Needed for Constipation., Disp: , Rfl:   •  FLUoxetine (PROzac) 20 MG capsule, Take 20 mg by mouth Daily., Disp: , Rfl:   •  levETIRAcetam (KEPPRA) 500 MG tablet, Take 1 tablet by mouth Every 12 (Twelve) Hours., Disp: 60 tablet, Rfl: 2  •  losartan (COZAAR) 50 MG tablet, Take 1 tablet by mouth Daily., Disp: 30 tablet, Rfl: 1  •  ondansetron (ZOFRAN) 8 MG tablet, Take 1 tablet by mouth Every 8 (Eight) Hours As Needed for Nausea or Vomiting., Disp: 30 tablet, Rfl: 2  •  pantoprazole (PROTONIX) 40 MG EC tablet, Take 1 tablet by mouth Daily., Disp: 30 tablet, Rfl: 1  •  polyethylene glycol (MIRALAX) packet, Take 17 g by mouth Daily As Needed (constipation)., Disp: , Rfl:   •  temazepam (RESTORIL) 30 MG capsule, Take 1 capsule by mouth At Night As Needed for Sleep for up to 15 days., Disp: 15 capsule, Rfl: 0  No current facility-administered medications for this visit.     Facility-Administered Medications Ordered in Other Visits:   •  CARBOplatin (PARAPLATIN) 460 mg in sodium chloride 0.9 % 296 mL chemo IVPB, 460 mg, Intravenous, Once, Nicolas Katz II, MD  •  dexamethasone (DECADRON) IVPB 12 mg, 12 mg, Intravenous, Once, Nicolas Katz II, MD  •  etoposide (TOPOSAR) 190 mg in sodium chloride 0.9 % 509.5 mL chemo IVPB, 100 mg/m2 (Treatment Plan Recorded), Intravenous, Once, Nicolas Katz II, MD  •  FOSAPREPITANT 150 MG/100ML NORMAL SALINE (CBC) IVPB 100 mL 100 mL, 150 mg, Intravenous, Once, Nicolas Katz II, MD, Last Rate: 200 mL/hr at 09/23/19 1108, 100 mL at 09/23/19 1108  •  heparin flush (porcine) 100 UNIT/ML injection 500 Units, 500 Units, Intravenous, PRN, Nicolas Katz II, MD    ALLERGIES:     Allergies   Allergen Reactions   • Sulfa Antibiotics Rash       SOCIAL HISTORY:       Social History     Socioeconomic History   • Marital status:      Spouse name: Juan   • Number of children: Not on file   • Years of education: Not on file   • Highest education level: Not on file  "  Occupational History     Employer: RETIRED   Tobacco Use   • Smoking status: Former Smoker     Packs/day: 1.00     Years: 50.00     Pack years: 50.00     Last attempt to quit: 2014     Years since quittin.7   • Smokeless tobacco: Never Used   Substance and Sexual Activity   • Alcohol use: No   • Drug use: No   • Sexual activity: Defer         FAMILY HISTORY:  Family History   Problem Relation Age of Onset   • Heart failure Mother    • Coronary artery disease Mother    • Depression Mother    • Heart disease Mother    • Leukemia Father    • Bone cancer Father    • Cancer Paternal Aunt    • Cancer Paternal Uncle        REVIEW OF SYSTEMS:  Review of Systems   Constitutional: Negative for activity change.   HENT: Negative for nosebleeds and trouble swallowing.    Respiratory: Negative for shortness of breath and wheezing.    Cardiovascular: Negative for chest pain and palpitations.   Gastrointestinal: Negative for constipation, diarrhea and nausea.   Genitourinary: Negative for dysuria and hematuria.   Musculoskeletal: Negative for arthralgias and myalgias.   Skin: Negative for rash and wound.   Neurological: Positive for speech difficulty and weakness. Negative for seizures and syncope.   Hematological: Negative for adenopathy. Does not bruise/bleed easily.   Psychiatric/Behavioral: Negative for confusion.            Vitals:    19 0742   BP: 112/70   Pulse: 69   Resp: 18   Temp: 98 °F (36.7 °C)   TempSrc: Oral   SpO2: 96%   Weight: 82.8 kg (182 lb 8 oz)   Height: 162.6 cm (64.02\")   PainSc: 0-No pain     Current Status 2019   ECOG score 1        PHYSICAL EXAM:    CONSTITUTIONAL:  Vital signs reviewed.  No distress, looks comfortable.  EYES:  Conjunctiva and lids unremarkable.  PERRLA  EARS,NOSE,MOUTH,THROAT:  Ears and nose appear unremarkable.  Lips, teeth, gums appear unremarkable.  RESPIRATORY:  Normal respiratory effort.  Lungs clear to auscultation bilaterally.  CARDIOVASCULAR:  Normal S1, S2.  No " murmurs rubs or gallops.  No significant lower extremity edema.  GASTROINTESTINAL: Abdomen appears unremarkable.  Nontender.  No hepatomegaly.  No splenomegaly.  LYMPHATIC:  No cervical, supraclavicular, axillary lymphadenopathy.  SKIN:  Warm.  No rashes.  PSYCHIATRIC:  Normal judgment and insight.  Normal mood and affect.  NEURO: Right arm and right leg slightly weaker than the left side.  Verbally appropriate but slightly slow to respond.    RECENT LABS:        WBC   Date/Time Value Ref Range Status   09/23/2019 08:08 AM 6.83 3.40 - 10.80 10*3/mm3 Final     Hemoglobin   Date/Time Value Ref Range Status   09/23/2019 08:08 AM 12.7 12.0 - 15.9 g/dL Final     Platelets   Date/Time Value Ref Range Status   09/23/2019 08:08  140 - 450 10*3/mm3 Final       Assessment/Plan   Small cell lung cancer (CMS/HCC)  - CBC & Differential  - Comprehensive Metabolic Panel  - CBC & Differential  - Comprehensive Metabolic Panel  - CT Chest With Contrast  - CT Abdomen Pelvis With Contrast    *Extensive stage small cell lung cancer, RUL, 2.4 x 2.3 x 1.7cm, with metastasis to 3 brain lesions (bilateral frontal lobes.)  · RUL mass first seen on CT 3/28/2016, 2 x 1.3 cm.  Decreased to 1.6 cm on 6/27/2016, and decreased again to 1.3 cm on 12/5/2016.  Therefore, this was felt at the time to be a benign resolving nodule.  · During August 2019 hospitalization, found to have brain metastasis from small cell lung cancer.  RUL mass and 3 brain lesions.  No evidence of disease otherwise  · Craniotomy for the medial left frontal lobe lesion (3.2 cm) , 8/28/2019  · Because the final diagnosis is small cell, Dr. Chacko does not plan lung resection as we typically treat this with chemoradiation  · SRS to 2 remaining brain lesions (first was resected) and to resection cavity of the first lesion, completed 9/18/2019.  Dr. Mayo only planning whole brain XRT if future brain recurrence.  · Usually with small cell I would like to start chemo XRT  within a couple of weeks.  However, she is recovering from brain surgery.  Furthermore, this lung lesion has been there for years.  Therefore, weighing risks and benefits I think it is in her best interest to wait almost 4 weeks after craniotomy to begin chemo and radiation.  · PET 9/16/2019: 2.5 cm RUL mass with SUV 15.8.  No hypermetabolic LAD or distant disease.  · PET images personally viewed by me.  · Today, 9/19/2019, begin lung mass XRT with  carboplatin, etoposide, Tecentriq, followed by Tecentriq maintenance for extensive stage small cell lung cancer.     *Goals of care.  · If the 3 brain lesions are locally treated with stereotactic radiation and resection, and truly no disease is found elsewhere, I think she has a very slim but possible chance of cure.   · If this is small cell lung cancer, it makes the possibility of long-term survival less likely.     *Neurological deficits due to suspected brain metastasis:  · Some mild confusion and right-sided weakness and urinary incontinence x2 weeks prompted ER visit 8/24/2019.  · Dr. Thibodeaux states she has a 5-10% of difficulty with speech or right hemiparesis.     *Tinnitus and decreased hearing.  Not a cisplatin candidate.    *Port placed 9/6/2019     *This is Ángela Pryor's mother-in-law (APRN with Dr. Thibodeaux)     Plan  · Begin carboplatin etoposide Tecentriq today  · Begin XRT today  · CBC and CMP with MD or APRN 2 weeks   · APRN with cycle 2 in 3 weeks  · MD with cycle 3 in 6 weeks.  CT scan 1 week prior (scheduled this today)  · Noted follow-up MRI brain has been scheduled for 10/8/2019 by her other physicians.     assisted with history.  Dr. Mayo's notes reviewed and summarized.

## 2019-09-20 NOTE — THERAPY TREATMENT NOTE
"Outpatient Speech Language Pathology   Adult Speech Language Cognitive Treatment Note  [facility]     Patient Name: Karen Pineda  : 1945  MRN: [id number]  Today's Date: 2019         Visit Date: 2019   Patient Active Problem List   Diagnosis   • Hemoptysis   • Lung mass   • Cough   • Arthritis   • Surgery follow-up examination   • Brain metastases (CMS/HCC)   • Small cell lung cancer (CMS/HCC)   • High risk medication use   • Encounter for fitting and adjustment of vascular catheter          Visit Dx:    ICD-10-CM ICD-9-CM   1. Brain tumor (CMS/HCC) D49.6 239.6   2. Brain mass G93.9 348.9   3. Cognitive communication deficit R41.841 799.52       SLP SLC Evaluation - 19 1000        Communication Assessment/Intervention    Document Type  evaluation   -KA (r) AM (t) KA (c)    Subjective Information  no complaints   -KA (r) AM (t) KA (c)    Patient Observations  alert;cooperative;agree to therapy   -KA (r) AM (t) KA (c)    Patient Effort  excellent   -KA (r) AM (t) KA (c)    Symptoms Noted During/After Treatment  none   -KA (r) AM (t) KA (c)       General Information    Patient Profile Reviewed  yes   -KA (r) AM (t) KA (c)    Pertinent History Of Current Problem  Pt. 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 Pt. underwent a craniotomy on 2019 to remove the largest mass and will recieve radiation therapy and chemotherapy after recovering from craniotomy. Per MD note pt. \"has expressive receptive language defecits, right hemiparesis, motor control defecits.\" Pt. recieved inpatient ST from 2019 to 2019 at [facility]. Pt. reports that her goals are to \"be able to coordinate hands\" and improve her memory. Pt. reports that her hobbies include reading, sewing, and cleaning. Pt. reports that she completes crossword puzzles.    -KA (r) AM (t) KA (c)    Precautions/Limitations, Vision  for purposes of eval;WFL with corrective lenses   -KA (r) AM (t) KA (c)    " Precautions/Limitations, Hearing  WFL;for purposes of eval   -KA (r) AM (t) KA (c)    Patient Level of Education  High School Graduate    -KA (r) AM (t) KA (c)    Prior Level of Function-Communication  WFL   -KA (r) AM (t) KA (c)    Plans/Goals Discussed with  patient   -KA (r) AM (t) KA (c)    Barriers to Rehab  none identified   -KA (r) AM (t) KA (c)    Patient's Goals for Discharge  return to home;functional cognition   -KA (r) AM (t) KA (c)    Standardized Assessment Used  CLQT   -KA (r) AM (t) KA (c)       Standardized Tests    Cognitive/Memory Tests  CLQT: Cognitive Linguistic Quick Test   -KA (r) AM (t) KA (c)       CLQT (The Cognitive Linguistic Quick Test)    Attention Domain Score  184   -KA (r) AM (t) KA (c)    Attention Severity Rating  4: WNL   -KA (r) AM (t) KA (c)    Memory Domain Score  157   -KA (r) AM (t) KA (c)    Memory Severity Rating  4: WNL   -KA (r) AM (t) KA (c)    Executive Function Domain Score  22   -KA (r) AM (t) KA (c)    Executive Function Severity Rating  4: WNL   -KA (r) AM (t) KA (c)    Language Domain Score  29   -KA (r) AM (t) KA (c)    Language Severity Rating  4: WNL   -KA (r) AM (t) KA (c)    Visuospatial Domain Score  81   -KA (r) AM (t) KA (c)    Visuospatial Severity Rating  4: WNL   -KA (r) AM (t) KA (c)    Clock Drawing Total Score  11   -KA (r) AM (t) KA (c)    Clock Drawing Severity Rating  WNL   -KA (r) AM (t) KA (c)    Composite Severity Rating  4   -KA (r) AM (t) KA (c)    Composite Severity Rating Range  4.0 - 3.5: WNL   -KA (r) AM (t) KA (c)    CLQT Comments  Pt. scored WNL for all subtests on the CLQT. Pt's scores for executive function and language subtests was borderline close to mild impairment. Pt. scored below the criterion average scores on generative naming. During generative naming task, pt. demonstrated increased difficulty when naming items from concrete and abstract category. Pt. named 6 items in concrete category and 5 items in abstract category. Pt.  named majority of items in first 30 seconds of task. In complex maze task, pt. demonstrated difficulty when completing the second complex maze. Pt. completed 3 incorrect attempts when working through the maze. Pt. was observed using decreased planning during maze tasks. In design generation task, pt. was observed preservating designs and copying the example designs. Overall, pt. demonstrated good effort throughtout assessment and was observed experiencing difficulty with memory, attention, and planning. During informal assessment, pt. was oriented to setting, time, date with 100% accuracy. Pt. completed a verbal recall task requiring repetition of words and sentences with 40% accuracy without cues or repetitions and with 70% accuracy when cues and repetitions were provided. Pt. recalled 3 unrelated words following a 10-minute delay with 33% accuracy without cues and with 100% accuracy when semantic and phonemic cues were provided. Pt. completed concrete and abstract convergent naming task with 100% accuracy without cues. Pt. completed functional math problems with 33% accuracy when problems were read to pt. requiring auditory processing and recall of verbal instructions. When pt. read instructions independently (reading comprehension and visual processing) accuracy increased to 100%. Pt. named similarities and differences of words with 100% accuracy without cues. Pt. completed deductive reasoning task with 90% accuracy without cues and with 100% accuracy when semantic cues were provided. Pt. completed inductive reasoning task with 89% accuracy without cues. Pt. demonstrates difficulty with recall of short paragraphs, memory of words, and completion of functional math problems.    -KA (r) AM (t) KA (c)       SLP Clinical Impressions    SLP Diagnosis  Mild cognitive communication defecit   -KA (r) AM (t) KA (c)    Rehab Potential/Prognosis  good   -KA (r) AM (t) KA (c)    [facility] Criteria for Skilled Therapy Interventions  Met  yes   -KA (r) AM (t) PJ (c)    Functional Impact  restrictions in personal and social life;difficulty completing home management task   -KA (r) AM (t) PJ (c)       Recommendations    Therapy Frequency (SLP SLC)  2 days per week   -KA (r) AM (t) PJ (c)    Predicted Duration Therapy Intervention (Days)  4 weeks   -KA (r) AM (t) PJ (c)    Anticipated Dischage Disposition  home   -KA (r) AM (t) PJ (c)      User Key  (r) = Recorded By, (t) = Taken By, (c) = Cosigned By    Initials Name Provider Type    Robby Pena MA,CCC-SLP Speech and Language Pathologist    Betty Camarillo, Speech Therapy Student Speech Therapy Student                        SLP OP Goals     Row Name 09/20/19 1200          Goal Type Needed    Goal Type Needed  Verbal Expression  (Pended)   -AM        Subjective Comments    Subjective Comments  Pt. was pleasent and cooperative.   (Pended)   -AM        Subjective Pain    Able to rate subjective pain?  yes  (Pended)   -AM     Pre-Treatment Pain Level  0  (Pended)   -AM     Post-Treatment Pain Level  0  (Pended)   -AM        Written Language Comprehension Goals    Patient will improve comprehension of written language skills by answering written questions related to home management/social/work tasks (bills, recipes, tv guide, emails, procedures)  90%:;without cues  (Pended)   -AM     Status: Patient will improve comprehension of written language skills by answering written questions related to home management/social/work tasks (bills, recipes, tv guide, emails, procedures)  New  (Pended)   -AM     Comments: Patient will improve comprehension of written language skills by answering written questions related to home management/social/work tasks (bills, recipes, tv guide, emails, procedures)  Pt. completd a perscription label task with 90% accuracy without cues and with 100% accuracy when cues were provided.   (Pended)   -AM        Verbal Expression Goals    Verbal Expression LTG's  Patient will  be able to use verbal expressive language skills to communicate effectively in social/avocational/work setting  (Pended)   -AM     Patient will be able to use verbal expressive language skills to communicate effectively in social/avocational/work setting  90%:;without cues  (Pended)   -AM     Status: Patient will be able to use verbal expressive language skills to communicate effectively in social/avocational/work setting  New  (Pended)   -AM     Verbal Expression STG's  Patient will improve verbal expressive language skills by completing divergent naming tasks  (Pended)   -AM     Patient will improve verbal expressive language skills by completing divergent naming tasks  90%:;without cues  (Pended)   -AM     Status: Patient will improve verbal expressive language skills by completing divergent naming tasks  New  (Pended)   -AM     Comments: Patient will improve verbal expressive language skills by completing divergent naming tasks  Pt. completed a divergent naming task in which pt. Named an average of 8 items per category within 60 seconds. Pt. was observed naming majority of the items within the first 30 seconds of each trial.   (Pended)   -AM        Memory Goals    Patient will demonstrate improved ability to recall information by immediately recalling a series of words  90%:;related;unrelated;with no delay  (Pended)   -AM     Status: Patient will demonstrate improved ability to recall information by immediately recalling a series of words  New  (Pended)   -AM     Comments: Patient will demonstrate improved ability to recall information by immediately recalling a series of words  Pt. completed a word list retention task in which pt. identified the correct placement of a word with 40% accuracy without repetitions and with 100% accuracy when repetitions were provided. Pt. was given 2 errands to remember throughout session. Pt. recalled 2 errands immedeately, following a 5-minute delay, 10-minute delay, and  20-minute delay with 100% accuracy without cues.   (Pended)   -AM        Problem Solving Goals    Problem Solving STG's  --  (Pended)   -AM     Patient will improve ability to analyze problems and determine solutions by completing a visual representation/filling in a chart by following  written directions  90%:;without cues  (Pended)   -AM     Status: Patient will improve ability to analyze problems and determine solutions by completing a visual representation/filling in a chart by following  written directions  New  (Pended)   -AM     Comments: Patient will improve ability to analyze problems and determine solutions by completing a visual representation/filling in a chart by following  written directions  Pt. completed a simple deduction puzzle requiring attention, organization, and planning skills with 70% accuracy without cues and with 100% accuracy when min cues were provided. SLP cued pt. to cross out completed items during task.   (Pended)   -AM     Patient will improve ability to analyze problems and determine solutions by completing functional math problems  90%:;without cues  (Pended)   -AM     Status: Patient will improve ability to analyze problems and determine solutions by completing functional math problems  New  (Pended)   -AM     Comments: Patient will improve ability to analyze problems and determine solutions by completing functional math problems  Pt. completed a functional math task in which pt. achieved 90% accuracy without cues and 100% accuracy when mod cues were provided. Pt. completed a time management task in which pt. achieved 60% accuracy without cues and 100% accuracy when mod cues were provided.   (Pended)   -AM       User Key  (r) = Recorded By, (t) = Taken By, (c) = Cosigned By    Initials Name Provider Type    Betty Camarillo, Speech Therapy Student Speech Therapy Student                         Time Calculation:   SLP Start Time: (P) 1100  SLP Stop Time: (P) 1153  SLP Time  Calculation (min): (P) 53 min    Therapy Charges for Today     Code Description Service Date Service Provider Modifiers Qty    [id number]  ST TREATMENT SPEECH 4 9/20/2019 Betty Lehmna, Speech Therapy Student GN 1                   Betty Lehman, Speech Therapy Student  9/20/2019

## 2019-09-20 NOTE — THERAPY EVALUATION
.Outpatient Physical Therapy Neuro Initial Evaluation  [facility]     Patient Name: Karen Pineda  : 1945  MRN: [id number]  Today's Date: 2019      Visit Date: 2019    Patient Active Problem List   Diagnosis   • Hemoptysis   • Lung mass   • Cough   • Arthritis   • Surgery follow-up examination   • Brain metastases (CMS/HCC)   • Small cell lung cancer (CMS/HCC)   • High risk medication use   • Encounter for fitting and adjustment of vascular catheter        Past Medical History:   Diagnosis Date   • Anxiety    • Arthritis    • Brain metastases (CMS/HCC) 2019   • Depression    • GERD (gastroesophageal reflux disease)    • H/O Pulmonary nodule    • Hemoptysis    • History of osteopenia    • Hyperlipidemia    • Nodule of right lung    • Post-menopause    • Sinus problem    • Stress incontinence         Past Surgical History:   Procedure Laterality Date   • BREAST BIOPSY Right    • BRONCHOSCOPY N/A 2016    Procedure: BRONCHOSCOPY WITH ENDOBRONCHIAL ULTRASOUND, NAVIGATION, BRUSHING,BIOPSIES, LAVAGE, AND TRANSBRONCHIAL NEEDLE ASPIRATION;  Surgeon: Js Lewis MD;  Location: [facility] ENDOSCOPY;  Service:    • COLONOSCOPY     • CRANIOTOMY FOR TUMOR Left 2019    Procedure: Left frontal craniotomy for tumor;  Surgeon: Ruben Thibodeaux MD;  Location: [facility] OR;  Service: Neurosurgery   • VENOUS ACCESS DEVICE (PORT) INSERTION Right 2019    Procedure: INSERTION VENOUS ACCESS DEVICE;  Surgeon: Saroj Chacko III, MD;  Location: [facility] OR;  Service: Thoracic         Visit Dx:     ICD-10-CM ICD-9-CM   1. Status post craniotomy Z98.890 V45.89   2. Brain tumor (CMS/HCC) D49.6 239.6   3. Hemiparesis of right dominant side due to non-cerebrovascular etiology (CMS/HCC) G81.91 342.91   4. Functional gait abnormality R26.89 781.2       Patient History     Row Name 19 1015             History    Chief Complaint  Difficulty Walking;Other 1 (comment) R knee gives  "away   -LB      Brief Description of Current Complaint  Pt developed right sided weakness and came to ER 8/24/19 with history of right sided weakness for ~ 2 weeks. Pt was diagnosed with brain tumor metatasis from lung small cell cancer. Pt. underwent a left frontal craniotomy on 8/28/2019 to remove the largest mass and has started daily radiation. Pt is scheduled to start chemotherapy on 9/23/19.  -LB      Previous treatment for THIS PROBLEM  Rehabilitation 9/2/19 - 9/12/19  -LB      Patient/Caregiver Goals  Other (comment) \"Walk with a walker.\" \"Right knee stronger.\"   -LB      Hand Dominance  right-handed  -LB      Occupation/sports/leisure activities  reading   -LB      Patient seeing anyone else for problem(s)?  ST   -LB      Related/Recent Hospitalizations  Yes  -LB      Date of Hospitalization  08/24/19  -LB      Surgery/Hospitalization  craniotomy 8/28/19  -LB         Pain     Pain at Present  0  -LB         Fall Risk Assessment    Any falls in the past year:  No  -LB      Previous Functional Level  Ambulation  -LB      Level of Assistance:  Pt was independent with ambulation without a device.  -LB         Services    Are you currently receiving Home Health services  No  -LB         Daily Activities    Primary Language  English  -LB      Are you able to read  Yes  -LB      Are you able to write  Yes  -LB      How does patient learn best?  Listening;Reading  -LB      Teaching needs identified  Falls Prevention;Home Safety  -LB      Patient is concerned about/has problems with  Walking  -LB      Does patient have problems with the following?  None  -LB      Pt Participated in POC and Goals  Yes  -LB         Safety    Are you being hurt, hit, or frightened by anyone at home or in your life?  No  -LB      Are you being neglected by a caregiver  No  -LB        User Key  (r) = Recorded By, (t) = Taken By, (c) = Cosigned By    Initials Name Provider Type    Ángela Cramer, PT Physical Therapist              PT " "Neuro     Row Name 09/20/19 1015             Subjective Comments    Subjective Comments  \"My right knee gives way.\" \"Haven't fallen.\" When ask what percentage of  the time does she use her rollator in the home pt stated, \"50%\".   -LB         Precautions and Contraindications    Precautions/Limitations  fall precautions  -LB      Precautions  starting chemotherapy on 9/23/19  -LB         Subjective Pain    Able to rate subjective pain?  yes  -LB      Pre-Treatment Pain Level  0  -LB      Post-Treatment Pain Level  0  -LB         Home Living    Living Arrangements  house  -LB      Home Accessibility  stairs to enter home  -LB      Number of Stairs, Main Entrance  two  -LB      Home Equipment  Rollator transport w/c   -LB      Living Environment Comment  Pt lives with her .   -LB         Vision-Basic Assessment    Current Vision  Wears glasses all the time  -LB         Cognition    Overall Cognitive Status  WFL followed commands throughout PT eval, slow to respond   -LB      Orientation Level  Oriented to place;Oriented to situation;Oriented to person  -LB      Safety Judgment  Decreased awareness of need for safety  -LB      Deficits  Fully aware of deficits  -LB      Comments  Pt was advised to use her rollator with ambulation at home at discharge from rehab.   -LB         Proprioception    Proprioception  intact bilateral LE's   -LB         Posture/Observations    Posture- WNL  Posture is WNL  -LB         General ROM    GENERAL ROM COMMENTS  AROM WNL bilateral UE's and LE's   -LB         MMT (Manual Muscle Testing)    Rt Upper Ext  Rt Shoulder WNL;Rt Elbow/Forearm WNL;Rt Wrist WNL  -LB      Lt Upper Ext  Lt Shoulder WNL;Lt Elbow/Forearm WNL;Lt Wrist WNL  -LB      Rt Lower Ext  Rt Hip Flexion;Rt Hip Extension;Rt Hip ABduction;Rt Knee Extension;Rt Knee Flexion;Rt Ankle Plantarflexion;Rt Ankle Dorsiflexion;Rt Ankle Subtalar Inversion;Rt Ankle Subtalar Eversion  -LB      Lt Lower Ext  Lt Hip WNL;Lt Knee WNL;Lt " Ankle WNL  -LB         MMT Right Lower Ext    Rt Hip Flexion MMT, Gross Movement  (4+/5) good plus  -LB      Rt Hip Extension MMT, Gross Movement  other (see comments) moderate resistance in sidelying  -LB      Rt Hip ABduction MMT, Gross Movement  (5/5) normal  -LB      Rt Knee Extension MMT, Gross Movement  (5/5) normal  -LB      Rt Knee Flexion MMT, Gross Movement  other (see comments) minimal resistance in sidelying  -LB      Rt Ankle Plantarflexion MMT, Gross Movement  (3/5) fair  -LB      Rt Ankle Dorsiflexion MMT, Gross Movement  (5/5) normal  -LB      Rt Ankle Subtalar Inversion MT, Gross Movement  (4+/5) good plus  -LB      Rt Ankle Subtalar Eversion MMT, Gross Movement  (4+/5) good plus  -LB      Rt Lower Extremity Comments   Pt was not positioned prone.  -LB         Bed Mobility Assessment/Treatment    Bed Mobility Assessment/Treatment  rolling left;rolling right;supine-sit;sit-supine  -LB      Rolling Left [place] (Bed Mobility)  independent  -LB      Rolling Right [place] (Bed Mobility)  independent  -LB      Supine-Sit [place] (Bed Mobility)  independent  -LB      Sit-Supine [place] (Bed Mobility)  independent  -LB         Transfers    Sit-Stand [place] (Transfers)  independent from an armless chair   -LB      Stand-Sit [place] (Transfers)  independent to an armless chair  -LB      Transfers, Sit-Stand-Sit, Assist Device  other (see comments) no device and rollator   -LB      Comment (Transfers)  Pt was able to come to stand from an armless chair without use of the UE's but pt was advised to use UE's   -LB         Gait/Stairs Assessment/Training    [place] Level (Gait)  stand by assist;contact guard  -LB      Assistive Device (Gait)  -- no device  -LB      Distance in Feet (Gait)  100'   -LB      Deviations/Abnormal Patterns (Gait)  other (see comments) none noted   -LB      [place] Level (Stairs)  contact guard  -LB      Assistive Device (Stairs)  -- none  -LB       Handrail Location (Stairs)  none  -LB      Number of Steps (Stairs)  4  -LB      Ascending Technique (Stairs)  step-over-step  -LB      Descending Technique (Stairs)  step-over-step  -LB      Negotiation (Ramp)  ramp independence;ramp assistive device  -LB      [place] Level (Ramp)  conditional independence  -LB      Assistive Device (Ramp)  walker, 4-wheeled  -LB      Comment (Gait/Stairs)  Pt ambulated into PT conditional independent with her rollator.   see DGI   -LB         Balance Skills Training    [place]  30 seconds with eyes opened and closed  -LB      Balance Comments  see KELLEY   -LB        User Key  (r) = Recorded By, (t) = Taken By, (c) = Cosigned By    Initials Name Provider Type    LB Ángela Lewis, PT Physical Therapist                  Therapy Education  Education Details: PT advised pt ot use her rollator with ambulation especially with her upcoming chemotherapy.  Given: Fall prevention and home safety  How Provided: Verbal  Provided to: Patient, Caregiver  Level of Understanding: Verbalized    PT OP Goals     Row Name 09/20/19 1015          PT Short Term Goals    STG Date to Achieve  10/18/19  -LB     STG 1  Pt to increase strength right knee flexors to maximal resistance in sitting.  -LB     STG 2  Pt to ambulate conditional independent with least restricitive device in her home.   -LB     STG 3  Pt to ambulate ~ 200' x 3 without a device with multiple head turns with SBA.   -LB     STG 4  Pt to be independent with HEP with emphasis on right LE strengthening.   -LB     STG 5  Pt to report decreased right knee buckling.   -LB        Long Term Goals    LTG Date to Achieve  11/15/19  -LB     LTG 1  Pt to be increase strength right plantarflexors to 4/5.  -LB     LTG 2  Pt to ambulate independently without a device in her home environment.   -LB     LTG 3  Pt to ambulate with least restricitve device conditional independent in the community.   -LB     LTG 4  Pt to score a a 22/24 on the  Dynamic Gait Index to reduce risk of falls.  -LB     LTG 5  Pt to ascend and descend 2 steps iwthout a rail independently with or without a device.   -LB        Time Calculation    PT Goal Re-Cert Due Date  10/18/19  -LB       User Key  (r) = Recorded By, (t) = Taken By, (c) = Cosigned By    Initials Name Provider Type    LB Ángela Lewis, PT Physical Therapist          PT Assessment/Plan     Row Name 09/20/19 1617 09/20/19 1615       PT Assessment    Functional Limitations  --  Performance in self-care ADL;Performance in leisure activities;Decreased safety during functional activities  -LB    Impairments  --  Balance;Gait;Muscle strength  -LB    Assessment Comments  Pt was diagnosed with brain tumor metatasis from lung small cell cancer. Pt. underwent a left frontal craniotomy on 8/28/2019 to remove the largest mass and has started daily radiation. Pt is scheduled to start chemotherapy on 9/23/19. Pt demonstrating weakness in her right hip extensors, knee flexors and plantarflexors. Pt demonstrating minimal deficits with gait and balance as indicated by the Dynamic Gait Index. Pt score was a 50/56 on the KELLEY which is WNL for her age. Pt reports she is not consistently using her rollator at home. PT strongly advised pt to use her rollator for fall prevention. Pt's balance and gait will need ongoing evaluation as pt is receiving radiaiton 5 x week and begins her chemotherapy next week.      -LB  --    Please refer to paper survey for additional self-reported information  --  Yes  -LB    Rehab Potential  --  Good  -LB    Patient/caregiver participated in establishment of treatment plan and goals  --  Yes  -LB    Patient would benefit from skilled therapy intervention  --  Yes  -LB       PT Plan    PT Frequency  --  1x/week;2x/week  -LB    Predicted Duration of Therapy Intervention (Therapy Eval)  --  8 weeks   -LB    Planned CPT's?  --  PT EVAL MOD COMPLELITY: 94967;PT NEUROMUSC RE-EDUCATION EA 15 MIN: 85432  -LB     "  User Key  (r) = Recorded By, (t) = Taken By, (c) = Cosigned By    Initials Name Provider Type    Ángela Cramer, PT Physical Therapist             OP Exercises     Row Name 09/20/19 1015             Subjective Comments    Subjective Comments  \"My right knee gives way.\" \"Haven't fallen.\" When ask what percentage of  the time does she use her rollator in the home pt stated, \"50%\".   -LB         Subjective Pain    Able to rate subjective pain?  yes  -LB      Pre-Treatment Pain Level  0  -LB      Post-Treatment Pain Level  0  -LB        User Key  (r) = Recorded By, (t) = Taken By, (c) = Cosigned By    Initials Name Provider Type    Ángela Cramer, PT Physical Therapist                      Outcome Measure Options: Manuel Balance, Dynamic Gait Index  Manuel Balance Scale  Sitting to Standing: able to stand without using hands and stabilize independently  Standing Unsupported: able to stand safely for 2 minutes  Sitting with Back Unsupported but Feet Supported on Floor or on Stool: able to sit safely and securely for 2 minutes  Standing to Sitting: sits safely with minimal use of hands  Transfers: able to transfer safely with minor use of hands  Standing Unsupported with Eyes Closed: able to stand 10 seconds safely  Standing Unsupported with Feet Together: able to place feet together independently and stand 1 minute safely  Reaching Forward with Outstretched Arm While Standing: can reach forward confidently 25 cm (10 inches)   Object From the Floor From a Standing Position: able to  object safely and easily  Turning to Look Behind Over Left and Right Shoulders While Standing: looks behind from both sides and weight shifts well  Turn 360 Degrees: able to turn 360 degrees safely one side only 4 seconds or less  Place Alternate Foot on Step or Stool While Standing Unsupported: able to complete 4 steps without aid with supervision  Standing Unsupported with One Foot in Front: able to place foot ahead " independently and hold 30 seconds  Standing on One Leg: able to lift leg independently and hold >/equal to 3 seconds  Manuel Total Score: 50  Dynamic Gait Index (DGI)  Gait Level Surface: Normal: walks 20’, no assistive devices, good speed, no evidence for imbalance, normal gait pattern  Change in Gait Speed: Normal: Able to smoothly change walking speed without loss of balance or gait deviation. Shows significant difference in walking speeds between normal, fast and slow paces.  Gait with Horizontal Head Turns: Mild impairment: Performs head turns smoothly with slight change in gait velocity, i.e. minor disruption to smooth gait path or uses walking aid.  Gait with Vertical Head Turns: Mild impairment: Performs head turns smoothly with slight change in gait velocity, i.e. minor disruption to smooth gait path or uses walking aid.  Gait and Pivot Turn: Normal: Pivot turns safely within 3 seconds and stops quickly with no loss of balance.  Step Over Obstacle: Moderate impairment: Is able to step over box but must stop, then step over. May require verbal cueing.  Step Around Obstacles: Normal: Is able to walk safely around cones safely without changing gait speed, no evidence of imbalance.  Steps: Mild impairment: Alternating feet, must use rail.  Dynamic Gait Index Score: 19  Dynamic Gait Index Comments: performed without a device    Time Calculation:   Start Time: 1015  Stop Time: 1100  Time Calculation (min): 45 min  Total Timed Code Minutes- PT: 45 minute(s)   Therapy Charges for Today     Code Description Service Date Service Provider Modifiers Qty    [id number]  PT EVAL MOD COMPLEXITY 4 9/20/2019 Ángela Lewis, PT GP 1          PT G-Codes  Outcome Measure Options: Manuel Balance, Dynamic Gait Index  Manuel Total Score: 50         Ángela Lewis, PT  9/20/2019

## 2019-09-26 NOTE — PROGRESS NOTES
Physician Weekly Management Note    Diagnosis:     Diagnosis Plan   1. Small cell lung cancer (CMS/HCC)         RT Details:  Treatment #4/30      Concurrent carboplatin, etoposide, Tecentriq    Notes on Treatment course, Films, Medical progress:  No treatment related issues.  She has occasional sudden intermittent weakness in the right lower extremity, no obvious causative factors prior to these events.  She is using a walker.    Weekly Management:  Medication reviewed?   Yes  New medications given?   No  Problemlist reviewed?   Yes  Problem added?   No       Technical aspects reviewed:  Weekly OBI approved?   Yes  Weekly port films approved?   Yes  Change requests noted on port film?   No  Patient setup and plan reviewed?   Yes    Chart Reviewed:  Continue current treatment plan?   Yes  Treatment plan change requested?   No  CBC reviewed?   Yes  Concurrent Chemo?   Yes    Objective     Toxicities:   As above     Review of Systems   As above    Vitals:    09/26/19 1519   BP: 106/59   Pulse: 69   SpO2: 98%       Current Status 9/25/2019   ECOG score 1       Physical Exam  As above      Problem Summary List    Diagnosis:     Diagnosis Plan   1. Small cell lung cancer (CMS/HCC)       Pathology:       Past Medical History:   Diagnosis Date   • Anxiety    • Arthritis    • Brain metastases (CMS/HCC) 9/18/2019   • Depression    • GERD (gastroesophageal reflux disease)    • H/O Pulmonary nodule    • Hemoptysis    • History of osteopenia    • Hyperlipidemia    • Nodule of right lung    • Post-menopause    • Sinus problem    • Stress incontinence          Past Surgical History:   Procedure Laterality Date   • BREAST BIOPSY Right    • BRONCHOSCOPY N/A 4/21/2016    Procedure: BRONCHOSCOPY WITH ENDOBRONCHIAL ULTRASOUND, NAVIGATION, BRUSHING,BIOPSIES, LAVAGE, AND TRANSBRONCHIAL NEEDLE ASPIRATION;  Surgeon: Js Lewis MD;  Location: [facility] ENDOSCOPY;  Service:    • COLONOSCOPY     • CRANIOTOMY FOR TUMOR Left 8/28/2019     Procedure: Left frontal craniotomy for tumor;  Surgeon: Ruben Thibodeaux MD;  Location: [facility] OR;  Service: Neurosurgery   • VENOUS ACCESS DEVICE (PORT) INSERTION Right 9/6/2019    Procedure: INSERTION VENOUS ACCESS DEVICE;  Surgeon: Saroj Chacko III, MD;  Location: [facility] OR;  Service: Thoracic         Current Outpatient Medications on File Prior to Visit   Medication Sig Dispense Refill   • acetaminophen (TYLENOL) 325 MG tablet Take 2 tablets by mouth Every 4 (Four) Hours As Needed for Mild Pain .     • dexamethasone (DECADRON) 2 MG tablet Take 1 tablet by mouth Every 8 (Eight) Hours. Or as directed by Radiation Oncology/Medical Oncology (do not stop abruptly) 60 tablet 0   • docusate sodium 100 MG capsule Take 200 mg by mouth Daily As Needed for Constipation.     • FLUoxetine (PROzac) 20 MG capsule Take 20 mg by mouth Daily.     • levETIRAcetam (KEPPRA) 500 MG tablet Take 1 tablet by mouth Every 12 (Twelve) Hours. 60 tablet 2   • losartan (COZAAR) 50 MG tablet Take 1 tablet by mouth Daily. 30 tablet 1   • ondansetron (ZOFRAN) 8 MG tablet Take 1 tablet by mouth Every 8 (Eight) Hours As Needed for Nausea or Vomiting. 30 tablet 2   • pantoprazole (PROTONIX) 40 MG EC tablet Take 1 tablet by mouth Daily. 30 tablet 1   • polyethylene glycol (MIRALAX) packet Take 17 g by mouth Daily As Needed (constipation).     • temazepam (RESTORIL) 30 MG capsule Take 1 capsule by mouth At Night As Needed for Sleep for up to 15 days. 15 capsule 0     Current Facility-Administered Medications on File Prior to Visit   Medication Dose Route Frequency Provider Last Rate Last Dose   • [DISCONTINUED] heparin flush (porcine) 100 UNIT/ML injection 500 Units  500 Units Intravenous PRN Nicolas Katz II, MD   500 Units at 09/25/19 1430   • [DISCONTINUED] sodium chloride 0.9 % flush 10 mL  10 mL Intravenous PRN Nicolas Katz II, MD   10 mL at 09/25/19 1430       Allergies   Allergen Reactions   • Sulfa Antibiotics Rash        Primary care MD:    Heide Wellington MD    Oncologist:    N Code MD    Seen and approved by:  Nicolas Mayo MD  09/26/2019

## 2019-09-27 NOTE — THERAPY TREATMENT NOTE
Outpatient Speech Language Pathology   Adult Speech Language Cognitive Treatment Note  [facility]     Patient Name: Karen Pineda  : 1945  MRN: [id number]  Today's Date: 2019         Visit Date: 2019   Patient Active Problem List   Diagnosis   • Hemoptysis   • Lung mass   • Cough   • Arthritis   • Surgery follow-up examination   • Brain metastases (CMS/HCC)   • Small cell lung cancer (CMS/HCC)   • High risk medication use   • Encounter for fitting and adjustment of vascular catheter          Visit Dx:    ICD-10-CM ICD-9-CM   1. Brain tumor (CMS/HCC) D49.6 239.6   2. Brain mass G93.9 348.9   3. Cognitive communication deficit R41.841 799.52                         SLP OP Goals     Row Name 19 1400          Subjective Comments    Subjective Comments  Patient is pleasant and cooperative.   -KA        Subjective Pain    Able to rate subjective pain?  yes  -KA     Pre-Treatment Pain Level  0  -KA     Post-Treatment Pain Level  0  -KA        Written Language Comprehension Goals    Patient will improve comprehension of written language skills by answering written questions related to home management/social/work tasks (bills, recipes, tv guide, emails, procedures)  90%:;without cues  -KA     Status: Patient will improve comprehension of written language skills by answering written questions related to home management/social/work tasks (bills, recipes, tv guide, emails, procedures)  -- ongoing  -KA     Comments: Patient will improve comprehension of written language skills by answering written questions related to home management/social/work tasks (bills, recipes, tv guide, emails, procedures)  Answering questions incorporating time in schedule task=60% without cues   -KA        Verbal Expression Goals    Patient will improve verbal expressive language skills by completing divergent naming tasks  90%:;without cues  -KA     Status: Patient will improve verbal expressive language skills by  completing divergent naming tasks  Progressing as expected  -KA     Comments: Patient will improve verbal expressive language skills by completing divergent naming tasks  Pt. completed a divergent naming task in which pt. named 11 items per cconcrete category within 60 seconds.   -KA        Memory Goals    Patient will demonstrate improved ability to recall information by immediately recalling a series of words  90%:;related;unrelated;with no delay  -KA     Status: Patient will demonstrate improved ability to recall information by immediately recalling a series of words  -- ongoing  -KA     Comments: Patient will demonstrate improved ability to recall information by immediately recalling a series of words  Delayed recall of three errands 10, 15 and 20 minute delay=100% without cues Immediate recalll in picture scene task =80% without cues.   -KA        Problem Solving Goals    Patient will improve ability to analyze problems and determine solutions by completing a visual representation/filling in a chart by following  written directions  90%:;without cues  -KA     Status: Patient will improve ability to analyze problems and determine solutions by completing a visual representation/filling in a chart by following  written directions  Progressing as expected  -KA     Comments: Patient will improve ability to analyze problems and determine solutions by completing a visual representation/filling in a chart by following  written directions  In two separate complex deductive reasoning puzzle incorporating process of elimination 60% without cues on first task and 100% on second with average of 80%.   -KA     Patient will improve ability to analyze problems and determine solutions by completing functional math problems  90%:;without cues  -KA     Status: Patient will improve ability to analyze problems and determine solutions by completing functional math problems  Progressing as expected  -KA     Comments: Patient will  improve ability to analyze problems and determine solutions by completing functional math problems  Functional math word problems=90% without cues and 100% with min cues   -PJ       User Key  (r) = Recorded By, (t) = Taken By, (c) = Cosigned By    Initials Name Provider Type    Robby Pena MA,CCC-SLP Speech and Language Pathologist                         Time Calculation:   SLP Start Time: 0952  SLP Stop Time: 1045  SLP Time Calculation (min): 53 min    Therapy Charges for Today     Code Description Service Date Service Provider Modifiers Qty    [id number] [facility] TREATMENT SPEECH 4 9/27/2019 Robby Smith MA,CCC-SLP GN 1                   Robby Smiht MA,CCC-SLP  9/27/2019

## 2019-09-27 NOTE — THERAPY TREATMENT NOTE
"    Outpatient Physical Therapy Neuro Treatment Note  [facility]     Patient Name: Karen Pineda  : 1945  MRN: [id number]  Today's Date: 2019      Visit Date: 2019    Visit Dx:    ICD-10-CM ICD-9-CM   1. Status post craniotomy Z98.890 V45.89   2. Brain tumor (CMS/HCC) D49.6 239.6   3. Hemiparesis of right dominant side due to non-cerebrovascular etiology (CMS/HCC) G81.91 342.91   4. Functional gait abnormality R26.89 781.2       Patient Active Problem List   Diagnosis   • Hemoptysis   • Lung mass   • Cough   • Arthritis   • Surgery follow-up examination   • Brain metastases (CMS/HCC)   • Small cell lung cancer (CMS/HCC)   • High risk medication use   • Encounter for fitting and adjustment of vascular catheter           PT Neuro     Row Name 19 1000             Subjective Comments    Subjective Comments  When ask how she was doing with all her radiation and chemotherapy appts pt reported , \"Doing good.\" \"My right knee still luis angel.\" per pt and  he has been using her rollator all the time. During ambulation with L HHA pt reported , \"My right knee is getting ready to buckle.\" (PT did not ntoice any buckling at that time)   -LB         Precautions and Contraindications    Precautions/Limitations  fall precautions  -LB      Precautions  radiation therapy M-F, chemo therapy started 19 (3 x week) then off   -LB         Subjective Pain    Able to rate subjective pain?  yes  -LB      Pre-Treatment Pain Level  0  -LB      Post-Treatment Pain Level  0  -LB         Home Living    Living Arrangements  house  -LB      Home Accessibility  stairs to enter home  -LB      Number of Stairs, Main Entrance  two no rail   -LB         Vision-Basic Assessment    Current Vision  Wears glasses all the time  -LB         Cognition    Overall Cognitive Status  WFL  -LB      Orientation Level  Oriented to place;Oriented to situation;Oriented to person  -LB      Safety Judgment  Decreased " awareness of need for safety  -LB      Deficits  Fully aware of deficits  -LB      Comments  Pt using her rollator at home as instructed by IP and OP PT.   -LB         Bed Mobility Assessment/Treatment    Comment (Bed Mobility)  not performed   -LB         Transfers    Sit-Stand [place] (Transfers)  verbal cues;supervision  -LB      Stand-Sit [place] (Transfers)  verbal cues;supervision  -LB      Transfers, Sit-Stand-Sit, Assist Device  other (see comments) rollator   -LB      Transfer, Safety Issues  sequencing ability decreased  -LB      Comment (Transfers)  Pt was able to come to stand with UE's on her on the rollator. PT advised pt to always push from her chair seat or armrests.   -LB         Gait/Stairs Assessment/Training    [place] Level (Gait)  minimum assist (75% patient effort) R HHA, near end of first walk pt reported she felt R knee w  -LB      Assistive Device (Gait)  -- no device  -LB      Distance in Feet (Gait)  100' x 3   (Significant)  near end of 1st walk, she felt R knee was going to buckle  -LB      Deviations/Abnormal Patterns (Gait)  other (see comments) none noted   -LB      [place] Level (Stairs)  not tested  -LB      Comment (Gait/Stairs)  PT did not visualize any buckling or hyperextension of R knee during ambulation.  -LB         Balance Skills Training    Standing-Level of Assistance  Contact guard  -LB      Static Standing Balance Support  No upper extremity supported  -LB      Standing-Balance Activities  Compliant surfaces;Standing on 1/2 roll  -LB      Gait Balance-Level of Assistance  Contact guard;Minimum assistance  -LB      Gait Balance Support  Left upper extremity supported  -LB      Gait Balance Activities  tandem;side-stepping  -LB        User Key  (r) = Recorded By, (t) = Taken By, (c) = Cosigned By    Initials Name Provider Type    Ángela Cramer, PT Physical Therapist                  PT Assessment/Plan     Row Name 09/27/19 1204          PT  "Assessment    Assessment Comments  Pt and  reporting she underwent chemotherapy and radiation this week without any problems. Pt reporting her R knee still luis angel and she noticed it getting ready to buckle during ambulation with L HHA ambulation. PT did not visualize any buckling. Again pt advised to continue with use of her rollator.   -LB       User Key  (r) = Recorded By, (t) = Taken By, (c) = Cosigned By    Initials Name Provider Type    Ángela Cramer PT Physical Therapist             OP Exercises     Row Name 09/27/19 1015 09/27/19 1000          Subjective Comments    Subjective Comments  --  When ask how she was doing with all her radiation and chemotherapy appts pt reported , \"Doing good.\" \"My right knee still luis angel.\" per pt and  he has been using her rollator all the time. During ambulation with L HHA pt reported , \"My right knee is getting ready to buckle.\" (PT did not ntoice any buckling at that time)   -LB        Subjective Pain    Able to rate subjective pain?  --  yes  -LB     Pre-Treatment Pain Level  --  0  -LB     Post-Treatment Pain Level  --  0  -LB        Exercise 1    Exercise Name 1  knee flexion in standing within ll bars   -LB  --     Cueing 1  Verbal;Tactile  -LB  --     Reps 1  7 each LE   -LB  --        Exercise 2    Exercise Name 2  bilateral plantarflexion standing on incline of ll bars with bilateral UE support  -LB  --     Cueing 2  Verbal;Tactile  -LB  --     Reps 2  12  -LB  --       User Key  (r) = Recorded By, (t) = Taken By, (c) = Cosigned By    Initials Name Provider Type    Ángela Cramer PT Physical Therapist                          Therapy Education  Education Details: Instructed  in HHA on the L if the situation occurred she couldn't use her rollator with ambulation.  Given: Mobility training  How Provided: Verbal, Demonstration  Provided to: Patient, Caregiver  Level of Understanding: Teach back education performed, Verbalized, " Demonstrated              Time Calculation:   Start Time: 1015  Stop Time: 1047  Time Calculation (min): 32 min  Total Timed Code Minutes- PT: 32 minute(s)   Therapy Charges for Today     Code Description Service Date Service Provider Modifiers Qty    67084614602 [facility] PT NEUROMUSC RE EDUCATION EA 15 MIN 9/27/2019 Ángela Lewis, PT GP 2                    Ángela Lewis, PT  9/27/2019

## 2019-10-04 PROBLEM — T45.1X5A CHEMOTHERAPY-INDUCED THROMBOCYTOPENIA: Status: ACTIVE | Noted: 2019-01-01

## 2019-10-04 PROBLEM — R21 RASH: Status: ACTIVE | Noted: 2019-01-01

## 2019-10-04 PROBLEM — D69.59 CHEMOTHERAPY-INDUCED THROMBOCYTOPENIA: Status: ACTIVE | Noted: 2019-01-01

## 2019-10-04 NOTE — TELEPHONE ENCOUNTER
----- Message from Sofia Dubon sent at 10/4/2019  1:17 PM EDT -----  Pt daughter in law is her mother in law has a rash all over          [phone] rama

## 2019-10-04 NOTE — THERAPY TREATMENT NOTE
Outpatient Speech Language Pathology   Adult Speech Language Cognitive Treatment Note  [facility]     Patient Name: Karen Pineda  : 1945  MRN: [id number]  Today's Date: 10/4/2019         Visit Date: 10/04/2019   Patient Active Problem List   Diagnosis   • Hemoptysis   • Lung mass   • Cough   • Arthritis   • Surgery follow-up examination   • Brain metastases (CMS/HCC)   • Small cell lung cancer (CMS/HCC)   • High risk medication use   • Encounter for fitting and adjustment of vascular catheter          Visit Dx:    ICD-10-CM ICD-9-CM   1. Brain tumor (CMS/HCC) D49.6 239.6   2. Brain mass G93.89 348.89   3. Cognitive communication deficit R41.841 799.52                         SLP OP Goals     Row Name 10/04/19 1200          Subjective Comments    Subjective Comments  Pt. was pleasent and cooperative.   (Pended)   -AM        Subjective Pain    Able to rate subjective pain?  yes  (Pended)   -AM     Pre-Treatment Pain Level  0  (Pended)   -AM     Post-Treatment Pain Level  0  (Pended)   -AM        Verbal Expression Goals    Patient will improve verbal expressive language skills by completing divergent naming tasks  90%:;without cues  (Pended)   -AM     Status: Patient will improve verbal expressive language skills by completing divergent naming tasks  --  (Pended)  Ongoing  -AM     Comments: Patient will improve verbal expressive language skills by completing divergent naming tasks  Pt. completed a divergent naming task in which pt. named an average of 7 items per concrete category within 60 seconds.   (Pended)   -AM        Memory Goals    Patient will demonstrate improved ability to recall information by immediately recalling a series of words  90%:;related;unrelated;with no delay  (Pended)   -AM     Status: Patient will demonstrate improved ability to recall information by immediately recalling a series of words  Progressing as expected  (Pended)   -AM     Comments: Patient will demonstrate  improved ability to recall information by immediately recalling a series of words  Pt. completed delayed recall of 3 functional errands immediately with 83% accuracy without cues and 100% accuracy with cues and following a 10-minute, 15-minute, and 20-minute delay with 100% accuracy without cues.   (Pended)   -AM        Problem Solving Goals    Patient will improve ability to analyze problems and determine solutions by completing a visual representation/filling in a chart by following  written directions  90%:;without cues  (Pended)   -AM     Status: Patient will improve ability to analyze problems and determine solutions by completing a visual representation/filling in a chart by following  written directions  --  (Pended)  Ongoing  -AM     Comments: Patient will improve ability to analyze problems and determine solutions by completing a visual representation/filling in a chart by following  written directions  Pt. completed a moderately complex deductive reasoning puzzle incoorporating attention to detail and process of elimination with 70% accuracy without cues and 100% accuracy when mod cues were provided.  (Pended)   -AM     Patient will improve ability to analyze problems and determine solutions by completing functional math problems  90%:;without cues  (Pended)   -AM     Status: Patient will improve ability to analyze problems and determine solutions by completing functional math problems  Progressing as expected  (Pended)   -AM     Comments: Patient will improve ability to analyze problems and determine solutions by completing functional math problems  Pt. completed a moderately complex functional math task (more difficult than those in previous sessions) in which pt. achieved 78% accuracy without cues and 100% accuracy when mod to max cues were provided. Pt. completed a time management task in which pt. achieved 90% accuracy without cues and 100% accuracy when min cues were provided.   (Pended)   -AM        User Key  (r) = Recorded By, (t) = Taken By, (c) = Cosigned By    Initials Name Provider Type    Betty Camarillo, Speech Therapy Student Speech Therapy Student                         Time Calculation:   SLP Start Time: (P) 1100  SLP Stop Time: (P) 1155  SLP Time Calculation (min): (P) 55 min    Therapy Charges for Today     Code Description Service Date Service Provider Modifiers Qty    [id number]  [facility] TREATMENT SPEECH 4 10/4/2019 Betty Lehman, Speech Therapy Student GN 1                   Betty Lehman, Speech Therapy Student  10/4/2019

## 2019-10-04 NOTE — PROGRESS NOTES
Pt walked in from seeing Dr. Mayo in radiology for a Rash. Pt has a rash on bilaterally arms, truck and back. She states it itches. She feels like she may have a UTI. Urine sample collected by radiology. Per Dr. Code pt is to be seen by an NP. Dr. Ross will see pt. Pt added to his schedule.       Lab Results   Component Value Date    WBC 8.19 10/04/2019    HGB 9.8 (L) 10/04/2019    HCT 31.2 (L) 10/04/2019    MCV 93.4 10/04/2019    PLT 61 (L) 10/04/2019    CBC reviewed with Dr. Ross. No new orders at this time. Pt denies any bleeding. Reviewed S/S of bleeding and when to seek meidcal attention.Copy of labs given to pt and f/u appt reviewed. Pt is instructed to call the office with any concerns or new symptoms prior to next visit. Pt vu

## 2019-10-04 NOTE — TELEPHONE ENCOUNTER
Pt sent to office by radiation.  Pt with rash at arms, chest, back.  Complaining of itching with rash.  Dr. Katz called and order received for NP to see.   NP unable to see.  Dr. Ross has   Agreed to see patient.  UA sent from radiation department.  Scheduling to add to Dr. Ross's schedule.

## 2019-10-04 NOTE — PROGRESS NOTES
Subjective   Karen Pineda is a 74 y.o. female.  She is seen today with a chief complaint of a rash and thrombocytopenia    History of Present Illness  This is a very pleasant 74-year-old woman followed by Dr. Katz in our practice for extensive stage small cell lung cancer.  She initiated systemic therapy with carboplatin/-16 and atezolizumab 9/23/2019 in conjunction with radiation therapy to the chest.  Approximately 7 days after receiving chemotherapy the patient began to develop a pruritic, maculopapular rash affecting the bilateral arms, chest and to a lesser degree scalp.  She also has a nonraised rash but erythema on the upper abdomen.  She has been applying a topical over-the-counter hydrocortisone with little improvement in the rash.  She has no oral mucositis or ulcers.  She denies a fever.  She was sent from radiation for review of the rash.  She denies recent changes to detergents, shampoos, etc.      Review of Systems   Constitutional: Negative.    Respiratory: Negative.    Skin: Positive for rash.   Neurological: Negative.    Hematological: Negative.        Objective   Physical Exam  There is a rather confluent maculopapular rash affecting majority of both arms left greater than right and also fairly confluent across the upper chest.  There is one small macule on the left temple.  There is redness across the abdomen but no raised rash there.  No oral mucositis.    Assessment/Plan   This is a 74-year-old lady with extensive stage small cell lung cancer who recently started systemic therapy with carboplatin/-16 and atezolizumab 9/23/2019 presenting with a rather diffuse rash affecting the arms and upper chest primarily.  Although a bit early, I suspect this may be an immunotherapy related rash.  Since the rash is at least grade 2, I recommended an oral prednisone 0.5 mg/kg or 40 mg daily with breakfast until she is seen next week for reassessment of the rash.  I also prescribed a medium  potency topical steroid to apply 3 times a day to the most affected areas.  I recommended that she also use a topical emollient to moisturize the skin several times a day.  She is scheduled to be seen again Monday for reassessment.    She also has moderate thrombocytopenia from her recent chemotherapy with a platelet count of 61,000.  She has no clinical bleeding.  We will recheck her CBC also Monday.

## 2019-10-05 NOTE — PROGRESS NOTES
Physician Weekly Management Note    Diagnosis:     Diagnosis Plan   1. Small cell lung cancer (CMS/HCC)  Urinalysis With Culture If Indicated - Urine, Clean Catch    Urinalysis With Culture If Indicated - Urine, Clean Catch    Urinalysis, Microscopic Only - Urine, Clean Catch    Urinalysis, Microscopic Only - Urine, Clean Catch    Urine Culture - Urine,    Urine Culture - Urine, Urine, Clean Catch       RT Details:  Treatment #10/30     Mediastinum right upper lobe    Concurrent carboplatin, -16, Tecentriq    Notes on Treatment course, Films, Medical progress:  Overall doing well, she has developed the expected fatigue, no new shortness of breath, she reports some urinary symptoms, for which we checked a urinalysis  which came back negative.  She also has a widespread rash on upper body and arms, itchy, I recommended she be checked on upstairs with the CBC group.    Weekly Management:  Medication reviewed?   Yes  New medications given?   No  Problemlist reviewed?   Yes  Problem added?   No       Technical aspects reviewed:  Weekly OBI approved?   Yes  Weekly port films approved?   Yes  Change requests noted on port film?   No  Patient setup and plan reviewed?   Yes    Chart Reviewed:  Continue current treatment plan?   Yes  Treatment plan change requested?   No  CBC reviewed?   Yes  Concurrent Chemo?   Yes    Objective     Toxicities:   As above     Review of Systems   As above    Vitals:    10/04/19 1402   BP: 124/69   Pulse: 72   Temp: 97 °F (36.1 °C)   SpO2: 95%       Current Status 10/4/2019   ECOG score 0       Physical Exam  As above      Problem Summary List    Diagnosis:     Diagnosis Plan   1. Small cell lung cancer (CMS/HCC)  Urinalysis With Culture If Indicated - Urine, Clean Catch    Urinalysis With Culture If Indicated - Urine, Clean Catch    Urinalysis, Microscopic Only - Urine, Clean Catch    Urinalysis, Microscopic Only - Urine, Clean Catch    Urine Culture - Urine,    Urine Culture - Urine, Urine,  Clean Catch     Pathology:      Past Medical History:   Diagnosis Date   • Anxiety    • Arthritis    • Brain metastases (CMS/HCC) 9/18/2019   • Depression    • GERD (gastroesophageal reflux disease)    • H/O Pulmonary nodule    • Hemoptysis    • History of osteopenia    • Hyperlipidemia    • Nodule of right lung    • Post-menopause    • Sinus problem    • Stress incontinence          Past Surgical History:   Procedure Laterality Date   • BREAST BIOPSY Right    • BRONCHOSCOPY N/A 4/21/2016    Procedure: BRONCHOSCOPY WITH ENDOBRONCHIAL ULTRASOUND, NAVIGATION, BRUSHING,BIOPSIES, LAVAGE, AND TRANSBRONCHIAL NEEDLE ASPIRATION;  Surgeon: Js Lewis MD;  Location: [facility] ENDOSCOPY;  Service:    • COLONOSCOPY     • CRANIOTOMY FOR TUMOR Left 8/28/2019    Procedure: Left frontal craniotomy for tumor;  Surgeon: Ruben Thibodeaux MD;  Location: [facility] OR;  Service: Neurosurgery   • VENOUS ACCESS DEVICE (PORT) INSERTION Right 9/6/2019    Procedure: INSERTION VENOUS ACCESS DEVICE;  Surgeon: Saroj Chacko III, MD;  Location: [facility] OR;  Service: Thoracic         Current Outpatient Medications on File Prior to Visit   Medication Sig Dispense Refill   • FLUoxetine (PROzac) 20 MG capsule Take 20 mg by mouth Daily.     • levETIRAcetam (KEPPRA) 500 MG tablet Take 1 tablet by mouth Every 12 (Twelve) Hours. 60 tablet 2   • pantoprazole (PROTONIX) 40 MG EC tablet Take 1 tablet by mouth Daily. 30 tablet 1     No current facility-administered medications on file prior to visit.        Allergies   Allergen Reactions   • Sulfa Antibiotics Rash       Primary care MD:    Heide Wellington MD    Oncologist:   Nelson Katz MD    Seen and approved by:  Nicolas Mayo MD  10/04/2019

## 2019-10-07 NOTE — THERAPY TREATMENT NOTE
"    Outpatient Physical Therapy Neuro Treatment Note  [facility]     Patient Name: Karen Pineda  : 1945  MRN: [id number]  Today's Date: 10/7/2019      Visit Date: 10/07/2019    Visit Dx:    ICD-10-CM ICD-9-CM   1. Status post craniotomy Z98.890 V45.89   2. Brain tumor (CMS/HCC) D49.6 239.6   3. Hemiparesis of right dominant side due to non-cerebrovascular etiology (CMS/HCC) G81.91 342.91   4. Functional gait abnormality R26.89 781.2       Patient Active Problem List   Diagnosis   • Hemoptysis   • Lung mass   • Cough   • Arthritis   • Surgery follow-up examination   • Brain metastases (CMS/HCC)   • Small cell lung cancer (CMS/HCC)   • High risk medication use   • Encounter for fitting and adjustment of vascular catheter   • Rash   • Chemotherapy-induced thrombocytopenia           PT Neuro     Row Name 10/07/19 1410             Subjective Comments    Subjective Comments  Pt seen after completing ST session and pt seated in chair. Pt's  and ST reporting she fell when walking when finished with ST.. \"I didn't hurt anything.\" \"I think my foot caught (pointing to her left foot) Pt reporting she was put back on steroids due to a rash from the chemo and it was making her shaky. PT's  reports they have been using the walker at night to and from the bathroom.  Pt's  reporting she has radiation after PT today. PT ask pt if she wanted to have PT this afternoon and pt stated \"Yes.\" PT re-evaluated pt and pt denied any discomofrt with ROM of UE and LE or with MMT B LE's. Pt's  reported they saw the nurse practioner this morning and they reduced her steroids in half (starting tomorrow).  Pt and  report her right knee did not buckle and her L foot did not drag over the weekend. (see observations)  -LB         Precautions and Contraindications    Precautions/Limitations  fall precautions  -LB      Precautions  radiation therapy M-F, chemo therapy started 19 (3 x week) then " "off   -LB         Subjective Pain    Able to rate subjective pain?  yes  -LB      Pre-Treatment Pain Level  0  -LB      Post-Treatment Pain Level  0  -LB      Subjective Pain Comment  \"I don't have any pain.\" ( reported prior and after PT)   -LB         Posture/Observations    Observations  -- no redness of bruising noted on her UE's or legs/ankles   -LB      Posture/Observations Comments  Vitals -- checked prior to PT 02 sat 98%, HR 79  BP R arm 141/78  -LB         General ROM    GENERAL ROM COMMENTS  AROM WNL B UE's and LE's with no complaints of pain   -LB         MMT (Manual Muscle Testing)    Lt Lower Ext  Lt Ankle Dorsiflexion;Lt Ankle Subtalar Inversion;Lt Ankle Subtalar Eversion;Lt Knee Extension  -LB         MMT Right Lower Ext    Rt Knee Extension MMT, Gross Movement  (5/5) normal  -LB      Rt Ankle Dorsiflexion MMT, Gross Movement  (5/5) normal  -LB      Rt Ankle Subtalar Inversion MT, Gross Movement  (4+/5) good plus  -LB      Rt Ankle Subtalar Eversion MMT, Gross Movement  (4+/5) good plus  -LB         MMT Left Lower Ext    Lt Knee Extension MMT, Gross Movement  (5/5) normal  -LB      Lt Ankle Dorsiflexion MMT, Gross Movement  (5/5) normal  -LB      Lt Ankle Subtalar Inversion MMT, Gross Movement  (4+/5) good plus  -LB      Lt Ankle Subtalar Eversion MMT, Gross Movement  (4+/5) good plus  -LB         Bed Mobility Assessment/Treatment    Comment (Bed Mobility)  not performed   -LB         Transfers    Sit-Stand [place] (Transfers)  conditional independence  -LB      Stand-Sit [place] (Transfers)  conditional independence  -LB      Transfers, Sit-Stand-Sit, Assist Device  other (see comments) no device  -LB      Comment (Transfers)  Pt using UE's lightly with to stand with = wt bilateral LE's.   -LB         Gait/Stairs Assessment/Training    [place] Level (Gait)  contact guard  -LB      Assistive Device (Gait)  other (see comments) no device  -LB      Distance in Feet (Gait)  175' x 3 " "without a device  -LB      Deviations/Abnormal Patterns (Gait)  -- none noted during session, no L foot drag or R knee buckling  -LB      [place] Level (Stairs)  not tested  -LB      Comment (Gait/Stairs)  Offered pt to borrow a walker to take to her radiation appointment but pt reported she would just hold her  hand. PT reviewed with pt's  R HHA with ambulation and loaned pt a gait belt.   -LB         Balance Skills Training    Standing-Level of Assistance  Contact guard  -LB      Static Standing Balance Support  No upper extremity supported  -LB      Standing-Balance Activities  Compliant surfaces;Standing on 1/2 roll;Feet together standing on 4\" foam w/ 1 foot forward w/ eyes opened/closed   -LB        User Key  (r) = Recorded By, (t) = Taken By, (c) = Cosigned By    Initials Name Provider Type    Ángela Cramer PT Physical Therapist                  PT Assessment/Plan     Row Name 10/07/19 6610          PT Assessment    Assessment Comments  Pt had a fall after finishing ST. Pt reporting she was not hurt. Pt talked with pt and  in great detail. Pt feels her left foot caught when walking. Pt stating repeatedly she was not injured and wanted to work in PT. Pt's vitals wer checked and stable. Pt vocalizing that they had statrted her on steroids on Friday and it make her feel shaky. PT completed a re-evaluation of ROM and strengh and no limitations noted. Pt demonstrated no left foot drag or right knee buckling during PT session. Pt reported no discomfort during sessin and gait patttern was not antalgic. PT recommended pt return to use of her walker. PT offered to loan pt a rolling walker until they returned home but pt preferred to hold her  hand.    -LB       User Key  (r) = Recorded By, (t) = Taken By, (c) = Cosigned By    Initials Name Provider Type    Ángela Cramer PT Physical Therapist             OP Exercises     Row Name 10/07/19 9183             Subjective Comments " "   Subjective Comments  Pt seen after completing ST session and pt seated in chair. Pt's  and ST reporting she fell when walking when finished with ST.. \"I didn't hurt anything.\" \"I think my foot caught (pointing to her left foot) Pt reporting she was put back on steroids due to a rash from the chemo and it was making her shaky. PT's  reports they have been using the walker at night to and from the bathroom.  Pt's  reporting she has radiation after PT today. PT ask pt if she wanted to have PT this afternoon and pt stated \"Yes.\" PT re-evaluated pt and pt denied any discomofrt with ROM of UE and LE or with MMT B LE's. Pt's  reported they saw the nurse practioner this morning and they reduced her steroids in half (starting tomorrow).  Pt and  report her right knee did not buckle and her L foot did not drag over the weekend. (see observations)  -LB         Subjective Pain    Able to rate subjective pain?  yes  -LB      Pre-Treatment Pain Level  0  -LB      Post-Treatment Pain Level  0  -LB      Subjective Pain Comment  \"I don't have any pain.\" ( reported prior and after PT)   -LB         Exercise 2    Exercise Name 2  bilateral plantarflexion standing on incline of ll bars with bilateral UE support  -LB      Cueing 2  Verbal;Tactile  -LB      Reps 2  15  -LB        User Key  (r) = Recorded By, (t) = Taken By, (c) = Cosigned By    Initials Name Provider Type    Ángela Cramer, PT Physical Therapist                          Therapy Education  Education Details: Advised pt and  to return to use of the rolling walker. Pt did not want to borrow a walker but use her 's hand. PT reviewed with pt and  R HHA with ambulation and the importance of use of a gait belt (pt given a gait belt). Recommened pt and  [facility] and opportunities including possible music therapy during chemo.    Given: Fall prevention and " home safety, Mobility training  How Provided: Verbal, Demonstration  Provided to: Patient, Caregiver  Level of Understanding: Teach back education performed, Verbalized, Demonstrated              Time Calculation:   Start Time: 1410  Stop Time: 1447  Time Calculation (min): 37 min  Total Timed Code Minutes- PT: 37 minute(s)   Therapy Charges for Today     Code Description Service Date Service Provider Modifiers Qty    69901703295 HC PT NEUROMUSC RE EDUCATION EA 15 MIN 10/7/2019 Ángela Lewis, PT GP 2                    Ángela Lewis, PT  10/7/2019

## 2019-10-07 NOTE — THERAPY TREATMENT NOTE
"Outpatient Speech Language Pathology   Adult Speech Language Cognitive Treatment Note  [facility]     Patient Name: Karen Pineda  : 1945  MRN: [id number]  Today's Date: 10/7/2019         Visit Date: 10/07/2019   Patient Active Problem List   Diagnosis   • Hemoptysis   • Lung mass   • Cough   • Arthritis   • Surgery follow-up examination   • Brain metastases (CMS/HCC)   • Small cell lung cancer (CMS/HCC)   • High risk medication use   • Encounter for fitting and adjustment of vascular catheter   • Rash   • Chemotherapy-induced thrombocytopenia          Visit Dx:    ICD-10-CM ICD-9-CM   1. Brain tumor (CMS/HCC) D49.6 239.6   2. Brain mass G93.89 348.89   3. Cognitive communication deficit R41.841 799.52                         SLP OP Goals     Row Name 10/07/19 1500          Subjective Comments    Subjective Comments  Pt. was pleasent and cooperative throughout the session. Pt. reports that she began taking steroids again on Friday (10/4/2019) and has since reported feeling \"jittery\" and having \"difficulty concentrating.\"  Discussed with patient if she felt her steroids where impacting her performance, concentration and attention may need to consider holding ST, pt vebralized understanding. Patient demonstrated increased difficulty with processing and speed today requiring more repetitions of questions and instructions.  (Pended)   -AM        Written Language Comprehension Goals    Patient will improve comprehension of written language skills by answering written questions related to home management/social/work tasks (bills, recipes, tv guide, emails, procedures)  90%:;without cues  (Pended)   -AM     Status: Patient will improve comprehension of written language skills by answering written questions related to home management/social/work tasks (bills, recipes, tv guide, emails, procedures)  New  (Pended)   -AM     Comments: Patient will improve comprehension of written language skills by " answering written questions related to home management/social/work tasks (bills, recipes, tv guide, emails, procedures)  Pt. completed a functional advertisement reading (movie theatre) task in which questions are presented auditorily and pt. is required to read and select answers. Pt. answered questions with 30% accuracy without cues and 90% accuracy when mod to max cues were provided. Pt. completed a paragraph inferencing task in which pt. achieved 80% accuracy without cues.    (Pended)   -AM        Verbal Expression Goals    Patient will improve verbal expressive language skills by completing divergent naming tasks  90%:;without cues  (Pended)   -AM     Status: Patient will improve verbal expressive language skills by completing divergent naming tasks  --  (Pended)  Ongoing  -AM     Comments: Patient will improve verbal expressive language skills by completing divergent naming tasks  Pt. completed a divergent naming task in which pt. named an average of 6 items per concrete category within 60 seconds.   (Pended)   -AM        Memory Goals    Patient will demonstrate improved ability to recall information by immediately recalling a series of words  90%:;related;unrelated;with no delay  (Pended)   -AM     Status: Patient will demonstrate improved ability to recall information by immediately recalling a series of words  Progressing as expected  (Pended)   -AM     Comments: Patient will demonstrate improved ability to recall information by immediately recalling a series of words  Pt. completed delayed recall of 3-functional errands immediately, following a 10-minute delay, and following a 20-minute delay with 100% accuracy without cues.   (Pended)   -AM        Problem Solving Goals    Status: Patient will improve ability to analyze problems and determine solutions by completing a visual representation/filling in a chart by following  written directions  Progressing as expected  (Pended)   -AM     Comments: Patient  will improve ability to analyze problems and determine solutions by completing a visual representation/filling in a chart by following  written directions  Pt. completed a moderately complex deductive reasoning puzzle incoorporating attention to detail and process of elimination with 58% accuracy without cues and 100% accuracy when mod to max cues and were provided.   (Pended)   -AM     Comments: Patient will improve ability to analyze problems and determine solutions by completing functional math problems  Pt. completed a functional math task with 60% accuracy without cues and 100% accuracy when mod cues were provided.    (Pended)   -AM       User Key  (r) = Recorded By, (t) = Taken By, (c) = Cosigned By    Initials Name Provider Type    Betty Camarillo, Speech Therapy Student Speech Therapy Student                         Time Calculation:   SLP Start Time: (P) 1300  SLP Stop Time: (P) 1355  SLP Time Calculation (min): (P) 55 min    Therapy Charges for Today     Code Description Service Date Service Provider Modifiers Qty    [id number]  ST TREATMENT SPEECH 4 10/7/2019 Betty Lehman, Speech Therapy Student GN 1                   Betty Lehman Speech Therapy Student  10/7/2019

## 2019-10-07 NOTE — PROGRESS NOTES
.     REASONS FOR FOLLOWUP: Extensive stage small cell lung cancer    HISTORY OF PRESENT ILLNESS:  The patient is a 74 y.o. year old female with the above mentioned history, who returns to the office today for two week toxicity check. She initiated therapy with Carboplatin, VP16, atezolizumab two weeks ago, 9/23/2019. She reports today she tolerating chemotherapy reasonably well. She denies nausea or vomiting. She denies changes in her bowel or bladder habits. She denies worsening shortness of breath or cough.   She did develop a rash 10 days following therapy. This was evaluated by Dr. Ross on Friday, 10/4/2019 and felt to be related to immunotherapy. She was started on Prednisone 40mg daily along with topical steroids. Thankfully she reports today the rash is improved. She does continue to have some redness and itching on her arms. Her spouse reports the rash is significantly improved. She has tolerated prednisone reasonably well without insomnia. She does have some agitation and feeling jittery. She is not a diabetic.    She continue on radiation therapy daily. She denies fevers or chills, signs or symptoms of bleeding.       Past Medical History:   Diagnosis Date   • Anxiety    • Arthritis    • Brain metastases (CMS/HCC) 9/18/2019   • Depression    • GERD (gastroesophageal reflux disease)    • H/O Pulmonary nodule    • Hemoptysis    • History of osteopenia    • Hyperlipidemia    • Nodule of right lung    • Post-menopause    • Sinus problem    • Stress incontinence      Past Surgical History:   Procedure Laterality Date   • BREAST BIOPSY Right    • BRONCHOSCOPY N/A 4/21/2016    Procedure: BRONCHOSCOPY WITH ENDOBRONCHIAL ULTRASOUND, NAVIGATION, BRUSHING,BIOPSIES, LAVAGE, AND TRANSBRONCHIAL NEEDLE ASPIRATION;  Surgeon: Js Lewis MD;  Location: [facility] ENDOSCOPY;  Service:    • COLONOSCOPY     • CRANIOTOMY FOR TUMOR Left 8/28/2019    Procedure: Left frontal craniotomy for tumor;  Surgeon: Morelia  Ruben VELOZ MD;  Location: [facility] OR;  Service: Neurosurgery   • VENOUS ACCESS DEVICE (PORT) INSERTION Right 2019    Procedure: INSERTION VENOUS ACCESS DEVICE;  Surgeon: Saroj Chacko III, MD;  Location: [facility] OR;  Service: Thoracic       MEDICATIONS    Current Outpatient Medications:   •  FLUoxetine (PROzac) 20 MG capsule, Take 20 mg by mouth Daily., Disp: , Rfl:   •  levETIRAcetam (KEPPRA) 500 MG tablet, Take 1 tablet by mouth Every 12 (Twelve) Hours., Disp: 60 tablet, Rfl: 2  •  pantoprazole (PROTONIX) 40 MG EC tablet, Take 1 tablet by mouth Daily., Disp: 30 tablet, Rfl: 1  •  predniSONE (DELTASONE) 10 MG tablet, Take 3 tablets by mouth Daily., Disp: 60 tablet, Rfl: 0  •  triamcinolone (KENALOG) 0.1 % ointment, Apply  topically to the appropriate area as directed 3 (Three) Times a Day., Disp: 30 g, Rfl: 0    ALLERGIES:     Allergies   Allergen Reactions   • Sulfa Antibiotics Rash       SOCIAL HISTORY:       Social History     Socioeconomic History   • Marital status:      Spouse name: Juan   • Number of children: Not on file   • Years of education: Not on file   • Highest education level: Not on file   Occupational History     Employer: RETIRED   Tobacco Use   • Smoking status: Former Smoker     Packs/day: 1.00     Years: 50.00     Pack years: 50.00     Last attempt to quit:      Years since quittin.7   • Smokeless tobacco: Never Used   Substance and Sexual Activity   • Alcohol use: No   • Drug use: No   • Sexual activity: Defer         FAMILY HISTORY:  Family History   Problem Relation Age of Onset   • Heart failure Mother    • Coronary artery disease Mother    • Depression Mother    • Heart disease Mother    • Leukemia Father    • Bone cancer Father    • Cancer Paternal Aunt    • Cancer Paternal Uncle      I have reviewed the patient's medical history in detail and updated the computerized patient record.     REVIEW OF SYSTEMS:  Review of Systems   Constitutional: Positive  "for fatigue. Negative for activity change, appetite change and fever.   HENT: Negative for nosebleeds and trouble swallowing.    Respiratory: Negative for shortness of breath and wheezing.    Cardiovascular: Negative for chest pain and palpitations.   Gastrointestinal: Negative for constipation, diarrhea and nausea.   Genitourinary: Negative for dysuria and hematuria.   Musculoskeletal: Negative for arthralgias and myalgias.   Skin: Positive for rash. Negative for wound.   Neurological: Positive for speech difficulty and weakness. Negative for seizures and syncope.   Hematological: Negative for adenopathy. Does not bruise/bleed easily.   Psychiatric/Behavioral: Positive for agitation. Negative for confusion.   Review of systems 10/07/2019  unchanged from previous office visit except as updated.           Vitals:    10/07/19 1020   BP: 143/79   Pulse: 82   Resp: 16   Temp: 98 °F (36.7 °C)   TempSrc: Oral   SpO2: 97%   Weight: 86.1 kg (189 lb 14.4 oz)   Height: 162.6 cm (64.02\")   PainSc: 0-No pain     Current Status 10/7/2019   ECOG score 1        PHYSICAL EXAM:    CONSTITUTIONAL:  Vital signs reviewed.  No distress, looks comfortable.  SKIN: Residual erythematous rash on arms, it is not raised, it is nearly resolved on the chest.  EYES:  Conjunctiva and lids unremarkable.  PERRLA  EARS,NOSE,MOUTH,THROAT:  Ears and nose appear unremarkable.  Lips, teeth, gums appear unremarkable.  RESPIRATORY:  Normal respiratory effort.  Lungs clear to auscultation bilaterally.  CARDIOVASCULAR:  Normal S1, S2.  No murmurs rubs or gallops.  No significant lower extremity edema.  GASTROINTESTINAL: Abdomen appears unremarkable.  Nontender.  No hepatomegaly.  No splenomegaly. Bowel sounds present.  LYMPHATIC:  No cervical, supraclavicular, axillary lymphadenopathy.   SKIN:  Warm.  No rashes.  PSYCHIATRIC:  Normal judgment and insight.  Normal mood and affect.  NEURO: Right arm and right leg slightly weaker than the left side.  Verbally " appropriate but slightly slow to respond.  Physical exam 10/07/2019 unchanged from previous office visit except as updated.      RECENT LABS:  Results from last 7 days   Lab Units 10/07/19  1007 10/04/19  1554   WBC 10*3/mm3 15.98* 8.19   NEUTROS ABS 10*3/mm3 10.19* 5.31   HEMOGLOBIN g/dL 10.0* 9.8*   HEMATOCRIT % 30.6* 31.2*   PLATELETS 10*3/mm3 63* 61*     Results from last 7 days   Lab Units 10/07/19  1007 10/04/19  1554   SODIUM mmol/L 146* 144   POTASSIUM mmol/L 4.0 4.4   CHLORIDE mmol/L 106 106   CO2 mmol/L 29.0 27.5   BUN mg/dL 15 15   CREATININE mg/dL 1.00 1.05   CALCIUM mg/dL 8.9 8.8   ALBUMIN g/dL 3.70 3.40*   BILIRUBIN mg/dL 0.2 0.2   ALK PHOS U/L 111 96   ALT (SGPT) U/L 18 21   AST (SGOT) U/L 16 17   GLUCOSE mg/dL 113 97           Assessment/Plan   Small cell lung cancer (CMS/HCC)    Brain metastases (CMS/HCC)    Rash    Chemotherapy-induced thrombocytopenia    *Extensive stage small cell lung cancer, RUL, 2.4 x 2.3 x 1.7cm, with metastasis to 3 brain lesions (bilateral frontal lobes.)  · RUL mass first seen on CT 3/28/2016, 2 x 1.3 cm.  Decreased to 1.6 cm on 6/27/2016, and decreased again to 1.3 cm on 12/5/2016.  Therefore, this was felt at the time to be a benign resolving nodule.  · During August 2019 hospitalization, found to have brain metastasis from small cell lung cancer.  RUL mass and 3 brain lesions.  No evidence of disease otherwise  · Craniotomy for the medial left frontal lobe lesion (3.2 cm) , 8/28/2019  · Because the final diagnosis is small cell, Dr. Chacko does not plan lung resection as we typically treat this with chemoradiation  · SRS to 2 remaining brain lesions (first was resected) and to resection cavity of the first lesion, completed 9/18/2019.  Dr. Mayo only planning whole brain XRT if future brain recurrence.  · Usually with small cell I would like to start chemo XRT within a couple of weeks.  However, she is recovering from brain surgery.  Furthermore, this lung lesion has  been there for years.  Therefore, weighing risks and benefits I think it is in her best interest to wait almost 4 weeks after craniotomy to begin chemo and radiation.  · PET 9/16/2019: 2.5 cm RUL mass with SUV 15.8.  No hypermetabolic LAD or distant disease.  · 9/19/2019, initiated  lung mass XRT with  carboplatin, etoposide, Tecentriq, followed by Tecentriq maintenance for extensive stage small cell lung cancer.     *Goals of care.  · If the 3 brain lesions are locally treated with stereotactic radiation and resection, and truly no disease is found elsewhere, I think she has a very slim but possible chance of cure.   · If this is small cell lung cancer, it makes the possibility of long-term survival less likely.     *Neurological deficits due to suspected brain metastasis:  · Some mild confusion and right-sided weakness and urinary incontinence x2 weeks prompted ER visit 8/24/2019.  · Dr. Tihbodeaux states she has a 5-10% of difficulty with speech or right hemiparesis.     *Port placed 9/6/2019     *This is Ángela Pryor's mother-in-law (APRN with Dr. Thibodeaux)    * Maculopapular rash  · Likely secondary to Tecentriq  · Improved with oral steroids, 0.5mg/kg 40mg daily.  · Taper prednisone to 30mg daily.  · In one week at follow up, pending improvement in rash, we will determine if she is a candidate for further immunotherapy.     *Thrombocytopenia.  · Without signs or symptoms of bleeding.  · She is not anticoagulated.  · Continue to monitor      Plan:  1. Taper prednisone to 30mg daily.   2. Continue topical Kenalog cream  3. Continue Protonix for gi prophylaxis while on steroids.   4. Continue radiation under the direction of radiation oncology.   5. Return in 1 week for CBC, CMP and NP follow up. She is due for cycle 2 Carboplatin, VP16, Tecentriq. (we will discuss with Dr. Katz at that time if the patient will receive further immunotherapy)  6. CT scans in 3 weeks to evaluate response to 2 cycles of  chemo/immunotherapy.  7. MD follow up in 4 weeks for scan review and continued therapy.   8. Noted follow-up MRI brain has been scheduled for 10/8/2019 by her other physicians.  9. The patient and her  understand to call if the rash worsens with steroid taper.     Gayla Emanuel, APRN  10/07/2019

## 2019-10-08 NOTE — PROGRESS NOTES
Subjective   Patient ID: Karen Pineda is a 74 y.o. female is here today for follow-up with a new Brain MRI. She is 2 months out from a left frontal crani for tumor. She has been having a slight headache, dizziness and her vision is blurry.    History of Present Illness    This patient returns today.  She is doing well.  She is still having a little trouble with weakness in her leg but is otherwise doing a lot better.  Her mental status is a lot better than it was preoperatively.    The following portions of the patient's history were reviewed and updated as appropriate: allergies, current medications, past family history, past medical history, past social history, past surgical history and problem list.    Review of Systems   Eyes: Positive for visual disturbance.   Respiratory: Negative for chest tightness and shortness of breath.    Cardiovascular: Negative for chest pain.   Neurological: Positive for dizziness and headaches.   All other systems reviewed and are negative.      Objective   Physical Exam   Constitutional: She is oriented to person, place, and time. She appears well-developed and well-nourished.   Neurological: She is oriented to person, place, and time.     Neurologic Exam     Mental Status   Oriented to person, place, and time.       Assessment/Plan   Independent Review of Radiographic Studies:      I reviewed her MRI of the brain done on 8 October.  This shows that both of the satellite lesions are smaller.  There is otherwise been no change in the MRI.  The area where we remove the tumor is also unchanged.    Medical Decision Making:      I told the patient and her  about the MRI.  I recommended that we check another MRI in about 3 months.    Karen was seen today for post-op.    Diagnoses and all orders for this visit:    Surgery follow-up examination  -     MRI Brain With & Without Contrast; Future      Return in about 3 months (around 1/15/2020).

## 2019-10-11 NOTE — PROGRESS NOTES
Physician Weekly Management Note    Diagnosis:     Diagnosis Plan   1. Small cell lung cancer (CMS/HCC)         RT Details:    Treatment #15/30 concurrent carbo -16   Tecentriq    Notes on Treatment course, Films, Medical progress:    Describes some pain with swallowing and will RX Fleener's.  She has an ulcerated lesion in the roof of her mouth recommended to try Orajel to start, if no help then possibly Kenalog in [place].  Denies new shortness of breath, cough, energy level low.  Continue as planned.    Weekly Management:  Medication reviewed?   Yes  New medications given?   No  Problemlist reviewed?   Yes  Problem added?   No       Technical aspects reviewed:  Weekly OBI approved?   Yes  Weekly port films approved?   Yes  Change requests noted on port film?   No  Patient setup and plan reviewed?   Yes    Chart Reviewed:  Continue current treatment plan?   Yes  Treatment plan change requested?   No  CBC reviewed?   Yes  Concurrent Chemo?   Yes    Objective     Toxicities:     As above     Review of Systems     As above    Vitals:    10/11/19 1449   BP: 135/72   Pulse: 87   SpO2: 98%         Current Status 10/7/2019   ECOG score 1       Physical Exam    As above      Problem Summary List    Diagnosis:     Diagnosis Plan   1. Small cell lung cancer (CMS/HCC)       Pathology:         Past Medical History:   Diagnosis Date   • Anxiety    • Arthritis    • Brain metastases (CMS/HCC) 9/18/2019   • Depression    • GERD (gastroesophageal reflux disease)    • H/O Pulmonary nodule    • Hemoptysis    • History of osteopenia    • Hyperlipidemia    • Nodule of right lung    • Post-menopause    • Sinus problem    • Stress incontinence            Past Surgical History:   Procedure Laterality Date   • BREAST BIOPSY Right    • BRONCHOSCOPY N/A 4/21/2016    Procedure: BRONCHOSCOPY WITH ENDOBRONCHIAL ULTRASOUND, NAVIGATION, BRUSHING,BIOPSIES, LAVAGE, AND TRANSBRONCHIAL NEEDLE ASPIRATION;  Surgeon: Js Lewis MD;   Location: [facility] ENDOSCOPY;  Service:    • COLONOSCOPY     • CRANIOTOMY FOR TUMOR Left 8/28/2019    Procedure: Left frontal craniotomy for tumor;  Surgeon: Ruben Thibodeaux MD;  Location: [facility] MAIN OR;  Service: Neurosurgery   • VENOUS ACCESS DEVICE (PORT) INSERTION Right 9/6/2019    Procedure: INSERTION VENOUS ACCESS DEVICE;  Surgeon: Saroj Chacko III, MD;  Location: [facility] MAIN OR;  Service: Thoracic           Current Outpatient Medications on File Prior to Visit   Medication Sig Dispense Refill   • FLUoxetine (PROzac) 20 MG capsule Take 20 mg by mouth Daily.     • levETIRAcetam (KEPPRA) 500 MG tablet Take 1 tablet by mouth Every 12 (Twelve) Hours. 60 tablet 2   • pantoprazole (PROTONIX) 40 MG EC tablet Take 1 tablet by mouth Daily. 30 tablet 1   • predniSONE (DELTASONE) 10 MG tablet Take 3 tablets by mouth Daily. 60 tablet 0   • triamcinolone (KENALOG) 0.1 % ointment Apply  topically to the appropriate area as directed 3 (Three) Times a Day. 30 g 0     No current facility-administered medications on file prior to visit.        Allergies   Allergen Reactions   • Sulfa Antibiotics Rash           Primary care MD:    Heide Wellington MD    Oncologist:    Nelson Katz MD     Seen and approved by:  Nicolas Mayo MD  10/11/2019

## 2019-10-11 NOTE — THERAPY TREATMENT NOTE
Outpatient Speech Language Pathology   Adult Speech Language Cognitive Treatment Note  [facility]     Patient Name: Karen Pineda  : 1945  MRN: [id number]  Today's Date: 10/11/2019         Visit Date: 10/11/2019   Patient Active Problem List   Diagnosis   • Hemoptysis   • Lung mass   • Cough   • Arthritis   • Surgery follow-up examination   • Brain metastases (CMS/HCC)   • Small cell lung cancer (CMS/HCC)   • High risk medication use   • Encounter for fitting and adjustment of vascular catheter   • Rash   • Chemotherapy-induced thrombocytopenia          Visit Dx:    ICD-10-CM ICD-9-CM   1. Brain tumor (CMS/HCC) D49.6 239.6   2. Brain mass G93.89 348.89   3. Cognitive communication deficit R41.841 799.52                         SLP OP Goals     Row Name 10/11/19 1200          Subjective Comments    Subjective Comments  Patient is pleasant and cooperative. Overall improvement today with processing speed today.   -KA        Subjective Pain    Able to rate subjective pain?  yes  -KA     Pre-Treatment Pain Level  0  -KA     Post-Treatment Pain Level  0  -KA        Written Language Comprehension Goals    Patient will improve comprehension of written language skills by answering written questions related to home management/social/work tasks (bills, recipes, tv guide, emails, procedures)  90%:;without cues  -KA     Status: Patient will improve comprehension of written language skills by answering written questions related to home management/social/work tasks (bills, recipes, tv guide, emails, procedures)  Progressing as expected  -KA     Comments: Patient will improve comprehension of written language skills by answering written questions related to home management/social/work tasks (bills, recipes, tv guide, emails, procedures)  Inferences about paragraphs 90% without cues. Answering questions on functional movie theater task incorporating functional math skills 80% without cues.   -KA        Verbal  Expression Goals    Patient will improve verbal expressive language skills by completing divergent naming tasks  90%:;without cues  -KA     Status: Patient will improve verbal expressive language skills by completing divergent naming tasks  Progressing as expected  -KA     Comments: Patient will improve verbal expressive language skills by completing divergent naming tasks  Pt. completed a divergent naming task in which pt. named an average of 8 items per concrete category within 60 seconds.   -KA        Memory Goals    Patient will demonstrate improved ability to recall information by immediately recalling a series of words  90%:;related;unrelated;with no delay  -KA     Status: Patient will demonstrate improved ability to recall information by immediately recalling a series of words  Progressing as expected  -KA     Comments: Patient will demonstrate improved ability to recall information by immediately recalling a series of words  Delayed recall of three functional errands after 15 and 25 minute delays 100% without cues.   -KA        Problem Solving Goals    Patient will improve ability to analyze problems and determine solutions by completing a visual representation/filling in a chart by following  written directions  90%:;without cues  -KA     Status: Patient will improve ability to analyze problems and determine solutions by completing a visual representation/filling in a chart by following  written directions  Progressing as expected  -KA     Comments: Patient will improve ability to analyze problems and determine solutions by completing a visual representation/filling in a chart by following  written directions  Patient completed moderate complex deductive reasoning puzzle incorporating process of elimination 83% without cues   -KA     Patient will improve ability to analyze problems and determine solutions by completing functional math problems  90%:;without cues  -KA     Status: Patient will improve ability  to analyze problems and determine solutions by completing functional math problems  Progressing as expected  -PJ     Comments: Patient will improve ability to analyze problems and determine solutions by completing functional math problems  75% without cues with functional math word problems mild difficulty with determining time in head requiring min to mod cues.   -PJ       User Key  (r) = Recorded By, (t) = Taken By, (c) = Cosigned By    Initials Name Provider Type    Robby Pena MA,CCC-SLP Speech and Language Pathologist                         Time Calculation:   SLP Start Time: 1100  SLP Stop Time: 1153  SLP Time Calculation (min): 53 min    Therapy Charges for Today     Code Description Service Date Service Provider Modifiers Qty    [id number] HC ST TREATMENT SPEECH 4 10/11/2019 Robby Smith MA,CCC-SLP GN 1                   Robby Smith MA,CCC-SLP  10/11/2019

## 2019-10-14 NOTE — PROGRESS NOTES
REASONS FOR FOLLOW-UP: Extensive stage small cell lung cancer    HISTORY OF PRESENT ILLNESS:  The patient is a 74 y.o. year old female with the above mentioned history, who returns the office today in anticipation of her second cycle of carboplatin, -16, atezolizumab.  She continues on radiation therapy.  The patient did develop a extensive rash following cycle 1 which was thought to be related to immunotherapy.  She was started on prednisone 0.5 mg/kg at 40 mg 10/4/2019.  She was then decreased to 30 mg 10/7/2019.  Thankfully, the patient reports today the rash is nearly resolved.  She denies shortness of breath or chest pain.  She does continue to have intermittent headaches and blurred vision, she reports today this is unchanged.  She denies fevers or chills, signs or symptoms of infection.  She denies signs or symptoms of bleeding.      Past Medical History:   Diagnosis Date   • Anxiety    • Arthritis    • Brain metastases (CMS/HCC) 9/18/2019   • Depression    • GERD (gastroesophageal reflux disease)    • H/O Pulmonary nodule    • Hemoptysis    • History of osteopenia    • Hyperlipidemia    • Nodule of right lung    • Post-menopause    • Sinus problem    • Stress incontinence      Past Surgical History:   Procedure Laterality Date   • BREAST BIOPSY Right    • BRONCHOSCOPY N/A 4/21/2016    Procedure: BRONCHOSCOPY WITH ENDOBRONCHIAL ULTRASOUND, NAVIGATION, BRUSHING,BIOPSIES, LAVAGE, AND TRANSBRONCHIAL NEEDLE ASPIRATION;  Surgeon: Js Lewis MD;  Location: [facility] ENDOSCOPY;  Service:    • COLONOSCOPY     • CRANIOTOMY FOR TUMOR Left 8/28/2019    Procedure: Left frontal craniotomy for tumor;  Surgeon: Ruben Thibodeaux MD;  Location: [facility] OR;  Service: Neurosurgery   • VENOUS ACCESS DEVICE (PORT) INSERTION Right 9/6/2019    Procedure: INSERTION VENOUS ACCESS DEVICE;  Surgeon: Saroj Chacko III, MD;  Location: [facility] OR;  Service: Thoracic       MEDICATIONS    Current Outpatient  Medications:   •  aluminum-magnesium hydroxide 200-200 MG/5ML suspension, diphenhydrAMINE 12.5 MG/5ML liquid, Lidocaine Viscous HCl 2 % solution, nystatin [id number] UNIT/ML suspension, Swish and swallow 10 mL 4 (Four) Times a Day Before Meals & at Bedtime., Disp: 400 mL, Rfl: 2  •  FLUoxetine (PROzac) 20 MG capsule, Take 20 mg by mouth Daily., Disp: , Rfl:   •  levETIRAcetam (KEPPRA) 500 MG tablet, Take 1 tablet by mouth Every 12 (Twelve) Hours., Disp: 60 tablet, Rfl: 2  •  losartan (COZAAR) 50 MG tablet, Take 50 mg by mouth Daily., Disp: , Rfl: 1  •  pantoprazole (PROTONIX) 40 MG EC tablet, Take 1 tablet by mouth Daily., Disp: 30 tablet, Rfl: 1  •  predniSONE (DELTASONE) 10 MG tablet, Take 3 tablets by mouth Daily., Disp: 60 tablet, Rfl: 0  •  triamcinolone (KENALOG) 0.1 % ointment, Apply  topically to the appropriate area as directed 3 (Three) Times a Day., Disp: 30 g, Rfl: 0  No current facility-administered medications for this visit.     Facility-Administered Medications Ordered in Other Visits:   •  heparin flush (porcine) 100 UNIT/ML injection 500 Units, 500 Units, Intravenous, PRN, Nicolas Katz II, MD, 500 Units at 10/14/19 1133  •  sodium chloride 0.9 % flush 10 mL, 10 mL, Intravenous, PRN, Nicolas Katz II, MD    ALLERGIES:     Allergies   Allergen Reactions   • Sulfa Antibiotics Rash       SOCIAL HISTORY:       Social History     Socioeconomic History   • Marital status:      Spouse name: Juan   • Number of children: Not on file   • Years of education: Not on file   • Highest education level: Not on file   Occupational History     Employer: RETIRED   Tobacco Use   • Smoking status: Former Smoker     Packs/day: 1.00     Years: 50.00     Pack years: 50.00     Last attempt to quit:      Years since quittin.7   • Smokeless tobacco: Never Used   Substance and Sexual Activity   • Alcohol use: No   • Drug use: No   • Sexual activity: Defer         FAMILY HISTORY:  Family History   Problem  "Relation Age of Onset   • Heart failure Mother    • Coronary artery disease Mother    • Depression Mother    • Heart disease Mother    • Leukemia Father    • Bone cancer Father    • Cancer Paternal Aunt    • Cancer Paternal Uncle      I have reviewed the patient's medical history in detail and updated the computerized patient record.     REVIEW OF SYSTEMS:  Review of Systems   Constitutional: Positive for fatigue. Negative for activity change, appetite change and fever.   HENT: Negative for nosebleeds and trouble swallowing.    Respiratory: Negative for shortness of breath and wheezing.    Cardiovascular: Negative for chest pain and palpitations.   Gastrointestinal: Negative for constipation, diarrhea and nausea.   Genitourinary: Negative for dysuria and hematuria.   Musculoskeletal: Negative for arthralgias and myalgias.   Skin: Positive for rash (much improved, nearly resolved.). Negative for wound.   Neurological: Positive for speech difficulty and weakness. Negative for seizures and syncope.   Hematological: Negative for adenopathy. Does not bruise/bleed easily.   Psychiatric/Behavioral: Negative for agitation and confusion.   Review of systems 10/14/2019 unchanged from previous office visit except as updated.           Vitals:    10/14/19 0749   BP: 118/61   Pulse: 78   Resp: 18   Temp: 97.7 °F (36.5 °C)   TempSrc: Oral   SpO2: 97%   Weight: 85.2 kg (187 lb 14.4 oz)   Height: 162.6 cm (64.02\")   PainSc: 0-No pain     Current Status 10/14/2019   ECOG score 1        PHYSICAL EXAM:    CONSTITUTIONAL:  Vital signs reviewed.  No distress, looks comfortable.  SKIN: Previous erythematous rash is completely resolved on the arms and abdomen, minimal rash remaining on the chest.  EYES:  Conjunctiva and lids unremarkable.  PERRLA  EARS,NOSE,MOUTH,THROAT:  Ears and nose appear unremarkable.  Lips, teeth, gums appear unremarkable.  RESPIRATORY:  Normal respiratory effort.  Lungs clear to auscultation bilaterally.  Benign " Mediport in the chest wall  CARDIOVASCULAR:  Normal S1, S2.  No murmurs rubs or gallops.  No significant lower extremity edema.  GASTROINTESTINAL: Abdomen appears unremarkable.  Nontender.  No hepatomegaly.  No splenomegaly. Bowel sounds present.  LYMPHATIC:  No cervical, supraclavicular, axillary lymphadenopathy.   SKIN:  Warm.  No rashes.  PSYCHIATRIC:  Normal judgment and insight.  Normal mood and affect.  NEURO: Right arm and right leg slightly weaker than the left side.  Verbally appropriate but slightly slow to respond.  Physical exam 10/14/2019 unchanged from previous office visit except as updated.      RECENT LABS:  Results from last 7 days   Lab Units 10/14/19  0754   WBC 10*3/mm3 11.71*   NEUTROS ABS 10*3/mm3 7.91*   HEMOGLOBIN g/dL 10.4*   HEMATOCRIT % 32.6*   PLATELETS 10*3/mm3 280     Results from last 7 days   Lab Units 10/14/19  0804 10/08/19  1646   SODIUM mmol/L 143  --    POTASSIUM mmol/L 3.6  --    CHLORIDE mmol/L 104  --    CO2 mmol/L 26.7  --    BUN mg/dL 18  --    CREATININE mg/dL 1.11* 0.80   CALCIUM mg/dL 8.7  --    ALBUMIN g/dL 3.60  --    BILIRUBIN mg/dL 0.2  --    ALK PHOS U/L 102  --    ALT (SGPT) U/L 16  --    AST (SGOT) U/L 16  --    GLUCOSE mg/dL 103  --            Assessment/Plan   There are no diagnoses linked to this encounter.  *Extensive stage small cell lung cancer, RUL, 2.4 x 2.3 x 1.7cm, with metastasis to 3 brain lesions (bilateral frontal lobes.)  · RUL mass first seen on CT 3/28/2016, 2 x 1.3 cm.  Decreased to 1.6 cm on 6/27/2016, and decreased again to 1.3 cm on 12/5/2016.  Therefore, this was felt at the time to be a benign resolving nodule.  · During August 2019 hospitalization, found to have brain metastasis from small cell lung cancer.  RUL mass and 3 brain lesions.  No evidence of disease otherwise  · Craniotomy for the medial left frontal lobe lesion (3.2 cm) , 8/28/2019  · Because the final diagnosis is small cell, Dr. Chacko does not plan lung resection as we  typically treat this with chemoradiation  · SRS to 2 remaining brain lesions (first was resected) and to resection cavity of the first lesion, completed 9/18/2019.  Dr. Mayo only planning whole brain XRT if future brain recurrence.  · Usually with small cell I would like to start chemo XRT within a couple of weeks.  However, she is recovering from brain surgery.  Furthermore, this lung lesion has been there for years.  Therefore, weighing risks and benefits I think it is in her best interest to wait almost 4 weeks after craniotomy to begin chemo and radiation.  · PET 9/16/2019: 2.5 cm RUL mass with SUV 15.8.  No hypermetabolic LAD or distant disease.  · 9/19/2019, initiated  lung mass XRT with  carboplatin, etoposide, Tecentriq, followed by Tecentriq maintenance for extensive stage small cell lung cancer.  · Cycle 2 given today 10/14/2019 without Tecentriq due to immune mediated dermatitis     *Goals of care.  · If the 3 brain lesions are locally treated with stereotactic radiation and resection, and truly no disease is found elsewhere, I think she has a very slim but possible chance of cure.   · If this is small cell lung cancer, it makes the possibility of long-term survival less likely.     *Neurological deficits due to suspected brain metastasis:  · Some mild confusion and right-sided weakness and urinary incontinence x2 weeks prompted ER visit 8/24/2019.  · Dr. Thibodeaux states she has a 5-10% of difficulty with speech or right hemiparesis.  · Follow-up with Dr. Thibodeaux 10/15/2019     *Port placed 9/6/2019     *This is Ángela Pryor's mother-in-law (APRN with Dr. Thibodeaux)    *Immune mediated dermatitis   · Improved with oral steroids, 0.5mg/kg 40mg daily.  Originally initiated 10/4/2019  · Rash has now essentially resolved.  We will taper prednisone to discontinue as outlined below.  · Tecentriq will be held today given that the patient continues on steroids.  She will likely not received Tecentriq in the future due to  the extensiveness of her rash    *Thrombocytopenia.  · Without signs or symptoms of bleeding.  · Platelet count julianne of 61,000 following cycle 1  · Platelets improved today, adequate for treatment.     Plan:  1. Proceed with carboplatin -16 today.    2. Tecentriq will be held due to immune mediated dermatitis  3. Taper prednisone to 20 mg x 5 days, then 10 mg x 5 days, then 5 mg x 5 days then discontinue  4. Continue topical Kenalog cream  5. Continue Protonix for gi prophylaxis while on steroids.   6. Continue radiation under the direction of radiation oncology.   7. CT scans in 2 weeks to evaluate response to 2 cycles of chemo/immunotherapy.  8. MD follow up in 3 weeks for scan review and continued therapy.   9. The patient will likely not be a candidate for further immunotherapy given the extensiveness of her immune mediated rash.  This was discussed today with Dr. Katz who is in agreement.    Gayla Emanuel, APRN  10/14/2019

## 2019-10-14 NOTE — PROGRESS NOTES
Called by MICHAEL Martin, who will be seeing the patient today.  Chart reviewed and case discussed with her.  Patient had a significant rash, thought to be due to Tecentriq after first cycle.  Was seen by Dr. Ross of our office and started on 0.5mg/kg (40 mg) prednisone.  Gayla saw her 4 days later and the rash had improved but not resolved.  She tapered the prednisone to 30 mg daily, on 10/7/2019.    Rash is completely resolved today, but patient remains on prednisone 30 mg daily.    She will not receive Tecentriq today.  Likely, we will not to try any more Tecentriq as I would expect a significant rash once again.

## 2019-10-15 NOTE — PROGRESS NOTES
Pt port remained accessed due to treatment expected on 10/16/19 and pt not wanting to have it accessed again tomorrow.  Pt educated on importance of keeping site clean, dry and intact.  Also educated on importance of keeping area sterile and free from microbial contaminants.  Nurse explained that port will be de-accessed after next treatment and pt verbalized understanding.  Pt and  given other options to wrap area of port site for showering purposes.

## 2019-10-16 NOTE — PROGRESS NOTES
Patient here for day 3 Etoposide and c/o tenderness in left bottom of arm. No redness, no swelling noted. Discussed with MICHAEL Gabriel. Will continue to monitor for now. Told patient to watch it and to let us know if tenderness gets worse.

## 2019-10-18 NOTE — THERAPY PROGRESS REPORT/RE-CERT
.Outpatient Physical Therapy Neuro Progress Note  [facility]     Patient Name: Karen Pineda  : 1945  MRN: [id number]  Today's Date: 10/18/2019      Visit Date: 10/18/2019    Patient Active Problem List   Diagnosis   • Hemoptysis   • Lung mass   • Cough   • Arthritis   • Surgery follow-up examination   • Brain metastases (CMS/HCC)   • Small cell lung cancer (CMS/HCC)   • High risk medication use   • Encounter for fitting and adjustment of vascular catheter   • Rash   • Chemotherapy-induced thrombocytopenia        Past Medical History:   Diagnosis Date   • Anxiety    • Arthritis    • Brain metastases (CMS/HCC) 2019   • Depression    • GERD (gastroesophageal reflux disease)    • H/O Pulmonary nodule    • Hemoptysis    • History of osteopenia    • Hyperlipidemia    • Nodule of right lung    • Post-menopause    • Sinus problem    • Stress incontinence         Past Surgical History:   Procedure Laterality Date   • BREAST BIOPSY Right    • BRONCHOSCOPY N/A 2016    Procedure: BRONCHOSCOPY WITH ENDOBRONCHIAL ULTRASOUND, NAVIGATION, BRUSHING,BIOPSIES, LAVAGE, AND TRANSBRONCHIAL NEEDLE ASPIRATION;  Surgeon: Js Lewis MD;  Location: [facility] ENDOSCOPY;  Service:    • COLONOSCOPY     • CRANIOTOMY FOR TUMOR Left 2019    Procedure: Left frontal craniotomy for tumor;  Surgeon: Ruben Thibodeaux MD;  Location: [facility] OR;  Service: Neurosurgery   • VENOUS ACCESS DEVICE (PORT) INSERTION Right 2019    Procedure: INSERTION VENOUS ACCESS DEVICE;  Surgeon: Saroj Chacko III, MD;  Location: [facility] OR;  Service: Thoracic         Visit Dx:     ICD-10-CM ICD-9-CM   1. Status post craniotomy Z98.890 V45.89   2. Brain tumor (CMS/HCC) D49.6 239.6   3. Hemiparesis of right dominant side due to non-cerebrovascular etiology (CMS/HCC) G81.91 342.91   4. Functional gait abnormality R26.89 781.2               PT Neuro     Row Name 10/18/19 0931             Subjective Comments     "Subjective Comments  At beginning of session pt reported, \"Can't walk very good.\" \"Feel weaker.\" My right knee luis angel an shakes.\" Pt denied any left foot drag or falls. \"If I didn't have ahold of something I think I'd fall.\" Pt reporting she uses the rollator when she goes out but not in the s=house.\" When PT mention a cane pt reported she would rather use her walker. Pt and  report when they go out to dinner she prefers just to hold his hand.    -LB         Precautions and Contraindications    Precautions/Limitations  fall precautions  -LB      Precautions  radiation therapy M-F, chemo therapy started 9/23/19 (3 x week) then off a few weeks, pt reports she had chemo week of 10/114/19  -LB         Subjective Pain    Able to rate subjective pain?  yes  -LB      Pre-Treatment Pain Level  0  -LB      Post-Treatment Pain Level  0  -LB      Subjective Pain Comment  No complaints of pain before, during or after PT.   -LB         Vision-Basic Assessment    Current Vision  Wears glasses all the time  -LB         Cognition    Overall Cognitive Status  WFL  -LB      Orientation Level  Oriented to place;Oriented to situation;Oriented to person  -LB      Deficits  Fully aware of deficits  -LB      Comments  Pt recommended pt use her rollator within her home and in the community. Pt prefers holding her 's arm when going out to dinner.   -LB         Posture/Observations    Posture- WNL  Posture is WNL  -LB         MMT Right Lower Ext    Rt Hip Flexion MMT, Gross Movement  (4+/5) good plus  -LB      Rt Hip Extension MMT, Gross Movement  other (see comments) maximal resistance in sidelying  -LB      Rt Hip ABduction MMT, Gross Movement  (4/5) good  -LB      Rt Knee Extension MMT, Gross Movement  (5/5) normal  -LB      Rt Knee Flexion MMT, Gross Movement  other (see comments) moderate resistance in sidelying and sitting   -LB      Rt Ankle Plantarflexion MMT, Gross Movement  (4/5) good  -LB      Rt Ankle Dorsiflexion " "MMT, Gross Movement  (5/5) normal  -LB      Rt Ankle Subtalar Inversion MT, Gross Movement  (4+/5) good plus  -LB      Rt Ankle Subtalar Eversion MMT, Gross Movement  (4/5) good  -LB      Rt Lower Extremity Comments   Ptwas not positioned prone.   -LB         Transfers    Sit-Stand Sproul (Transfers)  independent  -LB      Stand-Sit Sproul (Transfers)  independent  -LB      Transfers, Sit-Stand-Sit, Assist Device  other (see comments) no device  -LB      Transfer, Safety Issues  sequencing ability decreased  -LB      Comment (Transfers)  CAR TRANSFERS -- pt demonstrating how she was getting out of the car this am when she felt the R leg was weak . Pt placed her R LE out of the car and then started to pivot onto it while pulling on the door with L LE in the car.  PT instructed pt and  in technique to and from car with sitting with both feet on the ground ~ 6\" apart to prevent pistoning on the R knee.  Pt was able to come to stand from an armless chair without use of UE's during KELLEY balance test. Pt was advised to elier her hands lightly.   -LB         Gait/Stairs Assessment/Training    Sproul Level (Gait)  stand by assist;contact guard  -LB      Assistive Device (Gait)  -- no device  -LB      Distance in Feet (Gait)  250' x 2 through doorways at various speeds  -LB      Deviations/Abnormal Patterns (Gait)  other (see comments) none noted, no L foot dragging or R knee buckling   -LB      Comment (Gait/Stairs)  See DGI   Reviewed with pt and  ambulation without a device with HHA on either side and then with pt holding her 's arm. Pt and  reported they felt most steady with her holding onto his arm. (Pt's  was more stable in this position.   -LB         Balance Skills Training    Balance Comments  see KELLEY   -LB        User Key  (r) = Recorded By, (t) = Taken By, (c) = Cosigned By    Initials Name Provider Type    Ángela Cramer, PT Physical Therapist    "               Therapy Education  Education Details: Advised pt to use her rollator at home due to her feeling unstable when her right knee luis angel. Instructed pt and  in ambulation while hoilding his arm as pt strongly prefers not to take her walker when they go out to eat. Instructed in in car transfers. Reviewed with pt the improvement with outcome measures. Ask pt and  to monitor when her knee luis angel to determine a pattern.   Given: Mobility training, Fall prevention and home safety, Other (comment), HEP(added sidestepping at a counter to HEP )  How Provided: Verbal, Demonstration  Provided to: Patient, Caregiver  Level of Understanding: Teach back education performed, Verbalized, Demonstrated    PT OP Goals     Row Name 10/18/19 0931          PT Short Term Goals    STG Date to Achieve  11/15/19  -LB     STG 1  Pt to increase strength right knee flexors to maximal resistance in sitting.  -LB     STG 1 Progress  Progressing;Ongoing  -LB     STG 1 Progress Comments  moderate resistance in sidelying and sitting   -LB     STG 2  Pt to ambulate conditional independent with least restricitive device in her home.   -LB     STG 2 Progress  Met  -LB     STG 2 Progress Comments  Pt prefers the rollator verses a cane.   -LB     STG 3  Pt to ambulate ~ 200' x 3 without a device with multiple head turns with SBA.   -LB     STG 3 Progress  Progressing;Ongoing  -LB     STG 3 Progress Comments  250' x 2   -LB     STG 4  Pt to be independent with HEP with emphasis on right LE strengthening.   -LB     STG 4 Progress  Met  -LB     STG 5  Pt to report decreased right knee buckling.   -LB     STG 5 Progress  Ongoing  -LB     STG 5 Progress Comments  Pt and  report right knee luis angel at times.   -LB        Long Term Goals    LTG Date to Achieve  12/06/19  -LB     LTG 1  Pt to be increase strength right plantarflexors to 4/5.  -LB     LTG 1 Progress  Met  -LB     LTG 2  Pt to ambulate independently without a  device in her home environment.   -LB     LTG 2 Progress  Ongoing  -LB     LTG 3  Pt to ambulate with least restricitve device conditional independent in the community.   -LB     LTG 3 Progress  Progressing  -LB     LTG 4  Pt to score a a 22/24 on the Dynamic Gait Index to reduce risk of falls.  -LB     LTG 4 Progress  Progressing;Ongoing  -LB     LTG 4 Progress Comments  Pt scored a 21/24.   -LB     LTG 5  Pt to ascend and descend 2 steps iwthout a rail independently with or without a device.   -LB     LTG 5 Progress  Goal Revised  -LB     LTG 5 Progress Comments  Pt to ascend  and descend 2 steps with a cane or minimal HHA.   -LB        Time Calculation    PT Goal Re-Cert Due Date  11/17/19  -LB       User Key  (r) = Recorded By, (t) = Taken By, (c) = Cosigned By    Initials Name Provider Type    Ángela Cramer, PT Physical Therapist          PT Assessment/Plan     Row Name 10/18/19 4860          PT Assessment    Assessment Comments  Pt and  reporting continued buckling of her right knee. PT has not noted any R knee buckling during sessions. Pt did report her R knee felt weak when getting out of the car. PT reviewed her technique and educated pt on safer technique with equal weight on her LE's. Pt denies any left foot drag. Pt's strength in her R plantraflexors has improved. Pt's R abductors are are a little weaker. Pt's score on her outcome measures improved one point. Pt is frustrated with her right knee buckling and reports feels she may fall. Therefore, PT advised pt to use her rollator within her home and the community. Pt prefers to hold onto her  when going out to dinner verses the walker. Pt and  were safe with pt holding onto her 's arm. PT advised pt to note when her right knee luis angel. Pt would benefit from skilled PT with emphasis on gait, balance and R LE strenghtening.    -LB        PT Plan    PT Frequency  2x/week  -LB     Predicted Duration of Therapy Intervention  "(Therapy Eval)  6 weeks   -LB       User Key  (r) = Recorded By, (t) = Taken By, (c) = Cosigned By    Initials Name Provider Type    Ángela Cramer PT Physical Therapist             OP Exercises     Row Name 10/18/19 0931             Subjective Comments    Subjective Comments  At beginning of session pt reported, \"Can't walk very good.\" \"Feel weaker.\" My right knee luis angel an shakes.\" Pt denied any left foot drag or falls. \"If I didn't have ahold of something I think I'd fall.\" Pt reporting she uses the rollator when she goes out but not in the s=house.\" When PT mention a cane pt reported she would rather use her walker. Pt and  report when they go out to dinner she prefers just to hold his hand.    -LB         Subjective Pain    Able to rate subjective pain?  yes  -LB      Pre-Treatment Pain Level  0  -LB      Post-Treatment Pain Level  0  -LB      Subjective Pain Comment  No complaints of pain before, during or after PT.   -LB         Exercise 2    Exercise Name 2  bilateral plantarflexion standing on incline of ll bars with bilateral UE support  -LB      Cueing 2  Verbal  -LB      Reps 2  15  -LB         Exercise 5    Exercise Name 5  sidestepping with light UE support   -LB      Cueing 5  Verbal  -LB      Additional Comments  advised pt to perform at home at the counter.  -LB        User Key  (r) = Recorded By, (t) = Taken By, (c) = Cosigned By    Initials Name Provider Type    Ángela Cramer PT Physical Therapist                      Outcome Measure Options: Manuel Balance, Dynamic Gait Index  Manuel Balance Scale  Sitting to Standing: able to stand without using hands and stabilize independently  Standing Unsupported: able to stand safely for 2 minutes  Sitting with Back Unsupported but Feet Supported on Floor or on Stool: able to sit safely and securely for 2 minutes  Standing to Sitting: sits safely with minimal use of hands  Transfers: able to transfer safely with minor use of hands  Standing " Unsupported with Eyes Closed: able to stand 10 seconds safely  Standing Unsupported with Feet Together: able to place feet together independently and stand 1 minute safely  Reaching Forward with Outstretched Arm While Standing: can reach forward confidently 25 cm (10 inches)   Object From the Floor From a Standing Position: able to  object safely and easily  Turning to Look Behind Over Left and Right Shoulders While Standing: looks behind from both sides and weight shifts well  Turn 360 Degrees: able to turn 360 degrees safely in 4 seconds or less  Place Alternate Foot on Step or Stool While Standing Unsupported: able to stand independently and complete 8 steps in > 20 seconds  Standing Unsupported with One Foot in Front: able to place foot ahead independently and hold 30 seconds  Standing on One Leg: tries to lift leg unable to hold 3 seconds but remains standing independently  Manuel Total Score: 51  Dynamic Gait Index (DGI)  Gait Level Surface: Normal: walks 20’, no assistive devices, good speed, no evidence for imbalance, normal gait pattern  Change in Gait Speed: Normal: Able to smoothly change walking speed without loss of balance or gait deviation. Shows significant difference in walking speeds between normal, fast and slow paces.  Gait with Horizontal Head Turns: Normal: Performs head turns smoothly with no change in gait.  Gait with Vertical Head Turns: Mild impairment: Performs head turns smoothly with slight change in gait velocity, i.e. minor disruption to smooth gait path or uses walking aid.  Gait and Pivot Turn: Normal: Pivot turns safely within 3 seconds and stops quickly with no loss of balance.  Step Over Obstacle: Moderate impairment: Is able to step over box but must stop, then step over. May require verbal cueing.  Step Around Obstacles: Normal: Is able to walk safely around cones safely without changing gait speed, no evidence of imbalance.  Steps: Mild impairment: Alternating feet,  must use rail.  Dynamic Gait Index Score: 20  Dynamic Gait Index Comments: performed without a device    Time Calculation:   Start Time: 0931  Stop Time: 1015  Time Calculation (min): 44 min  Total Timed Code Minutes- PT: 44 minute(s)   Therapy Charges for Today     Code Description Service Date Service Provider Modifiers Qty    [id number] HC PT NEUROMUSC RE EDUCATION EA 15 MIN 10/18/2019 Ángela Lewis, PT GP 3          PT G-Codes  Outcome Measure Options: Manuel Balance, Dynamic Gait Index  Manuel Total Score: 51         Ángela Lewis, PT  10/18/2019

## 2019-10-18 NOTE — PROGRESS NOTES
Physician Weekly Management Note    Diagnosis:    1. Small cell lung cancer (CMS/HCC)    2. Brain metastases (CMS/HCC)      Reason for Visit:  Radiation (20/30)    Concurrent Chemo:   Yes    Notes on Treatment course, Films, Medical progress and Plan:  Doing fairly well. Pain in left fore arm. Nothing on exam. No warmth, erythema. Muscle may be slightly full there - no cord, etc. Leaning on walker with contralat arm in same location but she denies this. Told her to watch for warmth, erythema, talked thru infection vs blood clot. Let us know status on Monday. Also with reflux sxs. Has pantoprazole daily. Add maalox/mylanta - refuses prescription now. No other problems or questions, cont on.    Performance Status: (1) Restricted in physically strenuous activity, ambulatory and able to do work of light nature    ROS - Other than as listed above, as follows:  Constitutional - Normal - no complaints of fatigue, denies lack of appetite, fever, night sweats or change in weight.  Neck - Normal - denies neck masses, muscle weakness, neck pain, decreased range of motion or swelling.  Cardiovascular - Normal - denies arrhythmias, chest pain, dyspnea, edema, orthopnea or palpitations.  Respiratory - Normal - denies cough, dyspnea, hemoptysis, hiccoughs, pleuritic chest pain or wheezing.  Gastrointestinal - Normal - no complaints of constipation, abdominal pain, diarrhea, heartburn/dyspepsia, hematemesis, hemorrhoids, melena or GI bleeding, nausea, pain or cramping or vomiting.    PHYSICAL EXAM - Other than listed above, as follows:  Vitals:    10/18/19 1453   BP: 118/56   Pulse: 87   SpO2: 97%   Weight: 84.4 kg (186 lb)   PainSc:   2   PainLoc: Arm  Comment: left       Constitutional - Normal - no evidence of impaired alertness, inadequate appearance, premature or advanced chronologic age, uncooperativeness, altered mood, affect or disorientation.  Neck - Normal - no evidence of tender or enlarged lymph nodes, neck  "abnormalities, restricted range of motion or enlarged thyroid.  Chest - Normal - no evidence of chest abnormalities, tender or enlarged lymph nodes.  Respiratory - Normal - no evidence of abnormal breat sounds, chest abnormalities on palpation and chest abnormalities on percussion and normal breath sounds.  Hematologic/Lymphatic - Normal - no evidence of tender or enlarged axillary lymph nodes nor tender or enlarged neck nodes.    Problem added:  No problems updated.  Medications added: No orders of the defined types were placed in this encounter.    Ancillary referrals made: No orders of the defined types were placed in this encounter.      Technical aspects reviewed:  Weekly OBI approved if applicable?  Yes  Weekly port films approved?  Yes  Change requests noted if applicable?  Yes  Patient setup and plan reviewed?  Yes    Chart Reviewed:  Continue current treatment plan?  Yes  Treatment plan change requested?  No    I have reviewed and marked as \"reviewed\" the current medications, allergies and problem list in the patients EMR.    I have reviewed the patient's medical, surgical  history in detail, reviewed any pertinent lab work  and updated the computerized patient record if needed.    Patient's Care Team:  Patient Care Team:  Heide Wellington MD as PCP - General  Heide Wellington MD as PCP - Family Medicine  Js Lewis MD as Consulting Physician (Pulmonary Disease)  Nicolas Mayo MD as Consulting Physician (Radiation Oncology)  Nicolas Katz II, MD as Consulting Physician (Hematology and Oncology)  She Garcia PA-C as Referring Physician (Neurosurgery)  "

## 2019-10-18 NOTE — THERAPY TREATMENT NOTE
Outpatient Speech Language Pathology   Adult Speech Language Cognitive Treatment Note  [facility]     Patient Name: Karen Pineda  : 1945  MRN: [id number]  Today's Date: 10/18/2019         Visit Date: 10/18/2019   Patient Active Problem List   Diagnosis   • Hemoptysis   • Lung mass   • Cough   • Arthritis   • Surgery follow-up examination   • Brain metastases (CMS/HCC)   • Small cell lung cancer (CMS/HCC)   • High risk medication use   • Encounter for fitting and adjustment of vascular catheter   • Rash   • Chemotherapy-induced thrombocytopenia          Visit Dx:    ICD-10-CM ICD-9-CM   1. Brain tumor (CMS/HCC) D49.6 239.6   2. Brain mass G93.89 348.89   3. Cognitive communication deficit R41.841 799.52                         SLP OP Goals     Row Name 10/18/19 1200 10/18/19 0931       Subjective Comments    Subjective Comments  Patient is pleasant and cooperative  -KA  --       Subjective Pain    Able to rate subjective pain?  yes  -KA  --    Pre-Treatment Pain Level  0  -KA  --    Post-Treatment Pain Level  0  -KA  --       Written Language Comprehension Goals    Patient will improve comprehension of written language skills by answering written questions related to home management/social/work tasks (bills, recipes, tv guide, emails, procedures)  90%:;without cues  -KA  --    Status: Patient will improve comprehension of written language skills by answering written questions related to home management/social/work tasks (bills, recipes, tv guide, emails, procedures)  Progressing as expected  -KA  --    Comments: Patient will improve comprehension of written language skills by answering written questions related to home management/social/work tasks (bills, recipes, tv guide, emails, procedures)  More detailed task and moderately complex time task 60% without cues. Inferences about paragraph 80% without cues and 100% with min cues.   -KA  --       Verbal Expression Goals    Patient will improve  verbal expressive language skills by completing divergent naming tasks  90%:;without cues  -KA  --    Status: Patient will improve verbal expressive language skills by completing divergent naming tasks  Progressing as expected  -KA  --    Comments: Patient will improve verbal expressive language skills by completing divergent naming tasks  average of 5 in abstract divergent naming task without cues and 8 with cues   -KA  --       Problem Solving Goals    Patient will improve ability to analyze problems and determine solutions by completing a visual representation/filling in a chart by following  written directions  90%:;without cues  -KA  --    Status: Patient will improve ability to analyze problems and determine solutions by completing a visual representation/filling in a chart by following  written directions  Progressing as expected  -KA  --    Comments: Patient will improve ability to analyze problems and determine solutions by completing a visual representation/filling in a chart by following  written directions  80% without cues improvement today with moderate complex deductive reasoning puzzle   -KA  --    Patient will improve ability to analyze problems and determine solutions by completing functional math problems  90%:;without cues  -KA  --    Status: Patient will improve ability to analyze problems and determine solutions by completing functional math problems  Progressing as expected  -KA  --    Comments: Patient will improve ability to analyze problems and determine solutions by completing functional math problems  In time worksheet task with attention to detail patient had to answer questions based on information and time on metro, pt performed 60% without cues with reduced attention to detail. In balancing checkbook task (reported difficulty completing at home when tired) pt able to solve the math in her head and write down with 100%, mild difficulty with organization of task requiring min cues. In  functional math word problems=72% without cues and 100% with min cues -PJ  --       Time Calculation    PT Goal Re-Cert Due Date  --  11/17/19  -[place]      User Key  (r) = Recorded By, (t) = Taken By, (c) = Cosigned By    Initials Name Provider Type    Ángela Cramer, PT Physical Therapist    Robby Pena MA,CCC-SLP Speech and Language Pathologist                         Time Calculation:   SLP Start Time: 1100  SLP Stop Time: 1153  SLP Time Calculation (min): 53 min    Therapy Charges for Today     Code Description Service Date Service Provider Modifiers Qty    [id number]  ST TREATMENT SPEECH 4 10/18/2019 Robby Smith MA,CCC-SLP GN 1                   Robby Smith MA,CCC-SLP  10/18/2019

## 2019-10-21 NOTE — THERAPY TREATMENT NOTE
Outpatient Speech Language Pathology   Adult Speech Language Cognitive Treatment Note  [facility]     Patient Name: Karen Pineda  : 1945  MRN: [id number]  Today's Date: 10/21/2019         Visit Date: 10/21/2019   Patient Active Problem List   Diagnosis   • Hemoptysis   • Lung mass   • Cough   • Arthritis   • Surgery follow-up examination   • Brain metastases (CMS/HCC)   • Small cell lung cancer (CMS/HCC)   • High risk medication use   • Encounter for fitting and adjustment of vascular catheter   • Rash   • Chemotherapy-induced thrombocytopenia          Visit Dx:    ICD-10-CM ICD-9-CM   1. Brain tumor (CMS/HCC) D49.6 239.6   2. Brain mass G93.89 348.89   3. Cognitive communication deficit R41.841 799.52                         SLP OP Goals     Row Name 10/21/19 1500          Subjective Comments    Subjective Comments  Patient is pleasant and cooperative. Discussed possible d/c with patient due to pt's consistent progress and functional performance today.   -KA        Subjective Pain    Able to rate subjective pain?  yes  -KA     Pre-Treatment Pain Level  0  -KA     Post-Treatment Pain Level  0  -KA        Written Language Comprehension Goals    Patient will improve comprehension of written language skills by answering written questions related to home management/social/work tasks (bills, recipes, tv guide, emails, procedures)  90%:;without cues  -KA     Status: Patient will improve comprehension of written language skills by answering written questions related to home management/social/work tasks (bills, recipes, tv guide, emails, procedures)  Progressing as expected  -KA     Comments: Patient will improve comprehension of written language skills by answering written questions related to home management/social/work tasks (bills, recipes, tv guide, emails, procedures)  Answering questions on functional movie theater task asking questions on functional math and attention to detail=80% without cues.  Inferences about paragraphs 100% without cues.   -KA        Memory Goals    Patient will demonstrate improved ability to recall information by immediately recalling a series of words  90%:;related;unrelated;with no delay  -KA     Status: Patient will demonstrate improved ability to recall information by immediately recalling a series of words  Achieved  -KA     Comments: Patient will demonstrate improved ability to recall information by immediately recalling a series of words  Delayed recall of three unrelated words after 10 and 30 minute delay 100% without cues. `  -KA     Patient will demonstrate improved ability to recall information by listening to paragraph and answering yes/no questions  90%:;without cues  -KA     Status: Patient will demonstrate improved ability to recall information by listening to paragraph and answering yes/no questions  Progressing as expected  -KA     Comments: Patient will demonstrate improved ability to recall information by listening to paragraph and answering yes/no questions  90% without cues for recall of detail at the paragraph level without cues or multiple choice   -KA        Problem Solving Goals    Patient will improve ability to analyze problems and determine solutions by completing a visual representation/filling in a chart by following  written directions  90%:;without cues  -KA     Status: Patient will improve ability to analyze problems and determine solutions by completing a visual representation/filling in a chart by following  written directions  Progressing as expected  -KA     Comments: Patient will improve ability to analyze problems and determine solutions by completing a visual representation/filling in a chart by following  written directions  90% without cues progress today  -KA     Patient will improve ability to analyze problems and determine solutions by completing functional math problems  90%:;without cues  -KA     Status: Patient will improve ability to  analyze problems and determine solutions by completing functional math problems  Progressing as expected  -KA     Comments: Patient will improve ability to analyze problems and determine solutions by completing functional math problems  In time and management task 100% without cues, checkbook task including organization and computing math 100% without cues.q  -KA       User Key  (r) = Recorded By, (t) = Taken By, (c) = Cosigned By    Initials Name Provider Type    Robby Pena MA,CCC-SLP Speech and Language Pathologist                         Time Calculation:   SLP Start Time: 1300  SLP Stop Time: 1400  SLP Time Calculation (min): 60 min    Therapy Charges for Today     Code Description Service Date Service Provider Modifiers Qty    30212948600 HC ST TREATMENT SPEECH 4 10/21/2019 Robby Smith MA,CCC-SLP GN 1                   Robby Smith MA,CCC-SLP  10/21/2019

## 2019-10-21 NOTE — THERAPY TREATMENT NOTE
"    Outpatient Physical Therapy Neuro Treatment Note  [facility]     Patient Name: Karen Pineda  : 1945  MRN: [id number]  Today's Date: 10/21/2019      Visit Date: 10/21/2019    Visit Dx:    ICD-10-CM ICD-9-CM   1. Status post craniotomy Z98.890 V45.89   2. Brain tumor (CMS/HCC) D49.6 239.6   3. Hemiparesis of right dominant side due to non-cerebrovascular etiology (CMS/HCC) G81.91 342.91   4. Functional gait abnormality R26.89 781.2       Patient Active Problem List   Diagnosis   • Hemoptysis   • Lung mass   • Cough   • Arthritis   • Surgery follow-up examination   • Brain metastases (CMS/HCC)   • Small cell lung cancer (CMS/HCC)   • High risk medication use   • Encounter for fitting and adjustment of vascular catheter   • Rash   • Chemotherapy-induced thrombocytopenia           PT Neuro     Row Name 10/21/19 1430             Subjective Comments    Subjective Comments  \"We are going to have to cut it short i have radiation at 3:15.\" Pt's SLP reported pt noted her right knee buckling when they were leaving the ST room. Pt reported her right knee buckled and termbled. Pt reports her  said he would get her a cane but she doesn't want one.   -LB         Precautions and Contraindications    Precautions/Limitations  fall precautions  -LB      Precautions  radiation therapy M-F, chemo therapy started 19 (3 x week) then off a few weeks, pt reports she had chemo week of 10/114/19  -LB         Subjective Pain    Able to rate subjective pain?  yes  -LB      Pre-Treatment Pain Level  0  -LB      Post-Treatment Pain Level  0  -LB         Vision-Basic Assessment    Current Vision  Wears glasses all the time  -LB         Cognition    Overall Cognitive Status  WFL  -LB      Orientation Level  Oriented to place;Oriented to situation;Oriented to person  -LB      Safety Judgment  Decreased awareness of need for safety  -LB      Deficits  Fully aware of deficits  -LB         Posture/Observations    " Posture- WNL  Posture is WNL  -LB         Transfers    Sit-Stand Dietrich (Transfers)  independent  -LB      Stand-Sit Dietrich (Transfers)  independent  -LB      Transfers, Sit-Stand-Sit, Assist Device  other (see comments) no device and rollator   -LB         Gait/Stairs Assessment/Training    Dietrich Level (Gait)  stand by assist;contact guard  -LB      Assistive Device (Gait)  -- no device   -LB      Distance in Feet (Gait)  200' x 3 indoors on level surface, 150' outdoors on concrete and descending low grade ramp  -LB      Pattern (Gait)  step-through  -LB      Deviations/Abnormal Patterns (Gait)  other (see comments) none noted, no L foot dragging or R knee buckling   -LB      Dietrich Level (Stairs)  contact guard  -LB      Assistive Device (Stairs)  -- none   -LB      Number of Steps (Stairs)  curb   -LB      Comment (Gait/Stairs)  Pt reports her and her  did fine walking into the restuarant with him holding her hand.   -LB         Balance Skills Training    Standing-Level of Assistance  Contact guard  -LB      Static Standing Balance Support  No upper extremity supported  -LB      Standing-Balance Activities  Semi-tandum raising and lowering a ball in all planes   -LB      Gait Balance-Level of Assistance  Contact guard  -LB      Gait Balance Support  No upper extremity supported  -LB      Gait Balance Activities  side-stepping  -LB      Balance Comments  Tandem walking with CGA to minimal.   -LB        User Key  (r) = Recorded By, (t) = Taken By, (c) = Cosigned By    Initials Name Provider Type    Ángela Cramer, PT Physical Therapist                  PT Assessment/Plan     Row Name 10/21/19 8358          PT Assessment    Assessment Comments  Pt reporting an episode of right knee buckling when leaving ST room while ambulating with a rollator. Pt denied any falls and did not demonstrate any right knee buckling or foot drag.   -LB       User Key  (r) = Recorded By, (t) = Taken By,  "(c) = Cosigned By    Initials Name Provider Type    Ángela Cramer, PT Physical Therapist             OP Exercises     Row Name 10/21/19 1430             Subjective Comments    Subjective Comments  \"We are going to have to cut it short i have radiation at 3:15.\" Pt's SLP reported pt noted her right knee buckling when they were leaving the ST room. Pt reported her right knee buckled and termbled. Pt reports her  said he would get her a cane but she doesn't want one.   -LB         Subjective Pain    Able to rate subjective pain?  yes  -LB      Pre-Treatment Pain Level  0  -LB      Post-Treatment Pain Level  0  -LB         Exercise 1    Exercise Name 1  knee flexion in standing within ll bars   -LB      Cueing 1  Verbal  -LB      Reps 1  10 each LE   -LB         Exercise 2    Exercise Name 2  bilateral plantarflexion standing on incline of ll bars with bilateral UE support  -LB      Cueing 2  Verbal  -LB      Reps 2  10  -LB        User Key  (r) = Recorded By, (t) = Taken By, (c) = Cosigned By    Initials Name Provider Type    Ángela Cramer PT Physical Therapist                          Therapy Education  Education Details: Advised pt to discuss her knee buckling with Dr. Burt this afternoon.  Given: Other (comment)  How Provided: Verbal  Provided to: Patient, Caregiver  Level of Understanding: Verbalized              Time Calculation:   Start Time: 1430  Stop Time: 1505  Time Calculation (min): 35 min  Total Timed Code Minutes- PT: 35 minute(s)   Therapy Charges for Today     Code Description Service Date Service Provider Modifiers Qty    69994385178 HC PT NEUROMUSC RE EDUCATION EA 15 MIN 10/21/2019 Ángela Lewis, PT GP 2                    Ángela Lewis, PT  10/21/2019     "

## 2019-10-22 NOTE — TELEPHONE ENCOUNTER
----- Message from Rekha Minaya sent at 10/22/2019  9:17 AM EDT -----  [phone]  Having issues, acid reflux and irritation. Not sure if from the chemo or radiation.    Spoke with pt. She states she has had terrible acid reflux and has tried everything and nothing helps. She is taking Protonix as ordered,  mylanta PRN, tums PRN, alvina's magic mouthwash and none of those help. Spoke with mendez harvey, order for carafate liquid 10ml 4 times per day. Order sent to pt's pharmacy. Pt notified.

## 2019-10-23 NOTE — TELEPHONE ENCOUNTER
After insurance pays on Carafate suspension.  patient still pays $273.00 For a 7 day supply. Changed to the tablet form. Patient can chew tablet or dissolve in 2 tsp of water. Called patient with instructions.  BRODIE

## 2019-10-24 NOTE — PROGRESS NOTES
Physician Weekly Management Note    Diagnosis:     Diagnosis Plan   1. Small cell lung cancer (CMS/HCC)         RT Details:  Treatment #24/30   Concurrent chemotherapy.    Notes on Treatment course, Films, Medical progress:  Significant esophageal pain with swallowing food only.  We reviewed and  readjusted the way she is taking her Bronwyn's Magic and I have added Hycet 7.5.    Weekly Management:  Medication reviewed?   Yes  New medications given?   Yes  Problemlist reviewed?   Yes  Problem added?   No       Technical aspects reviewed:  Weekly OBI approved?   Yes  Weekly port films approved?   Yes  Change requests noted on port film?   No  Patient setup and plan reviewed?   Yes    Chart Reviewed:  Continue current treatment plan?   Yes  Treatment plan change requested?   No  CBC reviewed?   No  Concurrent Chemo?   Yes    Objective     Toxicities:   As above     Review of Systems   As above    Vitals:    10/24/19 1533   BP: 118/65   Pulse: 82   SpO2: 99%       Current Status 10/14/2019   ECOG score 1       Physical Exam  As above      Problem Summary List    Diagnosis:     Diagnosis Plan   1. Small cell lung cancer (CMS/HCC)       Pathology:       Past Medical History:   Diagnosis Date   • Anxiety    • Arthritis    • Brain metastases (CMS/HCC) 9/18/2019   • Depression    • GERD (gastroesophageal reflux disease)    • H/O Pulmonary nodule    • Hemoptysis    • History of osteopenia    • Hyperlipidemia    • Nodule of right lung    • Post-menopause    • Sinus problem    • Stress incontinence          Past Surgical History:   Procedure Laterality Date   • BREAST BIOPSY Right    • BRONCHOSCOPY N/A 4/21/2016    Procedure: BRONCHOSCOPY WITH ENDOBRONCHIAL ULTRASOUND, NAVIGATION, BRUSHING,BIOPSIES, LAVAGE, AND TRANSBRONCHIAL NEEDLE ASPIRATION;  Surgeon: Js Lewis MD;  Location: [facility] ENDOSCOPY;  Service:    • COLONOSCOPY     • CRANIOTOMY FOR TUMOR Left 8/28/2019    Procedure: Left frontal craniotomy for tumor;   Surgeon: Ruben Thibodeaux MD;  Location: [facility] OR;  Service: Neurosurgery   • VENOUS ACCESS DEVICE (PORT) INSERTION Right 9/6/2019    Procedure: INSERTION VENOUS ACCESS DEVICE;  Surgeon: Saroj Chacko III, MD;  Location: [facility] OR;  Service: Thoracic        Current Outpatient Medications on File Prior to Visit   Medication Sig Dispense Refill   • aluminum-magnesium hydroxide 200-200 MG/5ML suspension, diphenhydrAMINE 12.5 MG/5ML liquid, Lidocaine Viscous HCl 2 % solution, nystatin 557732 UNIT/ML suspension Swish and swallow 10 mL 4 (Four) Times a Day Before Meals & at Bedtime. 400 mL 2   • FLUoxetine (PROzac) 20 MG capsule Take 20 mg by mouth Daily.     • levETIRAcetam (KEPPRA) 500 MG tablet Take 1 tablet by mouth Every 12 (Twelve) Hours. 60 tablet 2   • lidocaine-prilocaine (EMLA) 2.5-2.5 % cream Apply  topically to the appropriate area as directed As Needed for Mild Pain . 1 each 1   • losartan (COZAAR) 50 MG tablet Take 50 mg by mouth Daily.  1   • pantoprazole (PROTONIX) 40 MG EC tablet Take 1 tablet by mouth Daily. 30 tablet 1   • predniSONE (DELTASONE) 10 MG tablet Take 3 tablets by mouth Daily. (Patient taking differently: Take 20 mg by mouth Daily.) 60 tablet 0   • triamcinolone (KENALOG) 0.1 % ointment Apply  topically to the appropriate area as directed 3 (Three) Times a Day. 30 g 0   • [DISCONTINUED] sucralfate (CARAFATE) 1 GM/10ML suspension Take 10 mL by mouth 4 (Four) Times a Day. 420 mL 1     No current facility-administered medications on file prior to visit.        Allergies   Allergen Reactions   • Sulfa Antibiotics Rash        Primary care MD:    Heide Wellington MD    Oncologist:     N Code MD    Seen and approved by:  Nicolas Mayo MD  10/24/2019

## 2019-10-25 NOTE — THERAPY TREATMENT NOTE
"    Outpatient Physical Therapy Neuro Treatment Note  [facility]     Patient Name: Karen Pineda  : 1945  MRN: [id number]  Today's Date: 10/25/2019      Visit Date: 10/25/2019    Visit Dx:    ICD-10-CM ICD-9-CM   1. Status post craniotomy Z98.890 V45.89   2. Brain tumor (CMS/HCC) D49.6 239.6   3. Hemiparesis of right dominant side due to non-cerebrovascular etiology (CMS/HCC) G81.91 342.91   4. Functional gait abnormality R26.89 781.2       Patient Active Problem List   Diagnosis   • Hemoptysis   • Lung mass   • Cough   • Arthritis   • Surgery follow-up examination   • Brain metastases (CMS/HCC)   • Small cell lung cancer (CMS/HCC)   • High risk medication use   • Encounter for fitting and adjustment of vascular catheter   • Rash   • Chemotherapy-induced thrombocytopenia           PT Neuro     Row Name 10/25/19 1017             Subjective Comments    Subjective Comments  \"Look my knee is doing it.\" (when walking into PT gym from waiting room, tremor noted in right knee lasting ~ 1 minute) Pt reported that her right knee did this several times yesterday and a couple of times today. \"It happened when I got out of the car.\" After discussion with pt and  it was determined it happens majoriyt of the time after she has been sitting. Pt stated she sat more yesterday. Also, PT noted at end of session pt crosses her ankles when sitting.    -LB         Precautions and Contraindications    Precautions/Limitations  fall precautions  -LB      Precautions   R knee tremor (per Andres Burt MD a motor control issue) radiation therapy M-F, chemo therapy started 19 (3 x week) then off a few weeks, pt reports she had chemo week of 19  -LB         Subjective Pain    Able to rate subjective pain?  yes  -LB      Pre-Treatment Pain Level  0  -LB      Post-Treatment Pain Level  0  -LB         Vision-Basic Assessment    Current Vision  Wears glasses all the time  -LB         Cognition    Overall " "Cognitive Status  WFL  -LB      Orientation Level  Oriented to place;Oriented to situation;Oriented to person  -LB      Safety Judgment  -- Pt feels safe if R knee \"luis angel\" she can hold in the house.  -LB      Deficits  Fully aware of deficits  -LB      Comments  Pt and  report she uses her rollator at night, in the morning nad when coming to and from appts.   -LB         Posture/Observations    Posture- WNL  Posture is WNL  -LB      Posture/Observations Comments  PT walked pt from waiting room toward gym and after ~ 20' pt's right knee demonstrated a tremor with right knee flexion ~ 10 degrees lasting ~ 1 minute. After apusing pt continued to walk the rest of the session without any tremors or knee fleixon.    -LB         Bed Mobility Assessment/Treatment    Comment (Bed Mobility)  not performed  -LB         Transfers    Sit-Stand Rogers (Transfers)  independent  -LB      Stand-Sit Rogers (Transfers)  independent  -LB         Gait/Stairs Assessment/Training    Rogers Level (Gait)  stand by assist;supervision  -LB      Assistive Device (Gait)  -- no device  -LB      Distance in Feet (Gait)  300' x 3 with multiple change in direction and varied speeds  -LB      Pattern (Gait)  step-through  -LB      Deviations/Abnormal Patterns (Gait)  -- none noted, No L foot drag or R knee buckling or tremor   -LB      Rogers Level (Stairs)  contact guard  -LB      Assistive Device (Stairs)  other (see comments) none   -LB      Number of Steps (Stairs)  curb   -LB      Rogers Level (Ramp)  stand by assist  -LB      Assistive Device (Ramp)  -- no device   -LB         Balance Skills Training    Standing-Level of Assistance  Contact guard  -LB      Static Standing Balance Support  Right upper extremity supported;Left upper extremity supported;parallel bars  -LB      Standing-Balance Activities  Standing on 1/2 roll;Semi-tandum;Compliant surfaces AQUIVA board forward/backward and laterally,   -LB   " "   Gait Balance-Level of Assistance  Contact guard  -LB      Gait Balance Support  No upper extremity supported  -LB      Gait Balance Activities  side-stepping;scanning environment R/L  -LB      Balance Comments  Tandem walking with light HHA on the R   -LB        User Key  (r) = Recorded By, (t) = Taken By, (c) = Cosigned By    Initials Name Provider Type    Ángela Cramer PT Physical Therapist                  PT Assessment/Plan     Row Name 10/25/19 1601          PT Assessment    Assessment Comments  Pt was ambulating from waiting room into PT gym and at ~ 20' pt reported her right knee was \"doing it\". Pt noted minimal tremor in her right knee with right knee flexed ~ 10 degrees lasting ~ 1 minute. PT spoke with Andrse Burt MD prior to PT session and felt pt's description of her right knee wasa motor control issue. Pt reported she had increased epsiodes yesterday and one time when getting out of the car. PT discussed with pt and [facility] in detail the right knee episodes seem to occur after sitting for a period of time. PT instructed pt in right knee LAQ's and ankle pu,ps if sitting over 30 minutes. Also, advised pt not to cross her legs when sitting. Pt did not demonstrate any other episodes of her right knee tremor throughout balance and gait exericses.   -LB       User Key  (r) = Recorded By, (t) = Taken By, (c) = Cosigned By    Initials Name Provider Type    Ángela Cramer PT Physical Therapist             OP Exercises     Row Name 10/25/19 1017             Subjective Comments    Subjective Comments  \"Look my knee is doing it.\" (when walking into PT gym from waiting room, tremor noted in right knee lasting ~ 1 minute) Pt reported that her right knee did this several times yesterday and a couple of times today. \"It happened when I got out of the car.\" After discussion with pt and  it was determined it happens majoriyt of the time after she has been sitting. Pt stated she sat more yesterday. " "Also, PT noted at end of session pt crosses her ankles when sitting.    -LB         Subjective Pain    Able to rate subjective pain?  yes  -LB      Pre-Treatment Pain Level  0  -LB      Post-Treatment Pain Level  0  -LB         Exercise 1    Exercise Name 1  knee flexion in standing within ll bars   -LB      Reps 1  12 each LE   -LB         Exercise 2    Exercise Name 2  bilateral plantarflexion standing on incline of ll bars with bilateral UE support  -LB      Cueing 2  Verbal  -LB      Reps 2  15  -LB         Exercise 6    Exercise Name 6  reciprocal stepping onto 6\" step   -LB      Cueing 6  Verbal;Tactile  -LB      Reps 6  7 each LE   -LB         Exercise 7    Exercise Name 7  LAQ's   -LB      Cueing 7  Verbal  -LB      Reps 7  5 each LE   -LB      Additional Comments  Advised pt to perform 5 LAQ's prior to standing up after sitting over 30 minute intervals.  -LB         Exercise 8    Exercise Name 8  AP   -LB      Cueing 8  Verbal  -LB      Reps 8  5  -LB        User Key  (r) = Recorded By, (t) = Taken By, (c) = Cosigned By    Initials Name Provider Type    Ángela Cramer, PT Physical Therapist                          Therapy Education  Education Details: Advised pt to perform 5 LAQ's prior to standing up after sitting over 30 minute intervals. PT also advised to avoid crossing her legs when sitting.   Given: Other (comment), Fall prevention and home safety, HEP  Program: New  How Provided: Verbal, Demonstration  Provided to: Patient, Caregiver  Level of Understanding: Teach back education performed, Verbalized, Demonstrated              Time Calculation:   Start Time: 1017  Stop Time: 1100  Time Calculation (min): 43 min  Total Timed Code Minutes- PT: 43 minute(s)   Therapy Charges for Today     Code Description Service Date Service Provider Modifiers Qty    72469662244 HC PT NEUROMUSC RE EDUCATION EA 15 MIN 10/25/2019 Ángela Lewis, PT GP 3                    Ángela Lewis, PT  10/25/2019     "

## 2019-10-25 NOTE — THERAPY DISCHARGE NOTE
Outpatient Speech Language Pathology   Adult Speech Language Cognitive Treatment Note/Discharge Summary  [facility]     Patient Name: Karen Pineda  : 1945  MRN: [id number]  Today's Date: 10/25/2019         Visit Date: 10/25/2019   Patient Active Problem List   Diagnosis   • Hemoptysis   • Lung mass   • Cough   • Arthritis   • Surgery follow-up examination   • Brain metastases (CMS/HCC)   • Small cell lung cancer (CMS/HCC)   • High risk medication use   • Encounter for fitting and adjustment of vascular catheter   • Rash   • Chemotherapy-induced thrombocytopenia          Visit Dx:    ICD-10-CM ICD-9-CM   1. Brain tumor (CMS/HCC) D49.6 239.6   2. Brain mass G93.89 348.89   3. Cognitive communication deficit R41.841 799.52                       SLP OP Goals     Row Name 10/25/19 1200          Subjective Comments    Subjective Comments  Patient is pleasant and cooperative. Patient has met goals and d/c today.  -KA        Subjective Pain    Able to rate subjective pain?  yes  -KA     Pre-Treatment Pain Level  0  -KA     Post-Treatment Pain Level  0  -KA        Written Language Comprehension Goals    Patient will be able to comprehend written material in social/avocational/work setting  90%:;without cues  -KA     Status: Patient will be able to comprehend written material in social/avocational/work setting  Achieved  -KA     Patient will improve comprehension of written language skills by answering written questions related to home management/social/work tasks (bills, recipes, tv guide, emails, procedures)  90%:;without cues  -KA     Status: Patient will improve comprehension of written language skills by answering written questions related to home management/social/work tasks (bills, recipes, tv guide, emails, procedures)  Achieved  -KA     Comments: Patient will improve comprehension of written language skills by answering written questions related to home management/social/work tasks (bills,  recipes, tv guide, emails, procedures)  Inferences about paragraph 90% without cues and answering questions on time management 90% without cues   -KA        Verbal Expression Goals    Patient will be able to use verbal expressive language skills to communicate effectively in social/avocational/work setting  90%:;without cues  -KA     Status: Patient will be able to use verbal expressive language skills to communicate effectively in social/avocational/work setting  Achieved  -KA     Patient will improve verbal expressive language skills by completing divergent naming tasks  90%:;without cues  -KA     Status: Patient will improve verbal expressive language skills by completing divergent naming tasks  Achieved  -KA     Comments: Patient will improve verbal expressive language skills by completing divergent naming tasks  average of 5 in abstract divergent naming task without cues and 8 with cues   -KA        Memory Goals    Patient will be able to remember information needed to participate in avocational activities  ``  -KA     Status: Patient will be able to remember information needed to participate in avocational activities  Achieved  -KA     Patient will demonstrate improved ability to recall information by immediately recalling a series of words  90%:;related;unrelated;with no delay  -KA     Status: Patient will demonstrate improved ability to recall information by immediately recalling a series of words  Achieved  -KA     Comments: Patient will demonstrate improved ability to recall information by immediately recalling a series of words  Delayed recall of three unrelated words after 10 and 30 minute delay 100% without cues. `  -KA     Patient’s memory skills will be enhanced as reported by patient by using external memory aides  90%:;without cues  -KA     Status: Patient’s memory skills will be enhanced as reported by patient by using external memory aides  Discontinued  -KA     Patient will demonstrate improved  ability to recall information by listening to paragraph and answering yes/no questions  90%:;without cues  -KA     Status: Patient will demonstrate improved ability to recall information by listening to paragraph and answering yes/no questions  Achieved  -KA     Comments: Patient will demonstrate improved ability to recall information by listening to paragraph and answering yes/no questions  90% without cues for recall of detail at the paragraph level without cues or multiple choice   -KA        Problem Solving Goals    Patient will be able to engage in avocational activities requiring high level cognitive skills  Independently  -KA     Status: Patient will be able to engage in avocational activities requiring high level cognitive skills  Achieved  -KA     Patient will improve ability to analyze problems and determine solutions by completing a visual representation/filling in a chart by following  written directions  90%:;without cues  -KA     Status: Patient will improve ability to analyze problems and determine solutions by completing a visual representation/filling in a chart by following  written directions  Achieved  -KA     Comments: Patient will improve ability to analyze problems and determine solutions by completing a visual representation/filling in a chart by following  written directions  90% without cues in complex reasoning task requiring process of elimination and attention to detail   -KA     Patient will improve ability to analyze problems and determine solutions by completing functional math problems  90%:;without cues  -KA     Status: Patient will improve ability to analyze problems and determine solutions by completing functional math problems  Achieved  -KA     Comments: Patient will improve ability to analyze problems and determine solutions by completing functional math problems  Functional math time word problems 90% without cues   -KA       User Key  (r) = Recorded By, (t) = Taken By, (c) =  Cosigned By    Initials Name Provider Type    Robby Pena MA,CCC-SLP Speech and Language Pathologist          OP SLP Education     Row Name 10/25/19 1235       Education    Assessed  Learning needs;Learning motivation  -PJ    Learning Motivation  Strong  -PJ    Learning Method  Explanation  -PJ    Teaching Response  Verbalized understanding  -PJ      User Key  (r) = Recorded By, (t) = Taken By, (c) = Cosigned By    Initials Name Effective Dates    Robby Pena MA,CCC-SLP 06/08/18 -           OP SLP Assessment/Plan - 10/25/19 1235        SLP Assessment    Clinical Impression: Speech Language-Adult/Congnition  Cognitive Communication WFL   -PJ      User Key  (r) = Recorded By, (t) = Taken By, (c) = Cosigned By    Initials Name Provider Type    Robby Pena MA,CCC-SLP Speech and Language Pathologist                   Time Calculation:   SLP Start Time: 1100  SLP Stop Time: 1145  SLP Time Calculation (min): 45 min    Therapy Charges for Today     Code Description Service Date Service Provider Modifiers Qty    28774442303  ST TREATMENT SPEECH 3 10/25/2019 Robby Smith MA,CCC-SLP GN 1                 OP SLP Discharge Summary  Date of Discharge: 10/18/19  Reason for Discharge: all goals and outcomes met, no further needs identified  Progress Toward Achieving Short/long Term Goals: all goals met within established timelines  Discharge Destination: home      Robby Smith MA,CCC-SLP  10/25/2019

## 2019-10-29 NOTE — PROGRESS NOTES
CBC reviewed with Dr. Katz. Per Dr. Katz pt is to take Levaquin 500 mg daily X 7 days. She will return on Thursday for a CBC with RN review. Reviewed Neutropenic precautions and when to seek medical attention. Pt v/U.

## 2019-10-29 NOTE — THERAPY TREATMENT NOTE
"    Outpatient Physical Therapy Neuro Treatment Note  [facility]     Patient Name: Karen Pineda  : 1945  MRN: [id number]  Today's Date: 10/29/2019      Visit Date: 10/29/2019    Visit Dx:    ICD-10-CM ICD-9-CM   1. Status post craniotomy Z98.890 V45.89   2. Brain tumor (CMS/HCC) D49.6 239.6   3. Hemiparesis of right dominant side due to non-cerebrovascular etiology (CMS/HCC) G81.91 342.91   4. Functional gait abnormality R26.89 781.2       Patient Active Problem List   Diagnosis   • Hemoptysis   • Lung mass   • Cough   • Arthritis   • Surgery follow-up examination   • Brain metastases (CMS/HCC)   • Small cell lung cancer (CMS/HCC)   • High risk medication use   • Encounter for fitting and adjustment of vascular catheter   • Rash   • Chemotherapy-induced thrombocytopenia           PT Neuro     Row Name 10/29/19 1103             Subjective Comments    Subjective Comments  When PT ask pt if she went out with her friends for dinner Saturday pt responded, \"I couldn't it.\" \"It hurts to eat or drink.\" \"I feel like I complain all the time.\" \"I have bad reflux.\" \"Dr. Mayo stopped my radiation this week.\" \"I don't know if I should have done these treatments.\"Pt reporting her right knee has been buckling more. Pt denies any falls.  Pt's  reports she has a PET scan today.\"   -LB         Precautions and Contraindications    Precautions/Limitations  fall precautions  -LB      Precautions   R knee tremor (per Andres Burt MD a motor control issue) radiation therapy M-F, chemo therapy started 19 (3 x week) then off a few weeks, pt reports she had chemo week of 19  -LB         Subjective Pain    Able to rate subjective pain?  yes  -LB      Subjective Pain Comment  Pt reporting pain in her throat and not able to eat or drink without pain.   -LB         Cognition    Overall Cognitive Status  WFL  -LB      Orientation Level  Oriented to place;Oriented to situation;Oriented to person  -LB      " Safety Judgment  -- Improved awareness of need for safety   -LB      Deficits  Fully aware of deficits  -LB      Comments  Pt and  comprehended the importance of using her rollator all the time since she isn't feeling as strong nad increased right knee buckling noted.   -LB         Bed Mobility Assessment/Treatment    Rolling Left [place] (Bed Mobility)  independent  -LB      Rolling Right [place] (Bed Mobility)  independent  -LB      Supine-Sit [place] (Bed Mobility)  independent  -LB      Sit-Supine [place] (Bed Mobility)  independent  -LB         Transfers    Sit-Stand [place] (Transfers)  independent  -LB      Stand-Sit [place] (Transfers)  independent  -LB      Transfers, Sit-Stand-Sit, Assist Device  other (see comments) no device and rollator   -LB         Gait/Stairs Assessment/Training    [place] Level (Gait)  contact guard PT provided CGA as pt's R knee buckled min w/ sidestepping  -LB      Assistive Device (Gait)  other (see comments) no device   -LB      Distance in Feet (Gait)  200' x 2   -LB      Pattern (Gait)  step-through  -LB      Deviations/Abnormal Patterns (Gait)  -- none noted, No L foot drag or R knee buckling or tremor   -LB      Right Sided Gait Deviations  -- see comments   -LB      [place] Level (Stairs)  not tested  -LB      Comment (Gait/Stairs)  Pt's right knee buckled minimally early in session when sidestepping tot he R. During ambulation without a device no R knee buckling noted.   (Significant)   -LB         Balance Skills Training    Standing-Level of Assistance  Contact guard  -LB      Static Standing Balance Support  Right upper extremity supported;Left upper extremity supported PT in front   -LB      Standing-Balance Activities  Feet together;Semi-tandum  -LB      Gait Balance-Level of Assistance  Contact guard  -LB      Gait Balance Support  Right upper extremity supported;Left upper extremity supported PT in front   -LB      Gait  "Balance Activities  side-stepping 4' to R w/ R knee buckling min when going R, activity   -LB      Balance Comments  after R knee buckling minimally with sidestepping R activity stopped   -LB        User Key  (r) = Recorded By, (t) = Taken By, (c) = Cosigned By    Initials Name Provider Type    Ángela Cramer, PT Physical Therapist                  PT Assessment/Plan     Row Name 10/29/19 1630          PT Assessment    Assessment Comments  Pt reporting she has been having great difficulty withpain with swallowing and unable to eat. Pt reporting her MD stopped radiation this week. Pt frustrated with side affects from her treatments. Pt's R knee buckling minimally one time with sidestepping. Pt reporting the buckling has been more over the weekend. Pt did not demonstrate any buckling with ambulation without a device. PT stressed to pt and   the importance of using her rollator 100% with ambulation due to increased symptoms of buckling, pain medication and decreased nutrition.    -LB       User Key  (r) = Recorded By, (t) = Taken By, (c) = Cosigned By    Initials Name Provider Type    Ángela Cramer, PT Physical Therapist             OP Exercises     Row Name 10/29/19 1103             Subjective Comments    Subjective Comments  When PT ask pt if she went out with her friends for dinner Saturday pt responded, \"I couldn't it.\" \"It hurts to eat or drink.\" \"I feel like I complain all the time.\" \"I have bad reflux.\" \"Dr. Mayo stopped my radiation this week.\" \"I don't know if I should have done these treatments.\"Pt reporting her right knee has been buckling more. Pt denies any falls.  Pt's  reports she has a PET scan today.\"   -LB         Subjective Pain    Able to rate subjective pain?  yes  -LB      Subjective Pain Comment  Pt reporting pain in her throat and not able to eat or drink without pain.   -LB         Exercise 2    Exercise Name 2  bilateral plantarflexion standing on incline of ll bars with " bilateral UE support  -LB      Cueing 2  Verbal  -LB      Reps 2  15  -LB         Exercise 9    Exercise Name 9  R SAQ's each LE   -LB      Cueing 9  Verbal  -LB      Reps 9  10  -LB         Exercise 10    Exercise Name 10  bridges   -LB      Cueing 10  Verbal  -LB      Reps 10  10  -LB        User Key  (r) = Recorded By, (t) = Taken By, (c) = Cosigned By    Initials Name Provider Type    Ángela Cramer, PT Physical Therapist                          Therapy Education  Education Details: Discussed in detail pt that she was not complaining but stating to her MD's and health care providers her symptoms and to continue to do so to allow them to adjust her treatment as indicated. Advised pt to use her rollator 100% of the time due to lack of eating and taking pain medication.   Given: Symptoms/condition management, Fall prevention and home safety  How Provided: Verbal  Provided to: Patient, Caregiver  Level of Understanding: Teach back education performed, Verbalized, Demonstrated              Time Calculation:   Start Time: 1103  Stop Time: 1146  Time Calculation (min): 43 min  Total Timed Code Minutes- PT: 43 minute(s)   Therapy Charges for Today     Code Description Service Date Service Provider Modifiers Qty    25449461983 HC PT NEUROMUSC RE EDUCATION EA 15 MIN 10/29/2019 Ángela Lewis, PT GP 3                    Ángela Lewis, PT  10/29/2019

## 2019-10-31 PROBLEM — R63.8 DECREASED ORAL INTAKE: Status: ACTIVE | Noted: 2019-01-01

## 2019-10-31 NOTE — PROGRESS NOTES
Reviewed CMP with Gayla. Order for total of 1L NS today, pt to return tomorrow for CBC, stat BMP and 1L NS. Pt is aware.     Pt will increase her hycet to 10mg to help with pain control. Ensure was given for pt to try.

## 2019-10-31 NOTE — PROGRESS NOTES
Pt is here for lab with RN review. Pt C/O fatigue, acid reflux that is increased with eating and drinking.The pain is dull and shifts to the right after swallowing. Her radiation was cancelled this week due to her reflux.  CBC and Symptoms  reviewed with Gayla Emanuel NP.  Per Gayla pt will D/C Levaquin. She will receive 500 CC of fluid and have a STAT CMP drawn. CMP to be reviewed with an NP. Pt will continue to take Protonix and Hycet. She will add Crafate 1 gram TID. Script e-scribed to pt's pharmacy. Report given to Clementina AVALOS RN who is taking over care. Pt will also continue to swish and swallow her Bronwyn's Magic.   Copy of labs given to pt and f/u appt reviewed. Pt is instructed to call the office with any concerns or new symptoms prior to next visit. Pt vu.   Lab Results   Component Value Date    WBC 2.83 (L) 10/31/2019    HGB 9.0 (L) 10/31/2019    HCT 26.5 (L) 10/31/2019    MCV 90.1 10/31/2019    PLT 58 (L) 10/31/2019

## 2019-11-01 NOTE — TELEPHONE ENCOUNTER
Pt needs refill on Hycet. Review of chart shows Dr. Mayo has been prescribing this but is out of the office. Pt's doctor here is also out of the office. D/w Honey who states their doctors don't send scripts electronically but if refill routed to Dr. Akers will attempt to sign and transmit to pt pharmacy. Script routed to Dr. Akers.    Update: Dr. Akers unable to sign script. Script re-routed to Dr. Ladd (MD#[id number]) for approval.    ----- Message from Shelby Arauz sent at 11/1/2019  3:07 PM EDT -----  Contact: [phone]  [facility]   Calling to get refill on pt Pain medication she will be out before weekend.

## 2019-11-01 NOTE — TELEPHONE ENCOUNTER
----- Message from Julia Molina RN sent at 11/1/2019  1:13 PM EDT -----  Per Gayla RODRIGUEZ, please schedule 2 units PRBC transfusion tomorrow. Referral and orders placed. Thanks

## 2019-11-01 NOTE — THERAPY TREATMENT NOTE
Outpatient Physical Therapy Neuro Treatment Note  [facility]     Patient Name: Karen Pineda  : 1945  MRN: [id number]  Today's Date: 2019      Visit Date: 2019    Visit Dx:  No diagnosis found.    Patient Active Problem List   Diagnosis   • Hemoptysis   • Lung mass   • Cough   • Arthritis   • Surgery follow-up examination   • Brain metastases (CMS/HCC)   • Small cell lung cancer (CMS/HCC)   • High risk medication use   • Encounter for fitting and adjustment of vascular catheter   • Rash   • Chemotherapy-induced thrombocytopenia   • Decreased oral intake           PT Neuro     Row Name 19 1037             Subjective Comments    Subjective Comments  Pt reports that swallowing is improving minimally.  Had IV fluids yesterday and going to recieve more fluids and possible transfusion today.  Using rolllator at all times.  -KP         Precautions and Contraindications    Precautions/Limitations  fall precautions  -KP      Precautions   R knee tremor (per Andres Burt MD a motor control issue) radiation therapy M-F, chemo therapy started 19 (3 x week) then off a few weeks, pt reports she had chemo week of 19  -KP         Subjective Pain    Able to rate subjective pain?  yes  -KP      Pre-Treatment Pain Level  0  -KP      Post-Treatment Pain Level  0  -KP      Subjective Pain Comment  Pain only with swallowing - improved since yesterday.  -KP         Cognition    Overall Cognitive Status  WFL  -KP      Orientation Level  Oriented to place;Oriented to situation;Oriented to person  -KP      Safety Judgment  Decreased awareness of need for safety  -KP      Deficits  Fully aware of deficits  -KP      Comments  Agreeable to use of rollator.  Pt is frustrated re condition  -KP         Posture/Observations    Posture- WNL  Posture is WNL  -KP      Posture/Observations Comments  Pt arrived to PT with spouse, rollator.  Going to cancer treatment center following rx.  -KP          Transfers    Sit-Stand [place] (Transfers)  conditional independence;verbal cues  -KP      Stand-Sit [place] (Transfers)  conditional independence;verbal cues  -      Transfers, Sit-Stand-Sit, Assist Device  rolling walker rollator  -      Comment (Transfers)  VC for hand placement to sit and stand  -         Gait/Stairs Assessment/Training    [place] Level (Gait)  contact guard;stand by assist  -      Assistive Device (Gait)  walker, 4-wheeled  -      Distance in Feet (Gait)  150, 120, 90  -KP      Pattern (Gait)  step-through  -      Deviations/Abnormal Patterns (Gait)  -- none noted  -      Right Sided Gait Deviations  -- Pt reports R knee is intermittently unstable.  -      Comment (Gait/Stairs)  No buckling noted but pt reports it feels unstable.  -         Balance Skills Training    Standing-Level of Assistance  Contact guard  -      Static Standing Balance Support  No upper extremity supported  -      Standing-Balance Activities  Feet together;Semi-tandum;Compliant surfaces standing with upper trunk rot holding balloon.  -      Gait Balance-Level of Assistance  Contact guard  -      Gait Balance Support  neli bar;Left upper extremity supported;Right upper extremity supported intermittent  -      Gait Balance Activities  side-stepping  -      Balance Comments  no buckling noted , balance on foam mat, eyes open, closed  -        User Key  (r) = Recorded By, (t) = Taken By, (c) = Cosigned By    Initials Name Provider Type     Licha Calderon, PT Physical Therapist                  PT Assessment/Plan     Row Name 11/01/19 1781          PT Assessment    Functional Limitations  Performance in self-care ADL;Performance in leisure activities;Decreased safety during functional activities  -     Impairments  Balance;Gait;Muscle strength  -     Assessment Comments  Pt continues to be very frustrated re set back with swallowing.  Felt better after IV fluids yesterday  "and reports she continues to feel unstable in R LE.  Encouraged pt to be patient as  tries to set most effective rx plan for her.  -KP       User Key  (r) = Recorded By, (t) = Taken By, (c) = Cosigned By    Initials Name Provider Type    Licha Gotti PT Physical Therapist             OP Exercises     Row Name 11/01/19 1100 11/01/19 1037          Subjective Comments    Subjective Comments  --  Pt reports that swallowing is improving minimally.  Had IV fluids yesterday and going to recieve more fluids and possible transfusion today.  Using rolllator at all times.  -KP        Subjective Pain    Able to rate subjective pain?  --  yes  -KP     Pre-Treatment Pain Level  --  0  -KP     Post-Treatment Pain Level  --  0  -KP     Subjective Pain Comment  --  Pain only with swallowing - improved since yesterday.  -KP        Exercise 1    Exercise Name 1  knee flexion in standing at neli bars   -KP  --     Reps 1  10  -KP  --        Exercise 2    Exercise Name 2  bilateral plantarflexion standing with bilateral UE support  -  --        Exercise 5    Exercise Name 5  sidestepping with light UE support   -  --     Cueing 5  Verbal  -KP  --        Exercise 6    Exercise Name 6  reciprocal stepping onto 6\" step   -KP  --     Cueing 6  Verbal;Tactile  -KP  --     Reps 6  10  -KP  --        Exercise 11    Exercise Name 11  mini squats  -KP  --     Cueing 11  Verbal;Tactile;Demo  -KP  --     Reps 11  10  -KP  --     Additional Comments  with rollator positioned behind  -KP  --        Exercise 12    Exercise Name 12  Nu step, LE only, L1  -KP  --     Time 12  5  -KP  --       User Key  (r) = Recorded By, (t) = Taken By, (c) = Cosigned By    Initials Name Provider Type    Licha Gotti PT Physical Therapist                          Therapy Education  Education Details: Encouraged pt that infusions, IV fluids will help.  Encouraged to resume eating as tolerated.  Given: Symptoms/condition management, Fall prevention " and home safety  Program: New  How Provided: Verbal  Provided to: Patient, Caregiver  Level of Understanding: Teach back education performed, Verbalized, Demonstrated              Time Calculation:   Start Time: 0955  Stop Time: 1045  Time Calculation (min): 50 min  PT Non-Billable Time (min): 10 min   Therapy Charges for Today     Code Description Service Date Service Provider Modifiers Qty    24395427058 HC PT NEUROMUSC RE EDUCATION EA 15 MIN 11/1/2019 Licha Calderon, PT GP 3                    Licha Calderon, PT  11/1/2019

## 2019-11-01 NOTE — PROGRESS NOTES
CBC and BMP results reviewed with pt and Gayla RODRIGUEZ. Creat improved to 1.3. HGB 7.8, PLT 66, ANC 1.44. Pt c/o weakness, SOA with exertion, dizziness and feeling lightheaded. Denies nausea or vomiting. Pt states pain with swallowing has improved and denies pain currently. Denies fever, chills, or bleeding. Per Gayla, pt to receive 1 liter NS today and 2 units PRBC ordered. Pt informed and v/u. Type and cross match drawn and bracelet placed on pt. Instructed her not to remove until after transfusion. Scheduled tomorrow at 930am at 3P. Pt informed and v/u.

## 2019-11-01 NOTE — PROGRESS NOTES
Physician Weekly Management Note    Diagnosis:     Diagnosis Plan   1. Small cell lung cancer (CMS/HCC)         RT Details:  Treatment #26/30     Concurrent chemotherapy    Notes on Treatment course, Films, Medical progress:  Significant esophageal symptoms.  Refill Hycet.  Continue [place] resume Carafate.  5-day treatment rest.  Return on Monday.    Weekly Management:  Medication reviewed?   Yes  New medications given?   No  Problemlist reviewed?   Yes  Problem added?   No       Technical aspects reviewed:  Weekly OBI approved?   Yes  Weekly port films approved?   Yes  Change requests noted on port film?   No  Patient setup and plan reviewed?   Yes    Chart Reviewed:  Continue current treatment plan?   Yes  Treatment plan change requested?   No  CBC reviewed?   Yes  Concurrent Chemo?   Yes    Objective     Toxicities:   As above     Review of Systems   As above    Vitals:    10/28/19 1514   BP: 136/80   Pulse: 81   SpO2: 95%       Current Status 10/14/2019   ECOG score 1       Physical Exam  As above      Problem Summary List    Diagnosis:     Diagnosis Plan   1. Small cell lung cancer (CMS/HCC)       Pathology:       Past Medical History:   Diagnosis Date   • Anxiety    • Arthritis    • Brain metastases (CMS/HCC) 9/18/2019   • Depression    • GERD (gastroesophageal reflux disease)    • H/O Pulmonary nodule    • Hemoptysis    • History of osteopenia    • Hyperlipidemia    • Nodule of right lung    • Post-menopause    • Sinus problem    • Stress incontinence          Past Surgical History:   Procedure Laterality Date   • BREAST BIOPSY Right    • BRONCHOSCOPY N/A 4/21/2016    Procedure: BRONCHOSCOPY WITH ENDOBRONCHIAL ULTRASOUND, NAVIGATION, BRUSHING,BIOPSIES, LAVAGE, AND TRANSBRONCHIAL NEEDLE ASPIRATION;  Surgeon: Js Lewis MD;  Location: [facility] ENDOSCOPY;  Service:    • COLONOSCOPY     • CRANIOTOMY FOR TUMOR Left 8/28/2019    Procedure: Left frontal craniotomy for tumor;  Surgeon: Ruben Thibodeaux MD;   Location: [facility] MAIN OR;  Service: Neurosurgery   • VENOUS ACCESS DEVICE (PORT) INSERTION Right 9/6/2019    Procedure: INSERTION VENOUS ACCESS DEVICE;  Surgeon: Saroj Chacko III, MD;  Location: [facility] OR;  Service: Thoracic         Current Outpatient Medications on File Prior to Visit   Medication Sig Dispense Refill   • aluminum-magnesium hydroxide 200-200 MG/5ML suspension, diphenhydrAMINE 12.5 MG/5ML liquid, Lidocaine Viscous HCl 2 % solution, nystatin 479540 UNIT/ML suspension Swish and swallow 10 mL 4 (Four) Times a Day Before Meals & at Bedtime. 400 mL 2   • FLUoxetine (PROzac) 20 MG capsule Take 20 mg by mouth Daily.     • HYDROcodone-acetaminophen (HYCET) 7.5-325 MG/15ML solution Take 15 mL by mouth Every 6 (Six) Hours As Needed for Moderate Pain . 250 mL 0   • levETIRAcetam (KEPPRA) 500 MG tablet Take 1 tablet by mouth Every 12 (Twelve) Hours. 60 tablet 2   • lidocaine-prilocaine (EMLA) 2.5-2.5 % cream Apply  topically to the appropriate area as directed As Needed for Mild Pain . 1 each 1   • losartan (COZAAR) 50 MG tablet Take 50 mg by mouth Daily.  1   • pantoprazole (PROTONIX) 40 MG EC tablet Take 1 tablet by mouth Daily. 30 tablet 1   • predniSONE (DELTASONE) 10 MG tablet Take 3 tablets by mouth Daily. (Patient taking differently: Take 20 mg by mouth Daily.) 60 tablet 0   • triamcinolone (KENALOG) 0.1 % ointment Apply  topically to the appropriate area as directed 3 (Three) Times a Day. 30 g 0     No current facility-administered medications on file prior to visit.        Allergies   Allergen Reactions   • Sulfa Antibiotics Rash         Primary care MD:    Heide Wellington MD    Oncologist:   Nelson Katz MD    Seen and approved by:  Nicolas Mayo MD  10/28/2019

## 2019-11-04 NOTE — PROGRESS NOTES
Pt to treatment room for possible chemo  Per Dr Katz's note, no chemo today , pt will receive IVF's 1 liter daily this week.

## 2019-11-04 NOTE — TELEPHONE ENCOUNTER
----- Message from Shelby Arauz sent at 11/4/2019  4:27 PM EST -----  Contact: [phone]  [facility]  Calling about liquid Morphine.  Wanted to let us know will order liquid morphine but takes 2 days to get in . In basket message to Dr Katz.

## 2019-11-05 PROBLEM — K20.80 RADIATION ESOPHAGITIS: Status: ACTIVE | Noted: 2019-01-01

## 2019-11-05 PROBLEM — T66.XXXA RADIATION ESOPHAGITIS: Status: ACTIVE | Noted: 2019-01-01

## 2019-11-05 NOTE — THERAPY TREATMENT NOTE
Outpatient Physical Therapy Neuro Treatment Note  [facility]     Patient Name: Karen Pineda  : 1945  MRN: [id number]  Today's Date: 2019      Visit Date: 2019    Visit Dx:  No diagnosis found.    Patient Active Problem List   Diagnosis   • Hemoptysis   • Lung mass   • Cough   • Arthritis   • Surgery follow-up examination   • Brain metastases (CMS/HCC)   • Small cell lung cancer (CMS/HCC)   • High risk medication use   • Encounter for fitting and adjustment of vascular catheter   • Rash   • Chemotherapy-induced thrombocytopenia   • Decreased oral intake   • Radiation esophagitis           PT Neuro     Row Name 19 1141             Subjective Comments    Subjective Comments  Pt denies improvement with swallowing and pain jazmine have had not effect on pain.  Pt received fluids daily since last visit and transfusion of 2 units of Blood.  Now reports feeling much better, no incidence of R LE instability.  Has not bee using rwx.  Other than throat pain, feels significantly stronger and more confident with mobility.  -KP         Precautions and Contraindications    Precautions/Limitations  fall precautions  -KP      Precautions   R knee tremor (per Andres Burt MD a motor control issue) radiation therapy M-F, Pt cancer rx is on hold last week and this week with pt receiving IV fluids daily.  -KP         Subjective Pain    Able to rate subjective pain?  yes  -KP      Pre-Treatment Pain Level  0  -KP      Post-Treatment Pain Level  0  -KP      Subjective Pain Comment  Pain only with swallowing.  -KP         Cognition    Overall Cognitive Status  WFL  -KP      Orientation Level  Oriented to place;Oriented to situation;Oriented to person  -KP      Safety Judgment  Decreased awareness of need for safety  -KP      Deficits  Fully aware of deficits  -KP      Comments  Pt encouraged to speak with MD re throat.  -KP         Posture/Observations    Posture- WNL  Posture is WNL  -KP       Posture/Observations Comments  Pt arrived to PT with spouse, no AD.  -KP         Bed Mobility Assessment/Treatment    Rolling Left [place] (Bed Mobility)  independent  -KP      Rolling Right [place] (Bed Mobility)  independent  -KP      Supine-Sit [place] (Bed Mobility)  independent  -KP      Sit-Supine [place] (Bed Mobility)  independent  -KP         Transfers    Sit-Stand [place] (Transfers)  conditional independence;verbal cues  -KP      Stand-Sit [place] (Transfers)  conditional independence;verbal cues  -KP      Transfers, Sit-Stand-Sit, Assist Device  -- no AD  -KP      Transfer, Safety Issues  sequencing ability decreased  -KP         Gait/Stairs Assessment/Training    [place] Level (Gait)  supervision;stand by assist  -      Assistive Device (Gait)  -- no AD  -KP      Distance in Feet (Gait)  175, 90 x 2  -KP      Pattern (Gait)  step-through  -KP      Deviations/Abnormal Patterns (Gait)  -- Occ narrow MICHAEL.  -KP      Right Sided Gait Deviations  -- No c/o R knee instability.  -      [place] Level (Stairs)  contact guard;stand by assist  -      Handrail Location (Stairs)  left side (ascending)  -      Number of Steps (Stairs)  4  -KP      Ascending Technique (Stairs)  step-over-step  -KP      Descending Technique (Stairs)  step-over-step  -KP      Stairs, Safety Issues  balance decreased during turns  -KP      Stairs, Impairments  strength decreased  -KP      Comment (Gait/Stairs)  Pt reports dc R knee feels more stable sonce transfusion.  Has felt confident without the rollator and has not used.  Pt verbalizes fear of falling and reports that she will use rollator if R knee begins to feel weak again.  -         Balance Skills Training    Standing-Level of Assistance  Contact guard  -      Static Standing Balance Support  No upper extremity supported  -      Standing-Balance Activities  Trampoline;Feet together;Semi-tandum MIP, heel raises, upper trunk  rot, eyes closed  -      Standing Balance # of Minutes  7  -      Gait Balance-Level of Assistance  Contact guard;Close supervision  -      Gait Balance Support  No upper extremity supported  -      Gait Balance Activities  -- almita changes, looking up/sideways, stop and turn  -      Balance Comments  No buckling noted.  -        User Key  (r) = Recorded By, (t) = Taken By, (c) = Cosigned By    Initials Name Provider Type    Licha Gotti PT Physical Therapist                  PT Assessment/Plan     Row Name 11/05/19 1246          PT Assessment    Functional Limitations  Performance in self-care ADL;Performance in leisure activities;Decreased safety during functional activities  -     Impairments  Balance;Gait;Muscle strength  -     Assessment Comments  Pt reports more confidence with functional mobility over the last few days.  Denies R LE tremors x 3 days.  Note improved quality of gait, trnasfers and stairs today.  Encoraged pt to continue to follow up with MD re difficulty swallowing.  -       User Key  (r) = Recorded By, (t) = Taken By, (c) = Cosigned By    Initials Name Provider Type    Licha Gotti PT Physical Therapist             OP Exercises     Row Name 11/05/19 1141             Subjective Comments    Subjective Comments  Pt denies improvement with swallowing and pain jazmine have had not effect on pain.  Pt received fluids daily since last visit and transfusion of 2 units of Blood.  Now reports feeling much better, no incidence of R LE instability.  Has not bee using rwx.  Other than throat pain, feels significantly stronger and more confident with mobility.  -         Subjective Pain    Able to rate subjective pain?  yes  -      Pre-Treatment Pain Level  0  -      Post-Treatment Pain Level  0  -      Subjective Pain Comment  Pain only with swallowing.  -         Exercise 2    Exercise Name 2  bilateral plantarflexion standing with fingertip bilateral UE support   "-KP      Cueing 2  Verbal;Demo  -KP      Reps 2  15  -KP         Exercise 6    Exercise Name 6  reciprocal stepping onto 4\" step   -KP      Cueing 6  Verbal;Tactile  -KP      Reps 6  10  -KP         Exercise 10    Exercise Name 10  bridges with feet on swiss ball  -KP      Cueing 10  Verbal  -KP      Sets 10  2  -KP      Reps 10  10  -KP         Exercise 11    Exercise Name 11  mini squats  -KP      Cueing 11  Verbal;Tactile  -KP      Reps 11  10  -KP      Additional Comments  seat behind for safety  -KP         Exercise 12    Exercise Name 12  Nu step, LE only, L2  -KP      Time 12  5  -KP        User Key  (r) = Recorded By, (t) = Taken By, (c) = Cosigned By    Initials Name Provider Type    Licha Gotti, PT Physical Therapist                          Therapy Education  Education Details: Reviewed safety and use of AD for longer days out or if minimal fatigue noted.  Given: Symptoms/condition management, Fall prevention and home safety  Program: Reinforced  How Provided: Verbal  Provided to: Patient, Caregiver  Level of Understanding: Teach back education performed, Verbalized, Demonstrated              Time Calculation:   Start Time: 1100  Stop Time: 1147  Time Calculation (min): 47 min   Therapy Charges for Today     Code Description Service Date Service Provider Modifiers Qty    [id number] HC PT NEUROMUSC RE EDUCATION EA 15 MIN 11/5/2019 Licha Calderon, PT GP 3                    Licha Calderon, PT  11/5/2019     "

## 2019-11-06 NOTE — TELEPHONE ENCOUNTER
Per Faith, call pt's pharmacy and give OK to order the liquid morphine. S/W pt's pharmacist and informed her OK to fill morphine. She v/u.

## 2019-11-06 NOTE — PROGRESS NOTES
REASONS FOR FOLLOWUP: Extensive stage small cell lung cancer    HISTORY OF PRESENT ILLNESS:  The patient is a 74 y.o. year old female who is here for follow-up with the above-mentioned history.    She was just seen on Monday by Dr. code, not doing well with significant radiation esophagitis continuing and getting no relief from hydrocodone prescribed by Dr. Mayo.  Radiation therapy has been on hold both last week and continuing and that this week.  She was given IV fluids on Monday and also given a prescription for Roxanol and we requested she return today for close monitoring.    Thankfully she seems to have turned a corner.  She states she was unable to fill the liquid morphine prescribed to her by Dr. Katz as it seems the hydrocodone had also been refilled per Dr. Mayo's office and the pharmacy was unsure which one they were to fill.  That being said the patient states she has had great improvement in her pain just by staying off of radiation.  She is eating and drinking better now.  Her chemistries are unremarkable.  Weight is stable.    She is obviously struggling with whether to continue therapy considering how poorly she has felt.  She is wondering what would happen if she just stop therapy altogether at this point.  I have encouraged her that she has come this far and I do think that she will continue to improve in the coming days and should be able to finish out the final week of radiation next week with minimal worsening of esophagitis hopefully considering her extended break over the last 2 weeks.  As to whether she will be able to receive a fourth cycle of chemotherapy is yet to be determined.    Otherwise she and her  deny any further concerns today.    Past Medical History:   Diagnosis Date   • Anxiety    • Arthritis    • Brain metastases (CMS/HCC) 9/18/2019   • Depression    • GERD (gastroesophageal reflux disease)    • H/O Pulmonary nodule    • Hemoptysis    • History of osteopenia     • Hyperlipidemia    • Nodule of right lung    • Post-menopause    • Sinus problem    • Stress incontinence      Past Surgical History:   Procedure Laterality Date   • BREAST BIOPSY Right    • BRONCHOSCOPY N/A 4/21/2016    Procedure: BRONCHOSCOPY WITH ENDOBRONCHIAL ULTRASOUND, NAVIGATION, BRUSHING,BIOPSIES, LAVAGE, AND TRANSBRONCHIAL NEEDLE ASPIRATION;  Surgeon: Js Lewis MD;  Location: [facility] ENDOSCOPY;  Service:    • COLONOSCOPY     • CRANIOTOMY FOR TUMOR Left 8/28/2019    Procedure: Left frontal craniotomy for tumor;  Surgeon: Ruben Thibodeaux MD;  Location: [facility] MAIN OR;  Service: Neurosurgery   • VENOUS ACCESS DEVICE (PORT) INSERTION Right 9/6/2019    Procedure: INSERTION VENOUS ACCESS DEVICE;  Surgeon: Saroj Chacko III, MD;  Location: [facility] MAIN OR;  Service: Thoracic       MEDICATIONS    Current Outpatient Medications:   •  aluminum-magnesium hydroxide 200-200 MG/5ML suspension, diphenhydrAMINE 12.5 MG/5ML liquid, Lidocaine Viscous HCl 2 % solution, nystatin 907675 UNIT/ML suspension, Swish and swallow 10 mL 4 (Four) Times a Day Before Meals & at Bedtime., Disp: 400 mL, Rfl: 2  •  FLUoxetine (PROzac) 20 MG capsule, Take 20 mg by mouth Daily., Disp: , Rfl:   •  HYDROcodone-acetaminophen (HYCET) 7.5-325 MG/15ML solution, Take 20 mL by mouth Every 6 (Six) Hours As Needed for Moderate Pain ., Disp: 250 mL, Rfl: 0  •  levETIRAcetam (KEPPRA) 500 MG tablet, Take 1 tablet by mouth Every 12 (Twelve) Hours., Disp: 60 tablet, Rfl: 2  •  lidocaine-prilocaine (EMLA) 2.5-2.5 % cream, Apply  topically to the appropriate area as directed As Needed for Mild Pain ., Disp: 1 each, Rfl: 1  •  losartan (COZAAR) 50 MG tablet, Take 50 mg by mouth Daily., Disp: , Rfl: 1  •  morphine 100 MG/5ML solution concentrated solution, Take 0.75 mL by mouth Every 4 (Four) Hours As Needed (as needed for pain)., Disp: 60 mL, Rfl: 0  •  pantoprazole (PROTONIX) 40 MG EC tablet, Take 1 tablet by mouth Daily., Disp: 30 tablet,  Rfl: 1  •  sucralfate (CARAFATE) 1 g tablet, Take 1 tablet by mouth 3 (Three) Times a Day With Meals., Disp: 120 tablet, Rfl: 0  •  triamcinolone (KENALOG) 0.1 % ointment, Apply  topically to the appropriate area as directed 3 (Three) Times a Day., Disp: 30 g, Rfl: 0  No current facility-administered medications for this visit.     ALLERGIES:     Allergies   Allergen Reactions   • Sulfa Antibiotics Rash       SOCIAL HISTORY:       Social History     Socioeconomic History   • Marital status:      Spouse name: Juan   • Number of children: Not on file   • Years of education: Not on file   • Highest education level: Not on file   Occupational History     Employer: RETIRED   Tobacco Use   • Smoking status: Former Smoker     Packs/day: 1.00     Years: 50.00     Pack years: 50.00     Last attempt to quit:      Years since quittin.8   • Smokeless tobacco: Never Used   Substance and Sexual Activity   • Alcohol use: No   • Drug use: No   • Sexual activity: Defer         FAMILY HISTORY:  Family History   Problem Relation Age of Onset   • Heart failure Mother    • Coronary artery disease Mother    • Depression Mother    • Heart disease Mother    • Leukemia Father    • Bone cancer Father    • Cancer Paternal Aunt    • Cancer Paternal Uncle        REVIEW OF SYSTEMS:  Review of Systems   Constitutional: Negative for activity change.   HENT: Negative for nosebleeds and trouble swallowing.    Respiratory: Negative for shortness of breath and wheezing.    Cardiovascular: Negative for chest pain and palpitations.   Gastrointestinal: Negative for constipation, diarrhea and nausea.   Genitourinary: Negative for dysuria and hematuria.   Musculoskeletal: Negative for arthralgias and myalgias.   Skin: Negative for rash and wound.   Neurological: Negative for seizures and syncope.   Hematological: Negative for adenopathy. Does not bruise/bleed easily.   Psychiatric/Behavioral: Negative for confusion.          Vitals:     "11/06/19 1143   BP: 130/77   Pulse: 83   Resp: 18   Temp: 97.7 °F (36.5 °C)   SpO2: 98%   Weight: 84.3 kg (185 lb 14.4 oz)   Height: 162.6 cm (64.02\")   PainSc: 0-No pain     Current Status 11/6/2019   ECOG score 0       PHYSICAL EXAM:    CONSTITUTIONAL:  Vital signs reviewed.  No distress, looks comfortable.  EYES:  Conjunctiva and lids unremarkable.  PERRLA  EARS,NOSE,MOUTH,THROAT:  Ears and nose appear unremarkable.  Lips, teeth, gums appear unremarkable.  RESPIRATORY:  Normal respiratory effort.  Lungs clear to auscultation bilaterally.  CARDIOVASCULAR:  Normal S1, S2.  No murmurs rubs or gallops.  No significant lower extremity edema.  GASTROINTESTINAL: Abdomen appears unremarkable.  Nontender.  No hepatomegaly.  No splenomegaly.  LYMPHATIC:  No cervical, supraclavicular, axillary lymphadenopathy.  SKIN:  Warm.  No rashes.  PSYCHIATRIC:  Normal judgment and insight.  Normal mood and affect.      RECENT LABS:        WBC   Date/Time Value Ref Range Status   11/06/2019 11:28 AM 4.16 3.40 - 10.80 10*3/mm3 Final     Hemoglobin   Date/Time Value Ref Range Status   11/06/2019 11:28 AM 11.2 (L) 12.0 - 15.9 g/dL Final     Platelets   Date/Time Value Ref Range Status   11/06/2019 11:28  (L) 140 - 450 10*3/mm3 Final       Assessment/Plan   There are no diagnoses linked to this encounter.  *Extensive stage small cell lung cancer, RUL, 2.4 x 2.3 x 1.7cm, with metastasis to 3 brain lesions (bilateral frontal lobes.)  · RUL mass first seen on CT 3/28/2016, 2 x 1.3 cm.  Decreased to 1.6 cm on 6/27/2016, and decreased again to 1.3 cm on 12/5/2016.  Therefore, this was felt at the time to be a benign resolving nodule.  · During August 2019 hospitalization, found to have brain metastasis from small cell lung cancer.  RUL mass and 3 brain lesions.  No evidence of disease otherwise  · Craniotomy for the medial left frontal lobe lesion (3.2 cm) , 8/28/2019  · Because the final diagnosis is small cell, Dr. Chacko does not plan " lung resection as we typically treat this with chemoradiation  · SRS to 2 remaining brain lesions (first was resected) and to resection cavity of the first lesion, completed 9/18/2019.  Dr. Mayo only planning whole brain XRT if future brain recurrence.  · Usually with small cell I would like to start chemo XRT within a couple of weeks.  However, she is recovering from brain surgery.  Furthermore, this lung lesion has been there for years.  Therefore, weighing risks and benefits I think it is in her best interest to wait almost 4 weeks after craniotomy to begin chemo and radiation.  · PET 9/16/2019: 2.5 cm RUL mass with SUV 15.8.  No hypermetabolic LAD or distant disease.  · On 9/18/2019 completed 5/5 XRT to brain metastasis resection cavity and 1/1 XRT intact brain metastasis #1 and 1/1 XRT due to intact brain metastasis #2.  · 9/19/2019, C1D1 lung mass XRT with  carboplatin, etoposide, Tecentriq, followed by Tecentriq maintenance for extensive stage small cell lung cancer.  Plan a total of 4-6 cycles of chemo depending on tolerability.  · On 10/4/2019, prednisone 0.5 mg/kg/day (40 mg) started due to significant rash from Tecentriq.  4 days later, rash improved but did not resolve.   ·  Plan no more Tecentriq.    · Prednisone tapered off 10/28/2019.  · C2D1 (without Tecentriq) given 10/14/2019.  · MRI brain 10/8/2019 reviewed by Dr. Thibodeaux-he feels this is stable and plan another MRI and appointment with him in 3 months  · Lung mass XRT held the week of 10/28/2019, and again the week of 11/4/2019 due to radiation esophagitis  · CT CAP 10/29/2019 (after C2): RUL lung mass 2 x 1.3 cm, from 2.5 x 2.5 cm.  · Did not give C3 chemo as planned 11/4/2019 because PLT 91.  When chemo is resumed, 20% dose reduction of both drugs.    *Goals of care.  · Since the 3 brain lesions were treated with stereotactic radiation and resection of 1 of the lesions and since no disease was found elsewhere, she perhaps has a very slim but  possible chance of cure.  However, small cell histology makes the possibility of long-term survival unlikely.  · On 11/4/2019, she was reminded that her disease is highly unlikely curable.  She was having significant issues with radiation esophagitis, limiting nutrition, requiring daily IV fluids.  Radiation will be held an additional week.  She has been miserable and hopefully another week of XRT and a week delay of chemo (PLT <100), and dose reduction of chemo will make further treatment reasonable regarding the importance she is placing on quality of life.     *Neurological deficits due to brain metastasis:  · Some mild confusion and right-sided weakness and urinary incontinence x2 weeks prompted ER visit 8/24/2019.     *Tinnitus and decreased hearing.  Not a cisplatin candidate.    *Port placed 9/6/2019    *Neutropenia due to chemotherapy.  We have not been using Neulasta due to XRT therapy.    *Thrombocytopenia due to chemotherapy  Because PLT only 91 on 11/4/2019, delay therapy by 1 week.    Platelet count currently improving at 125,000.    *Radiation esophagitis with significant pain and dehydration  · Patient initially prescribed hydrocodone however this was ineffective.  · Given a prescription for Roxanol on 11/4/2019.  She tells me today that the pharmacy did not fill this as she had a refill of hydrocodone also sent in and the pharmacy was not sure what to fill.  I have asked our triage nurses to call and rectify the situation making sure that the morphine does get filled.  That being said her pain is much better today with continued holding of radiation.  I do want her to have this Roxanol just in case she develops recurrent esophagitis symptoms when she resumes radiation next week.  · Patient received 1 L of normal saline on Monday, 11/4/2019.  Chemistries look good today and she is eating and drinking well.  I do not think she needs further IV fluids this week.     *This is Ángela Pryor's mother-in-law  (APRN with Dr. Thibodeaux)     Plan  · Patient to return on Monday, 11/11/2019 for follow-up with nurse practitioner and to receive delayed cycle 3 of carboplatin/etoposide.  Plan 20% dose reduction per Dr. echeverria because of thrombocytopenia.  · No more Tecentriq.  Carboplatin and etoposide only.  · Patient should complete radiation therapy as of 11/15/2019.  · Patient will also be scheduled tentatively to see Dr. code 12/2/2019 with cycle 4 of carboplatin/etoposide.  Can add Neulasta at that point as radiation will be completed.  · Follow-up MRI brain scheduled 12/26/2019 and Dr. Thibodeaux appointment scheduled 12/31/2019     assisted with history.

## 2019-11-08 NOTE — PROGRESS NOTES
rec from pts -he was stating that Dr Katz was supposed to change one medication to Morphine but the pharmacy didn't have it. He states that the nurse practitioner was supposed to send it but he states pharmacy still does not have it. I see that Angelic LIZAMA, RN actually spoke with the pharmacist to clarify the morphine rx. I contacted [facility] [phone] birgit spoke to Luz. She states that the medication came in the order today and she will get it filled for pickup today.  I called pt and notified him of the above.

## 2019-11-08 NOTE — THERAPY TREATMENT NOTE
"    Outpatient Physical Therapy Neuro Treatment Note  [facility]     Patient Name: Karen Pineda  : 1945  MRN: [id number]  Today's Date: 2019      Visit Date: 2019    Visit Dx:    ICD-10-CM ICD-9-CM   1. Small cell lung cancer (CMS/HCC) C34.90 162.9   2. Status post craniotomy Z98.890 V45.89   3. Brain tumor (CMS/HCC) D49.6 239.6   4. Hemiparesis of right dominant side due to non-cerebrovascular etiology (CMS/HCC) G81.91 342.91   5. Functional gait abnormality R26.89 781.2       Patient Active Problem List   Diagnosis   • Hemoptysis   • Lung mass   • Cough   • Arthritis   • Surgery follow-up examination   • Brain metastases (CMS/HCC)   • Small cell lung cancer (CMS/HCC)   • High risk medication use   • Encounter for fitting and adjustment of vascular catheter   • Rash   • Chemotherapy-induced thrombocytopenia   • Decreased oral intake   • Radiation esophagitis           PT Neuro     Row Name 19 1015             Subjective Comments    Subjective Comments  \"Doing good..\" When ask if any right knee buckling pt reported, \"No.\" Pt denied any pain. \"I swallowing better.\" \"I start chemo and radiation again next week.\"  -LB         Precautions and Contraindications    Precautions/Limitations  fall precautions  -LB      Precautions   R knee tremor (per Andres Burt MD a motor control issue) radiation therapy M-F, Pt cancer rx is on hold last week and this week with pt receiving IV fluids daily.  -LB         Subjective Pain    Able to rate subjective pain?  yes  -LB      Pre-Treatment Pain Level  0  -LB      Post-Treatment Pain Level  0  -LB         Cognition    Overall Cognitive Status  WFL  -LB      Orientation Level  Oriented X4  -LB      Safety Judgment  Good awareness of safety precautions  -LB      Deficits  Fully aware of deficits  -LB      Comments  Pt agreed if any trmors or buckling of her right knee occurred she would use her rollator.   -LB         Posture/Observations    " Posture- WNL  Posture is WNL  -LB      Posture/Observations Comments  Pt arrived to PT with spouse, no AD.  -LB         MMT Right Lower Ext    Rt Hip Flexion MMT, Gross Movement  (5/5) normal  -LB      Rt Hip ABduction MMT, Gross Movement  (4+/5) good plus  -LB      Rt Knee Extension MMT, Gross Movement  (5/5) normal  -LB      Rt Knee Flexion MMT, Gross Movement  -- moderate resistance in sidelying and sitting   -LB      Rt Ankle Plantarflexion MMT, Gross Movement  (4/5) good  -LB      Rt Ankle Dorsiflexion MMT, Gross Movement  (5/5) normal  -LB      Rt Ankle Subtalar Inversion MT, Gross Movement  (5/5) normal  -LB      Rt Ankle Subtalar Eversion MMT, Gross Movement  (4+/5) good plus  -LB      Rt Lower Extremity Comments   Pt was not positioned prone.  -LB         Bed Mobility Assessment/Treatment    Rolling Left Letcher (Bed Mobility)  independent  -LB      Rolling Right Letcher (Bed Mobility)  independent  -LB      Supine-Sit Letcher (Bed Mobility)  independent  -LB      Sit-Supine Letcher (Bed Mobility)  independent  -LB         Transfers    Sit-Stand Letcher (Transfers)  conditional independence light use of UE's  -LB      Stand-Sit Letcher (Transfers)  conditional independence light use of UE's  -LB      Transfers, Sit-Stand-Sit, Assist Device  -- no device  -LB      Transfer, Safety Issues  sequencing ability decreased  -LB         Gait/Stairs Assessment/Training    Letcher Level (Gait)  independent  -LB      Assistive Device (Gait)  -- no device  -LB      Distance in Feet (Gait)  200' x2 with multiple turns with no loss of balance  -LB      Letcher Level (Stairs)  stand by assist;contact guard  -LB      Handrail Location (Stairs)  none  -LB      Number of Steps (Stairs)  4  -LB      Ascending Technique (Stairs)  step-over-step  -LB      Descending Technique (Stairs)  step-over-step  -LB      Stairs, Impairments  strength decreased  -LB      Comment (Gait/Stairs)  Pt  "demonstrated good balance with stairs without a rail. However, PT strongly advised pt to always use a rail.   -LB         Balance Skills Training    Standing-Level of Assistance  Contact guard  -LB      Static Standing Balance Support  No upper extremity supported  -LB      Standing-Balance Activities  Semi-tandum while raising and lowering a ball in all planes  -LB      Gait Balance-Level of Assistance  Minimum assistance  -LB      Gait Balance Support  Right upper extremity supported;Left upper extremity supported PT in front providing light UE support  -LB      Gait Balance Activities  grapevine  -LB      Balance Comments  Pt performed sidestepping with SBA with good weight shift and no tremors or right knee buckling.   -LB        User Key  (r) = Recorded By, (t) = Taken By, (c) = Cosigned By    Initials Name Provider Type    Ángela Cramer, PT Physical Therapist                  PT Assessment/Plan     Row Name 11/08/19 1251 11/08/19 1232       PT Assessment    Assessment Comments  Pt arrived to PT with ambulating without a device and reporting no issues with her right knee and no balance issues, Pt reporting she is feeling good and will start radiation and chemotherapy next week. Pt tolerated all LE strengthening activities and balance activities without any episodes of right knee tremor or buckling. Pt agrees with reducing PT to 1 x week.   -LB  --       PT Plan    PT Frequency  --  1x/week  -LB    Predicted Duration of Therapy Intervention (Therapy Eval)  --  6 weeks  -LB      User Key  (r) = Recorded By, (t) = Taken By, (c) = Cosigned By    Initials Name Provider Type    Ángela Cramer PT Physical Therapist             OP Exercises     Row Name 11/08/19 1015             Subjective Comments    Subjective Comments  \"Doing good..\" When ask if any right knee buckling pt reported, \"No.\" Pt denied any pain. \"I swallowing better.\" \"I start chemo and radiation again next week.\"  -LB         Subjective Pain    " Able to rate subjective pain?  yes  -LB      Pre-Treatment Pain Level  0  -LB      Post-Treatment Pain Level  0  -LB         Exercise 1    Exercise Name 1  knee flexion in standing at neli bars   -LB      Cueing 1  Verbal  -LB      Reps 1  10 each LE   -LB         Exercise 5    Exercise Name 5  sidestepping with knees flexed ~ 30 degrees with light UE support   -LB         Exercise 12    Exercise Name 12  Nu step, LE only, Resistance 3  -LB      Time 12  5:30  -LB        User Key  (r) = Recorded By, (t) = Taken By, (c) = Cosigned By    Initials Name Provider Type    LB Ángela Lewis, PT Physical Therapist                          Therapy Education  Education Details: Discussed with pt reducing PT to 1 x weeks since she was progressing well.   Given: Other (comment)  How Provided: Verbal  Provided to: Patient, Caregiver  Level of Understanding: Verbalized              Time Calculation:   Start Time: 1015  Stop Time: 1100  Time Calculation (min): 45 min  Total Timed Code Minutes- PT: 45 minute(s)   Therapy Charges for Today     Code Description Service Date Service Provider Modifiers Qty    [id number] HC PT NEUROMUSC RE EDUCATION EA 15 MIN 11/8/2019 Ángela Lewis, PT GP 3                    Ángela Lewis, PT  11/8/2019

## 2019-11-11 NOTE — PROGRESS NOTES
REASONS FOR FOLLOWUP: Extensive stage small cell lung cancer    HISTORY OF PRESENT ILLNESS:  The patient is a 74 y.o. year old female with the above-mentioned history, who returns the office today for scheduled follow-up and possible cycle 3 chemotherapy.  She was evaluated 1 week ago by Dr. echeverria, at which time she was struggling with significant radiation esophagitis.  Radiation was held beginning 10/28/2019 through 11/8/2019 due to progressive pain and difficulty swallowing.  Additionally, cycle 3 carboplatin -16 was held 11/4/2019 due to thrombocytopenia.    The patient reports today, her symptoms are much improved with the break in radiation therapy.  She was prescribed both liquid hydrocodone as well as morphine, though was unable to receive either of these prescriptions from her pharmacy due to insurance constraints.  She did finally receive morphine last Friday, the reports her pain was improved and she has not required any of this medication.  She has been utilizing Magic mouth rinse which provides temporary relief.  She reports she is able to eat and drink with only mild discomfort which is much improved.    She denies signs or symptoms of bleeding.  Overall, her spirits are much improved today, she is willing to resume concurrent chemoradiation.  She has 5 or 6 remaining 80 and treatment.  She is an occasional cough though this is unchanged from her baseline.  She denies fevers or chills, signs or symptoms of infection.    Past Medical History:   Diagnosis Date   • Anxiety    • Arthritis    • Brain metastases (CMS/HCC) 9/18/2019   • Depression    • GERD (gastroesophageal reflux disease)    • H/O Pulmonary nodule    • Hemoptysis    • History of osteopenia    • Hyperlipidemia    • Nodule of right lung    • Post-menopause    • Sinus problem    • Stress incontinence      Past Surgical History:   Procedure Laterality Date   • BREAST BIOPSY Right    • BRONCHOSCOPY N/A 4/21/2016    Procedure: BRONCHOSCOPY  WITH ENDOBRONCHIAL ULTRASOUND, NAVIGATION, BRUSHING,BIOPSIES, LAVAGE, AND TRANSBRONCHIAL NEEDLE ASPIRATION;  Surgeon: Js Lewis MD;  Location: [facility] ENDOSCOPY;  Service:    • COLONOSCOPY     • CRANIOTOMY FOR TUMOR Left 8/28/2019    Procedure: Left frontal craniotomy for tumor;  Surgeon: Ruben Thibodeaux MD;  Location: [facility] MAIN OR;  Service: Neurosurgery   • VENOUS ACCESS DEVICE (PORT) INSERTION Right 9/6/2019    Procedure: INSERTION VENOUS ACCESS DEVICE;  Surgeon: Saroj Chacko III, MD;  Location: [facility] MAIN OR;  Service: Thoracic       MEDICATIONS    Current Outpatient Medications:   •  aluminum-magnesium hydroxide 200-200 MG/5ML suspension, diphenhydrAMINE 12.5 MG/5ML liquid, Lidocaine Viscous HCl 2 % solution, nystatin 484756 UNIT/ML suspension, Swish and swallow 10 mL 4 (Four) Times a Day Before Meals & at Bedtime., Disp: 400 mL, Rfl: 2  •  FLUoxetine (PROzac) 20 MG capsule, Take 20 mg by mouth Daily., Disp: , Rfl:   •  levETIRAcetam (KEPPRA) 500 MG tablet, Take 1 tablet by mouth Every 12 (Twelve) Hours., Disp: 60 tablet, Rfl: 2  •  lidocaine-prilocaine (EMLA) 2.5-2.5 % cream, Apply  topically to the appropriate area as directed As Needed for Mild Pain ., Disp: 1 each, Rfl: 1  •  losartan (COZAAR) 50 MG tablet, Take 50 mg by mouth Daily., Disp: , Rfl: 1  •  morphine 100 MG/5ML solution concentrated solution, Take 0.75 mL by mouth Every 4 (Four) Hours As Needed (as needed for pain)., Disp: 60 mL, Rfl: 0  •  pantoprazole (PROTONIX) 40 MG EC tablet, Take 1 tablet by mouth Daily., Disp: 30 tablet, Rfl: 1  •  sucralfate (CARAFATE) 1 g tablet, Take 1 tablet by mouth 3 (Three) Times a Day With Meals., Disp: 120 tablet, Rfl: 0  •  triamcinolone (KENALOG) 0.1 % ointment, Apply  topically to the appropriate area as directed 3 (Three) Times a Day., Disp: 30 g, Rfl: 0  •  HYDROcodone-acetaminophen (HYCET) 7.5-325 MG/15ML solution, Take 20 mL by mouth Every 6 (Six) Hours As Needed for Moderate Pain .,  Disp: 250 mL, Rfl: 0  No current facility-administered medications for this visit.     Facility-Administered Medications Ordered in Other Visits:   •  CARBOplatin (PARAPLATIN) 340 mg in sodium chloride 0.9 % 284 mL chemo IVPB, 340 mg, Intravenous, Once, Gayla Emnauel APRN  •  etoposide (TOPOSAR) 150 mg in sodium chloride 0.9 % 507.5 mL chemo IVPB, 80 mg/m2 (Treatment Plan Recorded), Intravenous, Once, Gayla Emanuel APRN  •  heparin flush (porcine) 100 UNIT/ML injection 500 Units, 500 Units, Intravenous, PRN, Nicolas Katz II, MD  •  sodium chloride 0.9 % flush 10 mL, 10 mL, Intravenous, PRN, Nicolsa Katz II, MD    ALLERGIES:     Allergies   Allergen Reactions   • Sulfa Antibiotics Rash       SOCIAL HISTORY:       Social History     Socioeconomic History   • Marital status:      Spouse name: Juan   • Number of children: Not on file   • Years of education: Not on file   • Highest education level: Not on file   Occupational History     Employer: RETIRED   Tobacco Use   • Smoking status: Former Smoker     Packs/day: 1.00     Years: 50.00     Pack years: 50.00     Last attempt to quit:      Years since quittin.8   • Smokeless tobacco: Never Used   Substance and Sexual Activity   • Alcohol use: No   • Drug use: No   • Sexual activity: Defer         FAMILY HISTORY:  Family History   Problem Relation Age of Onset   • Heart failure Mother    • Coronary artery disease Mother    • Depression Mother    • Heart disease Mother    • Leukemia Father    • Bone cancer Father    • Cancer Paternal Aunt    • Cancer Paternal Uncle      I have reviewed the patient's medical history in detail and updated the computerized patient record.    REVIEW OF SYSTEMS:  Review of Systems   Constitutional: Positive for appetite change and fatigue. Negative for activity change.   HENT: Positive for trouble swallowing (improved). Negative for nosebleeds.    Respiratory: Positive for cough (unchanged from  "baseline). Negative for shortness of breath and wheezing.    Cardiovascular: Negative for chest pain and palpitations.   Gastrointestinal: Negative for abdominal pain, constipation, diarrhea and nausea.   Genitourinary: Negative for dysuria and hematuria.   Musculoskeletal: Negative for arthralgias, gait problem, joint swelling and myalgias.   Skin: Negative for rash and wound.   Neurological: Negative for seizures and syncope.   Hematological: Negative for adenopathy. Does not bruise/bleed easily.   Psychiatric/Behavioral: Negative for confusion.   Review of systems 11/11/2019 unchanged from previous office visit except as updated.           Vitals:    11/11/19 1107   BP: 114/71   Pulse: 77   Resp: 16   Temp: 97.7 °F (36.5 °C)   TempSrc: Oral   SpO2: 97%   Weight: 83.8 kg (184 lb 11.2 oz)   Height: 162.6 cm (64.02\")   PainSc: 0-No pain     Current Status 11/11/2019   ECOG score 0       PHYSICAL EXAM:    CONSTITUTIONAL:  Vital signs reviewed.  No distress, looks comfortable.  EYES:  Conjunctiva and lids unremarkable.  PERRLA  EARS,NOSE,MOUTH,THROAT:  Ears and nose appear unremarkable.  Lips, teeth, gums appear unremarkable.  RESPIRATORY:  Normal respiratory effort.  Lungs clear to auscultation bilaterally.  CARDIOVASCULAR:  Normal S1, S2.  No murmurs rubs or gallops.  No significant lower extremity edema.  Benign Mediport present in the right chest wall  GASTROINTESTINAL: Abdomen appears unremarkable.  Nontender.  No hepatomegaly.  No splenomegaly.  LYMPHATIC:  No cervical, supraclavicular, axillary lymphadenopathy.  SKIN:  Warm.  No rashes.  PSYCHIATRIC:  Normal judgment and insight.  Normal mood and affect.  Physical exam 11/11/2019 unchanged from previous office visit except as updated.      RECENT LABS:  Results from last 7 days   Lab Units 11/11/19  1049 11/06/19  1128   WBC 10*3/mm3 3.72 4.16   NEUTROS ABS 10*3/mm3 2.59 3.03   HEMOGLOBIN g/dL 11.2* 11.2*   HEMATOCRIT % 34.2 33.4*   PLATELETS 10*3/mm3 231 128* "     Results from last 7 days   Lab Units 11/11/19  1048 11/06/19  1128   SODIUM mmol/L 143 141   POTASSIUM mmol/L 3.9 3.9   CHLORIDE mmol/L 104 102   CO2 mmol/L 29.1* 28.4   BUN mg/dL 13 9   CREATININE mg/dL 0.91 0.92   CALCIUM mg/dL 9.4 9.5   ALBUMIN g/dL 3.80 3.60   BILIRUBIN mg/dL 0.4 0.4   ALK PHOS U/L 75 81   ALT (SGPT) U/L 13 13   AST (SGOT) U/L 17 17   GLUCOSE mg/dL 89 89           Assessment/Plan   Small cell lung cancer (CMS/HCC)    Brain metastases (CMS/HCC)    Chemotherapy-induced thrombocytopenia    *Extensive stage small cell lung cancer, RUL, 2.4 x 2.3 x 1.7cm, with metastasis to 3 brain lesions (bilateral frontal lobes.)  · RUL mass first seen on CT 3/28/2016, 2 x 1.3 cm.  Decreased to 1.6 cm on 6/27/2016, and decreased again to 1.3 cm on 12/5/2016.  Therefore, this was felt at the time to be a benign resolving nodule.  · During August 2019 hospitalization, found to have brain metastasis from small cell lung cancer.  RUL mass and 3 brain lesions.  No evidence of disease otherwise  · Craniotomy for the medial left frontal lobe lesion (3.2 cm) , 8/28/2019  · Because the final diagnosis is small cell, Dr. Chacko does not plan lung resection as we typically treat this with chemoradiation  · SRS to 2 remaining brain lesions (first was resected) and to resection cavity of the first lesion, completed 9/18/2019.  Dr. Mayo only planning whole brain XRT if future brain recurrence.  · Usually with small cell I would like to start chemo XRT within a couple of weeks.  However, she is recovering from brain surgery.  Furthermore, this lung lesion has been there for years.  Therefore, weighing risks and benefits I think it is in her best interest to wait almost 4 weeks after craniotomy to begin chemo and radiation.  · PET 9/16/2019: 2.5 cm RUL mass with SUV 15.8.  No hypermetabolic LAD or distant disease.  · On 9/18/2019 completed 5/5 XRT to brain metastasis resection cavity and 1/1 XRT intact brain metastasis #1  and 1/1 XRT due to intact brain metastasis #2.  · 9/19/2019, C1D1 lung mass XRT with  carboplatin, etoposide, Tecentriq, followed by Tecentriq maintenance for extensive stage small cell lung cancer.  Plan a total of 4-6 cycles of chemo depending on tolerability.  · On 10/4/2019, prednisone 0.5 mg/kg/day (40 mg) started due to significant rash from Tecentriq.  4 days later, rash improved but did not resolve.   ·  Plan no more Tecentriq.    · Prednisone tapered off 10/28/2019.  · C2D1 (without Tecentriq) given 10/14/2019.  · MRI brain 10/8/2019 reviewed by Dr. Thibodeaux-he feels this is stable and plan another MRI and appointment with him in 3 months  · Lung mass XRT held the week of 10/28/2019, and again the week of 11/4/2019 due to radiation esophagitis  · CT CAP 10/29/2019 (after C2): RUL lung mass 2 x 1.3 cm, from 2.5 x 2.5 cm.  · Did not give C3 chemo as planned 11/4/2019 because PLT 91.    · Chemotherapy and radiation resumed today, 11/11/2018.  20% dose reduction of both carboplatin and etoposide.  (Of note, creatinine is improved today compared to C2, therefore, 20% dose reduction is based off carboplatin dosing with cycle 2 430mg  rather than AUC and today's creatinine).     *Goals of care.  · Since the 3 brain lesions were treated with stereotactic radiation and resection of 1 of the lesions and since no disease was found elsewhere, she perhaps has a very slim but possible chance of cure.  However, small cell histology makes the possibility of long-term survival unlikely.  · On 11/4/2019, she was reminded that her disease is highly unlikely curable.  She was questioning proceeding with additional concurrent chemoradiation, though overall is much improved today and willing to proceed.     *Neurological deficits due to brain metastasis:  · Some mild confusion and right-sided weakness and urinary incontinence x2 weeks prompted ER visit 8/24/2019.     *Tinnitus and decreased hearing.  Not a cisplatin  candidate.    *Port placed 9/6/2019    *Neutropenia due to chemotherapy.  We have not been using Neulasta due to XRT therapy.    *Thrombocytopenia due to chemotherapy  · Cycle 3 held 11/4/2018 because of platelet count 91,000  · Platelets improved today to 231,000, we will proceed with cycle 3 with dose reductions as outlined below    *Radiation esophagitis with significant pain and dehydration  · Patient initially prescribed hydrocodone however this was ineffective.  · Prescribed Roxanol 11/4/2019, though she was unable to receive this prescription from the pharmacy until 11/8/2019.  At that time, symptoms were much improved and she has not required Roxanol.  She does have this to use as needed.  · She received IV fluids last week with improvement in her symptoms.     *This is Ángela Pryor's mother-in-law (APRN with Dr. Thibodeaux)     Plan:  1. Proceed with cycle 3-day 1 carboplatin etoposide.  2. 20% dose reduction to both carboplatin and etoposide. (Cycle 2 carboplatin 430 mg was poorly tolerated, therefore, we will give 20% dose reduction today totaling 340 mg.  Patient's creatinine is improved today with AUC calculating to 410 mg.  Again, this is not felt to be an adequate dose reduction after poor tolerance to 430 mg.) This was discussed and reviewed with Dr. Katz today.   3. Resume radiation under the direction of radiation oncology  4. Tecentriq has been discontinued due to previous immune mediated dermatitis  5. Continue Magic mouth rinse as needed  6. Begin Roxanol as needed for esophagitis  7. Return weekly for CBC with nurse review  8. In 3 weeks, patient will follow-up with Dr. Katz, 12/2/2019 for cycle 4 carboplatin etoposide.  At that time, we can add Neulasta as radiation will be complete  9. Follow-up MRI of the brain scheduled 12/26/2019, follow-up with Dr. Thibodeaux scheduled 12/31/2019    Gayla Emanuel, APRN  11/11/2019

## 2019-11-13 NOTE — PROGRESS NOTES
Physician Weekly Management Note    Diagnosis:     Diagnosis Plan   1. Small cell lung cancer (CMS/HCC)         RT Details:  Treatment #28/30    Notes on Treatment course, Films, Medical progress:  Doing much better after treatment rest.  Dr. Katz has scans planned -  I have not made an appointment for her to see us back.    Weekly Management:  Medication reviewed?   Yes  New medications given?   No  Problemlist reviewed?   Yes  Problem added?   No       Technical aspects reviewed:  Weekly OBI approved?   Yes  Weekly port films approved?   Yes  Change requests noted on port film?   No  Patient setup and plan reviewed?   Yes    Chart Reviewed:  Continue current treatment plan?   Yes  Treatment plan change requested?   No  CBC reviewed?   Yes  Concurrent Chemo?   No    Objective     Toxicities:   As above     Review of Systems   As above    There were no vitals filed for this visit.    Current Status 11/11/2019   ECOG score 0       Physical Exam  As above      Problem Summary List    Diagnosis:     Diagnosis Plan   1. Small cell lung cancer (CMS/HCC)       Pathology:       Past Medical History:   Diagnosis Date   • Anxiety    • Arthritis    • Brain metastases (CMS/HCC) 9/18/2019   • Depression    • GERD (gastroesophageal reflux disease)    • H/O Pulmonary nodule    • Hemoptysis    • History of osteopenia    • Hyperlipidemia    • Nodule of right lung    • Post-menopause    • Sinus problem    • Stress incontinence          Past Surgical History:   Procedure Laterality Date   • BREAST BIOPSY Right    • BRONCHOSCOPY N/A 4/21/2016    Procedure: BRONCHOSCOPY WITH ENDOBRONCHIAL ULTRASOUND, NAVIGATION, BRUSHING,BIOPSIES, LAVAGE, AND TRANSBRONCHIAL NEEDLE ASPIRATION;  Surgeon: Js Lewis MD;  Location: [facility] ENDOSCOPY;  Service:    • COLONOSCOPY     • CRANIOTOMY FOR TUMOR Left 8/28/2019    Procedure: Left frontal craniotomy for tumor;  Surgeon: Ruben Thibodeaux MD;  Location: [facility] MAIN OR;  Service: Neurosurgery    • VENOUS ACCESS DEVICE (PORT) INSERTION Right 9/6/2019    Procedure: INSERTION VENOUS ACCESS DEVICE;  Surgeon: Saroj Chacko III, MD;  [facility] OR;  Service: Thoracic         Current Outpatient Medications on File Prior to Visit   Medication Sig Dispense Refill   • aluminum-magnesium hydroxide 200-200 MG/5ML suspension, diphenhydrAMINE 12.5 MG/5ML liquid, Lidocaine Viscous HCl 2 % solution, nystatin [id number] UNIT/ML suspension Swish and swallow 10 mL 4 (Four) Times a Day Before Meals & at Bedtime. 400 mL 2   • FLUoxetine (PROzac) 20 MG capsule Take 20 mg by mouth Daily.     • HYDROcodone-acetaminophen (HYCET) 7.5-325 MG/15ML solution Take 20 mL by mouth Every 6 (Six) Hours As Needed for Moderate Pain . 250 mL 0   • levETIRAcetam (KEPPRA) 500 MG tablet Take 1 tablet by mouth Every 12 (Twelve) Hours. 60 tablet 2   • lidocaine-prilocaine (EMLA) 2.5-2.5 % cream Apply  topically to the appropriate area as directed As Needed for Mild Pain . 1 each 1   • losartan (COZAAR) 50 MG tablet Take 50 mg by mouth Daily.  1   • morphine 100 MG/5ML solution concentrated solution Take 0.75 mL by mouth Every 4 (Four) Hours As Needed (as needed for pain). 60 mL 0   • pantoprazole (PROTONIX) 40 MG EC tablet Take 1 tablet by mouth Daily. 30 tablet 1   • sucralfate (CARAFATE) 1 g tablet Take 1 tablet by mouth 3 (Three) Times a Day With Meals. 120 tablet 0   • triamcinolone (KENALOG) 0.1 % ointment Apply  topically to the appropriate area as directed 3 (Three) Times a Day. 30 g 0     Current Facility-Administered Medications on File Prior to Visit   Medication Dose Route Frequency Provider Last Rate Last Dose   • [COMPLETED] etoposide (TOPOSAR) 150 mg in sodium chloride 0.9 % 507.5 mL chemo IVPB  80 mg/m2 (Treatment Plan Recorded) Intravenous Once Gayla Emanuel APRN   Stopped at 11/12/19 6645   • [COMPLETED] prochlorperazine (COMPAZINE) tablet 10 mg  10 mg Oral Once Gayla Emanuel APRN   10 mg at  11/12/19 1505   • [COMPLETED] sodium chloride 0.9 % infusion 250 mL  250 mL Intravenous Once Gayla Emanuel, MICHAEL   Stopped at 11/12/19 1615       Allergies   Allergen Reactions   • Sulfa Antibiotics Rash         Primary care MD:    Heide Wellington MD    Oncologist:   N Code MD    Seen and approved by:  Nicolas Mayo MD  11/13/2019

## 2019-11-15 NOTE — THERAPY DISCHARGE NOTE
"      Outpatient Physical Therapy Neuro Progress Note/Discharge Summary  [facility]     Patient Name: Karen Pineda  : 1945  MRN: [id number]  Today's Date: 11/15/2019      Visit Date: 11/15/2019    Visit Dx:    ICD-10-CM ICD-9-CM   1. Small cell lung cancer (CMS/HCC) C34.90 162.9   2. Status post craniotomy Z98.890 V45.89   3. Brain tumor (CMS/HCC) D49.6 239.6   4. Hemiparesis of right dominant side due to non-cerebrovascular etiology (CMS/HCC) G81.91 342.91   5. Functional gait abnormality R26.89 781.2       Patient Active Problem List   Diagnosis   • Hemoptysis   • Lung mass   • Cough   • Arthritis   • Surgery follow-up examination   • Brain metastases (CMS/HCC)   • Small cell lung cancer (CMS/HCC)   • High risk medication use   • Encounter for fitting and adjustment of vascular catheter   • Rash   • Chemotherapy-induced thrombocytopenia   • Decreased oral intake   • Radiation esophagitis            PT Neuro     Row Name 11/15/19 1015             Subjective Comments    Subjective Comments  \"Just finished my last radiation treatment today. I have one more chemo treatment.\" \"Doing great.\" \"No problems with my knee.\" \"I have been doing some housekeeping.\"   -LB         Precautions and Contraindications    Precautions  denied any tremors in her right knee   -LB         Subjective Pain    Able to rate subjective pain?  yes  -LB      Pre-Treatment Pain Level  0  -LB      Post-Treatment Pain Level  0  -LB         Cognition    Overall Cognitive Status  WFL  -LB      Orientation Level  Oriented X4  -LB      Safety Judgment  Good awareness of safety precautions  -LB      Deficits  Fully aware of deficits  -LB         Posture/Observations    Posture- WNL  Posture is WNL  -LB      Posture/Observations Comments  Pt arrived to PT with spouse, no AD.  -LB         MMT Right Lower Ext    Rt Hip Flexion MMT, Gross Movement  () normal  -LB      Rt Hip Extension MMT, Gross Movement  -- maximal resistance in " sidelying  -LB      Rt Hip ABduction MMT, Gross Movement  (5/5) normal  -LB      Rt Knee Extension MMT, Gross Movement  (5/5) normal  -LB      Rt Knee Flexion MMT, Gross Movement  -- maximal resistance in sitting   -LB      Rt Ankle Plantarflexion MMT, Gross Movement  (4/5) good  -LB      Rt Ankle Dorsiflexion MMT, Gross Movement  (5/5) normal  -LB      Rt Ankle Subtalar Inversion MT, Gross Movement  (5/5) normal  -LB      Rt Ankle Subtalar Eversion MMT, Gross Movement  (4+/5) good plus  -LB         Bed Mobility Assessment/Treatment    Rolling Left [place] (Bed Mobility)  independent  -LB      Rolling Right [place] (Bed Mobility)  independent  -LB      Supine-Sit [place] (Bed Mobility)  independent  -LB      Sit-Supine [place] (Bed Mobility)  independent  -LB         Transfers    Sit-Stand [place] (Transfers)  independent to an armless chair without the use of her UE's   -LB      Stand-Sit [place] (Transfers)  independent from an armless chair without the use of her UE's   -LB      Comment (Transfers)  FLOOR transfers- PT demonstrated floor transfers to and from a chair to a chair using L LE 1/2 kneeling technique.   -LB         Gait/Stairs Assessment/Training    [place] Level (Gait)  independent  -LB      Assistive Device (Gait)  -- no device  -LB      Distance in Feet (Gait)  500' x 3 with multiple turns and head turns  -LB      Pattern (Gait)  step-through  -LB      [place] Level (Stairs)  independent  -LB      Handrail Location (Stairs)  none  -LB      Number of Steps (Stairs)  4  -LB      Ascending Technique (Stairs)  step-over-step  -LB      Descending Technique (Stairs)  step-over-step  -LB      Comment (Gait/Stairs)  see DGI   -LB         Balance Skills Training    Gait Balance-Level of Assistance  Close supervision  -LB      Gait Balance Support  No upper extremity supported  -LB      Gait Balance Activities  grapevine  -LB      Rhomberg  30 seconds with eyes opened  "and closed   -LB      Sharpened Rhomberg  15 seconds with eyes opened  -LB        User Key  (r) = Recorded By, (t) = Taken By, (c) = Cosigned By    Initials Name Provider Type    Ángela Cramer PT Physical Therapist                  PT Assessment/Plan     Row Name 11/15/19 1352          PT Assessment    Assessment Comments  Pt arrived to PT very pleased she finished her last radiation session today and has one round of chemotherapy left. Pt denies any falls, right knee buckling or right knee tremors. Pt's demonstrating increased strength in her right LE. Pt demonstrating imporvement in her balance as indicated by the KELLEY. Pt demonstrating imporvement with gait and balance as indicated by the Dynamic Gait index. Pt scored a 24/24 on the Dynamic Gait index. Pt was independent with a HEP. Pt denies any issues with her right knee or gait. Pt has achieved all goals and iis discharged from PT.   -LB       User Key  (r) = Recorded By, (t) = Taken By, (c) = Cosigned By    Initials Name Provider Type    Ángela Cramer PT Physical Therapist               OP Exercises     Row Name 11/15/19 1015             Subjective Comments    Subjective Comments  \"Just finished my last radiation treatment today. I have one more chemo treatment.\" \"Doing great.\" \"No problems with my knee.\" \"I have been doing some housekeeping.\"   -LB         Subjective Pain    Able to rate subjective pain?  yes  -LB      Pre-Treatment Pain Level  0  -LB      Post-Treatment Pain Level  0  -LB         Exercise 1    Exercise Name 1  knee flexion in standing at neli bars   -LB      Cueing 1  Verbal  -LB      Reps 1  10 each LE   -LB      Additional Comments  Reviewed for HEP  -LB         Exercise 2    Exercise Name 2  bilateral plantarflexion standing with fingertip bilateral UE support  -LB      Cueing 2  Verbal  -LB      Reps 2  15  -LB      Additional Comments  Reviewed for HEP  -LB        User Key  (r) = Recorded By, (t) = Taken By, (c) = Cosigned By "    Initials Name Provider Type    Ángela Cramer PT Physical Therapist                        PT OP Goals     Row Name 11/15/19 1300          PT Short Term Goals    STG 1  Pt to increase strength right knee flexors to maximal resistance in sitting.  -LB     STG 1 Progress  Met  -LB     STG 2  Pt to ambulate conditional independent with least restricitive device in her home.   -LB     STG 2 Progress  Met  -LB     STG 3  Pt to ambulate ~ 200' x 3 without a device with multiple head turns with SBA.   -LB     STG 3 Progress  Met  -LB     STG 4  Pt to be independent with HEP with emphasis on right LE strengthening.   -LB     STG 4 Progress  Met  -LB     STG 5  Pt to report decreased right knee buckling.   -LB     STG 5 Progress  Met  -LB        Long Term Goals    LTG 1  Pt to be increase strength right plantarflexors to 4/5.  -LB     LTG 1 Progress  Met  -LB     LTG 2  Pt to ambulate independently without a device in her home environment.   -LB     LTG 2 Progress  Met  -LB     LTG 3  Pt to ambulate with least restricitve device conditional independent in the community.   -LB     LTG 3 Progress  Met  -LB     LTG 3 Progress Comments  Pt is able to ambulate in the community without a device..  -LB     LTG 4  Pt to score a a 22/24 on the Dynamic Gait Index to reduce risk of falls.  -LB     LTG 4 Progress  Met  -LB     LTG 4 Progress Comments  Pt scored a 24/24.  -LB     LTG 5  Pt to ascend and descend 2 steps iwthout a rail independently with or without a device.   -LB     LTG 5 Progress  Met  -LB       User Key  (r) = Recorded By, (t) = Taken By, (c) = Cosigned By    Initials Name Provider Type    Ángela Cramer PT Physical Therapist          Therapy Education  Education Details: Reviewed technique for floor transfers. Advised pt to always use a rail on steps if available.   Given: HEP(bilateral plantarflexion in standing, knee flexion each LE in standing (light UE support.))  How Provided: Verbal  Provided to:  Patient, Caregiver  Level of Understanding: Verbalized, Demonstrated, Teach back education performed       Dynamic Gait Index (DGI)  Gait Level Surface: Normal: walks 20’, no assistive devices, good speed, no evidence for imbalance, normal gait pattern  Change in Gait Speed: Normal: Able to smoothly change walking speed without loss of balance or gait deviation. Shows significant difference in walking speeds between normal, fast and slow paces.  Gait with Horizontal Head Turns: Normal: Performs head turns smoothly with no change in gait.  Gait with Vertical Head Turns: Normal: Performs head turns smoothly with no change in gait.  Gait and Pivot Turn: Normal: Pivot turns safely within 3 seconds and stops quickly with no loss of balance.  Step Over Obstacle: Normal: Is able to step over box without changing gait speed, no evidence for imbalance.  Step Around Obstacles: Normal: Is able to walk safely around cones safely without changing gait speed, no evidence of imbalance.  Steps: Normal: Alternating feet, no rail.  Dynamic Gait Index Score: 24  Dynamic Gait Index Comments: performed without a device     Time Calculation:   Start Time: 1015  Stop Time: 1100  Time Calculation (min): 45 min  Total Timed Code Minutes- PT: 45 minute(s)     Therapy Charges for Today     Code Description Service Date Service Provider Modifiers Qty    94849529306 HC PT NEUROMUSC RE EDUCATION EA 15 MIN 11/15/2019 Ángela Lewis, PT GP 3                         Ángela Lewis, PT  11/15/2019

## 2019-11-15 NOTE — PROGRESS NOTES
Patient: Karen Pineda  YOB: 1945    RADIATION THERAPY TREATMENT SUMMARY  Diagnosis: Small cell lung cancer (CMS/HCC)    Patient Care Team:  Heide Wellington MD as PCP - General  Heide Wellington MD as PCP - [facility]  [facility]ing Physician (Pulmonary Disease)  Nicolas Mayo MD as Consulting Physician (Radiation Oncology)  Nicolas Katz II, MD as Consulting Physician (Hematology and Oncology)  She Garcia PA-C as Referring Physician (Neurosurgery)    Karen Pineda has recently completed the course of radiation therapy prescribed for the above-mentioned diagnosis.  Below, please find the specifics of the course of treatment delivered:    Radiation Details:    Dates of treatment: 9/18/2019 - 9/18/2019 and 9/23/2019 - 11/15/2019.    Treatment Site - L_FRONTAL  Treatment Intent - Curative  Total Dose in cGy - 2600  Number of Treatments - 1  Dose per fraction - See below  Fractions per day - 1 fx/day  Fractions per week - 1 fx/week  Treatment Type - Stereotactic, Rapid Arc  Energy - 6 MVP  Normalization - Volumetric Normalization-- see below, See below  Imaging/Field Verification - IGRT using CBCT daily  Additional comments: - 2600 cGy x 1fx. Dose normalized to 100% covers 98% of PTV3_L_FRONTAL. CBCT. OSMS.      Treatment Site - RT LUNG MEDIAST  Treatment Intent - Curative, Concurrent Chemo  Total Dose in cGy - 5000  Number of Treatments - 25  Dose per fraction - 200 cGy per fraction  Fractions per day  -1 fx/day  Fractions per week - 5 fx/week  Treatment Type - Rapid Arc  Energy - 6 MVP  Normalization - Volumetric Normalization-- see below  Imaging/Field Verification - IGRT using CBCT daily  Additional comments: 100% COVERS 95%PTV50G, CBCT DAILY BONY MATCH C CORD THEN SOFT TISSUE PTV50G GTV_RLUNG, GTV_NODES      Treatment Site - R LUNG BOOST  Treatment Intent - Curative, Concurrent Chemo  Total Dose in cGy - 1000  Number of Treatments -  5  Dose per fraction - 200 cGy per fraction  Fractions per day - 1 fx/day  Fractions per week - 5 fx/week  Treatment Type - Rapid Arc  Energy - 6 MVP  Normalization - Volumetric Normalization-- see below  Imaging/Field Verification - IGRT using CBCT daily  Additional comments: 100% COVERS 95% PTVBOOST CBCT DAILY BONY MATCH C CORD THEN SOFT TISSUE GTV,PTV BOOST      Tolerance and Toxicities: she tolerated the treatments well , requiring 1 treatment break from 10/28 to 11/8 per Dr. Katz, did much better after treatment rest.. she completed the treatments in 53 elapsed days.    Follow-up Plans:  Dr. Katz has scans planned -  I have not made an appointment for her to see us back.  I have also made a referral to our [facility].

## 2019-11-18 NOTE — PROGRESS NOTES
PT ADDED FROM LAB #2, ORDERS FOR 1L NS TODAY    NO BLOOD RETURN FROM PORT, ACTIVASE INSTILLED PER PROTOCOL    1156 + BR FROM MP, LABS COLLECTED.  PT REQUESTING TO RETURN TOMORROW FOR ADDITIONAL IVF'S.  REVIEWED WITH BROOKE SILVA, OKAY TO RETURN FOR 1 L NS TOMORROW.   MESSAGE SENT TO SCHEDULING DESK

## 2019-11-25 NOTE — PROGRESS NOTES
Pt is here for lab with RN review.Pt is fatigue and struggles to get 40 oz of water in a day. She has chest congestion with a cough. CBC and Symptoms reviewed with Dr. Katz. Per Dr. Katz pt will receive 1L of NS today. She will take 500 mg of Levaquin daily and return on Wednesday for a CBC to assess her platelets and receive 1L of NS if needed. She will also take Mucinex for her chest congestion.  Copy of labs given to pt and f/u appt reviewed. Pt is instructed to call the office with any concerns or new symptoms prior to next visit. Pt vu. All orders placed and pt sent to infusion. Angelic nunze RN notified.   Lab Results   Component Value Date    WBC 1.52 (L) 11/25/2019    HGB 9.6 (L) 11/25/2019    HCT 27.8 (L) 11/25/2019    MCV 86.9 11/25/2019    PLT 57 (L) 11/25/2019     If pt's platelets are 20k-40k on 11-27 review with Dr. Katz. If they are below 20K transfuse 1 unit of platelets all per Dr. Katz.

## 2019-11-27 NOTE — PROGRESS NOTES
Pt is here for lab with RN review.  CBC reviewed with pt, counts are stable for this pt at this time.Platelets 59K. Per Dr. Cain order pt will not receive platelets today. She has increased her PO intake and would only like 500 CC of NS instead of 80253dt NS. Pt did not voice any complaints and VSS. Copy of labs given to pt and f/u appt reviewed. Pt is instructed to call the office with any concerns or new symptoms prior to next visit. Pt vu.   Lab Results   Component Value Date    WBC 1.39 (L) 11/27/2019    HGB 9.4 (L) 11/27/2019    HCT 27.4 (L) 11/27/2019    MCV 87.5 11/27/2019    PLT 59 (L) 11/27/2019

## 2019-11-27 NOTE — TELEPHONE ENCOUNTER
Patient calling about congestion and unable to cough up phlegm. Pt is on levaquin and mucinex and asks about the possibility of breathing treatments. Reviewed with Dr Katz. This is not something he would order so he suggests pt see pcp if she wants breathing treatment orders. Otherwise he recommends continuing the levaquin and mucinex. Notified patient and she v/u. Pt will be in office today for ivfs.

## 2019-12-09 NOTE — PROGRESS NOTES
Subjective   Karen Pineda is a 74 y.o. female.     History of Present Illness   Karen Pineda 74 y.o. female who presents today for a new patient appointment.    she has a history of   Patient Active Problem List   Diagnosis   • Hemoptysis   • Lung mass   • Cough   • Arthritis   • Surgery follow-up examination   • Brain metastases (CMS/HCC)   • Small cell lung cancer (CMS/HCC)   • High risk medication use   • Encounter for fitting and adjustment of vascular catheter   • Rash   • Chemotherapy-induced thrombocytopenia   • Decreased oral intake   • Radiation esophagitis   .  she is here to establish care I reviewed the PFSH recorded today by my MA/LPN staff.   she   She has been feeling overall well. She is currently under the care of Dr. Katz and Dr. Thibodeaux for small cell lung cancer with brain metastases.  She had 1 tumor resection per Dr. Thibodeaux in September and then radiation for remaining 2 lesions.  She is currently on Keppra per Dr. Thibodeaux for seizure prevention related to her tumor resection.  She is taking fluoxetine for anxiety and depression and feels that this is stable.  She is also on pantoprazole for GERD which is fairly well controlled. She will need refills on these.   She reports decreased appetite due to chemo.  Weight is currently stable.     She has her next f/u with hematology tomorrow with labs.    Her next appt with Dr. Thibodeaux is 12/31 and she will have MRI prior.       Patient currently has cough with clear to white sputum production.  She was given Levaquin that she finished a week ago. She reports this seemed to help but her cough has lingered.  She denies fever or chills.    The following portions of the patient's history were reviewed and updated as appropriate: allergies, current medications, past family history, past medical history, past social history, past surgical history and problem list.    Review of Systems   Constitutional: Positive for appetite change and fatigue.  Negative for chills, fever and unexpected weight change.   HENT: Positive for congestion and postnasal drip. Negative for sinus pain.    Respiratory: Positive for cough, shortness of breath and wheezing.    Cardiovascular: Negative for chest pain and palpitations.   Neurological: Negative for seizures and speech difficulty.   Psychiatric/Behavioral: Negative for behavioral problems, decreased concentration and dysphoric mood.       Objective   Physical Exam   Constitutional: She is oriented to person, place, and time. She appears well-developed and well-nourished.   Cardiovascular: Normal rate and regular rhythm.   Pulmonary/Chest: Effort normal and breath sounds normal.   Neurological: She is alert and oriented to person, place, and time.   Psychiatric: She has a normal mood and affect. Her behavior is normal. Judgment and thought content normal.   Nursing note and vitals reviewed.      Assessment/Plan   Karen was seen today for establish care.    Diagnoses and all orders for this visit:    Cough  -     albuterol sulfate  (90 Base) MCG/ACT inhaler; Inhale 2 puffs Every 4 (Four) Hours As Needed for Wheezing or Shortness of Air.    Brain metastases (CMS/HCC)    Small cell lung cancer (CMS/HCC)    Anxiety and depression  -     FLUoxetine (PROzac) 20 MG capsule; Take 1 capsule by mouth Daily.    Gastroesophageal reflux disease without esophagitis  -     pantoprazole (PROTONIX) 40 MG EC tablet; Take 1 tablet by mouth Daily.    Essential hypertension  -     losartan (COZAAR) 50 MG tablet; Take 1 tablet by mouth Daily.          Encouraged hydration and Boost or Ensure for nutrients due to decreased oral intake.         O2 stable.  Lungs clear.  Patient will continue to take Mucinex.  She will contact office if cough worsens or does not further improve within the next week.

## 2019-12-10 NOTE — PROGRESS NOTES
.     REASONS FOR FOLLOWUP: Extensive stage small cell lung cancer    HISTORY OF PRESENT ILLNESS:  The patient is a 74 y.o. year old female the above-mentioned history is here today for reevaluation.  She seen in short interval follow-up, as her treatment was delayed last week due to cytopenias.  Patient returns today reporting that she is been doing fairly well.  No fevers or chills.  No new problems or concerns.  We discussed we plan on adding Neulasta back with this cycle, and I have reviewed possible side effects, which the patient is familiar with.  She previously received it with her first cycle in September.      Past Medical History:   Diagnosis Date   • Anxiety    • Arthritis    • Brain metastases (CMS/HCC) 9/18/2019   • Depression    • GERD (gastroesophageal reflux disease)    • H/O Pulmonary nodule    • Hemoptysis    • History of osteopenia    • Hyperlipidemia    • Nodule of right lung    • Post-menopause    • Sinus problem    • Stress incontinence      Past Surgical History:   Procedure Laterality Date   • BREAST BIOPSY Right    • BRONCHOSCOPY N/A 4/21/2016    Procedure: BRONCHOSCOPY WITH ENDOBRONCHIAL ULTRASOUND, NAVIGATION, BRUSHING,BIOPSIES, LAVAGE, AND TRANSBRONCHIAL NEEDLE ASPIRATION;  Surgeon: Js Lewsi MD;  Location: [facility] ENDOSCOPY;  Service:    • COLONOSCOPY     • CRANIOTOMY FOR TUMOR Left 8/28/2019    Procedure: Left frontal craniotomy for tumor;  Surgeon: Ruben Thibodeaux MD;  Location: [facility] OR;  Service: Neurosurgery   • VENOUS ACCESS DEVICE (PORT) INSERTION Right 9/6/2019    Procedure: INSERTION VENOUS ACCESS DEVICE;  Surgeon: Saroj Chacko III, MD;  Location: [facility] OR;  Service: Thoracic       MEDICATIONS    Current Outpatient Medications:   •  albuterol sulfate  (90 Base) MCG/ACT inhaler, Inhale 2 puffs Every 4 (Four) Hours As Needed for Wheezing or Shortness of Air., Disp: 1 inhaler, Rfl: 0  •  aluminum-magnesium hydroxide 200-200 MG/5ML suspension,  diphenhydrAMINE 12.5 MG/5ML liquid, Lidocaine Viscous HCl 2 % solution, nystatin 234989 UNIT/ML suspension, Swish and swallow 10 mL 4 (Four) Times a Day Before Meals & at Bedtime., Disp: 400 mL, Rfl: 2  •  FLUoxetine (PROzac) 20 MG capsule, Take 1 capsule by mouth Daily., Disp: 90 capsule, Rfl: 1  •  guaiFENesin (MUCINEX PO), Take  by mouth., Disp: , Rfl:   •  levETIRAcetam (KEPPRA) 500 MG tablet, Take 1 tablet by mouth Every 12 (Twelve) Hours., Disp: 60 tablet, Rfl: 2  •  losartan (COZAAR) 50 MG tablet, Take 1 tablet by mouth Daily., Disp: 90 tablet, Rfl: 1  •  morphine 100 MG/5ML solution concentrated solution, Take 0.75 mL by mouth Every 4 (Four) Hours As Needed (as needed for pain)., Disp: 60 mL, Rfl: 0  •  NARCAN 4 MG/0.1ML nasal spray, ADMINISTER A SINGLE SPRAY INTRANASALLY INTO ONE NOSTRIL. CALL 911. MAY REPEAT X1., Disp: , Rfl: 0  •  pantoprazole (PROTONIX) 40 MG EC tablet, Take 1 tablet by mouth Daily., Disp: 90 tablet, Rfl: 1  •  lidocaine-prilocaine (EMLA) 2.5-2.5 % cream, Apply  topically to the appropriate area as directed As Needed for Mild Pain ., Disp: 1 each, Rfl: 1    Current Facility-Administered Medications:   •  sodium chloride 0.9 % infusion 1,000 mL, 1,000 mL, Intravenous, Once, Nicolas Katz II, MD    Facility-Administered Medications Ordered in Other Visits:   •  CARBOplatin (PARAPLATIN) 270 mg in sodium chloride 0.9 % 277 mL chemo IVPB, 270 mg, Intravenous, Once, Symone Mcrae APRABEL  •  dexamethasone (DECADRON) IVPB 12 mg, 12 mg, Intravenous, Once, Symone Mcrae APRN  •  etoposide (TOPOSAR) 110 mg in sodium chloride 0.9 % 505.5 mL chemo IVPB, 60 mg/m2 (Treatment Plan Recorded), Intravenous, Once, Symone Mcrae APRN  •  FOSAPREPITANT 150 MG/100ML NORMAL SALINE (CBC) IVPB 100 mL 100 mL, 150 mg, Intravenous, Once, Symone Mcrae APRN  •  palonosetron (ALOXI) injection 0.25 mg, 0.25 mg, Intravenous, Once, Symone Mcrae APRN  •  sodium chloride 0.9 % infusion  "250 mL, 250 mL, Intravenous, Once, Symone Mcrae, MICHAEL    ALLERGIES:     Allergies   Allergen Reactions   • Sulfa Antibiotics Rash       SOCIAL HISTORY:       Social History     Socioeconomic History   • Marital status:      Spouse name: Juan   • Number of children: Not on file   • Years of education: Not on file   • Highest education level: Not on file   Occupational History     Employer: RETIRED   Tobacco Use   • Smoking status: Former Smoker     Packs/day: 1.00     Years: 50.00     Pack years: 50.00     Last attempt to quit:      Years since quittin.9   • Smokeless tobacco: Never Used   Substance and Sexual Activity   • Alcohol use: No   • Drug use: No   • Sexual activity: Defer         FAMILY HISTORY:  Family History   Problem Relation Age of Onset   • Heart failure Mother    • Coronary artery disease Mother    • Depression Mother    • Heart disease Mother    • Leukemia Father    • Bone cancer Father    • Cancer Paternal Aunt    • Cancer Paternal Uncle        REVIEW OF SYSTEMS:  Review of Systems   Constitutional: Negative for activity change.   HENT: Negative for nosebleeds and trouble swallowing.    Respiratory: Negative for shortness of breath and wheezing.    Cardiovascular: Negative for chest pain and palpitations.   Gastrointestinal: Negative for constipation, diarrhea and nausea.   Genitourinary: Negative for dysuria and hematuria.   Musculoskeletal: Negative for arthralgias and myalgias.   Skin: Negative for rash and wound.   Neurological: Negative for seizures and syncope.   Hematological: Negative for adenopathy. Does not bruise/bleed easily.   Psychiatric/Behavioral: Negative for confusion.            Vitals:    12/10/19 1052   BP: 131/75   Pulse: 77   Resp: 16   Temp: 97.5 °F (36.4 °C)   TempSrc: Oral   SpO2: 98%   Weight: 81.6 kg (180 lb)   Height: 162.6 cm (64.02\")   PainSc: 0-No pain     Current Status 12/10/2019   ECOG score 0       Physical Exam   Constitutional: She is " oriented to person, place, and time. She appears well-developed and well-nourished. No distress.   HENT:   Head: Normocephalic and atraumatic.   Mouth/Throat: Oropharynx is clear and moist and mucous membranes are normal. No oropharyngeal exudate.   Eyes: Pupils are equal, round, and reactive to light.   Neck: Normal range of motion.   Cardiovascular: Normal rate, regular rhythm and normal heart sounds.   No murmur heard.  Pulmonary/Chest: Effort normal and breath sounds normal. No respiratory distress. She has no wheezes. She has no rhonchi. She has no rales.   Abdominal: Soft. Normal appearance and bowel sounds are normal. She exhibits no distension. There is no hepatosplenomegaly.   Musculoskeletal: Normal range of motion. She exhibits no edema.   Neurological: She is alert and oriented to person, place, and time.   Skin: Skin is warm and dry. No rash noted.   Psychiatric: She has a normal mood and affect.   Vitals reviewed.      RECENT LABS:        WBC   Date/Time Value Ref Range Status   12/10/2019 10:50 AM 2.68 (L) 3.40 - 10.80 10*3/mm3 Final     Hemoglobin   Date/Time Value Ref Range Status   12/10/2019 10:50 AM 9.5 (L) 12.0 - 15.9 g/dL Final     Platelets   Date/Time Value Ref Range Status   12/10/2019 10:50  (L) 140 - 450 10*3/mm3 Final       Assessment/Plan   Small cell lung cancer (CMS/HCC)  - CT chest w contrast  - CT abdomen pelvis w contrast    Brain metastases (CMS/HCC)  - CT chest w contrast  - CT abdomen pelvis w contrast    *Extensive stage small cell lung cancer, RUL, 2.4 x 2.3 x 1.7cm, with metastasis to 3 brain lesions (bilateral frontal lobes.)  · RUL mass first seen on CT 3/28/2016, 2 x 1.3 cm.  Decreased to 1.6 cm on 6/27/2016, and decreased again to 1.3 cm on 12/5/2016.  Therefore, this was felt at the time to be a benign resolving nodule.  · During August 2019 hospitalization, found to have brain metastasis from small cell lung cancer.  RUL mass and 3 brain lesions.  No evidence of  disease otherwise  · Craniotomy for the medial left frontal lobe lesion (3.2 cm) , 8/28/2019  · Because the final diagnosis is small cell, Dr. Chacko does not plan lung resection as we typically treat this with chemoradiation  · SRS to 2 remaining brain lesions (first was resected) and to resection cavity of the first lesion, completed 9/18/2019.  Dr. Mayo only planning whole brain XRT if future brain recurrence.  · Usually with small cell I would like to start chemo XRT within a couple of weeks.  However, she is recovering from brain surgery.  Furthermore, this lung lesion has been there for years.  Therefore, weighing risks and benefits I think it is in her best interest to wait almost 4 weeks after craniotomy to begin chemo and radiation.  · PET 9/16/2019: 2.5 cm RUL mass with SUV 15.8.  No hypermetabolic LAD or distant disease.  · On 9/18/2019 completed 5/5 XRT to brain metastasis resection cavity and 1/1 XRT intact brain metastasis #1 and 1/1 XRT due to intact brain metastasis #2.  · 9/19/2019, C1D1 lung mass XRT with  carboplatin, etoposide, Tecentriq, followed by Tecentriq maintenance for extensive stage small cell lung cancer.  Plan a total of 4-6 cycles of chemo depending on tolerability.  · On 10/4/2019, prednisone 0.5 mg/kg/day (40 mg) started due to significant rash from Tecentriq.  4 days later, rash improved but did not resolve.   ·  Plan no more Tecentriq.    · Prednisone tapered off 10/28/2019.  · C2D1 (without Tecentriq) given 10/14/2019.  · MRI brain 10/8/2019 reviewed by Dr. Thibodeaux-he feels this is stable and plan another MRI and appointment with him in 3 months  · Lung mass XRT held the week of 10/28/2019, and again the week of 11/4/2019 due to radiation esophagitis  · Completed lung mass XRT 11/15/2019  · CT CAP 10/29/2019 (after C2): RUL lung mass 2 x 1.3 cm, from 2.5 x 2.5 cm.  · Did not give C3 chemo is planned 11/4/2019 because PLT 91.    · C3 D1 on 11/11/2019 (20% dose reduction of both  drugs)  · Did not receive C4 D1 as planned on 12/2/2019 due to ANC 1330 and PLT 67.  Delayed by 1 week and further dose reduction of both drugs by an additional 20%  · (We will only plan 4 cycles of chemo since she is having difficulty tolerating chemo due to cytopenias).  · 12/10/2019 C4 D1 carboplatin/etoposide, reduced by an additional 20%.  Plans to repeat scans in 2 weeks to evaluate her treatment response.  We will also add Neulasta back with this cycle.    *Goals of care.  · Since the 3 brain lesions were treated with stereotactic radiation and resection of 1 of the lesions and since no disease was found elsewhere, she perhaps has a very slim but possible chance of cure.  However, small cell histology makes the possibility of long-term survival unlikely.  · On 11/4/2019, I reminded her this is highly unlikely curable.  She was having significant issues with radiation esophagitis, limiting nutrition, requiring daily IV fluids.  Radiation will be held an additional week.  She has been miserable and hopefully another week of XRT and a week delay of chemo (PLT <100), and dose reduction of chemo will make further treatment reasonable regarding the importance she is placing on quality of life.     *Neurological deficits due to brain metastasis:  · Some mild confusion and right-sided weakness and urinary incontinence x2 weeks prompted ER visit 8/24/2019.     *Tinnitus and decreased hearing.  Not a cisplatin candidate.    *Port placed 9/6/2019    *Neutropenia due to chemotherapy.  We have not been using Neulasta due to XRT therapy.  Prophylactic Levaquin prescribed 11/25/2019 due to neutropenia.  Add Neulasta with cycle 4    *Thrombocytopenia due to chemotherapy  Platelet count is 111 today.  Continue to monitor.     *This is Ángela Pryor's mother-in-law (APRN with Dr. Thibodeaux)    *She was started on Keppra in the ER when brain mets were discovered.  She states she did not have any seizures.  The neurology notes just  states patient is on Keppra.  They do not recommend continuing or discontinuing Keppra.  For now, we will continue Keppra.  I told her I doubt she has a clear indication for Keppra.  I recommended she discuss this with Dr. Thibodeaux, her neurosurgeon to see if he feels she needs any further Keppra.  In the meantime, we will refill her Keppra.     Plan  · Proceed with cycle 4 carboplatin and etoposide (no more Tecentriq), dose reduced by another 20%.  · Patient will receive Neulasta with this cycle.  · CBC with RN review in 1 in 2 weeks.  · In 2 weeks schedule the patient for CT scan of the chest, abdomen, and pelvis with contrast to evaluate her treatment response.    · Return for follow-up visit with Dr. code in 3 weeks to review her scan results with repeat labs.  · Follow-up MRI brain scheduled 12/26/2019 and Dr. Thibodeaux appointment scheduled 12/31/2019  · Patient plans to be in [place] during the month of February.  · Refill Keppra

## 2019-12-10 NOTE — NURSING NOTE
Pt given copy of lab results.  Tolerated treatment well.  Port remains accessed for treatment tomorrow on 12/11/19.  Blood return noted and flushed.

## 2019-12-11 NOTE — NURSING NOTE
Pt tolerated treatment well. No complaints and feeling well today.  Port remains accessed for treatment tomorrow 12/12/19

## 2019-12-17 NOTE — PROGRESS NOTES
EEG ordered per verbal from Dr. Thibodeaux. We will call her once it is complete and let her know if she can stop her Keppra.

## 2019-12-26 NOTE — PROGRESS NOTES
"Patient was unable to stay for RN review due to a conflicting appt. S/w pt prior to lab draw, pt states she feels \"really good\" and denies any complaints. Called pt and informed her that counts are stable and instructed her to call w/ any concerns. Reviewed future appts, pt v/u.    Lab Results   Component Value Date    WBC 7.32 12/26/2019    HGB 8.8 (L) 12/26/2019    HCT 26.1 (L) 12/26/2019    MCV 96.7 12/26/2019    PLT 77 (L) 12/26/2019     "

## 2019-12-27 NOTE — PROGRESS NOTES
Subjective   Patient ID: Karen Pineda is a 74 y.o. female is here today for follow-up with a new Brain MRI and EEG. Ms. Pineda is now 4 months and 3 days out from a Left frontal/ vertex craniotomy with gross total removal of a left medial frontal lobe mass done on 8/28/2019. Patient is feeling good. Patient denies any headaches, dizziness, vision changes or nausea.      History of Present Illness    This patient returns today.  She really does not have much in the way of complaints at all.  She has no headaches and no change in her vision.  She has no dizziness and no nausea.    The following portions of the patient's history were reviewed and updated as appropriate: allergies, current medications, past family history, past medical history, past social history, past surgical history and problem list.    Review of Systems   Eyes: Negative for visual disturbance.   Respiratory: Negative for chest tightness and shortness of breath.    Cardiovascular: Negative for chest pain.   Neurological: Negative for dizziness, light-headedness and headaches.   All other systems reviewed and are negative.      Objective   Physical Exam   Constitutional: She is oriented to person, place, and time. She appears well-developed and well-nourished.   Neurological: She is oriented to person, place, and time.     Neurologic Exam     Mental Status   Oriented to person, place, and time.       Assessment/Plan   Independent Review of Radiographic Studies:      I reviewed her MRI of the brain which was done on 26 December.  This shows increasing size of a metastasis in the left posterior frontal parietal lobe disappearance of the other smaller metastasis and very little residual enhancement in the area of the previous resection.    I also reviewed an EEG which was normal.    Medical Decision Making:      I told the patient and her  about the imaging.  I told him that from my point of view I would suggest scanning her again  in about 3 months.  I do think she can wean off the Keppra at this point.  I told her to take 1 a day for a week and 1 every other day for a week and then stop.  I explained that this does not guarantee she will not have a seizure but it makes it unlikely enough that it is not worth taking the medication anymore.  I will see her back after the next MRI.    Karen was seen today for follow-up.    Diagnoses and all orders for this visit:    Brain metastases (CMS/HCC)  -     MRI Brain With & Without Contrast; Future      Return in about 3 months (around 3/31/2020).

## 2019-12-27 NOTE — PROGRESS NOTES
.     REASONS FOR FOLLOWUP: Extensive stage small cell lung cancer    HISTORY OF PRESENT ILLNESS:  The patient is a 74 y.o. year old female  who is here for follow-up with the above-mentioned history.    Continues to have a nonproductive cough.  Unchanged.  Slightly more SOA.  However, SOA is not impairing activities.    Past Medical History:   Diagnosis Date   • Anxiety    • Arthritis    • Brain metastases (CMS/HCC) 9/18/2019   • Depression    • GERD (gastroesophageal reflux disease)    • H/O Pulmonary nodule    • Hemoptysis    • History of osteopenia    • Hyperlipidemia    • Nodule of right lung    • Post-menopause    • Sinus problem    • Stress incontinence      Past Surgical History:   Procedure Laterality Date   • BREAST BIOPSY Right    • BRONCHOSCOPY N/A 4/21/2016    Procedure: BRONCHOSCOPY WITH ENDOBRONCHIAL ULTRASOUND, NAVIGATION, BRUSHING,BIOPSIES, LAVAGE, AND TRANSBRONCHIAL NEEDLE ASPIRATION;  Surgeon: Js Lewis MD;  Location: [facility] ENDOSCOPY;  Service:    • COLONOSCOPY     • CRANIOTOMY FOR TUMOR Left 8/28/2019    Procedure: Left frontal craniotomy for tumor;  Surgeon: Ruben Thibodeaux MD;  Location: [facility] MAIN OR;  Service: Neurosurgery   • VENOUS ACCESS DEVICE (PORT) INSERTION Right 9/6/2019    Procedure: INSERTION VENOUS ACCESS DEVICE;  Surgeon: Saroj Chacko III, MD;  Location: [facility] OR;  Service: Thoracic       MEDICATIONS    Current Outpatient Medications:   •  albuterol sulfate  (90 Base) MCG/ACT inhaler, Inhale 2 puffs Every 4 (Four) Hours As Needed for Wheezing or Shortness of Air., Disp: 1 inhaler, Rfl: 0  •  FLUoxetine (PROzac) 20 MG capsule, Take 1 capsule by mouth Daily., Disp: 90 capsule, Rfl: 1  •  levETIRAcetam (KEPPRA) 500 MG tablet, Take 1 tablet by mouth Every 12 (Twelve) Hours., Disp: 60 tablet, Rfl: 2  •  losartan (COZAAR) 50 MG tablet, Take 1 tablet by mouth Daily., Disp: 90 tablet, Rfl: 1  •  pantoprazole (PROTONIX) 40 MG EC tablet, Take 1 tablet by  mouth Daily., Disp: 90 tablet, Rfl: 1  •  aluminum-magnesium hydroxide 200-200 MG/5ML suspension, diphenhydrAMINE 12.5 MG/5ML liquid, Lidocaine Viscous HCl 2 % solution, nystatin 327615 UNIT/ML suspension, Swish and swallow 10 mL 4 (Four) Times a Day Before Meals & at Bedtime., Disp: 400 mL, Rfl: 2  •  guaiFENesin (MUCINEX PO), Take  by mouth., Disp: , Rfl:   •  morphine 100 MG/5ML solution concentrated solution, Take 0.75 mL by mouth Every 4 (Four) Hours As Needed (as needed for pain)., Disp: 60 mL, Rfl: 0  •  NARCAN 4 MG/0.1ML nasal spray, ADMINISTER A SINGLE SPRAY INTRANASALLY INTO ONE NOSTRIL. CALL 911. MAY REPEAT X1., Disp: , Rfl: 0    Current Facility-Administered Medications:   •  sodium chloride 0.9 % infusion 1,000 mL, 1,000 mL, Intravenous, Once, Code, Nicolas ZIMMERMAN II, MD    ALLERGIES:     Allergies   Allergen Reactions   • Sulfa Antibiotics Rash       SOCIAL HISTORY:       Social History     Socioeconomic History   • Marital status:      Spouse name: Juan   • Number of children: Not on file   • Years of education: Not on file   • Highest education level: Not on file   Occupational History     Employer: RETIRED   Tobacco Use   • Smoking status: Former Smoker     Packs/day: 1.00     Years: 50.00     Pack years: 50.00     Last attempt to quit:      Years since quittin.9   • Smokeless tobacco: Never Used   Substance and Sexual Activity   • Alcohol use: No   • Drug use: No   • Sexual activity: Defer         FAMILY HISTORY:  Family History   Problem Relation Age of Onset   • Heart failure Mother    • Coronary artery disease Mother    • Depression Mother    • Heart disease Mother    • Leukemia Father    • Bone cancer Father    • Cancer Paternal Aunt    • Cancer Paternal Uncle        REVIEW OF SYSTEMS:  Review of Systems   Constitutional: Negative for activity change.   HENT: Negative for nosebleeds and trouble swallowing.    Respiratory: Positive for shortness of breath. Negative for wheezing.   "  Cardiovascular: Negative for chest pain and palpitations.   Gastrointestinal: Negative for constipation, diarrhea and nausea.   Genitourinary: Negative for dysuria and hematuria.   Musculoskeletal: Negative for arthralgias and myalgias.   Skin: Negative for rash and wound.   Neurological: Negative for seizures and syncope.   Hematological: Negative for adenopathy. Does not bruise/bleed easily.   Psychiatric/Behavioral: Negative for confusion.            Vitals:    12/30/19 0748   BP: 114/77   Pulse: 74   Resp: 16   Temp: 98.5 °F (36.9 °C)   TempSrc: Oral   SpO2: 97%   Weight: 82.7 kg (182 lb 6.4 oz)   Height: 162.6 cm (64.02\")   PainSc: 0-No pain     Current Status 12/30/2019   ECOG score 0        PHYSICAL EXAM:    CONSTITUTIONAL:  Vital signs reviewed.  No distress, looks comfortable.  EYES:  Conjunctiva and lids unremarkable.  PERRLA  EARS,NOSE,MOUTH,THROAT:  Ears and nose appear unremarkable.  Lips, teeth, gums appear unremarkable.  RESPIRATORY:  Normal respiratory effort.  Lungs clear to auscultation bilaterally.  CARDIOVASCULAR:  Normal S1, S2.  No murmurs rubs or gallops.  No significant lower extremity edema.  GASTROINTESTINAL: Abdomen appears unremarkable.  Nontender.  No hepatomegaly.  No splenomegaly.  LYMPHATIC:  No cervical, supraclavicular, axillary lymphadenopathy.  SKIN:  Warm.  No rashes.  PSYCHIATRIC:  Normal judgment and insight.  Normal mood and affect.      RECENT LABS:        WBC   Date/Time Value Ref Range Status   12/30/2019 07:40 AM 3.17 (L) 3.40 - 10.80 10*3/mm3 Final     Hemoglobin   Date/Time Value Ref Range Status   12/30/2019 07:40 AM 8.5 (L) 12.0 - 15.9 g/dL Final     Platelets   Date/Time Value Ref Range Status   12/30/2019 07:40 AM 92 (L) 140 - 450 10*3/mm3 Final       Assessment/Plan   Small cell lung cancer (CMS/HCC)    *Extensive stage small cell lung cancer, RUL, 2.4 x 2.3 x 1.7cm, with metastasis to 3 brain lesions (bilateral frontal lobes.)  · RUL mass first seen on CT " 3/28/2016, 2 x 1.3 cm.  Decreased to 1.6 cm on 6/27/2016, and decreased again to 1.3 cm on 12/5/2016.  Therefore, this was felt at the time to be a benign resolving nodule.  · During August 2019 hospitalization, found to have brain metastasis from small cell lung cancer.  RUL mass and 3 brain lesions.  No evidence of disease otherwise  · Craniotomy for the medial left frontal lobe lesion (3.2 cm) , 8/28/2019  · Because the final diagnosis is small cell, Dr. Chacko does not plan lung resection as we typically treat this with chemoradiation  · SRS to 2 remaining brain lesions (first was resected) and to resection cavity of the first lesion, completed 9/18/2019.  Dr. Mayo only planning whole brain XRT if future brain recurrence.  · Usually with small cell I would like to start chemo XRT within a couple of weeks.  However, she is recovering from brain surgery.  Furthermore, this lung lesion has been there for years.  Therefore, weighing risks and benefits I think it is in her best interest to wait almost 4 weeks after craniotomy to begin chemo and radiation.  · PET 9/16/2019: 2.5 cm RUL mass with SUV 15.8.  No hypermetabolic LAD or distant disease.  · On 9/18/2019 completed 5/5 XRT to brain metastasis resection cavity and 1/1 XRT intact brain metastasis #1 and 1/1 XRT due to intact brain metastasis #2.  · 9/19/2019, C1D1 lung mass XRT with  carboplatin, etoposide, Tecentriq, followed by Tecentriq maintenance for extensive stage small cell lung cancer.  Plan a total of 4-6 cycles of chemo depending on tolerability.  · On 10/4/2019, prednisone 0.5 mg/kg/day (40 mg) started due to significant rash from Tecentriq.  4 days later, rash improved but did not resolve.   ·  Plan no more Tecentriq.    · Prednisone tapered off 10/28/2019.  · C2D1 (without Tecentriq) given 10/14/2019.  · MRI brain 10/8/2019 reviewed by Dr. Thibodeaux-he feels this is stable and plan another MRI and appointment with him in 3 months  · Lung mass XRT held  the week of 10/28/2019, and again the week of 11/4/2019 due to radiation esophagitis  · Completed lung mass XRT 11/15/2019  · CT CAP 10/29/2019 (after C2): RUL lung mass 2 x 1.3 cm, from 2.5 x 2.5 cm.  · Did not give C3 chemo is planned 11/4/2019 because PLT 91.    · C3 D1 on 11/11/2019 (20% dose reduction of both drugs)  · Did not receive C4 D1 as planned on 12/2/2019 due to ANC 1330 and PLT 67.  Delayed by 1 week and further dose reduction of both drugs by an additional 20%  · (We will only plan 4 cycles of chemo since she is having difficulty tolerating chemo due to cytopenias).  · C4 D1 carbo etoposide, 12/10/2019 (completed first-line chemo).  · CT 12/18/2019 (after C4): No change RUL 2 x 1.3 cm lung mass.   · CT images personally viewed by me  ·  Radiologist noted there might be a tiny thrombus along the right Mediport catheter tip.  No distant disease found.  · MRI brain 12/26/2019: Left frontal lobe metastasis 5 mm, from 1 cm on 10/8/2019.  Right frontal lobe lesion also smaller.  Left parietal lobe dural nodular enhancement 5 mm, unchanged (radiologist notes this might have been a meningioma since it is unchanged).    *Goals of care.  · Since the 3 brain lesions were treated with stereotactic radiation and resection of 1 of the lesions and since no disease was found elsewhere, she perhaps has a very slim but possible chance of cure.  However, small cell histology makes the possibility of long-term survival unlikely.  · On 11/4/2019, I reminded her this is highly unlikely curable.  She was having significant issues with radiation esophagitis, limiting nutrition, requiring daily IV fluids.  Radiation will be held an additional week.  She has been miserable and hopefully another week of XRT and a week delay of chemo (PLT <100), and dose reduction of chemo will make further treatment reasonable regarding the importance she is placing on quality of life.     *Neurological deficits due to brain  metastasis:  · Some mild confusion and right-sided weakness and urinary incontinence x2 weeks prompted ER visit 8/24/2019.     *Tinnitus and decreased hearing.  Not a cisplatin candidate.    *Port placed 9/6/2019    *Neutropenia due to chemotherapy.  We have not been using Neulasta due to XRT therapy.  Prophylactic Levaquin prescribed 11/25/2019 due to neutropenia.  Added Neulasta with cycle 4  ANC 2200 today    *Thrombocytopenia due to chemotherapy  · This caused dose delays and dose reductions of C3 and C4 carbo etoposide  · PLT 92 today    *Anemia.  Hb 8.5 today.     *This is Ángela Pryor's mother-in-law (APRN with Dr. Thibodeaux)    *She was started on Keppra in the ER when brain mets were discovered.  She states she did not have any seizures.  The neurology notes just states patient is on Keppra.  They do not recommend continuing or discontinuing Keppra.  For now, we will continue Keppra.  I told her I doubt she has a clear indication for Keppra.  I recommended she discuss this with Dr. Thibodeaux, her neurosurgeon to see if he feels she needs any further Keppra.  In the meantime, we will refill her Keppra.     Plan  · Port dye study today.  If a thrombus is found, plan Eliquis.  I discussed anticoagulation options with her including the fact that Eliquis is not easily reversible where his Coumadin is.  I discussed anticoagulation can have life-threatening bleeding complications.  She wants Eliquis if clot is found.  · She plans to be in [place] the month of February.  · Dr. Thibodeaux appointment scheduled 12/31/2019  · MD 3 months.  CT CAP with contrast, MRI brain with and without contrast 1 week prior  · Port flush 6 weeks     assisted with history.    Addendum  Discussed with Dr. Renny pinzon.  He sees no signs of a clot on the port dye study.  He states the port is functioning well.  However, it has flipped and is pointing out laterally.  He recommends I discussed with Dr. Chacko to see if he feels the port needs  to be replaced or not.  I sent a message to Dr. Chacko.  I also told him it is possible she might not need a port for quite some time.  Although this is small cell lung cancer, which usually grows very quickly, her cancer in the past has grown very slowly, over the course of years.

## 2019-12-30 NOTE — TELEPHONE ENCOUNTER
Patient notified v/u.       ----- Message from Nicolas Katz II, MD sent at 12/30/2019  3:18 PM EST -----  Please call the patient and tell her that the port dye study shows no signs of a clot.  Therefore, she does not need to begin blood thinners.    Tell her the port is working well but it has changed position.  Therefore, I am going to send a message to Dr. Chacko to see if he feels she needs the port replaced or not.  However, no need for anticoagulation.

## 2020-01-01 ENCOUNTER — APPOINTMENT (OUTPATIENT)
Dept: PREADMISSION TESTING | Facility: HOSPITAL | Age: 75
End: 2020-01-01

## 2020-01-01 ENCOUNTER — HOSPITAL ENCOUNTER (OUTPATIENT)
Facility: HOSPITAL | Age: 75
Setting detail: HOSPITAL OUTPATIENT SURGERY
Discharge: HOME | End: 2020-06-01
Attending: THORACIC SURGERY (CARDIOTHORACIC VASCULAR SURGERY) | Admitting: THORACIC SURGERY (CARDIOTHORACIC VASCULAR SURGERY)

## 2020-01-01 ENCOUNTER — APPOINTMENT (OUTPATIENT)
Dept: ONCOLOGY | Facility: HOSPITAL | Age: 75
End: 2020-01-01

## 2020-01-01 ENCOUNTER — INFUSION (OUTPATIENT)
Dept: ONCOLOGY | Facility: HOSPITAL | Age: 75
End: 2020-01-01

## 2020-01-01 ENCOUNTER — HOSPITAL ENCOUNTER (OUTPATIENT)
Dept: GENERAL RADIOLOGY | Facility: HOSPITAL | Age: 75
Discharge: HOME | End: 2020-06-17
Admitting: THORACIC SURGERY (CARDIOTHORACIC VASCULAR SURGERY)

## 2020-01-01 ENCOUNTER — ANESTHESIA (OUTPATIENT)
Dept: PERIOP | Facility: HOSPITAL | Age: 75
End: 2020-01-01

## 2020-01-01 ENCOUNTER — DOCUMENTATION (OUTPATIENT)
Dept: RADIATION ONCOLOGY | Facility: HOSPITAL | Age: 75
End: 2020-01-01

## 2020-01-01 ENCOUNTER — OFFICE VISIT (OUTPATIENT)
Dept: RADIATION ONCOLOGY | Facility: HOSPITAL | Age: 75
End: 2020-01-01

## 2020-01-01 ENCOUNTER — READMISSION MANAGEMENT (OUTPATIENT)
Dept: CALL CENTER | Facility: HOSPITAL | Age: 75
End: 2020-01-01

## 2020-01-01 ENCOUNTER — TELEMEDICINE (OUTPATIENT)
Dept: FAMILY MEDICINE CLINIC | Facility: CLINIC | Age: 75
End: 2020-01-01

## 2020-01-01 ENCOUNTER — TELEPHONE (OUTPATIENT)
Dept: NEUROSURGERY | Facility: CLINIC | Age: 75
End: 2020-01-01

## 2020-01-01 ENCOUNTER — HOSPITAL ENCOUNTER (OUTPATIENT)
Dept: GENERAL RADIOLOGY | Facility: HOSPITAL | Age: 75
Discharge: HOME | End: 2020-03-03
Admitting: THORACIC SURGERY (CARDIOTHORACIC VASCULAR SURGERY)

## 2020-01-01 ENCOUNTER — APPOINTMENT (OUTPATIENT)
Dept: CT IMAGING | Facility: HOSPITAL | Age: 75
End: 2020-01-01

## 2020-01-01 ENCOUNTER — TELEPHONE (OUTPATIENT)
Dept: ONCOLOGY | Facility: CLINIC | Age: 75
End: 2020-01-01

## 2020-01-01 ENCOUNTER — LAB (OUTPATIENT)
Dept: LAB | Facility: HOSPITAL | Age: 75
End: 2020-01-01

## 2020-01-01 ENCOUNTER — CLINICAL SUPPORT (OUTPATIENT)
Dept: ONCOLOGY | Facility: HOSPITAL | Age: 75
End: 2020-01-01

## 2020-01-01 ENCOUNTER — HOSPITAL ENCOUNTER (OUTPATIENT)
Dept: NEUROLOGY | Facility: HOSPITAL | Age: 75
Discharge: HOME | End: 2020-05-29
Admitting: NEUROLOGICAL SURGERY

## 2020-01-01 ENCOUNTER — TELEPHONE (OUTPATIENT)
Dept: ONCOLOGY | Facility: HOSPITAL | Age: 75
End: 2020-01-01

## 2020-01-01 ENCOUNTER — TELEPHONE (OUTPATIENT)
Dept: RADIATION ONCOLOGY | Facility: HOSPITAL | Age: 75
End: 2020-01-01

## 2020-01-01 ENCOUNTER — TELEMEDICINE (OUTPATIENT)
Dept: ONCOLOGY | Facility: CLINIC | Age: 75
End: 2020-01-01

## 2020-01-01 ENCOUNTER — DOCUMENTATION (OUTPATIENT)
Dept: ONCOLOGY | Facility: CLINIC | Age: 75
End: 2020-01-01

## 2020-01-01 ENCOUNTER — HOSPITAL ENCOUNTER (OUTPATIENT)
Dept: MRI IMAGING | Facility: HOSPITAL | Age: 75
Discharge: HOME | End: 2020-04-02
Admitting: INTERNAL MEDICINE

## 2020-01-01 ENCOUNTER — OFFICE VISIT (OUTPATIENT)
Dept: ONCOLOGY | Facility: CLINIC | Age: 75
End: 2020-01-01

## 2020-01-01 ENCOUNTER — RADIATION ONCOLOGY WEEKLY ASSESSMENT (OUTPATIENT)
Dept: RADIATION ONCOLOGY | Facility: HOSPITAL | Age: 75
End: 2020-01-01

## 2020-01-01 ENCOUNTER — DOCUMENTATION (OUTPATIENT)
Dept: OTHER | Facility: HOSPITAL | Age: 75
End: 2020-01-01

## 2020-01-01 ENCOUNTER — APPOINTMENT (OUTPATIENT)
Dept: LAB | Facility: HOSPITAL | Age: 75
End: 2020-01-01

## 2020-01-01 ENCOUNTER — HOSPITAL ENCOUNTER (OUTPATIENT)
Dept: MRI IMAGING | Facility: HOSPITAL | Age: 75
Discharge: HOME | End: 2020-05-07
Admitting: NURSE PRACTITIONER

## 2020-01-01 ENCOUNTER — ANESTHESIA EVENT (OUTPATIENT)
Dept: PERIOP | Facility: HOSPITAL | Age: 75
End: 2020-01-01

## 2020-01-01 ENCOUNTER — TRANSITIONAL CARE MANAGEMENT TELEPHONE ENCOUNTER (OUTPATIENT)
Dept: FAMILY MEDICINE CLINIC | Facility: CLINIC | Age: 75
End: 2020-01-01

## 2020-01-01 ENCOUNTER — HOSPITAL ENCOUNTER (INPATIENT)
Dept: MRI IMAGING | Facility: HOSPITAL | Age: 75
LOS: 3 days | Discharge: HOME | End: 2020-03-10
Attending: INTERNAL MEDICINE | Admitting: INTERNAL MEDICINE

## 2020-01-01 ENCOUNTER — APPOINTMENT (OUTPATIENT)
Dept: GENERAL RADIOLOGY | Facility: HOSPITAL | Age: 75
End: 2020-01-01

## 2020-01-01 ENCOUNTER — PREP FOR SURGERY (OUTPATIENT)
Dept: OTHER | Facility: HOSPITAL | Age: 75
End: 2020-01-01

## 2020-01-01 ENCOUNTER — OFFICE VISIT (OUTPATIENT)
Dept: NEUROSURGERY | Facility: CLINIC | Age: 75
End: 2020-01-01

## 2020-01-01 ENCOUNTER — HOSPITAL ENCOUNTER (OUTPATIENT)
Dept: PET IMAGING | Facility: HOSPITAL | Age: 75
Discharge: HOME | End: 2020-05-13
Admitting: INTERNAL MEDICINE

## 2020-01-01 ENCOUNTER — OFFICE VISIT (OUTPATIENT)
Dept: SURGERY | Facility: CLINIC | Age: 75
End: 2020-01-01

## 2020-01-01 ENCOUNTER — HOSPITAL ENCOUNTER (OUTPATIENT)
Dept: MRI IMAGING | Facility: HOSPITAL | Age: 75
Discharge: HOME | End: 2020-07-01
Admitting: NEUROLOGICAL SURGERY

## 2020-01-01 ENCOUNTER — APPOINTMENT (OUTPATIENT)
Dept: NEUROLOGY | Facility: HOSPITAL | Age: 75
End: 2020-01-01

## 2020-01-01 ENCOUNTER — HOSPITAL ENCOUNTER (OUTPATIENT)
Dept: PET IMAGING | Facility: HOSPITAL | Age: 75
Discharge: HOME | End: 2020-07-08
Admitting: INTERNAL MEDICINE

## 2020-01-01 ENCOUNTER — APPOINTMENT (OUTPATIENT)
Dept: RADIATION ONCOLOGY | Facility: HOSPITAL | Age: 75
End: 2020-01-01

## 2020-01-01 ENCOUNTER — HOSPITAL ENCOUNTER (OUTPATIENT)
Dept: GENERAL RADIOLOGY | Facility: HOSPITAL | Age: 75
Discharge: HOME | End: 2020-01-08
Admitting: NURSE PRACTITIONER

## 2020-01-01 VITALS
SYSTOLIC BLOOD PRESSURE: 124 MMHG | TEMPERATURE: 98 F | BODY MASS INDEX: 30.88 KG/M2 | OXYGEN SATURATION: 100 % | WEIGHT: 180 LBS | DIASTOLIC BLOOD PRESSURE: 74 MMHG | HEART RATE: 80 BPM

## 2020-01-01 VITALS
DIASTOLIC BLOOD PRESSURE: 76 MMHG | TEMPERATURE: 97.5 F | OXYGEN SATURATION: 98 % | WEIGHT: 174.5 LBS | HEIGHT: 64 IN | HEART RATE: 60 BPM | BODY MASS INDEX: 29.79 KG/M2 | SYSTOLIC BLOOD PRESSURE: 124 MMHG | RESPIRATION RATE: 14 BRPM

## 2020-01-01 VITALS
DIASTOLIC BLOOD PRESSURE: 62 MMHG | SYSTOLIC BLOOD PRESSURE: 109 MMHG | HEART RATE: 81 BPM | BODY MASS INDEX: 28.51 KG/M2 | OXYGEN SATURATION: 98 % | WEIGHT: 167 LBS | TEMPERATURE: 97.3 F | HEIGHT: 64 IN

## 2020-01-01 VITALS
SYSTOLIC BLOOD PRESSURE: 158 MMHG | RESPIRATION RATE: 16 BRPM | OXYGEN SATURATION: 97 % | HEIGHT: 64 IN | WEIGHT: 161.4 LBS | TEMPERATURE: 97.5 F | HEART RATE: 68 BPM | BODY MASS INDEX: 27.55 KG/M2 | DIASTOLIC BLOOD PRESSURE: 87 MMHG

## 2020-01-01 VITALS
HEART RATE: 89 BPM | OXYGEN SATURATION: 95 % | TEMPERATURE: 97.4 F | DIASTOLIC BLOOD PRESSURE: 68 MMHG | SYSTOLIC BLOOD PRESSURE: 111 MMHG

## 2020-01-01 VITALS
DIASTOLIC BLOOD PRESSURE: 76 MMHG | OXYGEN SATURATION: 97 % | RESPIRATION RATE: 18 BRPM | HEIGHT: 64 IN | SYSTOLIC BLOOD PRESSURE: 135 MMHG | BODY MASS INDEX: 28.46 KG/M2 | TEMPERATURE: 97.7 F | WEIGHT: 166.7 LBS | HEART RATE: 66 BPM

## 2020-01-01 VITALS
SYSTOLIC BLOOD PRESSURE: 94 MMHG | DIASTOLIC BLOOD PRESSURE: 59 MMHG | WEIGHT: 170 LBS | HEART RATE: 75 BPM | BODY MASS INDEX: 29.18 KG/M2 | OXYGEN SATURATION: 97 % | TEMPERATURE: 99 F

## 2020-01-01 VITALS
RESPIRATION RATE: 16 BRPM | HEIGHT: 64 IN | DIASTOLIC BLOOD PRESSURE: 74 MMHG | WEIGHT: 166 LBS | OXYGEN SATURATION: 98 % | BODY MASS INDEX: 28.34 KG/M2 | HEART RATE: 88 BPM | SYSTOLIC BLOOD PRESSURE: 117 MMHG | TEMPERATURE: 97.1 F

## 2020-01-01 VITALS
WEIGHT: 180 LBS | HEIGHT: 64 IN | DIASTOLIC BLOOD PRESSURE: 64 MMHG | HEART RATE: 83 BPM | BODY MASS INDEX: 30.73 KG/M2 | OXYGEN SATURATION: 98 % | SYSTOLIC BLOOD PRESSURE: 108 MMHG

## 2020-01-01 VITALS
SYSTOLIC BLOOD PRESSURE: 142 MMHG | HEART RATE: 111 BPM | DIASTOLIC BLOOD PRESSURE: 86 MMHG | TEMPERATURE: 97.7 F | OXYGEN SATURATION: 99 %

## 2020-01-01 VITALS
TEMPERATURE: 97.2 F | HEART RATE: 60 BPM | DIASTOLIC BLOOD PRESSURE: 80 MMHG | OXYGEN SATURATION: 98 % | HEIGHT: 64 IN | RESPIRATION RATE: 18 BRPM | WEIGHT: 175.4 LBS | BODY MASS INDEX: 29.94 KG/M2 | SYSTOLIC BLOOD PRESSURE: 133 MMHG

## 2020-01-01 VITALS
WEIGHT: 173 LBS | HEART RATE: 70 BPM | BODY MASS INDEX: 29.7 KG/M2 | DIASTOLIC BLOOD PRESSURE: 80 MMHG | SYSTOLIC BLOOD PRESSURE: 126 MMHG | OXYGEN SATURATION: 98 %

## 2020-01-01 VITALS
BODY MASS INDEX: 29.94 KG/M2 | WEIGHT: 175.4 LBS | SYSTOLIC BLOOD PRESSURE: 112 MMHG | HEIGHT: 64 IN | HEART RATE: 74 BPM | DIASTOLIC BLOOD PRESSURE: 66 MMHG | OXYGEN SATURATION: 100 %

## 2020-01-01 VITALS — SYSTOLIC BLOOD PRESSURE: 108 MMHG | DIASTOLIC BLOOD PRESSURE: 75 MMHG

## 2020-01-01 VITALS — DIASTOLIC BLOOD PRESSURE: 65 MMHG | SYSTOLIC BLOOD PRESSURE: 148 MMHG | TEMPERATURE: 98.4 F | HEART RATE: 69 BPM

## 2020-01-01 DIAGNOSIS — R05.9 COUGH: ICD-10-CM

## 2020-01-01 DIAGNOSIS — I95.9 HYPOTENSION, UNSPECIFIED HYPOTENSION TYPE: Primary | ICD-10-CM

## 2020-01-01 DIAGNOSIS — C79.31 BRAIN METASTASES: Primary | ICD-10-CM

## 2020-01-01 DIAGNOSIS — C79.31 BRAIN METASTASES: ICD-10-CM

## 2020-01-01 DIAGNOSIS — F41.9 ANXIETY AND DEPRESSION: ICD-10-CM

## 2020-01-01 DIAGNOSIS — C34.90 SMALL CELL LUNG CANCER (HCC): ICD-10-CM

## 2020-01-01 DIAGNOSIS — I95.9 HYPOTENSION, UNSPECIFIED HYPOTENSION TYPE: ICD-10-CM

## 2020-01-01 DIAGNOSIS — Z45.2 ENCOUNTER FOR FITTING AND ADJUSTMENT OF VASCULAR CATHETER: Primary | ICD-10-CM

## 2020-01-01 DIAGNOSIS — Z45.2 ENCOUNTER FOR FITTING AND ADJUSTMENT OF VASCULAR CATHETER: ICD-10-CM

## 2020-01-01 DIAGNOSIS — C34.90 MALIGNANT NEOPLASM OF BRONCHUS AND LUNG (HCC): Primary | ICD-10-CM

## 2020-01-01 DIAGNOSIS — C34.90 SMALL CELL LUNG CANCER (HCC): Primary | ICD-10-CM

## 2020-01-01 DIAGNOSIS — F32.A ANXIETY AND DEPRESSION: ICD-10-CM

## 2020-01-01 DIAGNOSIS — B02.29 POSTHERPETIC NEURALGIA: Primary | ICD-10-CM

## 2020-01-01 DIAGNOSIS — I10 ESSENTIAL HYPERTENSION: Primary | ICD-10-CM

## 2020-01-01 DIAGNOSIS — H91.91 HEARING DECREASED, RIGHT: Primary | ICD-10-CM

## 2020-01-01 DIAGNOSIS — Z09 SURGERY FOLLOW-UP EXAMINATION: ICD-10-CM

## 2020-01-01 DIAGNOSIS — R79.1 ABNORMAL COAGULATION PROFILE: ICD-10-CM

## 2020-01-01 DIAGNOSIS — B02.9 HERPES ZOSTER WITHOUT COMPLICATION: ICD-10-CM

## 2020-01-01 DIAGNOSIS — H91.91 HEARING DECREASED, RIGHT: ICD-10-CM

## 2020-01-01 DIAGNOSIS — C34.90 MALIGNANT NEOPLASM OF BRONCHUS AND LUNG (HCC): ICD-10-CM

## 2020-01-01 DIAGNOSIS — R42 DIZZINESS: ICD-10-CM

## 2020-01-01 LAB
ALBUMIN SERPL-MCNC: 3.5 G/DL (ref 3.5–5.2)
ALBUMIN SERPL-MCNC: 3.7 G/DL (ref 3.5–5.2)
ALBUMIN SERPL-MCNC: 3.8 G/DL (ref 3.5–5.2)
ALBUMIN SERPL-MCNC: 3.9 G/DL (ref 3.5–5.2)
ALBUMIN SERPL-MCNC: 4 G/DL (ref 3.5–5.2)
ALBUMIN SERPL-MCNC: 4 G/DL (ref 3.5–5.2)
ALBUMIN SERPL-MCNC: 4.1 G/DL (ref 3.5–5.2)
ALBUMIN SERPL-MCNC: 4.4 G/DL (ref 3.5–5.2)
ALBUMIN/GLOB SERPL: 1.5 G/DL
ALBUMIN/GLOB SERPL: 1.5 G/DL (ref 1.1–2.4)
ALBUMIN/GLOB SERPL: 1.7 G/DL
ALBUMIN/GLOB SERPL: 1.7 G/DL
ALBUMIN/GLOB SERPL: 1.7 G/DL (ref 1.1–2.4)
ALBUMIN/GLOB SERPL: 1.8 G/DL
ALBUMIN/GLOB SERPL: 1.8 G/DL
ALBUMIN/GLOB SERPL: 1.9 G/DL
ALP SERPL-CCNC: 59 U/L (ref 38–116)
ALP SERPL-CCNC: 72 U/L (ref 38–116)
ALP SERPL-CCNC: 72 U/L (ref 39–117)
ALP SERPL-CCNC: 75 U/L (ref 39–117)
ALP SERPL-CCNC: 80 U/L (ref 39–117)
ALP SERPL-CCNC: 80 U/L (ref 39–117)
ALP SERPL-CCNC: 81 U/L (ref 39–117)
ALP SERPL-CCNC: 91 U/L (ref 39–117)
ALT SERPL W P-5'-P-CCNC: 11 U/L (ref 1–33)
ALT SERPL W P-5'-P-CCNC: 12 U/L (ref 0–33)
ALT SERPL W P-5'-P-CCNC: 12 U/L (ref 1–33)
ALT SERPL W P-5'-P-CCNC: 12 U/L (ref 1–33)
ALT SERPL W P-5'-P-CCNC: 14 U/L (ref 1–33)
ALT SERPL W P-5'-P-CCNC: 15 U/L (ref 1–33)
ALT SERPL W P-5'-P-CCNC: 17 U/L (ref 1–33)
ALT SERPL W P-5'-P-CCNC: 22 U/L (ref 0–33)
ANION GAP SERPL CALCULATED.3IONS-SCNC: 10.9 MMOL/L (ref 5–15)
ANION GAP SERPL CALCULATED.3IONS-SCNC: 11 MMOL/L (ref 5–15)
ANION GAP SERPL CALCULATED.3IONS-SCNC: 11.2 MMOL/L (ref 5–15)
ANION GAP SERPL CALCULATED.3IONS-SCNC: 11.8 MMOL/L (ref 5–15)
ANION GAP SERPL CALCULATED.3IONS-SCNC: 11.9 MMOL/L (ref 5–15)
ANION GAP SERPL CALCULATED.3IONS-SCNC: 12.5 MMOL/L (ref 5–15)
ANION GAP SERPL CALCULATED.3IONS-SCNC: 12.9 MMOL/L (ref 5–15)
ANION GAP SERPL CALCULATED.3IONS-SCNC: 13.8 MMOL/L (ref 5–15)
AST SERPL-CCNC: 11 U/L (ref 1–32)
AST SERPL-CCNC: 12 U/L (ref 1–32)
AST SERPL-CCNC: 13 U/L (ref 1–32)
AST SERPL-CCNC: 14 U/L (ref 1–32)
AST SERPL-CCNC: 14 U/L (ref 1–32)
AST SERPL-CCNC: 15 U/L (ref 0–32)
AST SERPL-CCNC: 16 U/L (ref 0–32)
AST SERPL-CCNC: 17 U/L (ref 1–32)
BASOPHILS # BLD AUTO: 0 10*3/MM3 (ref 0–0.2)
BASOPHILS # BLD AUTO: 0.02 10*3/MM3 (ref 0–0.2)
BASOPHILS # BLD AUTO: 0.02 10*3/MM3 (ref 0–0.2)
BASOPHILS # BLD AUTO: 0.04 10*3/MM3 (ref 0–0.2)
BASOPHILS # BLD AUTO: 0.04 10*3/MM3 (ref 0–0.2)
BASOPHILS # BLD AUTO: 0.05 10*3/MM3 (ref 0–0.2)
BASOPHILS # BLD AUTO: 0.05 10*3/MM3 (ref 0–0.2)
BASOPHILS # BLD MANUAL: 0.03 10*3/MM3 (ref 0–0.2)
BASOPHILS NFR BLD AUTO: 0 % (ref 0–1.5)
BASOPHILS NFR BLD AUTO: 0.2 % (ref 0–1.5)
BASOPHILS NFR BLD AUTO: 0.5 % (ref 0–1.5)
BASOPHILS NFR BLD AUTO: 0.9 % (ref 0–1.5)
BASOPHILS NFR BLD AUTO: 1 % (ref 0–1.5)
BASOPHILS NFR BLD AUTO: 1 % (ref 0–1.5)
BASOPHILS NFR BLD AUTO: 1.1 % (ref 0–1.5)
BASOPHILS NFR BLD AUTO: 1.4 % (ref 0–1.5)
BILIRUB SERPL-MCNC: 0.2 MG/DL (ref 0.2–1.2)
BILIRUB SERPL-MCNC: 0.3 MG/DL (ref 0.1–1.2)
BILIRUB SERPL-MCNC: 0.3 MG/DL (ref 0.2–1.2)
BILIRUB SERPL-MCNC: 0.5 MG/DL (ref 0.2–1.2)
BUN BLD-MCNC: 11 MG/DL (ref 8–23)
BUN BLD-MCNC: 17 MG/DL (ref 8–23)
BUN BLD-MCNC: 20 MG/DL (ref 6–20)
BUN BLD-MCNC: 21 MG/DL (ref 8–23)
BUN BLD-MCNC: 22 MG/DL (ref 8–23)
BUN BLD-MCNC: 8 MG/DL (ref 8–23)
BUN BLD-MCNC: 9 MG/DL (ref 8–23)
BUN SERPL-MCNC: 11 MG/DL (ref 6–20)
BUN/CREAT SERPL: 12.1 (ref 7.3–30)
BUN/CREAT SERPL: 13.1 (ref 7–25)
BUN/CREAT SERPL: 18.9 (ref 7–25)
BUN/CREAT SERPL: 23.3 (ref 7.3–30)
BUN/CREAT SERPL: 23.9 (ref 7–25)
BUN/CREAT SERPL: 26.6 (ref 7–25)
BUN/CREAT SERPL: 8.7 (ref 7–25)
BUN/CREAT SERPL: 9.5 (ref 7–25)
CALCIUM SPEC-SCNC: 10.2 MG/DL (ref 8.5–10.2)
CALCIUM SPEC-SCNC: 8.8 MG/DL (ref 8.6–10.5)
CALCIUM SPEC-SCNC: 9.1 MG/DL (ref 8.6–10.5)
CALCIUM SPEC-SCNC: 9.2 MG/DL (ref 8.5–10.2)
CALCIUM SPEC-SCNC: 9.4 MG/DL (ref 8.6–10.5)
CALCIUM SPEC-SCNC: 9.4 MG/DL (ref 8.6–10.5)
CALCIUM SPEC-SCNC: 9.5 MG/DL (ref 8.6–10.5)
CALCIUM SPEC-SCNC: 9.9 MG/DL (ref 8.6–10.5)
CHLORIDE SERPL-SCNC: 100 MMOL/L (ref 98–107)
CHLORIDE SERPL-SCNC: 100 MMOL/L (ref 98–107)
CHLORIDE SERPL-SCNC: 103 MMOL/L (ref 98–107)
CHLORIDE SERPL-SCNC: 104 MMOL/L (ref 98–107)
CHLORIDE SERPL-SCNC: 105 MMOL/L (ref 98–107)
CHLORIDE SERPL-SCNC: 105 MMOL/L (ref 98–107)
CHLORIDE SERPL-SCNC: 106 MMOL/L (ref 98–107)
CHLORIDE SERPL-SCNC: 98 MMOL/L (ref 98–107)
CO2 SERPL-SCNC: 23.8 MMOL/L (ref 22–29)
CO2 SERPL-SCNC: 24.1 MMOL/L (ref 22–29)
CO2 SERPL-SCNC: 25 MMOL/L (ref 22–29)
CO2 SERPL-SCNC: 25.1 MMOL/L (ref 22–29)
CO2 SERPL-SCNC: 25.1 MMOL/L (ref 22–29)
CO2 SERPL-SCNC: 26.2 MMOL/L (ref 22–29)
CO2 SERPL-SCNC: 26.2 MMOL/L (ref 22–29)
CO2 SERPL-SCNC: 26.5 MMOL/L (ref 22–29)
CREAT BLD-MCNC: 0.79 MG/DL (ref 0.57–1)
CREAT BLD-MCNC: 0.84 MG/DL (ref 0.57–1)
CREAT BLD-MCNC: 0.86 MG/DL (ref 0.6–1.1)
CREAT BLD-MCNC: 0.9 MG/DL (ref 0.57–1)
CREAT BLD-MCNC: 0.92 MG/DL (ref 0.57–1)
CREAT BLD-MCNC: 0.92 MG/DL (ref 0.57–1)
CREAT BLD-MCNC: 0.95 MG/DL (ref 0.57–1)
CREAT BLDA-MCNC: 0.8 MG/DL (ref 0.6–1.3)
CREAT BLDA-MCNC: 0.8 MG/DL (ref 0.6–1.3)
CREAT BLDA-MCNC: 0.9 MG/DL (ref 0.6–1.3)
CREAT SERPL-MCNC: 0.91 MG/DL (ref 0.6–1.1)
DEPRECATED RDW RBC AUTO: 39.8 FL (ref 37–54)
DEPRECATED RDW RBC AUTO: 39.8 FL (ref 37–54)
DEPRECATED RDW RBC AUTO: 40.2 FL (ref 37–54)
DEPRECATED RDW RBC AUTO: 40.5 FL (ref 37–54)
DEPRECATED RDW RBC AUTO: 40.5 FL (ref 37–54)
DEPRECATED RDW RBC AUTO: 42.5 FL (ref 37–54)
DEPRECATED RDW RBC AUTO: 45.2 FL (ref 37–54)
DEPRECATED RDW RBC AUTO: 52 FL (ref 37–54)
DEPRECATED RDW RBC AUTO: 53.9 FL (ref 37–54)
DEPRECATED RDW RBC AUTO: 57.8 FL (ref 37–54)
EOSINOPHIL # BLD AUTO: 0 10*3/MM3 (ref 0–0.4)
EOSINOPHIL # BLD AUTO: 0.02 10*3/MM3 (ref 0–0.4)
EOSINOPHIL # BLD AUTO: 0.03 10*3/MM3 (ref 0–0.4)
EOSINOPHIL # BLD AUTO: 0.04 10*3/MM3 (ref 0–0.4)
EOSINOPHIL NFR BLD AUTO: 0 % (ref 0.3–6.2)
EOSINOPHIL NFR BLD AUTO: 0.5 % (ref 0.3–6.2)
EOSINOPHIL NFR BLD AUTO: 0.6 % (ref 0.3–6.2)
EOSINOPHIL NFR BLD AUTO: 0.9 % (ref 0.3–6.2)
ERYTHROCYTE [DISTWIDTH] IN BLOOD BY AUTOMATED COUNT: 12.2 % (ref 12.3–15.4)
ERYTHROCYTE [DISTWIDTH] IN BLOOD BY AUTOMATED COUNT: 12.3 % (ref 12.3–15.4)
ERYTHROCYTE [DISTWIDTH] IN BLOOD BY AUTOMATED COUNT: 12.3 % (ref 12.3–15.4)
ERYTHROCYTE [DISTWIDTH] IN BLOOD BY AUTOMATED COUNT: 12.4 % (ref 12.3–15.4)
ERYTHROCYTE [DISTWIDTH] IN BLOOD BY AUTOMATED COUNT: 12.5 % (ref 12.3–15.4)
ERYTHROCYTE [DISTWIDTH] IN BLOOD BY AUTOMATED COUNT: 14.8 % (ref 12.3–15.4)
ERYTHROCYTE [DISTWIDTH] IN BLOOD BY AUTOMATED COUNT: 16.3 % (ref 12.3–15.4)
ERYTHROCYTE [DISTWIDTH] IN BLOOD BY AUTOMATED COUNT: 16.4 % (ref 12.3–15.4)
FERRITIN SERPL-MCNC: 379 NG/ML (ref 13–150)
FOLATE SERPL-MCNC: 2.96 NG/ML (ref 4.78–24.2)
GFR SERPL CREATININE-BSD FRML MDRD: 58 ML/MIN/1.73
GFR SERPL CREATININE-BSD FRML MDRD: 60 ML/MIN/1.73
GFR SERPL CREATININE-BSD FRML MDRD: 60 ML/MIN/1.73
GFR SERPL CREATININE-BSD FRML MDRD: 61 ML/MIN/1.73
GFR SERPL CREATININE-BSD FRML MDRD: 62 ML/MIN/1.73
GFR SERPL CREATININE-BSD FRML MDRD: 65 ML/MIN/1.73
GFR SERPL CREATININE-BSD FRML MDRD: 67 ML/MIN/1.73
GFR SERPL CREATININE-BSD FRML MDRD: 72 ML/MIN/1.73
GLOBULIN UR ELPH-MCNC: 2.1 GM/DL
GLOBULIN UR ELPH-MCNC: 2.3 GM/DL
GLOBULIN UR ELPH-MCNC: 2.4 GM/DL
GLOBULIN UR ELPH-MCNC: 2.4 GM/DL (ref 1.8–3.5)
GLOBULIN UR ELPH-MCNC: 2.5 GM/DL
GLOBULIN UR ELPH-MCNC: 2.8 GM/DL (ref 1.8–3.5)
GLUCOSE BLD-MCNC: 107 MG/DL (ref 74–124)
GLUCOSE BLD-MCNC: 118 MG/DL (ref 65–99)
GLUCOSE BLD-MCNC: 130 MG/DL (ref 65–99)
GLUCOSE BLD-MCNC: 143 MG/DL (ref 65–99)
GLUCOSE BLD-MCNC: 149 MG/DL (ref 65–99)
GLUCOSE BLD-MCNC: 160 MG/DL (ref 65–99)
GLUCOSE BLD-MCNC: 88 MG/DL (ref 65–99)
GLUCOSE SERPL-MCNC: 113 MG/DL (ref 74–124)
HCT VFR BLD AUTO: 27.2 % (ref 34–46.6)
HCT VFR BLD AUTO: 30.5 % (ref 34–46.6)
HCT VFR BLD AUTO: 32.7 % (ref 34–46.6)
HCT VFR BLD AUTO: 32.7 % (ref 34–46.6)
HCT VFR BLD AUTO: 33.3 % (ref 34–46.6)
HCT VFR BLD AUTO: 34.2 % (ref 34–46.6)
HCT VFR BLD AUTO: 34.8 % (ref 34–46.6)
HCT VFR BLD AUTO: 35 % (ref 34–46.6)
HCT VFR BLD AUTO: 35.9 % (ref 34–46.6)
HCT VFR BLD AUTO: 36.1 % (ref 34–46.6)
HGB BLD-MCNC: 10.3 G/DL (ref 12–15.9)
HGB BLD-MCNC: 11.2 G/DL (ref 12–15.9)
HGB BLD-MCNC: 11.5 G/DL (ref 12–15.9)
HGB BLD-MCNC: 11.7 G/DL (ref 12–15.9)
HGB BLD-MCNC: 11.8 G/DL (ref 12–15.9)
HGB BLD-MCNC: 11.8 G/DL (ref 12–15.9)
HGB BLD-MCNC: 12.1 G/DL (ref 12–15.9)
HGB BLD-MCNC: 9.3 G/DL (ref 12–15.9)
IMM GRANULOCYTES # BLD AUTO: 0.01 10*3/MM3 (ref 0–0.05)
IMM GRANULOCYTES # BLD AUTO: 0.02 10*3/MM3 (ref 0–0.05)
IMM GRANULOCYTES # BLD AUTO: 0.03 10*3/MM3 (ref 0–0.05)
IMM GRANULOCYTES # BLD AUTO: 0.05 10*3/MM3 (ref 0–0.05)
IMM GRANULOCYTES # BLD AUTO: 0.06 10*3/MM3 (ref 0–0.05)
IMM GRANULOCYTES # BLD AUTO: 0.3 10*3/MM3 (ref 0–0.05)
IMM GRANULOCYTES NFR BLD AUTO: 0.2 % (ref 0–0.5)
IMM GRANULOCYTES NFR BLD AUTO: 0.2 % (ref 0–0.5)
IMM GRANULOCYTES NFR BLD AUTO: 0.3 % (ref 0–0.5)
IMM GRANULOCYTES NFR BLD AUTO: 0.3 % (ref 0–0.5)
IMM GRANULOCYTES NFR BLD AUTO: 0.4 % (ref 0–0.5)
IMM GRANULOCYTES NFR BLD AUTO: 0.5 % (ref 0–0.5)
IMM GRANULOCYTES NFR BLD AUTO: 0.6 % (ref 0–0.5)
IMM GRANULOCYTES NFR BLD AUTO: 1 % (ref 0–0.5)
IMM GRANULOCYTES NFR BLD AUTO: 3.5 % (ref 0–0.5)
INR PPP: 1 (ref 0.9–1.1)
IRON 24H UR-MRATE: 61 MCG/DL (ref 37–145)
IRON SATN MFR SERPL: 19 % (ref 20–50)
LYMPHOCYTES # BLD AUTO: 0.32 10*3/MM3 (ref 0.7–3.1)
LYMPHOCYTES # BLD AUTO: 0.34 10*3/MM3 (ref 0.7–3.1)
LYMPHOCYTES # BLD AUTO: 0.35 10*3/MM3 (ref 0.7–3.1)
LYMPHOCYTES # BLD AUTO: 0.63 10*3/MM3 (ref 0.7–3.1)
LYMPHOCYTES # BLD AUTO: 0.67 10*3/MM3 (ref 0.7–3.1)
LYMPHOCYTES # BLD AUTO: 0.7 10*3/MM3 (ref 0.7–3.1)
LYMPHOCYTES # BLD AUTO: 0.74 10*3/MM3 (ref 0.7–3.1)
LYMPHOCYTES # BLD AUTO: 0.74 10*3/MM3 (ref 0.7–3.1)
LYMPHOCYTES # BLD AUTO: 0.82 10*3/MM3 (ref 0.7–3.1)
LYMPHOCYTES # BLD MANUAL: 0.19 10*3/MM3 (ref 0.7–3.1)
LYMPHOCYTES NFR BLD AUTO: 13.1 % (ref 19.6–45.3)
LYMPHOCYTES NFR BLD AUTO: 17.8 % (ref 19.6–45.3)
LYMPHOCYTES NFR BLD AUTO: 18.1 % (ref 19.6–45.3)
LYMPHOCYTES NFR BLD AUTO: 19.2 % (ref 19.6–45.3)
LYMPHOCYTES NFR BLD AUTO: 19.3 % (ref 19.6–45.3)
LYMPHOCYTES NFR BLD AUTO: 4 % (ref 19.6–45.3)
LYMPHOCYTES NFR BLD AUTO: 4.5 % (ref 19.6–45.3)
LYMPHOCYTES NFR BLD AUTO: 5.2 % (ref 19.6–45.3)
LYMPHOCYTES NFR BLD AUTO: 8.7 % (ref 19.6–45.3)
LYMPHOCYTES NFR BLD MANUAL: 1 % (ref 5–12)
LYMPHOCYTES NFR BLD MANUAL: 7 % (ref 19.6–45.3)
Lab: NORMAL
MCH RBC QN AUTO: 30.3 PG (ref 26.6–33)
MCH RBC QN AUTO: 30.3 PG (ref 26.6–33)
MCH RBC QN AUTO: 30.6 PG (ref 26.6–33)
MCH RBC QN AUTO: 30.8 PG (ref 26.6–33)
MCH RBC QN AUTO: 30.9 PG (ref 26.6–33)
MCH RBC QN AUTO: 31.1 PG (ref 26.6–33)
MCH RBC QN AUTO: 31.1 PG (ref 26.6–33)
MCH RBC QN AUTO: 32.4 PG (ref 26.6–33)
MCH RBC QN AUTO: 32.7 PG (ref 26.6–33)
MCH RBC QN AUTO: 33.2 PG (ref 26.6–33)
MCHC RBC AUTO-ENTMCNC: 32.7 G/DL (ref 31.5–35.7)
MCHC RBC AUTO-ENTMCNC: 32.9 G/DL (ref 31.5–35.7)
MCHC RBC AUTO-ENTMCNC: 33.6 G/DL (ref 31.5–35.7)
MCHC RBC AUTO-ENTMCNC: 33.7 G/DL (ref 31.5–35.7)
MCHC RBC AUTO-ENTMCNC: 33.8 G/DL (ref 31.5–35.7)
MCHC RBC AUTO-ENTMCNC: 33.9 G/DL (ref 31.5–35.7)
MCHC RBC AUTO-ENTMCNC: 34.2 G/DL (ref 31.5–35.7)
MCHC RBC AUTO-ENTMCNC: 34.2 G/DL (ref 31.5–35.7)
MCHC RBC AUTO-ENTMCNC: 34.3 G/DL (ref 31.5–35.7)
MCHC RBC AUTO-ENTMCNC: 34.3 G/DL (ref 31.5–35.7)
MCV RBC AUTO: 89.8 FL (ref 79–97)
MCV RBC AUTO: 90 FL (ref 79–97)
MCV RBC AUTO: 90.3 FL (ref 79–97)
MCV RBC AUTO: 90.4 FL (ref 79–97)
MCV RBC AUTO: 91 FL (ref 79–97)
MCV RBC AUTO: 92.3 FL (ref 79–97)
MCV RBC AUTO: 92.6 FL (ref 79–97)
MCV RBC AUTO: 95.9 FL (ref 79–97)
MCV RBC AUTO: 97.1 FL (ref 79–97)
MCV RBC AUTO: 99.4 FL (ref 79–97)
METHYLMALONATE SERPL-SCNC: 250 NMOL/L (ref 0–378)
MONOCYTES # BLD AUTO: 0.03 10*3/MM3 (ref 0.1–0.9)
MONOCYTES # BLD AUTO: 0.14 10*3/MM3 (ref 0.1–0.9)
MONOCYTES # BLD AUTO: 0.2 10*3/MM3 (ref 0.1–0.9)
MONOCYTES # BLD AUTO: 0.23 10*3/MM3 (ref 0.1–0.9)
MONOCYTES # BLD AUTO: 0.29 10*3/MM3 (ref 0.1–0.9)
MONOCYTES # BLD AUTO: 0.35 10*3/MM3 (ref 0.1–0.9)
MONOCYTES # BLD AUTO: 0.35 10*3/MM3 (ref 0.1–0.9)
MONOCYTES # BLD AUTO: 0.36 10*3/MM3 (ref 0.1–0.9)
MONOCYTES # BLD AUTO: 0.54 10*3/MM3 (ref 0.1–0.9)
MONOCYTES # BLD AUTO: 0.79 10*3/MM3 (ref 0.1–0.9)
MONOCYTES NFR BLD AUTO: 10.3 % (ref 5–12)
MONOCYTES NFR BLD AUTO: 13 % (ref 5–12)
MONOCYTES NFR BLD AUTO: 2.3 % (ref 5–12)
MONOCYTES NFR BLD AUTO: 2.4 % (ref 5–12)
MONOCYTES NFR BLD AUTO: 2.9 % (ref 5–12)
MONOCYTES NFR BLD AUTO: 6.8 % (ref 5–12)
MONOCYTES NFR BLD AUTO: 8 % (ref 5–12)
MONOCYTES NFR BLD AUTO: 8.2 % (ref 5–12)
MONOCYTES NFR BLD AUTO: 9.3 % (ref 5–12)
NEUTROPHILS # BLD AUTO: 2.44 10*3/MM3 (ref 1.7–7)
NEUTROPHILS # BLD AUTO: 2.44 10*3/MM3 (ref 1.7–7)
NEUTROPHILS # BLD AUTO: 2.6 10*3/MM3 (ref 1.7–7)
NEUTROPHILS # BLD AUTO: 2.82 10*3/MM3 (ref 1.7–7)
NEUTROPHILS # BLD AUTO: 2.99 10*3/MM3 (ref 1.7–7)
NEUTROPHILS # BLD AUTO: 5.6 10*3/MM3 (ref 1.7–7)
NEUTROPHILS # BLD AUTO: 6.68 10*3/MM3 (ref 1.7–7)
NEUTROPHILS # BLD AUTO: 7.25 10*3/MM3 (ref 1.7–7)
NEUTROPHILS # BLD AUTO: 7.9 10*3/MM3 (ref 1.7–7)
NEUTROPHILS NFR BLD AUTO: 4.01 10*3/MM3 (ref 1.7–7)
NEUTROPHILS NFR BLD AUTO: 67.7 % (ref 42.7–76)
NEUTROPHILS NFR BLD AUTO: 69.9 % (ref 42.7–76)
NEUTROPHILS NFR BLD AUTO: 70.5 % (ref 42.7–76)
NEUTROPHILS NFR BLD AUTO: 71.4 % (ref 42.7–76)
NEUTROPHILS NFR BLD AUTO: 78.3 % (ref 42.7–76)
NEUTROPHILS NFR BLD AUTO: 78.3 % (ref 42.7–76)
NEUTROPHILS NFR BLD AUTO: 91.5 % (ref 42.7–76)
NEUTROPHILS NFR BLD AUTO: 92.2 % (ref 42.7–76)
NEUTROPHILS NFR BLD AUTO: 93 % (ref 42.7–76)
NEUTROPHILS NFR BLD MANUAL: 91 % (ref 42.7–76)
NRBC BLD AUTO-RTO: 0 /100 WBC (ref 0–0.2)
NRBC BLD AUTO-RTO: 0.2 /100 WBC (ref 0–0.2)
NRBC BLD AUTO-RTO: 0.2 /100 WBC (ref 0–0.2)
PLAT MORPH BLD: NORMAL
PLATELET # BLD AUTO: 109 10*3/MM3 (ref 140–450)
PLATELET # BLD AUTO: 127 10*3/MM3 (ref 140–450)
PLATELET # BLD AUTO: 131 10*3/MM3 (ref 140–450)
PLATELET # BLD AUTO: 131 10*3/MM3 (ref 140–450)
PLATELET # BLD AUTO: 132 10*3/MM3 (ref 140–450)
PLATELET # BLD AUTO: 138 10*3/MM3 (ref 140–450)
PLATELET # BLD AUTO: 147 10*3/MM3 (ref 140–450)
PLATELET # BLD AUTO: 158 10*3/MM3 (ref 140–450)
PLATELET # BLD AUTO: 171 10*3/MM3 (ref 140–450)
PLATELET # BLD AUTO: 175 10*3/MM3 (ref 140–450)
PMV BLD AUTO: 8.5 FL (ref 6–12)
PMV BLD AUTO: 8.5 FL (ref 6–12)
PMV BLD AUTO: 8.8 FL (ref 6–12)
PMV BLD AUTO: 8.8 FL (ref 6–12)
PMV BLD AUTO: 9 FL (ref 6–12)
PMV BLD AUTO: 9.1 FL (ref 6–12)
PMV BLD AUTO: 9.4 FL (ref 6–12)
POTASSIUM BLD-SCNC: 3.5 MMOL/L (ref 3.5–5.2)
POTASSIUM BLD-SCNC: 3.7 MMOL/L (ref 3.5–5.2)
POTASSIUM BLD-SCNC: 3.8 MMOL/L (ref 3.5–5.2)
POTASSIUM BLD-SCNC: 3.9 MMOL/L (ref 3.5–5.2)
POTASSIUM BLD-SCNC: 4 MMOL/L (ref 3.5–4.7)
POTASSIUM BLD-SCNC: 4 MMOL/L (ref 3.5–5.2)
POTASSIUM BLD-SCNC: 4 MMOL/L (ref 3.5–5.2)
POTASSIUM SERPL-SCNC: 3.6 MMOL/L (ref 3.5–4.7)
PROT SERPL-MCNC: 5.6 G/DL (ref 6–8.5)
PROT SERPL-MCNC: 5.8 G/DL (ref 6–8.5)
PROT SERPL-MCNC: 6 G/DL (ref 6–8.5)
PROT SERPL-MCNC: 6.3 G/DL (ref 6–8.5)
PROT SERPL-MCNC: 6.3 G/DL (ref 6–8.5)
PROT SERPL-MCNC: 6.4 G/DL (ref 6.3–8)
PROT SERPL-MCNC: 6.8 G/DL (ref 6–8.5)
PROT SERPL-MCNC: 6.9 G/DL (ref 6.3–8)
PROTHROMBIN TIME: 12.9 SECONDS (ref 11.7–14.2)
RBC # BLD AUTO: 2.8 10*6/MM3 (ref 3.77–5.28)
RBC # BLD AUTO: 3.18 10*6/MM3 (ref 3.77–5.28)
RBC # BLD AUTO: 3.52 10*6/MM3 (ref 3.77–5.28)
RBC # BLD AUTO: 3.62 10*6/MM3 (ref 3.77–5.28)
RBC # BLD AUTO: 3.64 10*6/MM3 (ref 3.77–5.28)
RBC # BLD AUTO: 3.7 10*6/MM3 (ref 3.77–5.28)
RBC # BLD AUTO: 3.76 10*6/MM3 (ref 3.77–5.28)
RBC # BLD AUTO: 3.85 10*6/MM3 (ref 3.77–5.28)
RBC # BLD AUTO: 3.89 10*6/MM3 (ref 3.77–5.28)
RBC # BLD AUTO: 3.9 10*6/MM3 (ref 3.77–5.28)
RBC MORPH BLD: NORMAL
REF LAB TEST METHOD: NORMAL
SARS-COV-2 RNA RESP QL NAA+PROBE: NOT DETECTED
SODIUM BLD-SCNC: 136 MMOL/L (ref 134–145)
SODIUM BLD-SCNC: 136 MMOL/L (ref 136–145)
SODIUM BLD-SCNC: 141 MMOL/L (ref 136–145)
SODIUM BLD-SCNC: 142 MMOL/L (ref 136–145)
SODIUM BLD-SCNC: 142 MMOL/L (ref 136–145)
SODIUM SERPL-SCNC: 140 MMOL/L (ref 134–145)
TIBC SERPL-MCNC: 317 MCG/DL (ref 298–536)
TRANSFERRIN SERPL-MCNC: 213 MG/DL (ref 200–360)
VIT B12 BLD-MCNC: 435 PG/ML (ref 211–946)
WBC # BLD AUTO: 5.12 10*3/MM3 (ref 3.4–10.8)
WBC MORPH BLD: NORMAL
WBC NRBC COR # BLD: 2.68 10*3/MM3 (ref 3.4–10.8)
WBC NRBC COR # BLD: 3.49 10*3/MM3 (ref 3.4–10.8)
WBC NRBC COR # BLD: 3.64 10*3/MM3 (ref 3.4–10.8)
WBC NRBC COR # BLD: 4.16 10*3/MM3 (ref 3.4–10.8)
WBC NRBC COR # BLD: 4.25 10*3/MM3 (ref 3.4–10.8)
WBC NRBC COR # BLD: 6.12 10*3/MM3 (ref 3.4–10.8)
WBC NRBC COR # BLD: 7.86 10*3/MM3 (ref 3.4–10.8)
WBC NRBC COR # BLD: 8.49 10*3/MM3 (ref 3.4–10.8)
WBC NRBC COR # BLD: 8.53 10*3/MM3 (ref 3.4–10.8)

## 2020-01-01 PROCEDURE — 70553 MRI BRAIN STEM W/O & W/DYE: CPT

## 2020-01-01 PROCEDURE — A9577 INJ MULTIHANCE: HCPCS | Performed by: NEUROLOGICAL SURGERY

## 2020-01-01 PROCEDURE — 77336 RADIATION PHYSICS CONSULT: CPT | Performed by: RADIOLOGY

## 2020-01-01 PROCEDURE — 85025 COMPLETE CBC W/AUTO DIFF WBC: CPT | Performed by: NURSE PRACTITIONER

## 2020-01-01 PROCEDURE — 99213 OFFICE O/P EST LOW 20 MIN: CPT | Performed by: NURSE PRACTITIONER

## 2020-01-01 PROCEDURE — 77412 RADIATION TX DELIVERY LVL 3: CPT | Performed by: RADIOLOGY

## 2020-01-01 PROCEDURE — 85025 COMPLETE CBC W/AUTO DIFF WBC: CPT | Performed by: INTERNAL MEDICINE

## 2020-01-01 PROCEDURE — 99231 SBSQ HOSP IP/OBS SF/LOW 25: CPT | Performed by: NEUROLOGICAL SURGERY

## 2020-01-01 PROCEDURE — 74177 CT ABD & PELVIS W/CONTRAST: CPT

## 2020-01-01 PROCEDURE — 93005 ELECTROCARDIOGRAM TRACING: CPT

## 2020-01-01 PROCEDURE — 82607 VITAMIN B-12: CPT | Performed by: INTERNAL MEDICINE

## 2020-01-01 PROCEDURE — 36415 COLL VENOUS BLD VENIPUNCTURE: CPT

## 2020-01-01 PROCEDURE — 99233 SBSQ HOSP IP/OBS HIGH 50: CPT | Performed by: INTERNAL MEDICINE

## 2020-01-01 PROCEDURE — 85025 COMPLETE CBC W/AUTO DIFF WBC: CPT

## 2020-01-01 PROCEDURE — C9803 HOPD COVID-19 SPEC COLLECT: HCPCS

## 2020-01-01 PROCEDURE — A9577 INJ MULTIHANCE: HCPCS | Performed by: INTERNAL MEDICINE

## 2020-01-01 PROCEDURE — 25010000002 DEXAMETHASONE PER 1 MG: Performed by: NURSE PRACTITIONER

## 2020-01-01 PROCEDURE — 71260 CT THORAX DX C+: CPT

## 2020-01-01 PROCEDURE — 25010000002 IOPAMIDOL 61 % SOLUTION: Performed by: INTERNAL MEDICINE

## 2020-01-01 PROCEDURE — 83540 ASSAY OF IRON: CPT | Performed by: INTERNAL MEDICINE

## 2020-01-01 PROCEDURE — 80053 COMPREHEN METABOLIC PANEL: CPT

## 2020-01-01 PROCEDURE — 85610 PROTHROMBIN TIME: CPT | Performed by: THORACIC SURGERY (CARDIOTHORACIC VASCULAR SURGERY)

## 2020-01-01 PROCEDURE — 80053 COMPREHEN METABOLIC PANEL: CPT | Performed by: NURSE PRACTITIONER

## 2020-01-01 PROCEDURE — 0 GADOBENATE DIMEGLUMINE 529 MG/ML SOLUTION: Performed by: INTERNAL MEDICINE

## 2020-01-01 PROCEDURE — 95816 EEG AWAKE AND DROWSY: CPT | Performed by: PSYCHIATRY & NEUROLOGY

## 2020-01-01 PROCEDURE — 25010000003 CEFAZOLIN IN DEXTROSE 2-4 GM/100ML-% SOLUTION: Performed by: THORACIC SURGERY (CARDIOTHORACIC VASCULAR SURGERY)

## 2020-01-01 PROCEDURE — 25010000003 HEPARIN LOCK FLUCH PER 10 UNITS: Performed by: INTERNAL MEDICINE

## 2020-01-01 PROCEDURE — 99024 POSTOP FOLLOW-UP VISIT: CPT | Performed by: THORACIC SURGERY (CARDIOTHORACIC VASCULAR SURGERY)

## 2020-01-01 PROCEDURE — A9577 INJ MULTIHANCE: HCPCS | Performed by: NURSE PRACTITIONER

## 2020-01-01 PROCEDURE — 0 DIATRIZOATE MEGLUMINE & SODIUM PER 1 ML: Performed by: INTERNAL MEDICINE

## 2020-01-01 PROCEDURE — 83921 ORGANIC ACID SINGLE QUANT: CPT | Performed by: INTERNAL MEDICINE

## 2020-01-01 PROCEDURE — 96523 IRRIG DRUG DELIVERY DEVICE: CPT

## 2020-01-01 PROCEDURE — 99214 OFFICE O/P EST MOD 30 MIN: CPT | Performed by: INTERNAL MEDICINE

## 2020-01-01 PROCEDURE — U0004 COV-19 TEST NON-CDC HGH THRU: HCPCS | Performed by: NURSE PRACTITIONER

## 2020-01-01 PROCEDURE — 99213 OFFICE O/P EST LOW 20 MIN: CPT | Performed by: NEUROLOGICAL SURGERY

## 2020-01-01 PROCEDURE — 80053 COMPREHEN METABOLIC PANEL: CPT | Performed by: INTERNAL MEDICINE

## 2020-01-01 PROCEDURE — 99232 SBSQ HOSP IP/OBS MODERATE 35: CPT | Performed by: INTERNAL MEDICINE

## 2020-01-01 PROCEDURE — 77427 RADIATION TX MANAGEMENT X5: CPT | Performed by: RADIOLOGY

## 2020-01-01 PROCEDURE — 99213 OFFICE O/P EST LOW 20 MIN: CPT | Performed by: THORACIC SURGERY (CARDIOTHORACIC VASCULAR SURGERY)

## 2020-01-01 PROCEDURE — 99213 OFFICE O/P EST LOW 20 MIN: CPT | Performed by: RADIOLOGY

## 2020-01-01 PROCEDURE — 82565 ASSAY OF CREATININE: CPT

## 2020-01-01 PROCEDURE — 95816 EEG AWAKE AND DROWSY: CPT

## 2020-01-01 PROCEDURE — 25010000002 FENTANYL CITRATE (PF) 100 MCG/2ML SOLUTION: Performed by: NURSE ANESTHETIST, CERTIFIED REGISTERED

## 2020-01-01 PROCEDURE — 99239 HOSP IP/OBS DSCHRG MGMT >30: CPT | Performed by: INTERNAL MEDICINE

## 2020-01-01 PROCEDURE — 99220 PR INITIAL OBSERVATION CARE/DAY 70 MINUTES: CPT | Performed by: NURSE PRACTITIONER

## 2020-01-01 PROCEDURE — 77263 THER RADIOLOGY TX PLNG CPLX: CPT | Performed by: RADIOLOGY

## 2020-01-01 PROCEDURE — 99441 PR PHYS/QHP TELEPHONE EVALUATION 5-10 MIN: CPT | Performed by: NURSE PRACTITIONER

## 2020-01-01 PROCEDURE — 77334 RADIATION TREATMENT AID(S): CPT | Performed by: RADIOLOGY

## 2020-01-01 PROCEDURE — S0260 H&P FOR SURGERY: HCPCS | Performed by: THORACIC SURGERY (CARDIOTHORACIC VASCULAR SURGERY)

## 2020-01-01 PROCEDURE — 0 GADOBENATE DIMEGLUMINE 529 MG/ML SOLUTION: Performed by: NURSE PRACTITIONER

## 2020-01-01 PROCEDURE — 77300 RADIATION THERAPY DOSE PLAN: CPT | Performed by: RADIOLOGY

## 2020-01-01 PROCEDURE — 99213 OFFICE O/P EST LOW 20 MIN: CPT | Performed by: FAMILY MEDICINE

## 2020-01-01 PROCEDURE — 99212 OFFICE O/P EST SF 10 MIN: CPT | Performed by: NURSE PRACTITIONER

## 2020-01-01 PROCEDURE — 63710000001 DEXAMETHASONE PER 0.25 MG: Performed by: INTERNAL MEDICINE

## 2020-01-01 PROCEDURE — 71046 X-RAY EXAM CHEST 2 VIEWS: CPT

## 2020-01-01 PROCEDURE — 77295 3-D RADIOTHERAPY PLAN: CPT | Performed by: RADIOLOGY

## 2020-01-01 PROCEDURE — 93010 ELECTROCARDIOGRAM REPORT: CPT | Performed by: INTERNAL MEDICINE

## 2020-01-01 PROCEDURE — A9270 NON-COVERED ITEM OR SERVICE: HCPCS | Performed by: NURSE PRACTITIONER

## 2020-01-01 PROCEDURE — 96523 IRRIG DRUG DELIVERY DEVICE: CPT | Performed by: INTERNAL MEDICINE

## 2020-01-01 PROCEDURE — 99232 SBSQ HOSP IP/OBS MODERATE 35: CPT | Performed by: RADIOLOGY

## 2020-01-01 PROCEDURE — 25010000003 HEPARIN LOCK FLUCH PER 10 UNITS: Performed by: NURSE PRACTITIONER

## 2020-01-01 PROCEDURE — 0 GADOBENATE DIMEGLUMINE 529 MG/ML SOLUTION: Performed by: NEUROLOGICAL SURGERY

## 2020-01-01 PROCEDURE — 85007 BL SMEAR W/DIFF WBC COUNT: CPT | Performed by: NURSE PRACTITIONER

## 2020-01-01 PROCEDURE — 36591 DRAW BLOOD OFF VENOUS DEVICE: CPT

## 2020-01-01 PROCEDURE — G0463 HOSPITAL OUTPT CLINIC VISIT: HCPCS

## 2020-01-01 PROCEDURE — 25010000002 ONDANSETRON PER 1 MG: Performed by: NURSE ANESTHETIST, CERTIFIED REGISTERED

## 2020-01-01 PROCEDURE — 77417 THER RADIOLOGY PORT IMAGE(S): CPT | Performed by: RADIOLOGY

## 2020-01-01 PROCEDURE — 82728 ASSAY OF FERRITIN: CPT | Performed by: INTERNAL MEDICINE

## 2020-01-01 PROCEDURE — 63710000001 MELATONIN 5 MG TABLET: Performed by: INTERNAL MEDICINE

## 2020-01-01 PROCEDURE — 25010000002 PROPOFOL 10 MG/ML EMULSION: Performed by: NURSE ANESTHETIST, CERTIFIED REGISTERED

## 2020-01-01 PROCEDURE — 25010000002 DEXAMETHASONE SODIUM PHOSPHATE 20 MG/5ML SOLUTION 5 ML VIAL: Performed by: NURSE PRACTITIONER

## 2020-01-01 PROCEDURE — 99222 1ST HOSP IP/OBS MODERATE 55: CPT | Performed by: RADIOLOGY

## 2020-01-01 PROCEDURE — 63710000001 LEVETIRACETAM 500 MG TABLET: Performed by: NURSE PRACTITIONER

## 2020-01-01 PROCEDURE — 82746 ASSAY OF FOLIC ACID SERUM: CPT | Performed by: INTERNAL MEDICINE

## 2020-01-01 PROCEDURE — 71045 X-RAY EXAM CHEST 1 VIEW: CPT

## 2020-01-01 PROCEDURE — A9270 NON-COVERED ITEM OR SERVICE: HCPCS | Performed by: INTERNAL MEDICINE

## 2020-01-01 PROCEDURE — 36590 REMOVAL TUNNELED CV CATH: CPT | Performed by: THORACIC SURGERY (CARDIOTHORACIC VASCULAR SURGERY)

## 2020-01-01 PROCEDURE — 84466 ASSAY OF TRANSFERRIN: CPT | Performed by: INTERNAL MEDICINE

## 2020-01-01 PROCEDURE — 99024 POSTOP FOLLOW-UP VISIT: CPT | Performed by: RADIOLOGY

## 2020-01-01 RX ORDER — FAMOTIDINE 10 MG/ML
20 INJECTION, SOLUTION INTRAVENOUS ONCE
Status: COMPLETED | OUTPATIENT
Start: 2020-01-01 | End: 2020-01-01

## 2020-01-01 RX ORDER — MORPHINE SULFATE 10 MG/ML
2 INJECTION INTRAMUSCULAR; INTRAVENOUS; SUBCUTANEOUS EVERY 4 HOURS PRN
Status: DISCONTINUED | OUTPATIENT
Start: 2020-01-01 | End: 2020-01-01 | Stop reason: HOSPADM

## 2020-01-01 RX ORDER — ALBUTEROL SULFATE 90 UG/1
2 AEROSOL, METERED RESPIRATORY (INHALATION) EVERY 4 HOURS PRN
Qty: 1 INHALER | Refills: 0 | Status: SHIPPED | OUTPATIENT
Start: 2020-01-01

## 2020-01-01 RX ORDER — VALACYCLOVIR HYDROCHLORIDE 1 G/1
1000 TABLET, FILM COATED ORAL 3 TIMES DAILY
Qty: 21 TABLET | Refills: 0 | Status: SHIPPED | OUTPATIENT
Start: 2020-01-01 | End: 2020-01-01

## 2020-01-01 RX ORDER — LIDOCAINE HYDROCHLORIDE 20 MG/ML
INJECTION, SOLUTION INFILTRATION; PERINEURAL AS NEEDED
Status: DISCONTINUED | OUTPATIENT
Start: 2020-01-01 | End: 2020-01-01 | Stop reason: SURG

## 2020-01-01 RX ORDER — PROMETHAZINE HYDROCHLORIDE 25 MG/ML
12.5 INJECTION, SOLUTION INTRAMUSCULAR; INTRAVENOUS ONCE AS NEEDED
Status: DISCONTINUED | OUTPATIENT
Start: 2020-01-01 | End: 2020-01-01 | Stop reason: HOSPADM

## 2020-01-01 RX ORDER — FENTANYL CITRATE 50 UG/ML
INJECTION, SOLUTION INTRAMUSCULAR; INTRAVENOUS AS NEEDED
Status: DISCONTINUED | OUTPATIENT
Start: 2020-01-01 | End: 2020-01-01 | Stop reason: SURG

## 2020-01-01 RX ORDER — TEMAZEPAM 30 MG/1
30 CAPSULE ORAL
COMMUNITY
Start: 2020-01-01 | End: 2020-01-01

## 2020-01-01 RX ORDER — HEPARIN SODIUM (PORCINE) LOCK FLUSH IV SOLN 100 UNIT/ML 100 UNIT/ML
500 SOLUTION INTRAVENOUS ONCE
Status: DISCONTINUED | OUTPATIENT
Start: 2020-01-01 | End: 2020-01-01 | Stop reason: HOSPADM

## 2020-01-01 RX ORDER — DEXAMETHASONE 4 MG/1
4 TABLET ORAL 2 TIMES DAILY WITH MEALS
Qty: 30 TABLET | Refills: 0 | Status: SHIPPED | OUTPATIENT
Start: 2020-01-01

## 2020-01-01 RX ORDER — SODIUM CHLORIDE 0.9 % (FLUSH) 0.9 %
10 SYRINGE (ML) INJECTION AS NEEDED
Status: DISCONTINUED | OUTPATIENT
Start: 2020-01-01 | End: 2020-01-01 | Stop reason: HOSPADM

## 2020-01-01 RX ORDER — NALOXONE HCL 0.4 MG/ML
0.2 VIAL (ML) INJECTION AS NEEDED
Status: DISCONTINUED | OUTPATIENT
Start: 2020-01-01 | End: 2020-01-01 | Stop reason: HOSPADM

## 2020-01-01 RX ORDER — GABAPENTIN 300 MG/1
CAPSULE ORAL
Qty: 90 CAPSULE | Refills: 5 | Status: SHIPPED | OUTPATIENT
Start: 2020-01-01 | End: 2020-01-01 | Stop reason: SDUPTHER

## 2020-01-01 RX ORDER — SODIUM CHLORIDE 0.9 % (FLUSH) 0.9 %
10 SYRINGE (ML) INJECTION AS NEEDED
Status: CANCELLED | OUTPATIENT
Start: 2020-01-01

## 2020-01-01 RX ORDER — FLUOXETINE HYDROCHLORIDE 20 MG/1
20 CAPSULE ORAL EVERY MORNING
Qty: 30 CAPSULE | Refills: 0 | Status: SHIPPED | OUTPATIENT
Start: 2020-01-01 | End: 2020-01-01

## 2020-01-01 RX ORDER — FLUOXETINE HYDROCHLORIDE 20 MG/1
CAPSULE ORAL
Qty: 90 CAPSULE | Refills: 0 | OUTPATIENT
Start: 2020-01-01

## 2020-01-01 RX ORDER — HEPARIN SODIUM (PORCINE) LOCK FLUSH IV SOLN 100 UNIT/ML 100 UNIT/ML
500 SOLUTION INTRAVENOUS AS NEEDED
Status: CANCELLED | OUTPATIENT
Start: 2020-01-01

## 2020-01-01 RX ORDER — SODIUM CHLORIDE 0.9 % (FLUSH) 0.9 %
3-10 SYRINGE (ML) INJECTION AS NEEDED
Status: DISCONTINUED | OUTPATIENT
Start: 2020-01-01 | End: 2020-01-01 | Stop reason: HOSPADM

## 2020-01-01 RX ORDER — HEPARIN SODIUM (PORCINE) LOCK FLUSH IV SOLN 100 UNIT/ML 100 UNIT/ML
500 SOLUTION INTRAVENOUS AS NEEDED
Status: DISCONTINUED | OUTPATIENT
Start: 2020-01-01 | End: 2020-01-01 | Stop reason: HOSPADM

## 2020-01-01 RX ORDER — CHOLECALCIFEROL (VITAMIN D3) 125 MCG
5 CAPSULE ORAL NIGHTLY PRN
Status: DISCONTINUED | OUTPATIENT
Start: 2020-01-01 | End: 2020-01-01 | Stop reason: HOSPADM

## 2020-01-01 RX ORDER — PROMETHAZINE HYDROCHLORIDE 25 MG/1
25 SUPPOSITORY RECTAL ONCE AS NEEDED
Status: DISCONTINUED | OUTPATIENT
Start: 2020-01-01 | End: 2020-01-01 | Stop reason: HOSPADM

## 2020-01-01 RX ORDER — CEPHALEXIN 250 MG/1
250 CAPSULE ORAL 3 TIMES DAILY
Qty: 15 CAPSULE | Refills: 0 | Status: SHIPPED | OUTPATIENT
Start: 2020-01-01 | End: 2020-01-01

## 2020-01-01 RX ORDER — SODIUM CHLORIDE, SODIUM LACTATE, POTASSIUM CHLORIDE, CALCIUM CHLORIDE 600; 310; 30; 20 MG/100ML; MG/100ML; MG/100ML; MG/100ML
INJECTION, SOLUTION INTRAVENOUS CONTINUOUS PRN
Status: DISCONTINUED | OUTPATIENT
Start: 2020-01-01 | End: 2020-01-01 | Stop reason: SURG

## 2020-01-01 RX ORDER — GABAPENTIN 300 MG/1
CAPSULE ORAL
Qty: 90 CAPSULE | Refills: 5 | Status: SHIPPED | OUTPATIENT
Start: 2020-01-01 | End: 2020-01-01

## 2020-01-01 RX ORDER — ACETAMINOPHEN 325 MG/1
650 TABLET ORAL EVERY 6 HOURS PRN
Start: 2020-01-01

## 2020-01-01 RX ORDER — SODIUM CHLORIDE 0.9 % (FLUSH) 0.9 %
3 SYRINGE (ML) INJECTION EVERY 12 HOURS SCHEDULED
Status: DISCONTINUED | OUTPATIENT
Start: 2020-01-01 | End: 2020-01-01 | Stop reason: HOSPADM

## 2020-01-01 RX ORDER — NALOXONE HCL 0.4 MG/ML
0.4 VIAL (ML) INJECTION
Status: DISCONTINUED | OUTPATIENT
Start: 2020-01-01 | End: 2020-01-01 | Stop reason: HOSPADM

## 2020-01-01 RX ORDER — ONDANSETRON 2 MG/ML
INJECTION INTRAMUSCULAR; INTRAVENOUS AS NEEDED
Status: DISCONTINUED | OUTPATIENT
Start: 2020-01-01 | End: 2020-01-01 | Stop reason: SURG

## 2020-01-01 RX ORDER — GABAPENTIN 600 MG/1
600 TABLET ORAL 3 TIMES DAILY
Qty: 270 TABLET | Refills: 1 | Status: SHIPPED | OUTPATIENT
Start: 2020-01-01

## 2020-01-01 RX ORDER — LIDOCAINE HYDROCHLORIDE 10 MG/ML
INJECTION, SOLUTION EPIDURAL; INFILTRATION; INTRACAUDAL; PERINEURAL AS NEEDED
Status: DISCONTINUED | OUTPATIENT
Start: 2020-01-01 | End: 2020-01-01 | Stop reason: HOSPADM

## 2020-01-01 RX ORDER — HYDROCODONE BITARTRATE AND ACETAMINOPHEN 7.5; 325 MG/1; MG/1
1 TABLET ORAL ONCE AS NEEDED
Status: DISCONTINUED | OUTPATIENT
Start: 2020-01-01 | End: 2020-01-01 | Stop reason: HOSPADM

## 2020-01-01 RX ORDER — HEPARIN SODIUM (PORCINE) LOCK FLUSH IV SOLN 100 UNIT/ML 100 UNIT/ML
5 SOLUTION INTRAVENOUS AS NEEDED
Status: DISCONTINUED | OUTPATIENT
Start: 2020-01-01 | End: 2020-01-01 | Stop reason: HOSPADM

## 2020-01-01 RX ORDER — SODIUM CHLORIDE 0.9 % (FLUSH) 0.9 %
10 SYRINGE (ML) INJECTION EVERY 12 HOURS SCHEDULED
Status: DISCONTINUED | OUTPATIENT
Start: 2020-01-01 | End: 2020-01-01 | Stop reason: HOSPADM

## 2020-01-01 RX ORDER — HEPARIN SODIUM (PORCINE) LOCK FLUSH IV SOLN 100 UNIT/ML 100 UNIT/ML
5 SOLUTION INTRAVENOUS AS NEEDED
Status: CANCELLED | OUTPATIENT
Start: 2020-01-01

## 2020-01-01 RX ORDER — LOSARTAN POTASSIUM 25 MG/1
25 TABLET ORAL DAILY
Qty: 90 TABLET | Refills: 1 | Status: SHIPPED | OUTPATIENT
Start: 2020-01-01 | End: 2020-01-01

## 2020-01-01 RX ORDER — FENTANYL CITRATE 50 UG/ML
50 INJECTION, SOLUTION INTRAMUSCULAR; INTRAVENOUS
Status: DISCONTINUED | OUTPATIENT
Start: 2020-01-01 | End: 2020-01-01 | Stop reason: HOSPADM

## 2020-01-01 RX ORDER — PROMETHAZINE HYDROCHLORIDE 25 MG/ML
6.25 INJECTION, SOLUTION INTRAMUSCULAR; INTRAVENOUS
Status: DISCONTINUED | OUTPATIENT
Start: 2020-01-01 | End: 2020-01-01 | Stop reason: HOSPADM

## 2020-01-01 RX ORDER — GABAPENTIN 300 MG/1
600 CAPSULE ORAL 2 TIMES DAILY
COMMUNITY
End: 2020-01-01

## 2020-01-01 RX ORDER — OFLOXACIN 3 MG/ML
SOLUTION AURICULAR (OTIC)
COMMUNITY
Start: 2020-01-01

## 2020-01-01 RX ORDER — DEXAMETHASONE 1.5 MG/1
TABLET ORAL
Qty: 51 TABLET | Refills: 0 | Status: SHIPPED | OUTPATIENT
Start: 2020-01-01 | End: 2020-01-01

## 2020-01-01 RX ORDER — SODIUM CHLORIDE, SODIUM LACTATE, POTASSIUM CHLORIDE, CALCIUM CHLORIDE 600; 310; 30; 20 MG/100ML; MG/100ML; MG/100ML; MG/100ML
9 INJECTION, SOLUTION INTRAVENOUS CONTINUOUS
Status: DISCONTINUED | OUTPATIENT
Start: 2020-01-01 | End: 2020-01-01 | Stop reason: HOSPADM

## 2020-01-01 RX ORDER — HYDROMORPHONE HYDROCHLORIDE 1 MG/ML
0.5 INJECTION, SOLUTION INTRAMUSCULAR; INTRAVENOUS; SUBCUTANEOUS
Status: DISCONTINUED | OUTPATIENT
Start: 2020-01-01 | End: 2020-01-01 | Stop reason: HOSPADM

## 2020-01-01 RX ORDER — VALACYCLOVIR HCL 1000 MG
1000 TABLET ORAL 3 TIMES DAILY
Qty: 21 TABLET | Refills: 0 | Status: SHIPPED | OUTPATIENT
Start: 2020-01-01 | End: 2020-01-01

## 2020-01-01 RX ORDER — PANTOPRAZOLE SODIUM 40 MG/1
40 TABLET, DELAYED RELEASE ORAL
Status: DISCONTINUED | OUTPATIENT
Start: 2020-01-01 | End: 2020-01-01 | Stop reason: HOSPADM

## 2020-01-01 RX ORDER — CEFAZOLIN SODIUM 2 G/100ML
2 INJECTION, SOLUTION INTRAVENOUS ONCE
Status: CANCELLED | OUTPATIENT
Start: 2020-01-01 | End: 2020-01-01

## 2020-01-01 RX ORDER — FOLIC ACID 1 MG/1
TABLET ORAL
Qty: 90 TABLET | Refills: 0 | Status: SHIPPED | OUTPATIENT
Start: 2020-01-01

## 2020-01-01 RX ORDER — FOLIC ACID 1 MG/1
1 TABLET ORAL DAILY
Status: DISCONTINUED | OUTPATIENT
Start: 2020-01-01 | End: 2020-01-01 | Stop reason: HOSPADM

## 2020-01-01 RX ORDER — LEVETIRACETAM 500 MG/1
500 TABLET ORAL EVERY 12 HOURS SCHEDULED
Status: DISCONTINUED | OUTPATIENT
Start: 2020-01-01 | End: 2020-01-01 | Stop reason: HOSPADM

## 2020-01-01 RX ORDER — PROMETHAZINE HYDROCHLORIDE 25 MG/1
25 TABLET ORAL ONCE AS NEEDED
Status: DISCONTINUED | OUTPATIENT
Start: 2020-01-01 | End: 2020-01-01 | Stop reason: HOSPADM

## 2020-01-01 RX ORDER — SODIUM CHLORIDE 0.9 % (FLUSH) 0.9 %
10 SYRINGE (ML) INJECTION EVERY 12 HOURS SCHEDULED
Status: CANCELLED | OUTPATIENT
Start: 2020-01-01

## 2020-01-01 RX ORDER — FOLIC ACID 1 MG/1
1 TABLET ORAL DAILY
Qty: 30 TABLET | Refills: 2 | Status: SHIPPED | OUTPATIENT
Start: 2020-01-01 | End: 2020-01-01

## 2020-01-01 RX ORDER — LIDOCAINE HYDROCHLORIDE 10 MG/ML
0.5 INJECTION, SOLUTION EPIDURAL; INFILTRATION; INTRACAUDAL; PERINEURAL ONCE AS NEEDED
Status: DISCONTINUED | OUTPATIENT
Start: 2020-01-01 | End: 2020-01-01 | Stop reason: HOSPADM

## 2020-01-01 RX ORDER — FLUOXETINE HYDROCHLORIDE 20 MG/1
CAPSULE ORAL
Qty: 30 CAPSULE | Refills: 0 | Status: SHIPPED | OUTPATIENT
Start: 2020-01-01

## 2020-01-01 RX ORDER — DEXAMETHASONE SODIUM PHOSPHATE 4 MG/ML
6 INJECTION, SOLUTION INTRA-ARTICULAR; INTRALESIONAL; INTRAMUSCULAR; INTRAVENOUS; SOFT TISSUE EVERY 6 HOURS
Status: CANCELLED | OUTPATIENT
Start: 2020-01-01

## 2020-01-01 RX ORDER — CEFAZOLIN SODIUM 2 G/100ML
2 INJECTION, SOLUTION INTRAVENOUS ONCE
Status: COMPLETED | OUTPATIENT
Start: 2020-01-01 | End: 2020-01-01

## 2020-01-01 RX ORDER — DIPHENHYDRAMINE HYDROCHLORIDE 50 MG/ML
12.5 INJECTION INTRAMUSCULAR; INTRAVENOUS
Status: DISCONTINUED | OUTPATIENT
Start: 2020-01-01 | End: 2020-01-01 | Stop reason: HOSPADM

## 2020-01-01 RX ORDER — SODIUM CHLORIDE 0.9 % (FLUSH) 0.9 %
3 SYRINGE (ML) INJECTION EVERY 12 HOURS SCHEDULED
Status: CANCELLED | OUTPATIENT
Start: 2020-01-01

## 2020-01-01 RX ORDER — HYDRALAZINE HYDROCHLORIDE 20 MG/ML
5 INJECTION INTRAMUSCULAR; INTRAVENOUS
Status: DISCONTINUED | OUTPATIENT
Start: 2020-01-01 | End: 2020-01-01 | Stop reason: HOSPADM

## 2020-01-01 RX ORDER — OXYCODONE AND ACETAMINOPHEN 7.5; 325 MG/1; MG/1
1 TABLET ORAL ONCE AS NEEDED
Status: DISCONTINUED | OUTPATIENT
Start: 2020-01-01 | End: 2020-01-01 | Stop reason: HOSPADM

## 2020-01-01 RX ORDER — DEXAMETHASONE 2 MG/1
2 TABLET ORAL
Qty: 14 TABLET | Refills: 0 | Status: SHIPPED | OUTPATIENT
Start: 2020-01-01 | End: 2020-01-01

## 2020-01-01 RX ORDER — ACETAMINOPHEN 325 MG/1
650 TABLET ORAL ONCE AS NEEDED
Status: DISCONTINUED | OUTPATIENT
Start: 2020-01-01 | End: 2020-01-01 | Stop reason: HOSPADM

## 2020-01-01 RX ORDER — SODIUM CHLORIDE 0.9 % (FLUSH) 0.9 %
20 SYRINGE (ML) INJECTION AS NEEDED
Status: DISCONTINUED | OUTPATIENT
Start: 2020-01-01 | End: 2020-01-01 | Stop reason: HOSPADM

## 2020-01-01 RX ORDER — MIDAZOLAM HYDROCHLORIDE 1 MG/ML
2 INJECTION INTRAMUSCULAR; INTRAVENOUS
Status: DISCONTINUED | OUTPATIENT
Start: 2020-01-01 | End: 2020-01-01 | Stop reason: HOSPADM

## 2020-01-01 RX ORDER — ONDANSETRON 2 MG/ML
4 INJECTION INTRAMUSCULAR; INTRAVENOUS ONCE AS NEEDED
Status: DISCONTINUED | OUTPATIENT
Start: 2020-01-01 | End: 2020-01-01 | Stop reason: HOSPADM

## 2020-01-01 RX ORDER — DEXAMETHASONE 4 MG/1
4 TABLET ORAL EVERY 6 HOURS SCHEDULED
Qty: 120 TABLET | Refills: 0 | Status: SHIPPED | OUTPATIENT
Start: 2020-01-01 | End: 2020-01-01

## 2020-01-01 RX ORDER — DEXAMETHASONE 4 MG/1
4 TABLET ORAL 2 TIMES DAILY WITH MEALS
Qty: 30 TABLET | Refills: 0 | Status: SHIPPED | OUTPATIENT
Start: 2020-01-01 | End: 2020-01-01 | Stop reason: ALTCHOICE

## 2020-01-01 RX ORDER — EPHEDRINE SULFATE 50 MG/ML
5 INJECTION, SOLUTION INTRAVENOUS ONCE AS NEEDED
Status: DISCONTINUED | OUTPATIENT
Start: 2020-01-01 | End: 2020-01-01 | Stop reason: HOSPADM

## 2020-01-01 RX ORDER — FOLIC ACID 1 MG/1
1 TABLET ORAL DAILY
Qty: 30 TABLET | Refills: 2 | Status: SHIPPED | OUTPATIENT
Start: 2020-01-01 | End: 2020-01-01 | Stop reason: SDUPTHER

## 2020-01-01 RX ORDER — LEVETIRACETAM 500 MG/1
500 TABLET ORAL EVERY 12 HOURS SCHEDULED
Status: CANCELLED | OUTPATIENT
Start: 2020-01-01

## 2020-01-01 RX ORDER — LABETALOL HYDROCHLORIDE 5 MG/ML
5 INJECTION, SOLUTION INTRAVENOUS
Status: DISCONTINUED | OUTPATIENT
Start: 2020-01-01 | End: 2020-01-01 | Stop reason: HOSPADM

## 2020-01-01 RX ORDER — MIDAZOLAM HYDROCHLORIDE 1 MG/ML
1 INJECTION INTRAMUSCULAR; INTRAVENOUS
Status: DISCONTINUED | OUTPATIENT
Start: 2020-01-01 | End: 2020-01-01 | Stop reason: HOSPADM

## 2020-01-01 RX ORDER — FLUMAZENIL 0.1 MG/ML
0.2 INJECTION INTRAVENOUS AS NEEDED
Status: DISCONTINUED | OUTPATIENT
Start: 2020-01-01 | End: 2020-01-01 | Stop reason: HOSPADM

## 2020-01-01 RX ORDER — CHOLECALCIFEROL (VITAMIN D3) 125 MCG
10 CAPSULE ORAL NIGHTLY
COMMUNITY

## 2020-01-01 RX ORDER — DEXAMETHASONE SODIUM PHOSPHATE 4 MG/ML
6 INJECTION, SOLUTION INTRA-ARTICULAR; INTRALESIONAL; INTRAMUSCULAR; INTRAVENOUS; SOFT TISSUE EVERY 6 HOURS
Status: DISCONTINUED | OUTPATIENT
Start: 2020-01-01 | End: 2020-01-01

## 2020-01-01 RX ORDER — ACETAMINOPHEN 325 MG/1
650 TABLET ORAL EVERY 6 HOURS PRN
Status: DISCONTINUED | OUTPATIENT
Start: 2020-01-01 | End: 2020-01-01 | Stop reason: HOSPADM

## 2020-01-01 RX ORDER — SODIUM CHLORIDE 0.9 % (FLUSH) 0.9 %
20 SYRINGE (ML) INJECTION AS NEEDED
Status: CANCELLED | OUTPATIENT
Start: 2020-01-01

## 2020-01-01 RX ORDER — DEXAMETHASONE 4 MG/1
4 TABLET ORAL EVERY 6 HOURS SCHEDULED
Status: DISCONTINUED | OUTPATIENT
Start: 2020-01-01 | End: 2020-01-01 | Stop reason: HOSPADM

## 2020-01-01 RX ORDER — PROPOFOL 10 MG/ML
VIAL (ML) INTRAVENOUS CONTINUOUS PRN
Status: DISCONTINUED | OUTPATIENT
Start: 2020-01-01 | End: 2020-01-01 | Stop reason: SURG

## 2020-01-01 RX ORDER — DIPHENHYDRAMINE HCL 25 MG
25 CAPSULE ORAL
Status: DISCONTINUED | OUTPATIENT
Start: 2020-01-01 | End: 2020-01-01 | Stop reason: HOSPADM

## 2020-01-01 RX ORDER — FLUOXETINE HYDROCHLORIDE 20 MG/1
20 CAPSULE ORAL DAILY
Status: DISCONTINUED | OUTPATIENT
Start: 2020-01-01 | End: 2020-01-01 | Stop reason: HOSPADM

## 2020-01-01 RX ORDER — SODIUM CHLORIDE 0.9 % (FLUSH) 0.9 %
3-10 SYRINGE (ML) INJECTION AS NEEDED
Status: CANCELLED | OUTPATIENT
Start: 2020-01-01

## 2020-01-01 RX ADMIN — SODIUM CHLORIDE, POTASSIUM CHLORIDE, SODIUM LACTATE AND CALCIUM CHLORIDE: 600; 310; 30; 20 INJECTION, SOLUTION INTRAVENOUS at 09:38

## 2020-01-01 RX ADMIN — DEXAMETHASONE SODIUM PHOSPHATE 6 MG: 4 INJECTION, SOLUTION INTRAMUSCULAR; INTRAVENOUS at 18:14

## 2020-01-01 RX ADMIN — GADOBENATE DIMEGLUMINE 16 ML: 529 INJECTION, SOLUTION INTRAVENOUS at 14:09

## 2020-01-01 RX ADMIN — DEXAMETHASONE SODIUM PHOSPHATE 6 MG: 4 INJECTION, SOLUTION INTRAMUSCULAR; INTRAVENOUS at 06:18

## 2020-01-01 RX ADMIN — DEXAMETHASONE SODIUM PHOSPHATE 6 MG: 4 INJECTION, SOLUTION INTRAMUSCULAR; INTRAVENOUS at 06:05

## 2020-01-01 RX ADMIN — LEVETIRACETAM 500 MG: 500 TABLET, FILM COATED ORAL at 20:02

## 2020-01-01 RX ADMIN — Medication 500 UNITS: at 18:07

## 2020-01-01 RX ADMIN — LEVETIRACETAM 500 MG: 500 TABLET, FILM COATED ORAL at 08:33

## 2020-01-01 RX ADMIN — IOPAMIDOL 85 ML: 612 INJECTION, SOLUTION INTRAVENOUS at 12:15

## 2020-01-01 RX ADMIN — FAMOTIDINE 20 MG: 10 INJECTION, SOLUTION INTRAVENOUS at 09:13

## 2020-01-01 RX ADMIN — DEXAMETHASONE SODIUM PHOSPHATE 6 MG: 4 INJECTION, SOLUTION INTRAMUSCULAR; INTRAVENOUS at 13:01

## 2020-01-01 RX ADMIN — SODIUM CHLORIDE, POTASSIUM CHLORIDE, SODIUM LACTATE AND CALCIUM CHLORIDE 9 ML/HR: 600; 310; 30; 20 INJECTION, SOLUTION INTRAVENOUS at 09:12

## 2020-01-01 RX ADMIN — DIATRIZOATE MEGLUMINE AND DIATRIZOATE SODIUM 30 ML: 600; 100 SOLUTION ORAL; RECTAL at 09:31

## 2020-01-01 RX ADMIN — CEFAZOLIN SODIUM 2 G: 2 INJECTION, SOLUTION INTRAVENOUS at 10:45

## 2020-01-01 RX ADMIN — DEXAMETHASONE SODIUM PHOSPHATE 6 MG: 4 INJECTION, SOLUTION INTRAMUSCULAR; INTRAVENOUS at 18:42

## 2020-01-01 RX ADMIN — LIDOCAINE HYDROCHLORIDE 100 MG: 20 INJECTION, SOLUTION INFILTRATION; PERINEURAL at 10:45

## 2020-01-01 RX ADMIN — SODIUM CHLORIDE, PRESERVATIVE FREE 10 ML: 5 INJECTION INTRAVENOUS at 20:22

## 2020-01-01 RX ADMIN — PANTOPRAZOLE SODIUM 40 MG: 40 TABLET, DELAYED RELEASE ORAL at 06:17

## 2020-01-01 RX ADMIN — DIATRIZOATE MEGLUMINE AND DIATRIZOATE SODIUM 30 ML: 660; 100 LIQUID ORAL; RECTAL at 10:10

## 2020-01-01 RX ADMIN — SODIUM CHLORIDE, PRESERVATIVE FREE 10 ML: 5 INJECTION INTRAVENOUS at 08:28

## 2020-01-01 RX ADMIN — Medication 500 UNITS: at 10:31

## 2020-01-01 RX ADMIN — Medication 5 MG: at 21:24

## 2020-01-01 RX ADMIN — IOPAMIDOL 85 ML: 612 INJECTION, SOLUTION INTRAVENOUS at 10:39

## 2020-01-01 RX ADMIN — Medication 5 MG: at 20:22

## 2020-01-01 RX ADMIN — GADOBENATE DIMEGLUMINE 15 ML: 529 INJECTION, SOLUTION INTRAVENOUS at 08:26

## 2020-01-01 RX ADMIN — Medication 5 MG: at 21:04

## 2020-01-01 RX ADMIN — LEVETIRACETAM 500 MG: 500 TABLET, FILM COATED ORAL at 08:52

## 2020-01-01 RX ADMIN — LEVETIRACETAM 500 MG: 500 TABLET, FILM COATED ORAL at 08:27

## 2020-01-01 RX ADMIN — SODIUM CHLORIDE, PRESERVATIVE FREE 10 ML: 5 INJECTION INTRAVENOUS at 10:31

## 2020-01-01 RX ADMIN — PANTOPRAZOLE SODIUM 40 MG: 40 TABLET, DELAYED RELEASE ORAL at 12:18

## 2020-01-01 RX ADMIN — FENTANYL CITRATE 50 MCG: 50 INJECTION INTRAMUSCULAR; INTRAVENOUS at 10:45

## 2020-01-01 RX ADMIN — PANTOPRAZOLE SODIUM 40 MG: 40 TABLET, DELAYED RELEASE ORAL at 06:18

## 2020-01-01 RX ADMIN — LEVETIRACETAM 500 MG: 500 TABLET, FILM COATED ORAL at 20:41

## 2020-01-01 RX ADMIN — DEXAMETHASONE SODIUM PHOSPHATE 6 MG: 4 INJECTION, SOLUTION INTRAMUSCULAR; INTRAVENOUS at 06:21

## 2020-01-01 RX ADMIN — FLUOXETINE HYDROCHLORIDE 20 MG: 20 CAPSULE ORAL at 08:33

## 2020-01-01 RX ADMIN — FOLIC ACID 1 MG: 1 TABLET ORAL at 11:37

## 2020-01-01 RX ADMIN — DEXAMETHASONE SODIUM PHOSPHATE 6 MG: 4 INJECTION, SOLUTION INTRAMUSCULAR; INTRAVENOUS at 01:12

## 2020-01-01 RX ADMIN — FOLIC ACID 1 MG: 1 TABLET ORAL at 08:15

## 2020-01-01 RX ADMIN — IOPAMIDOL 85 ML: 612 INJECTION, SOLUTION INTRAVENOUS at 09:05

## 2020-01-01 RX ADMIN — Medication 5 MG: at 20:41

## 2020-01-01 RX ADMIN — DEXAMETHASONE 4 MG: 4 TABLET ORAL at 08:52

## 2020-01-01 RX ADMIN — DEXAMETHASONE SODIUM PHOSPHATE 20 MG: 4 INJECTION, SOLUTION INTRAMUSCULAR; INTRAVENOUS at 19:56

## 2020-01-01 RX ADMIN — Medication 10 ML: at 14:22

## 2020-01-01 RX ADMIN — PANTOPRAZOLE SODIUM 40 MG: 40 TABLET, DELAYED RELEASE ORAL at 06:21

## 2020-01-01 RX ADMIN — DEXAMETHASONE SODIUM PHOSPHATE 6 MG: 4 INJECTION, SOLUTION INTRAMUSCULAR; INTRAVENOUS at 01:20

## 2020-01-01 RX ADMIN — DEXAMETHASONE SODIUM PHOSPHATE 6 MG: 4 INJECTION, SOLUTION INTRAMUSCULAR; INTRAVENOUS at 19:30

## 2020-01-01 RX ADMIN — LEVETIRACETAM 500 MG: 500 TABLET, FILM COATED ORAL at 21:04

## 2020-01-01 RX ADMIN — DEXAMETHASONE SODIUM PHOSPHATE 6 MG: 4 INJECTION, SOLUTION INTRAMUSCULAR; INTRAVENOUS at 12:18

## 2020-01-01 RX ADMIN — SODIUM CHLORIDE, PRESERVATIVE FREE 10 ML: 5 INJECTION INTRAVENOUS at 20:41

## 2020-01-01 RX ADMIN — DEXAMETHASONE SODIUM PHOSPHATE 6 MG: 4 INJECTION, SOLUTION INTRAMUSCULAR; INTRAVENOUS at 01:08

## 2020-01-01 RX ADMIN — DEXAMETHASONE SODIUM PHOSPHATE 6 MG: 4 INJECTION, SOLUTION INTRAMUSCULAR; INTRAVENOUS at 12:28

## 2020-01-01 RX ADMIN — FLUOXETINE HYDROCHLORIDE 20 MG: 20 CAPSULE ORAL at 08:15

## 2020-01-01 RX ADMIN — LEVETIRACETAM 500 MG: 500 TABLET, FILM COATED ORAL at 20:22

## 2020-01-01 RX ADMIN — SODIUM CHLORIDE, PRESERVATIVE FREE 10 ML: 5 INJECTION INTRAVENOUS at 20:12

## 2020-01-01 RX ADMIN — ONDANSETRON HYDROCHLORIDE 4 MG: 2 SOLUTION INTRAMUSCULAR; INTRAVENOUS at 11:10

## 2020-01-01 RX ADMIN — FOLIC ACID 1 MG: 1 TABLET ORAL at 08:52

## 2020-01-01 RX ADMIN — FENTANYL CITRATE 50 MCG: 50 INJECTION INTRAMUSCULAR; INTRAVENOUS at 10:52

## 2020-01-01 RX ADMIN — Medication 500 UNITS: at 15:19

## 2020-01-01 RX ADMIN — PROPOFOL 100 MCG/KG/MIN: 10 INJECTION, EMULSION INTRAVENOUS at 10:45

## 2020-01-01 RX ADMIN — GADOBENATE DIMEGLUMINE 16 ML: 529 INJECTION, SOLUTION INTRAVENOUS at 11:30

## 2020-01-01 RX ADMIN — SODIUM CHLORIDE, PRESERVATIVE FREE 10 ML: 5 INJECTION INTRAVENOUS at 08:15

## 2020-01-01 RX ADMIN — SODIUM CHLORIDE, PRESERVATIVE FREE 10 ML: 5 INJECTION INTRAVENOUS at 15:19

## 2020-01-01 RX ADMIN — DEXAMETHASONE SODIUM PHOSPHATE 6 MG: 4 INJECTION, SOLUTION INTRAMUSCULAR; INTRAVENOUS at 00:56

## 2020-01-01 RX ADMIN — DEXAMETHASONE SODIUM PHOSPHATE 6 MG: 4 INJECTION, SOLUTION INTRAMUSCULAR; INTRAVENOUS at 06:17

## 2020-01-01 RX ADMIN — FLUOXETINE HYDROCHLORIDE 20 MG: 20 CAPSULE ORAL at 08:52

## 2020-01-01 RX ADMIN — SODIUM CHLORIDE, PRESERVATIVE FREE 10 ML: 5 INJECTION INTRAVENOUS at 21:04

## 2020-01-01 RX ADMIN — SODIUM CHLORIDE, PRESERVATIVE FREE 10 ML: 5 INJECTION INTRAVENOUS at 08:33

## 2020-01-01 RX ADMIN — LEVETIRACETAM 500 MG: 500 TABLET, FILM COATED ORAL at 08:15

## 2020-01-01 RX ADMIN — DIATRIZOATE MEGLUMINE AND DIATRIZOATE SODIUM 30 ML: 660; 100 LIQUID ORAL; RECTAL at 08:06

## 2020-01-01 RX ADMIN — GADOBENATE DIMEGLUMINE 15 ML: 529 INJECTION, SOLUTION INTRAVENOUS at 10:38

## 2020-01-01 RX ADMIN — Medication 500 UNITS: at 14:22

## 2020-01-06 NOTE — PROGRESS NOTES
Lab Results   Component Value Date    WBC 4.16 01/06/2020    HGB 9.3 (L) 01/06/2020    HCT 27.2 (L) 01/06/2020    MCV 97.1 (H) 01/06/2020     (L) 01/06/2020     Pt is here for lab with RN review.  CBC reviewed with pt and spouse, counts are stable for this pt at this time. Pt has no complaints and reports  feeling fine Copy of labs given to pt and f/u appt reviewed. Pt is instructed to call the office with any concerns or new symptoms prior to next visit. Pt vu

## 2020-01-08 NOTE — PROGRESS NOTES
Subjective   Patient ID: Karen Pineda is a 74 y.o. female is here today for follow-up.    History of Present Illness  Dear Colleague,  Karen Pineda was seen in our office today at the request of Dr. Nelson Katz to evaluate her right subclavian venous access port.  Port was placed in September 2019.  It has been functioning properly since then.  Recently they had some difficulty drawing blood.  A contrast study through the port was obtained and showed that the port catheter had migrated.  He was concerned about this migration and asked me to evaluate the patient.  There has been no swelling or discomfort in the right upper extremity or hand.  They are able to inject through the port without difficulty.  They were able to draw blood without difficulty.    The following portions of the patient's history were reviewed and updated as appropriate: allergies, current medications, past family history, past medical history, past social history, past surgical history and problem list.  Review of Systems   Constitution: Negative.   HENT: Negative.    Eyes: Negative.    Cardiovascular: Negative.    Respiratory: Positive for cough.    Endocrine: Negative.    Hematologic/Lymphatic: Negative.    Skin: Negative.    Musculoskeletal: Negative.    Gastrointestinal: Negative.    Genitourinary: Negative.    Neurological: Negative.    Psychiatric/Behavioral: Negative.    Allergic/Immunologic: Negative.      Patient Active Problem List   Diagnosis   • Hemoptysis   • Lung mass   • Cough   • Arthritis   • Surgery follow-up examination   • Brain metastases (CMS/HCC)   • Small cell lung cancer (CMS/HCC)   • High risk medication use   • Encounter for fitting and adjustment of vascular catheter   • Rash   • Chemotherapy-induced thrombocytopenia   • Decreased oral intake   • Radiation esophagitis     Past Medical History:   Diagnosis Date   • Anxiety    • Arthritis    • Brain metastases (CMS/HCC) 9/18/2019   • Depression    •  GERD (gastroesophageal reflux disease)    • H/O Pulmonary nodule    • Hemoptysis    • History of osteopenia    • Hyperlipidemia    • Nodule of right lung    • Post-menopause    • Sinus problem    • Stress incontinence      Past Surgical History:   Procedure Laterality Date   • BREAST BIOPSY Right    • BRONCHOSCOPY N/A 2016    Procedure: BRONCHOSCOPY WITH ENDOBRONCHIAL ULTRASOUND, NAVIGATION, BRUSHING,BIOPSIES, LAVAGE, AND TRANSBRONCHIAL NEEDLE ASPIRATION;  Surgeon: Js Lewis MD;  Location: [facility] ENDOSCOPY;  Service:    • COLONOSCOPY     • CRANIOTOMY FOR TUMOR Left 2019    Procedure: Left frontal craniotomy for tumor;  Surgeon: Ruben Thibodeaux MD;  Location: [facility] MAIN OR;  Service: Neurosurgery   • VENOUS ACCESS DEVICE (PORT) INSERTION Right 2019    Procedure: INSERTION VENOUS ACCESS DEVICE;  Surgeon: Saroj Chacko III, MD;  Location: [facility] MAIN OR;  Service: Thoracic     Family History   Problem Relation Age of Onset   • Heart failure Mother    • Coronary artery disease Mother    • Depression Mother    • Heart disease Mother    • Leukemia Father    • Bone cancer Father    • Cancer Paternal Aunt    • Cancer Paternal Uncle      Social History     Socioeconomic History   • Marital status:      Spouse name: Juan   • Number of children: Not on file   • Years of education: Not on file   • Highest education level: Not on file   Occupational History     Employer: RETIRED   Tobacco Use   • Smoking status: Former Smoker     Packs/day: 1.00     Years: 50.00     Pack years: 50.00     Last attempt to quit:      Years since quittin.0   • Smokeless tobacco: Never Used   Substance and Sexual Activity   • Alcohol use: No   • Drug use: No   • Sexual activity: Defer       Current Outpatient Medications:   •  albuterol sulfate  (90 Base) MCG/ACT inhaler, Inhale 2 puffs Every 4 (Four) Hours As Needed for Wheezing or Shortness of Air., Disp: 1 inhaler, Rfl: 0  •   aluminum-magnesium hydroxide 200-200 MG/5ML suspension, diphenhydrAMINE 12.5 MG/5ML liquid, Lidocaine Viscous HCl 2 % solution, nystatin [id number] UNIT/ML suspension, Swish and swallow 10 mL 4 (Four) Times a Day Before Meals & at Bedtime., Disp: 400 mL, Rfl: 2  •  FLUoxetine (PROzac) 20 MG capsule, Take 1 capsule by mouth Daily., Disp: 90 capsule, Rfl: 1  •  guaiFENesin (MUCINEX PO), Take  by mouth., Disp: , Rfl:   •  levETIRAcetam (KEPPRA) 500 MG tablet, Take 1 tablet by mouth Every 12 (Twelve) Hours., Disp: 60 tablet, Rfl: 2  •  losartan (COZAAR) 50 MG tablet, Take 1 tablet by mouth Daily., Disp: 90 tablet, Rfl: 1  •  morphine 100 MG/5ML solution concentrated solution, Take 0.75 mL by mouth Every 4 (Four) Hours As Needed (as needed for pain)., Disp: 60 mL, Rfl: 0  •  NARCAN 4 MG/0.1ML nasal spray, ADMINISTER A SINGLE SPRAY INTRANASALLY INTO ONE NOSTRIL. CALL 911. MAY REPEAT X1., Disp: , Rfl: 0  •  pantoprazole (PROTONIX) 40 MG EC tablet, Take 1 tablet by mouth Daily., Disp: 90 tablet, Rfl: 1    Current Facility-Administered Medications:   •  sodium chloride 0.9 % infusion 1,000 mL, 1,000 mL, Intravenous, Once, Code, Nicolas ZIMMERMAN II, MD  Allergies   Allergen Reactions   • Sulfa Antibiotics Rash        Objective   Vitals:    01/08/20 0858   BP: 108/64   Pulse: 83   SpO2: 98%     Physical Exam   Constitutional: She is oriented to person, place, and time. She appears well-developed and well-nourished.   HENT:   Head: Normocephalic.   Eyes: Pupils are equal, round, and reactive to light. Conjunctivae, EOM and lids are normal.   Neck: Trachea normal and normal range of motion. Neck supple. No hepatojugular reflux and no JVD present. Carotid bruit is not present. No thyroid mass and no thyromegaly present.   Cardiovascular: Normal rate, regular rhythm, S1 normal, S2 normal, normal heart sounds and normal pulses.  No extrasystoles are present. PMI is not displaced.   Pulmonary/Chest: Effort normal and breath sounds normal.    Port site is clean without signs of infection.  There is no swelling.  Port is well fixed to the chest wall.   Abdominal: Soft. Normal appearance and bowel sounds are normal. She exhibits no mass. There is no hepatosplenomegaly. There is no tenderness. No hernia.   Musculoskeletal: Normal range of motion.   No swelling in the right upper extremity or hand.   Neurological: She is alert and oriented to person, place, and time. She has normal strength and normal reflexes. No cranial nerve deficit or sensory deficit. She displays a negative Romberg sign.   Skin: Skin is warm, dry and intact.   Psychiatric: She has a normal mood and affect. Her speech is normal and behavior is normal. Judgment and thought content normal. Cognition and memory are normal.     Independent Review of Radiographic Studies:              Contrast study through the port shows good flow of contrast from port into catheter and into the vein.  The catheter has flipped back onto itself and now extends down into the axillary vein.  It has migrated from the superior vena cava.  No obvious clot or other obstruction.    Assessment/Plan      Since the port aspirates easily and injects easily I am inclined to leave this alone.  She is having no pain with injections or blood drawing.  She is having no swelling or other signs of thrombosis.  I will see her back here in the office in mid March for reevaluation.  I would recommend continuing use of the port as we have been doing.    Diagnoses and all orders for this visit:    Encounter for fitting and adjustment of vascular catheter  -     XR Chest 2 View; Future

## 2020-01-13 NOTE — PROGRESS NOTES
Pt is here for lab with RN review.  CBC reviewed with pt, counts are stable for this pt at this time. Pt has no complaints.  Copy of labs given to pt and f/u appt reviewed. Pt is instructed to call the office with any concerns or new symptoms prior to next visit. Pt v/u.    Lab Results   Component Value Date    WBC 3.49 01/13/2020    HGB 10.3 (L) 01/13/2020    HCT 30.5 (L) 01/13/2020    MCV 95.9 01/13/2020     01/13/2020

## 2020-03-03 NOTE — PROGRESS NOTES
Dr. Noble inform a port is flipped and will need to be removed.    I will defer to him whether to put another port in now versus waiting until she needs it.    Typically extensive stage small cell lung cancer would need a port fairly quickly.  However, her disease has been indolent in the past, likely growing over the course of a couple of years or so.

## 2020-03-03 NOTE — PROGRESS NOTES
Subjective   Patient ID: Karen Pineda is a 74 y.o. female is here today for follow-up.    History of Present Illness  Dear Colleague,  Karen Pineda was seen in our office today for further follow-up of a right subclavian venous access port and a history of small cell lung cancer.  The patient has been on vacation in [place].  She has been doing well.  She has had no shortness of breath.  She has had no pleuritic pain.  She has been active without significant limitations.  She is here today to discuss migration of the catheter from her subclavian access port.    The following portions of the patient's history were reviewed and updated as appropriate: allergies, current medications, past family history, past medical history, past social history, past surgical history and problem list.  Review of Systems   Constitution: Negative.   HENT: Negative.    Eyes: Negative.    Cardiovascular: Negative.    Respiratory: Positive for cough, shortness of breath, sputum production and wheezing.    Endocrine: Negative.    Hematologic/Lymphatic: Negative.    Skin: Negative.    Musculoskeletal: Negative.    Gastrointestinal: Negative.    Genitourinary: Negative.    Neurological: Negative.    Psychiatric/Behavioral: Negative.    Allergic/Immunologic: Negative.      Patient Active Problem List   Diagnosis   • Hemoptysis   • Lung mass   • Cough   • Arthritis   • Surgery follow-up examination   • Brain metastases (CMS/HCC)   • Small cell lung cancer (CMS/HCC)   • High risk medication use   • Encounter for fitting and adjustment of vascular catheter   • Rash   • Chemotherapy-induced thrombocytopenia   • Decreased oral intake   • Radiation esophagitis     Past Medical History:   Diagnosis Date   • Anxiety    • Arthritis    • Brain metastases (CMS/HCC) 9/18/2019   • Depression    • GERD (gastroesophageal reflux disease)    • H/O Pulmonary nodule    • Hemoptysis    • History of osteopenia    • Hyperlipidemia    • Nodule of  right lung    • Post-menopause    • Sinus problem    • Stress incontinence      Past Surgical History:   Procedure Laterality Date   • BREAST BIOPSY Right    • BRONCHOSCOPY N/A 2016    Procedure: BRONCHOSCOPY WITH ENDOBRONCHIAL ULTRASOUND, NAVIGATION, BRUSHING,BIOPSIES, LAVAGE, AND TRANSBRONCHIAL NEEDLE ASPIRATION;  Surgeon: Js Lewis MD;  Location: [facility] ENDOSCOPY;  Service:    • COLONOSCOPY     • CRANIOTOMY FOR TUMOR Left 2019    Procedure: Left frontal craniotomy for tumor;  Surgeon: Ruben Thibodeaux MD;  Location: [facility] MAIN OR;  Service: Neurosurgery   • VENOUS ACCESS DEVICE (PORT) INSERTION Right 2019    Procedure: INSERTION VENOUS ACCESS DEVICE;  Surgeon: Saroj Chacko III, MD;  Location: [facility] MAIN OR;  Service: Thoracic     Family History   Problem Relation Age of Onset   • Heart failure Mother    • Coronary artery disease Mother    • Depression Mother    • Heart disease Mother    • Leukemia Father    • Bone cancer Father    • Cancer Paternal Aunt    • Cancer Paternal Uncle      Social History     Socioeconomic History   • Marital status:      Spouse name: Juan   • Number of children: Not on file   • Years of education: Not on file   • Highest education level: Not on file   Occupational History     Employer: RETIRED   Tobacco Use   • Smoking status: Former Smoker     Packs/day: 1.00     Years: 50.00     Pack years: 50.00     Last attempt to quit:      Years since quittin.1   • Smokeless tobacco: Never Used   Substance and Sexual Activity   • Alcohol use: No   • Drug use: No   • Sexual activity: Defer       Current Outpatient Medications:   •  albuterol sulfate  (90 Base) MCG/ACT inhaler, Inhale 2 puffs Every 4 (Four) Hours As Needed for Wheezing or Shortness of Air., Disp: 1 inhaler, Rfl: 0  •  FLUoxetine (PROzac) 20 MG capsule, Take 1 capsule by mouth Daily., Disp: 90 capsule, Rfl: 1  •  losartan (COZAAR) 50 MG tablet, Take 1 tablet by mouth Daily.,  Disp: 90 tablet, Rfl: 1  •  pantoprazole (PROTONIX) 40 MG EC tablet, Take 1 tablet by mouth Daily., Disp: 90 tablet, Rfl: 1  •  aluminum-magnesium hydroxide 200-200 MG/5ML suspension, diphenhydrAMINE 12.5 MG/5ML liquid, Lidocaine Viscous HCl 2 % solution, nystatin [id number] UNIT/ML suspension, Swish and swallow 10 mL 4 (Four) Times a Day Before Meals & at Bedtime., Disp: 400 mL, Rfl: 2  •  guaiFENesin (MUCINEX PO), Take  by mouth., Disp: , Rfl:   •  levETIRAcetam (KEPPRA) 500 MG tablet, Take 1 tablet by mouth Every 12 (Twelve) Hours., Disp: 60 tablet, Rfl: 2  •  morphine 100 MG/5ML solution concentrated solution, Take 0.75 mL by mouth Every 4 (Four) Hours As Needed (as needed for pain)., Disp: 60 mL, Rfl: 0  •  NARCAN 4 MG/0.1ML nasal spray, ADMINISTER A SINGLE SPRAY INTRANASALLY INTO ONE NOSTRIL. CALL 911. MAY REPEAT X1., Disp: , Rfl: 0    Current Facility-Administered Medications:   •  sodium chloride 0.9 % infusion 1,000 mL, 1,000 mL, Intravenous, Once, Code, Nicolas ZIMMERMAN II, MD  Allergies   Allergen Reactions   • Sulfa Antibiotics Rash        Objective   Vitals:    03/03/20 0936   BP: 112/66   Pulse: 74   SpO2: 100%     Physical Exam   Constitutional: She is oriented to person, place, and time. She appears well-developed and well-nourished.   HENT:   Head: Normocephalic.   Eyes: Pupils are equal, round, and reactive to light. Conjunctivae, EOM and lids are normal.   Neck: Trachea normal and normal range of motion. Neck supple. No hepatojugular reflux and no JVD present. Carotid bruit is not present. No thyroid mass and no thyromegaly present.   Cardiovascular: Normal rate, regular rhythm, S1 normal, S2 normal, normal heart sounds and normal pulses.  No extrasystoles are present. PMI is not displaced.   Pulmonary/Chest: Effort normal and breath sounds normal.   Port site is clean and dry.  There is no swelling.  There is no tenderness.  The port has migrated slightly inferiorly on the chest wall.   Abdominal: Soft.  Normal appearance and bowel sounds are normal. She exhibits no mass. There is no hepatosplenomegaly. There is no tenderness. No hernia.   Musculoskeletal: Normal range of motion.   Neurological: She is alert and oriented to person, place, and time. She has normal strength and normal reflexes. No cranial nerve deficit or sensory deficit. She displays a negative Romberg sign.   Skin: Skin is warm, dry and intact.   Psychiatric: She has a normal mood and affect. Her speech is normal and behavior is normal. Judgment and thought content normal. Cognition and memory are normal.     Independent Review of Radiographic Studies:    Chest x-ray performed today was independently reviewed and compared to the x-ray from January 8.  The right subclavian port catheter has looped back on itself with its tip in the right axillary vein.  This is unchanged from January.  The port itself appears to have migrated inferiorly.  The lungs appear clear.  There is no pleural effusion.      Assessment/Plan   Assessment:  I have discussed the case with Dr. Katz of the [facility] group.  He is not planning any chemotherapy at this time.  Because of the migration of the catheter into the axillary vein I am concerned that this could precipitate a DVT.  I believe that the port should be removed.  I have discussed this with Dr. Katz and the plan is to remove the port and if she requires another port we will then replace it at that time.  I have discussed this with the patient as well.  I have explained all this to her in detail.  I have explained the procedure and have answered all of her questions.  She has requested that we proceed.    Plan: Patient will be scheduled for removal of right subclavian venous access port.  Case request and preoperative orders have been placed in epic.  I will keep you informed of her progress.  Next line thank you for allowing me to participate in the care of Mrs. Pineda.    Diagnoses and all orders for this  visit:    Encounter for fitting and adjustment of vascular catheter  -     Case request    Small cell lung cancer (CMS/HCC)  -     Case request

## 2020-03-06 NOTE — H&P
"  Subjective .     REASON FOR ADMISSION:  Extensive Stage Small Cell Lung Cancer  -New Brain Metastasis     HISTORY OF PRESENT ILLNESS:  The patient is a 74 y.o. year old female who is here for follow-up with the above-mentioned history.    History of Present Illness       The patient is a 74 y.o. year old female who is here today for a triage visit.  She called our office with reports of right-sided numbness that precipitated 3 weeks ago in her foot and leg.  She has noted burning with new extension to her right upper extremity and face within the last few days.  She has also had associated mild weakness of her right upper and lower extremities.  She is still able to walk without gait abnormality.  She denies any associated headaches or blurred vision.    She denies any slurring of her speech.  She is eating and drinking adequately.  Her bowels and urination are regular. She recently discontinued her Keppra, 500 mg BID per neurology.     Of note, patient did mention hearing changes of her right ear.  Over the last several days that she is heard what sounds like \"someone snoring\" continuously.    We have obtained a stat brain MRI unfortunately demonstrating multiple areas of new metastasis within the supra and infratentorial regions with the largest lesions involving the posterior aspect of the midbrain to the left where there is mild to moderate adjacent vasogenic edema and mild mass-effect on the sylvian aqueduct.  There is a metastatic lesion involving the medial aspect of the right temporal lobe as well as enlargement of the right temporal horn suggesting trapping of the right temporal horn.  This will be further detailed below.    She has no other concerns.  Past Medical History:   Diagnosis Date   • Anxiety    • Arthritis    • Brain metastases (CMS/HCC) 9/18/2019   • Depression    • GERD (gastroesophageal reflux disease)    • H/O Pulmonary nodule    • Hemoptysis    • History of osteopenia    • Hyperlipidemia  "   • Nodule of right lung    • Post-menopause    • Sinus problem    • Stress incontinence        ONCOLOGIC HISTORY:  (History from previous dates can be found in the separate document.)    Current Outpatient Medications on File Prior to Visit   Medication Sig Dispense Refill   • albuterol sulfate  (90 Base) MCG/ACT inhaler Inhale 2 puffs Every 4 (Four) Hours As Needed for Wheezing or Shortness of Air. 1 inhaler 0   • aluminum-magnesium hydroxide 200-200 MG/5ML suspension, diphenhydrAMINE 12.5 MG/5ML liquid, Lidocaine Viscous HCl 2 % solution, nystatin 141624 UNIT/ML suspension Swish and swallow 10 mL 4 (Four) Times a Day Before Meals & at Bedtime. 400 mL 2   • FLUoxetine (PROzac) 20 MG capsule Take 1 capsule by mouth Daily. 90 capsule 1   • losartan (COZAAR) 50 MG tablet Take 1 tablet by mouth Daily. 90 tablet 1   • melatonin 5 MG tablet tablet Take 5 mg by mouth.     • morphine 100 MG/5ML solution concentrated solution Take 0.75 mL by mouth Every 4 (Four) Hours As Needed (as needed for pain). 60 mL 0   • pantoprazole (PROTONIX) 40 MG EC tablet Take 1 tablet by mouth Daily. 90 tablet 1   • guaiFENesin (MUCINEX PO) Take  by mouth.     • levETIRAcetam (KEPPRA) 500 MG tablet Take 1 tablet by mouth Every 12 (Twelve) Hours. 60 tablet 2   • NARCAN 4 MG/0.1ML nasal spray ADMINISTER A SINGLE SPRAY INTRANASALLY INTO ONE NOSTRIL. CALL 911. MAY REPEAT X1.  0     Current Facility-Administered Medications on File Prior to Visit   Medication Dose Route Frequency Provider Last Rate Last Dose   • [COMPLETED] gadobenate dimeglumine (MULTIHANCE) injection 16 mL  16 mL Intravenous Once in imaging Nicolas Katz II, MD   16 mL at 03/06/20 1409   • heparin injection 500 Units  500 Units Intravenous PRN Nicolas Katz II, MD   500 Units at 03/06/20 1519   • sodium chloride 0.9 % flush 10 mL  10 mL Intravenous PRN Nicolas Katz II, MD   10 mL at 03/06/20 1519   • sodium chloride 0.9 % infusion 1,000 mL  1,000 mL Intravenous Once  Code, Nicolas ZIMMERMAN II, MD           ALLERGIES:     Allergies   Allergen Reactions   • Sulfa Antibiotics Rash       Social History     Socioeconomic History   • Marital status:      Spouse name: Juan   • Number of children: Not on file   • Years of education: Not on file   • Highest education level: Not on file   Occupational History     Employer: RETIRED   Tobacco Use   • Smoking status: Former Smoker     Packs/day: 1.00     Years: 50.00     Pack years: 50.00     Last attempt to quit:      Years since quittin.1   • Smokeless tobacco: Never Used   Substance and Sexual Activity   • Alcohol use: No   • Drug use: No   • Sexual activity: Defer         Cancer-related family history includes Bone cancer in her father; Cancer in her paternal aunt and paternal uncle.     Review of Systems   Constitutional: Positive for activity change. Negative for appetite change, chills, fatigue and fever.   HENT:        See HPI, hearing changes    Eyes: Negative.  Negative for visual disturbance.   Respiratory: Negative.  Negative for cough, choking and shortness of breath.    Cardiovascular: Negative for chest pain and palpitations.   Gastrointestinal: Negative for abdominal pain, blood in stool, constipation, diarrhea, nausea and vomiting.   Genitourinary: Negative for difficulty urinating, dysuria and flank pain.   Musculoskeletal: Negative for arthralgias, back pain, gait problem, neck pain and neck stiffness.   Skin: Negative.    Neurological: Positive for weakness and numbness. Negative for tremors, seizures, syncope, speech difficulty, light-headedness and headaches.        See HPI    Hematological: Negative for adenopathy. Does not bruise/bleed easily.   Psychiatric/Behavioral: Negative.  Negative for agitation.     A comprehensive 14 point review of systems was performed and was negative except as mentioned.    Objective      Vitals:    20 1518   BP: 124/76   Pulse: 60   Resp: 14   Temp: 97.5 °F (36.4 °C)    SpO2: 98%      Current Status 3/6/2020   ECOG score 0       Physical Exam      CONSTITUTIONAL:  Vital signs reviewed.  No distress, looks comfortable.  EYES:  Conjunctiva and lids unremarkable.  PERRLA  EARS,NOSE,MOUTH,THROAT:  Ears and nose appear unremarkable.  Lips, teeth, gums appear unremarkable.  RESPIRATORY:  Normal respiratory effort.  Lungs clear to auscultation bilaterally.  CARDIOVASCULAR:  Normal S1, S2.  No murmurs rubs or gallops.  No significant lower extremity edema.  GASTROINTESTINAL: Abdomen appears unremarkable.  Nontender.  No hepatomegaly.  No splenomegaly.  LYMPHATIC:  No cervical, supraclavicular, axillary lymphadenopathy.  SKIN:  Warm.  No rashes.  NEUROLOGICAL:  sensation of burning with palpation extending from her right lower extremity up to her right buccal region, oriented x3, no visual impairment appreciated, muscle tone even bilaterally of upper and lower extremities, decreased proprioception from her right lower extremity extending up to her right cheekbone, positive for burning sensation with palpation of her entire right side, positive reflexes, no difficulty with gait  PSYCHIATRIC:  Normal judgment and insight.  Normal mood and affect.  RECENT LABS:  Hematology WBC   Date Value Ref Range Status   03/06/2020 4.25 3.40 - 10.80 10*3/mm3 Final     RBC   Date Value Ref Range Status   03/06/2020 3.90 3.77 - 5.28 10*6/mm3 Final     Hemoglobin   Date Value Ref Range Status   03/06/2020 11.8 (L) 12.0 - 15.9 g/dL Final     Hematocrit   Date Value Ref Range Status   03/06/2020 36.1 34.0 - 46.6 % Final     Platelets   Date Value Ref Range Status   03/06/2020 138 (L) 140 - 450 10*3/mm3 Final        MRI BRAIN WITH AND WITHOUT CONTRAST (3/6/20)     HISTORY: Lung cancer, brain metastases. Right-sided weakness.     COMPARISON: Comparison is made to multiple prior MRI examinations of the  brain with the most recent examination being that of 11/26/2019.     TECHNIQUE: A MRI examination of the brain  was performed before and after  the intravenous administration of contrast utilizing sagittal T1, axial  diffusion, T1, T2, T2 FLAIR, susceptibility weighted imaging as well as  sagittal, axial and coronal T1 postcontrast weighted sequences.     FINDINGS: A left frontal craniotomy is noted at the vertex. There is no  evidence of restricted diffusion, hydrocephalus or of abnormal  extra-axial fluid.     On the prior MRI examination, a resection cavity involving the medial  aspect of the left frontal lobe was appreciated with marginal  enhancement. The resection cavity has decreased in size since prior  examination and the marginal enhancement appears slightly less  prominent, likely representing postoperative changes.     The prior MRI examination also demonstrated multiple enhancing lesions,  consistent with metastatic disease. However, there has been marked  progression since the prior examination with multiple new supratentorial  and infratentorial  metastatic lesions identified.     There is a 4 mm enhancing lesion involving the white matter of the right  cerebellar hemisphere laterally. A 7 x 7 x 5 mm enhancing lesion  involving the posterior aspect of the fourth ventricle, and 11 mm  enhancing lesion involving the left cerebral hemisphere posteriorly  immediately inferior to the medial aspect of the left transverse sinus,  and a 11 x 11 x 12 mm enhancing lesion involving the posterior aspect of  the midbrain to the left. There is mild mass effect on the sylvian  aqueduct. The third ventricle is not enlarged.     A cortical enhancing lesion involving the right frontal lobe is noted  superolaterally measuring 7 mm in size. A somewhat centrally  hypoenhancing or chronic metastatic lesion is identified involving the  right temporal lobe medially measuring 17 x 14 x 14 mm in size. The  right temporal horn is enlarged and this mass may trap the temporal  horn. There is no evidence of transependymal migration of the  temporal  horn. A 4 mm enhancing lesion is appreciated involving the right  temporal lobe anteriorly and inferiorly and a 4 mm enhancing lesion is  appreciated involving the white matter of the right temporal lobe  posteriorly.     There is a peripherally enhancing lesion measuring 4 mm in size  involving the head of the caudate nucleus on the left. A nodular area of  enhancement involving the left frontal lobe anterior laterally is  appreciated. This previously measured 5 mm in size and now measures 2.5  mm in size.     A 5 mm enhancing lesion involving the dura overlying the left parietal  lobe superolaterally is appreciated, unchanged and likely representing a  small meningioma.     There is expected flow-void in the basilar artery and in the distal  aspect of the internal carotid arteries bilaterally on the axial T2  sequence. There is a small amount of fluid present within the mastoid  air cells bilaterally.     The axial T2 FLAIR sequence demonstrates vasogenic edema adjacent to the  larger of the new metastatic lesions with the most prominent vasogenic  edema involving the midbrain to the left.     IMPRESSION:  1.  The resection cavity continues to decrease in size and the enhancing  lesion involving the left frontal lobe anterior laterally has decreased  in size.  2.  There are multiple new supratentorial and infratentorial enhancing  lesions as described in detail above with the largest lesions involving  the posterior aspect of midbrain to the left where there is  mild-to-moderate adjacent vasogenic edema and mild mass effect on the  sylvian aqueduct. The third ventricle is not enlarged. A metastatic  lesion involving the medial aspect of the right temporal lobe is  appreciated measuring approximately 17 mm in size. There is enlargement  of the right temporal horn. The findings suggest trapping of the right  temporal horn. The right lateral ventricle is not enlarged immediately  posterior to this  metastatic lesion suggesting that the temporal horn is  trapped  rather than the lateral ventricle being expanded by the  metastatic lesion.  3.  Stable appearance of a 5 mm area of enhancement, extra-axial likely  representing a small meningioma overlying the left parietal lobe  superiolaterally.  4.  No evidence of hydrocephalus or of acute infarction.     The above information was called to and discussed with Shelby Grossman on  03/06/2020 at 1455 hours.    Assessment/Plan       Assessment/Plan   There are no diagnoses linked to this encounter.  *Extensive stage small cell lung cancer, RUL, 2.4 x 2.3 x 1.7cm, with metastasis to 3 brain lesions (bilateral frontal lobes.)  · RUL mass first seen on CT 3/28/2016, 2 x 1.3 cm.  Decreased to 1.6 cm on 6/27/2016, and decreased again to 1.3 cm on 12/5/2016.  Therefore, this was felt at the time to be a benign resolving nodule.  · During August 2019 hospitalization, found to have brain metastasis from small cell lung cancer.  RUL mass and 3 brain lesions.  No evidence of disease otherwise  · Craniotomy for the medial left frontal lobe lesion (3.2 cm) , 8/28/2019  · Because the final diagnosis is small cell, Dr. Chacko does not plan lung resection as we typically treat this with chemoradiation  · SRS to 2 remaining brain lesions (first was resected) and to resection cavity of the first lesion, completed 9/18/2019.  Dr. Mayo only planning whole brain XRT if future brain recurrence.  · Usually with small cell I would like to start chemo XRT within a couple of weeks.  However, she is recovering from brain surgery.  Furthermore, this lung lesion has been there for years.  Therefore, weighing risks and benefits I think it is in her best interest to wait almost 4 weeks after craniotomy to begin chemo and radiation.  · PET 9/16/2019: 2.5 cm RUL mass with SUV 15.8.  No hypermetabolic LAD or distant disease.  · On 9/18/2019 completed 5/5 XRT to brain metastasis resection cavity and  1/1 XRT intact brain metastasis #1 and 1/1 XRT due to intact brain metastasis #2.  · 9/19/2019, C1D1 lung mass XRT with  carboplatin, etoposide, Tecentriq, followed by Tecentriq maintenance for extensive stage small cell lung cancer.  Plan a total of 4-6 cycles of chemo depending on tolerability.  · On 10/4/2019, prednisone 0.5 mg/kg/day (40 mg) started due to significant rash from Tecentriq.  4 days later, rash improved but did not resolve.   ·  Plan no more Tecentriq.    · Prednisone tapered off 10/28/2019.  · C2D1 (without Tecentriq) given 10/14/2019.  · MRI brain 10/8/2019 reviewed by Dr. Thibodeaux-he feels this is stable and plan another MRI and appointment with him in 3 months  · Lung mass XRT held the week of 10/28/2019, and again the week of 11/4/2019 due to radiation esophagitis  · Completed lung mass XRT 11/15/2019  · CT CAP 10/29/2019 (after C2): RUL lung mass 2 x 1.3 cm, from 2.5 x 2.5 cm.  · Did not give C3 chemo is planned 11/4/2019 because PLT 91.    · C3 D1 on 11/11/2019 (20% dose reduction of both drugs)  · Did not receive C4 D1 as planned on 12/2/2019 due to ANC 1330 and PLT 67.  Delayed by 1 week and further dose reduction of both drugs by an additional 20%  · (We will only plan 4 cycles of chemo since she is having difficulty tolerating chemo due to cytopenias).  · C4 D1 carbo etoposide, 12/10/2019 (completed first-line chemo).  · CT 12/18/2019 (after C4): No change RUL 2 x 1.3 cm lung mass.   · CT images personally viewed by Dr. Katz  ·  Radiologist noted there might be a tiny thrombus along the right Mediport catheter tip.  No distant disease found.  · MRI brain 12/26/2019: Left frontal lobe metastasis 5 mm, from 1 cm on 10/8/2019.  Right frontal lobe lesion also smaller.  Left parietal lobe dural nodular enhancement 5 mm, unchanged (radiologist notes this might have been a meningioma since it is unchanged).  · The patient returns today, March 6, 2020 with reports of right-sided numbness and  weakness that precipitated 3 weeks ago.  She has had acute worsening over the last few days with extension of the numbness up to her face and right arm.  No associated gait abnormalities or headaches.  We have obtained a stat brain MRI with extensive new metastatic disease involving the supratentorial and infratentorial regions.  Please see above for additional details.  The patient is reasonably stable clinically, at this point.  I have discussed these results with Dr. Holbrook of radiology and there is concern for obstructive hydrocephalus if there indeed appears to be more mass-effect.  Of great concern additionally is the trapping of the right temporal horn.  This will be further detailed below.    *Goals of care.  · Since the 3 brain lesions were treated with stereotactic radiation and resection of 1 of the lesions and since no disease was found elsewhere, she perhaps has a very slim but possible chance of cure.  However, small cell histology makes the possibility of long-term survival unlikely.  · On 11/4/2019, Dr. Katz reminded her this is highly unlikely curable.  At that time, she was having significant difficulty with tolerance to radiation therapy  · Unfortunately, as noted above; the patient has new areas of brain metastasis and we will have to pursue inpatient hospital admission for immediate management and evaluation     *Neurological deficits due to brain metastasis:  · Some mild confusion and right-sided weakness and urinary incontinence x2 weeks prompted ER visit 8/24/2019.  · Please see above.  The patient has had right-sided weakness and numbness over the last 3 weeks for which she has undergone a stat brain MRI for further evaluation.  With multiple new areas of brain metastasis and neurologic changes with right-sided numbness, tingling, and mild weakness, she will need to be admitted for immediate management and further evaluation.  · I have spoken with Dr. Rios of radiation oncology who  consulted with Dr. Thibodeaux of neurosurgery.  The patient previously had radiation therapy and the recommendation is to proceed with hospital admission with neurosurgery consult.  We will begin the patient on IV steroid therapy and will restart her Keppra at 500 mg twice daily.  Patient has not had any seizure activity.  · She will be evaluated for the possibility of further intervention with radiation/surgery      *Tinnitus and decreased hearing.  Not a cisplatin candidate.  · The patient has noticed recent hearing changes in her right ear, as noted above.  This is likely due to her new brain metastasis    *Port placed 9/6/2019    *Neutropenia due to chemotherapy.  We have not been using Neulasta due to XRT therapy.  Prophylactic Levaquin prescribed 11/25/2019 due to neutropenia.  Added Neulasta with cycle 4    · Total white blood cell count today is 4.25 and the ANC is 2.99    *Thrombocytopenia   · This caused dose delays and dose reductions of C3 and C4 carbo etoposide  · PLT today is 138,000 in the absence of excess bleeding or bruising    *Anemia.  HGB today is improved from prior reading in January to 11.8     *This is Ángela Pryor's mother-in-law (APRN with Dr. Thibodeaux)    *VTE Prophylaxis.  Lovenox, 40 mg daily will need to be started if no surgical intervention will be planned per neurosurgery     Plan  · The patient underwent a stat brain MRI today.  Please see above for additional details  · After discussion with doctors Gianna Rios, and Morelia; we will proceed with inpatient hospital admission  · Please initiate DVT prophylaxis with Lovenox 40 mg daily if no surgical intervention will be planned  · Please see inpatient hospital orders for additional details    -All the above reviewed with Dr. Katz and he is in agreement                Cc:  No ref. provider found

## 2020-03-06 NOTE — PROGRESS NOTES
Pharmacy Consult - Lovenox    Karen Pineda has been consulted for pharmacy to dose enoxaparin for VTE prophylaxis per Shelby RODRIGUEZ's request.         Relevant clinical data and objective history reviewed:  74 y.o. female        Home Anticoagulation: N/A    Past Medical History:   Diagnosis Date    Anxiety     Arthritis     Brain metastases (CMS/HCC) 9/18/2019    Depression     GERD (gastroesophageal reflux disease)     H/O Pulmonary nodule     Hemoptysis     History of osteopenia     Hyperlipidemia     Nodule of right lung     Post-menopause     Sinus problem     Stress incontinence      is allergic to sulfa antibiotics.    Lab Results   Component Value Date    WBC 4.25 03/06/2020    HGB 11.8 (L) 03/06/2020    HCT 36.1 03/06/2020    MCV 92.6 03/06/2020     (L) 03/06/2020     Lab Results   Component Value Date    GLUCOSE 88 03/06/2020    CALCIUM 9.9 03/06/2020     03/06/2020    K 3.7 03/06/2020    CO2 26.5 03/06/2020     03/06/2020    BUN 9 03/06/2020    CREATININE 0.95 03/06/2020    EGFRIFNONA 58 (L) 03/06/2020    BCR 9.5 03/06/2020    ANIONGAP 12.5 03/06/2020       Estimated Creatinine Clearance: 54.5 mL/min (by C-G formula based on SCr of 0.95 mg/dL).    Active Inpatient Anticoagulation Orders:     Assessment/Plan    Will start patient on 40 mg ( mg/kg) subcutaneous every 24 hours, adjusted for renal function. Labs to be ordered:  Pharmacy will continue to follow.   Richy Diamond, PharmD, BCIDP, BCPS

## 2020-03-06 NOTE — TELEPHONE ENCOUNTER
PT CALLING C/O R SIDED NUMBNESS. STARTED 3 WKS AGO IN HER R FOOT AND LEG. SHE HAS BURNING IN HER FOOT AND LEG. THE NUMBNESS HAS NOW SPREAD UP HER R SIDE AND IS ALL THE WAY UP TO HER FACE. INCLUDING SIDE AND ARM. HAS SOME WEAKNESS ON THAT R SIDE AS WELL. NO OTHER ISSUES THAT SHE CAN THINK OF. WILL CALL DR. BLACK AND SEE WHAT TO DO W/ HER.     PER DR. BLACK HE WANTS PT TO GET STAT MRI ASAP W/ AND W/O CONTRAST. CALL STAT RESULTS. THEN SEE NP W/ CBC AND CMP. PT IS WILLING TO GO TO [facility] FOR MRI AND THEN EP FOR NP VS. ONC RN'S AND APPT DESK MESSAGED. PT V/U.

## 2020-03-06 NOTE — H&P
"   The following is Shelby Zuleta NP, admission H&P.  She saw the patient today and discussed the patient with me.  The computer system is asking me to place an H&P even though she already placed an H&P.  Therefore, I copy this and pasted it here.      Subjective    .      REASON FOR ADMISSION:  Extensive Stage Small Cell Lung Cancer  -New Brain Metastasis      HISTORY OF PRESENT ILLNESS:  The patient is a 74 y.o. year old female who is here for follow-up with the above-mentioned history.     History of Present Illness        The patient is a 74 y.o. year old female who is here today for a triage visit.  She called our office with reports of right-sided numbness that precipitated 3 weeks ago in her foot and leg.  She has noted burning with new extension to her right upper extremity and face within the last few days.  She has also had associated mild weakness of her right upper and lower extremities.  She is still able to walk without gait abnormality.  She denies any associated headaches or blurred vision.     She denies any slurring of her speech.  She is eating and drinking adequately.  Her bowels and urination are regular. She recently discontinued her Keppra, 500 mg BID per neurology.      Of note, patient did mention hearing changes of her right ear.  Over the last several days that she is heard what sounds like \"someone snoring\" continuously.     We have obtained a stat brain MRI unfortunately demonstrating multiple areas of new metastasis within the supra and infratentorial regions with the largest lesions involving the posterior aspect of the midbrain to the left where there is mild to moderate adjacent vasogenic edema and mild mass-effect on the sylvian aqueduct.  There is a metastatic lesion involving the medial aspect of the right temporal lobe as well as enlargement of the right temporal horn suggesting trapping of the right temporal horn.  This will be further detailed below.     She has no other " concerns.  Medical History        Past Medical History:   Diagnosis Date   • Anxiety     • Arthritis     • Brain metastases (CMS/HCC) 9/18/2019   • Depression     • GERD (gastroesophageal reflux disease)     • H/O Pulmonary nodule     • Hemoptysis     • History of osteopenia     • Hyperlipidemia     • Nodule of right lung     • Post-menopause     • Sinus problem     • Stress incontinence              ONCOLOGIC HISTORY:  (History from previous dates can be found in the separate document.)            Current Outpatient Medications on File Prior to Visit   Medication Sig Dispense Refill   • albuterol sulfate  (90 Base) MCG/ACT inhaler Inhale 2 puffs Every 4 (Four) Hours As Needed for Wheezing or Shortness of Air. 1 inhaler 0   • aluminum-magnesium hydroxide 200-200 MG/5ML suspension, diphenhydrAMINE 12.5 MG/5ML liquid, Lidocaine Viscous HCl 2 % solution, nystatin 549338 UNIT/ML suspension Swish and swallow 10 mL 4 (Four) Times a Day Before Meals & at Bedtime. 400 mL 2   • FLUoxetine (PROzac) 20 MG capsule Take 1 capsule by mouth Daily. 90 capsule 1   • losartan (COZAAR) 50 MG tablet Take 1 tablet by mouth Daily. 90 tablet 1   • melatonin 5 MG tablet tablet Take 5 mg by mouth.       • morphine 100 MG/5ML solution concentrated solution Take 0.75 mL by mouth Every 4 (Four) Hours As Needed (as needed for pain). 60 mL 0   • pantoprazole (PROTONIX) 40 MG EC tablet Take 1 tablet by mouth Daily. 90 tablet 1   • guaiFENesin (MUCINEX PO) Take  by mouth.       • levETIRAcetam (KEPPRA) 500 MG tablet Take 1 tablet by mouth Every 12 (Twelve) Hours. 60 tablet 2   • NARCAN 4 MG/0.1ML nasal spray ADMINISTER A SINGLE SPRAY INTRANASALLY INTO ONE NOSTRIL. CALL 911. MAY REPEAT X1.   0                Current Facility-Administered Medications on File Prior to Visit   Medication Dose Route Frequency Provider Last Rate Last Dose   • [COMPLETED] gadobenate dimeglumine (MULTIHANCE) injection 16 mL  16 mL Intravenous Once in imaging Code,  Nicolas ZIMMERMAN II, MD   16 mL at 20 1409   • heparin injection 500 Units  500 Units Intravenous PRN Nicolas Katz II, MD   500 Units at 20 1519   • sodium chloride 0.9 % flush 10 mL  10 mL Intravenous PRN Nicolas Katz II, MD   10 mL at 20 1519   • sodium chloride 0.9 % infusion 1,000 mL  1,000 mL Intravenous Once Nicolas Katz II, MD             ALLERGIES:          Allergies   Allergen Reactions   • Sulfa Antibiotics Rash         Social History   Social History            Socioeconomic History   • Marital status:        Spouse name: Juan   • Number of children: Not on file   • Years of education: Not on file   • Highest education level: Not on file   Occupational History       Employer: RETIRED   Tobacco Use   • Smoking status: Former Smoker       Packs/day: 1.00       Years: 50.00       Pack years: 50.00       Last attempt to quit:        Years since quittin.1   • Smokeless tobacco: Never Used   Substance and Sexual Activity   • Alcohol use: No   • Drug use: No   • Sexual activity: Defer             Cancer-related family history includes Bone cancer in her father; Cancer in her paternal aunt and paternal uncle.      Review of Systems   Constitutional: Positive for activity change. Negative for appetite change, chills, fatigue and fever.   HENT:        See HPI, hearing changes    Eyes: Negative.  Negative for visual disturbance.   Respiratory: Negative.  Negative for cough, choking and shortness of breath.    Cardiovascular: Negative for chest pain and palpitations.   Gastrointestinal: Negative for abdominal pain, blood in stool, constipation, diarrhea, nausea and vomiting.   Genitourinary: Negative for difficulty urinating, dysuria and flank pain.   Musculoskeletal: Negative for arthralgias, back pain, gait problem, neck pain and neck stiffness.   Skin: Negative.    Neurological: Positive for weakness and numbness. Negative for tremors, seizures, syncope, speech difficulty,  light-headedness and headaches.        See HPI    Hematological: Negative for adenopathy. Does not bruise/bleed easily.   Psychiatric/Behavioral: Negative.  Negative for agitation.      A comprehensive 14 point review of systems was performed and was negative except as mentioned.        Objective              Vitals:     03/06/20 1518   BP: 124/76   Pulse: 60   Resp: 14   Temp: 97.5 °F (36.4 °C)   SpO2: 98%      Current Status 3/6/2020   ECOG score 0         Physical Exam       CONSTITUTIONAL:  Vital signs reviewed.  No distress, looks comfortable.  EYES:  Conjunctiva and lids unremarkable.  PERRLA  EARS,NOSE,MOUTH,THROAT:  Ears and nose appear unremarkable.  Lips, teeth, gums appear unremarkable.  RESPIRATORY:  Normal respiratory effort.  Lungs clear to auscultation bilaterally.  CARDIOVASCULAR:  Normal S1, S2.  No murmurs rubs or gallops.  No significant lower extremity edema.  GASTROINTESTINAL: Abdomen appears unremarkable.  Nontender.  No hepatomegaly.  No splenomegaly.  LYMPHATIC:  No cervical, supraclavicular, axillary lymphadenopathy.  SKIN:  Warm.  No rashes.  NEUROLOGICAL:  sensation of burning with palpation extending from her right lower extremity up to her right buccal region, oriented x3, no visual impairment appreciated, muscle tone even bilaterally of upper and lower extremities, decreased proprioception from her right lower extremity extending up to her right cheekbone, positive for burning sensation with palpation of her entire right side, positive reflexes, no difficulty with gait  PSYCHIATRIC:  Normal judgment and insight.  Normal mood and affect.  RECENT LABS:  Hematology       WBC   Date Value Ref Range Status   03/06/2020 4.25 3.40 - 10.80 10*3/mm3 Final            RBC   Date Value Ref Range Status   03/06/2020 3.90 3.77 - 5.28 10*6/mm3 Final            Hemoglobin   Date Value Ref Range Status   03/06/2020 11.8 (L) 12.0 - 15.9 g/dL Final            Hematocrit   Date Value Ref Range Status    03/06/2020 36.1 34.0 - 46.6 % Final            Platelets   Date Value Ref Range Status   03/06/2020 138 (L) 140 - 450 10*3/mm3 Final          MRI BRAIN WITH AND WITHOUT CONTRAST (3/6/20)     HISTORY: Lung cancer, brain metastases. Right-sided weakness.     COMPARISON: Comparison is made to multiple prior MRI examinations of the  brain with the most recent examination being that of 11/26/2019.     TECHNIQUE: A MRI examination of the brain was performed before and after  the intravenous administration of contrast utilizing sagittal T1, axial  diffusion, T1, T2, T2 FLAIR, susceptibility weighted imaging as well as  sagittal, axial and coronal T1 postcontrast weighted sequences.     FINDINGS: A left frontal craniotomy is noted at the vertex. There is no  evidence of restricted diffusion, hydrocephalus or of abnormal  extra-axial fluid.     On the prior MRI examination, a resection cavity involving the medial  aspect of the left frontal lobe was appreciated with marginal  enhancement. The resection cavity has decreased in size since prior  examination and the marginal enhancement appears slightly less  prominent, likely representing postoperative changes.     The prior MRI examination also demonstrated multiple enhancing lesions,  consistent with metastatic disease. However, there has been marked  progression since the prior examination with multiple new supratentorial  and infratentorial  metastatic lesions identified.     There is a 4 mm enhancing lesion involving the white matter of the right  cerebellar hemisphere laterally. A 7 x 7 x 5 mm enhancing lesion  involving the posterior aspect of the fourth ventricle, and 11 mm  enhancing lesion involving the left cerebral hemisphere posteriorly  immediately inferior to the medial aspect of the left transverse sinus,  and a 11 x 11 x 12 mm enhancing lesion involving the posterior aspect of  the midbrain to the left. There is mild mass effect on the sylvian  aqueduct. The  third ventricle is not enlarged.     A cortical enhancing lesion involving the right frontal lobe is noted  superolaterally measuring 7 mm in size. A somewhat centrally  hypoenhancing or chronic metastatic lesion is identified involving the  right temporal lobe medially measuring 17 x 14 x 14 mm in size. The  right temporal horn is enlarged and this mass may trap the temporal  horn. There is no evidence of transependymal migration of the temporal  horn. A 4 mm enhancing lesion is appreciated involving the right  temporal lobe anteriorly and inferiorly and a 4 mm enhancing lesion is  appreciated involving the white matter of the right temporal lobe  posteriorly.     There is a peripherally enhancing lesion measuring 4 mm in size  involving the head of the caudate nucleus on the left. A nodular area of  enhancement involving the left frontal lobe anterior laterally is  appreciated. This previously measured 5 mm in size and now measures 2.5  mm in size.     A 5 mm enhancing lesion involving the dura overlying the left parietal  lobe superolaterally is appreciated, unchanged and likely representing a  small meningioma.     There is expected flow-void in the basilar artery and in the distal  aspect of the internal carotid arteries bilaterally on the axial T2  sequence. There is a small amount of fluid present within the mastoid  air cells bilaterally.     The axial T2 FLAIR sequence demonstrates vasogenic edema adjacent to the  larger of the new metastatic lesions with the most prominent vasogenic  edema involving the midbrain to the left.     IMPRESSION:  1.  The resection cavity continues to decrease in size and the enhancing  lesion involving the left frontal lobe anterior laterally has decreased  in size.  2.  There are multiple new supratentorial and infratentorial enhancing  lesions as described in detail above with the largest lesions involving  the posterior aspect of midbrain to the left where there  is  mild-to-moderate adjacent vasogenic edema and mild mass effect on the  sylvian aqueduct. The third ventricle is not enlarged. A metastatic  lesion involving the medial aspect of the right temporal lobe is  appreciated measuring approximately 17 mm in size. There is enlargement  of the right temporal horn. The findings suggest trapping of the right  temporal horn. The right lateral ventricle is not enlarged immediately  posterior to this metastatic lesion suggesting that the temporal horn is  trapped  rather than the lateral ventricle being expanded by the  metastatic lesion.  3.  Stable appearance of a 5 mm area of enhancement, extra-axial likely  representing a small meningioma overlying the left parietal lobe  superiolaterally.  4.  No evidence of hydrocephalus or of acute infarction.     The above information was called to and discussed with Shelby Grossman on  03/06/2020 at 1455 hours.        Assessment/Plan            Assessment/Plan   There are no diagnoses linked to this encounter.  *Extensive stage small cell lung cancer, RUL, 2.4 x 2.3 x 1.7cm, with metastasis to 3 brain lesions (bilateral frontal lobes.)  · RUL mass first seen on CT 3/28/2016, 2 x 1.3 cm.  Decreased to 1.6 cm on 6/27/2016, and decreased again to 1.3 cm on 12/5/2016.  Therefore, this was felt at the time to be a benign resolving nodule.  · During August 2019 hospitalization, found to have brain metastasis from small cell lung cancer.  RUL mass and 3 brain lesions.  No evidence of disease otherwise  · Craniotomy for the medial left frontal lobe lesion (3.2 cm) , 8/28/2019  · Because the final diagnosis is small cell, Dr. Chacko does not plan lung resection as we typically treat this with chemoradiation  · SRS to 2 remaining brain lesions (first was resected) and to resection cavity of the first lesion, completed 9/18/2019.  Dr. Mayo only planning whole brain XRT if future brain recurrence.  · Usually with small cell I would like to start  chemo XRT within a couple of weeks.  However, she is recovering from brain surgery.  Furthermore, this lung lesion has been there for years.  Therefore, weighing risks and benefits I think it is in her best interest to wait almost 4 weeks after craniotomy to begin chemo and radiation.  · PET 9/16/2019: 2.5 cm RUL mass with SUV 15.8.  No hypermetabolic LAD or distant disease.  · On 9/18/2019 completed 5/5 XRT to brain metastasis resection cavity and 1/1 XRT intact brain metastasis #1 and 1/1 XRT due to intact brain metastasis #2.  · 9/19/2019, C1D1 lung mass XRT with  carboplatin, etoposide, Tecentriq, followed by Tecentriq maintenance for extensive stage small cell lung cancer.  Plan a total of 4-6 cycles of chemo depending on tolerability.  ? On 10/4/2019, prednisone 0.5 mg/kg/day (40 mg) started due to significant rash from Tecentriq.  4 days later, rash improved but did not resolve.   §  Plan no more Tecentriq.    § Prednisone tapered off 10/28/2019.  · C2D1 (without Tecentriq) given 10/14/2019.  · MRI brain 10/8/2019 reviewed by Dr. Thibodeaux-he feels this is stable and plan another MRI and appointment with him in 3 months  · Lung mass XRT held the week of 10/28/2019, and again the week of 11/4/2019 due to radiation esophagitis  · Completed lung mass XRT 11/15/2019  · CT CAP 10/29/2019 (after C2): RUL lung mass 2 x 1.3 cm, from 2.5 x 2.5 cm.  · Did not give C3 chemo is planned 11/4/2019 because PLT 91.    · C3 D1 on 11/11/2019 (20% dose reduction of both drugs)  · Did not receive C4 D1 as planned on 12/2/2019 due to ANC 1330 and PLT 67.  Delayed by 1 week and further dose reduction of both drugs by an additional 20%  · (We will only plan 4 cycles of chemo since she is having difficulty tolerating chemo due to cytopenias).  · C4 D1 carbo etoposide, 12/10/2019 (completed first-line chemo).  · CT 12/18/2019 (after C4): No change RUL 2 x 1.3 cm lung mass.   § CT images personally viewed by Dr. Katz  ?  Radiologist noted  there might be a tiny thrombus along the right Mediport catheter tip.  No distant disease found.  · MRI brain 12/26/2019: Left frontal lobe metastasis 5 mm, from 1 cm on 10/8/2019.  Right frontal lobe lesion also smaller.  Left parietal lobe dural nodular enhancement 5 mm, unchanged (radiologist notes this might have been a meningioma since it is unchanged).  · The patient returns today, March 6, 2020 with reports of right-sided numbness and weakness that precipitated 3 weeks ago.  She has had acute worsening over the last few days with extension of the numbness up to her face and right arm.  No associated gait abnormalities or headaches.  We have obtained a stat brain MRI with extensive new metastatic disease involving the supratentorial and infratentorial regions.  Please see above for additional details.  The patient is reasonably stable clinically, at this point.  I have discussed these results with Dr. Holbrook of radiology and there is concern for obstructive hydrocephalus if there indeed appears to be more mass-effect.  Of great concern additionally is the trapping of the right temporal horn.  This will be further detailed below.     *Goals of care.  · Since the 3 brain lesions were treated with stereotactic radiation and resection of 1 of the lesions and since no disease was found elsewhere, she perhaps has a very slim but possible chance of cure.  However, small cell histology makes the possibility of long-term survival unlikely.  · On 11/4/2019, Dr. Katz reminded her this is highly unlikely curable.  At that time, she was having significant difficulty with tolerance to radiation therapy  · Unfortunately, as noted above; the patient has new areas of brain metastasis and we will have to pursue inpatient hospital admission for immediate management and evaluation     *Neurological deficits due to brain metastasis:  · Some mild confusion and right-sided weakness and urinary incontinence x2 weeks prompted ER visit  8/24/2019.  · Please see above.  The patient has had right-sided weakness and numbness over the last 3 weeks for which she has undergone a stat brain MRI for further evaluation.  With multiple new areas of brain metastasis and neurologic changes with right-sided numbness, tingling, and mild weakness, she will need to be admitted for immediate management and further evaluation.  · I have spoken with Dr. Rios of radiation oncology who consulted with Dr. Thibodeaux of neurosurgery.  The patient previously had radiation therapy and the recommendation is to proceed with hospital admission with neurosurgery consult.  We will begin the patient on IV steroid therapy and will restart her Keppra at 500 mg twice daily.  Patient has not had any seizure activity.  · She will be evaluated for the possibility of further intervention with radiation/surgery        *Tinnitus and decreased hearing.  Not a cisplatin candidate.  · The patient has noticed recent hearing changes in her right ear, as noted above.  This is likely due to her new brain metastasis     *Port placed 9/6/2019     *Neutropenia due to chemotherapy.  We have not been using Neulasta due to XRT therapy.  Prophylactic Levaquin prescribed 11/25/2019 due to neutropenia.  Added Neulasta with cycle 4     · Total white blood cell count today is 4.25 and the ANC is 2.99     *Thrombocytopenia   · This caused dose delays and dose reductions of C3 and C4 carbo etoposide  · PLT today is 138,000 in the absence of excess bleeding or bruising     *Anemia.  HGB today is improved from prior reading in January to 11.8     *This is Ángela Pryor's mother-in-law (APRN with Dr. Thibodeaux)     *VTE Prophylaxis.  Lovenox, 40 mg daily will need to be started if no surgical intervention will be planned per neurosurgery     Plan  · The patient underwent a stat brain MRI today.  Please see above for additional details  · After discussion with doctors Gianna Rios, and [place]; we will proceed with  inpatient hospital admission  · Please initiate DVT prophylaxis with Lovenox 40 mg daily if no surgical intervention will be planned  · Please see inpatient hospital orders for additional details     -All the above reviewed with Dr. Katz and he is in agreement        Note this is Shelby Zuleta's H&P.  She saw the patient under the admission today.              Cc:  No ref. provider found                Routing History

## 2020-03-07 NOTE — PLAN OF CARE
Pt has had no c/o pain. Up ambulating in rooms and halls. R port accessed 3/6/20, dressing C/D/I, great blood return and flushes well. Pt had shower this AM. Ct Chest?Abd/Pelvis completed. Will continue to monitor.

## 2020-03-07 NOTE — CONSULTS
DIAGNOSIS and REASON FOR CONSULTATION: small cell lung cancer with brain metastases-  for advice and recommendations for this diagnosis    Referring Provider:  Nicolas Katz II, MD    HISTORY OF PRESENT ILLNESS:  The patient is a 74 y.o. year old female with a hx of extensive stage small cell lung cancer with brain metastases diagnosed in august 2019.  She underwent resection of the medial left frontal lobe lesion on 8/28/19 and SRS to the 2 remaining lesions on 9.18.19 and resection cavity with Dr. Amado Mayo. She went on to receive chemo and radiation to the lung mass completed 11/15/19.      She presented yesterday with a 3 week hx of right sided numbness in her foot and leg and then burning in her right arm and face.  She had a mri of the brain on 3/6/20 which showed multiple new metastases supratentorially and infratentorially including a lesion involving the right temporal lobe medially measuring 17 x 14 x 14 mm in size. The right temporal horn is enlarged and this mass may trap the temporal horn.     She has been started on iv decadron with some improvement in her symptoms.  She denies any difficulty walking or change in bowel or urinary habits.  She denies any headaches or seizure like activity.    I was asked to see the patient at the request of the referring provider noted above for advice and recommendations regarding this diagnosis.    Past Medical History: she  has a past medical history of Anxiety, Arthritis, Brain metastases (CMS/HCC) (9/18/2019), Depression, GERD (gastroesophageal reflux disease), H/O Pulmonary nodule, Hemoptysis, History of osteopenia, Hyperlipidemia, Nodule of right lung, Post-menopause, Sinus problem, and Stress incontinence.    Past Surgical History:  she has a past surgical history that includes Breast biopsy (Right); Bronchoscopy (N/A, 4/21/2016); Craniotomy for Tumor (Left, 8/28/2019); Venous Access Device (Port) (Right, 9/6/2019); and Colonoscopy.    Meds:    Current  Facility-Administered Medications:   •  dexamethasone (DECADRON) injection 6 mg, 6 mg, Intravenous, Q6H, Ngoc, Shelby, APRN, 6 mg at 03/07/20 0605  •  enoxaparin (LOVENOX) syringe 40 mg, 40 mg, Subcutaneous, Q24H, Nicolas Katz II, MD  •  heparin injection 500 Units, 5 mL, Intravenous, PRN, Harbert, Shelby, APRN  •  levETIRAcetam (KEPPRA) tablet 500 mg, 500 mg, Oral, Q12H, Harbert, Shelby, APRN, 500 mg at 03/07/20 0827  •  melatonin tablet 5 mg, 5 mg, Oral, Nightly PRN, Marciano Cabrera MD, 5 mg at 03/06/20 2124  •  sodium chloride 0.9 % flush 10 mL, 10 mL, Intravenous, Q12H, Harbert, Shelby, APRN, 10 mL at 03/07/20 0828  •  sodium chloride 0.9 % flush 10 mL, 10 mL, Intravenous, PRN, Ngoc, Shelby, APRN  •  sodium chloride 0.9 % flush 20 mL, 20 mL, Intravenous, PRN, Harbert, Shelby, APRN    Allergies:    Allergies   Allergen Reactions   • Sulfa Antibiotics Rash       Family History:  her family history includes Bone cancer in her father; Cancer in her paternal aunt and paternal uncle; Coronary artery disease in her mother; Depression in her mother; Heart disease in her mother; Heart failure in her mother; Leukemia in her father.    Social History:  she  reports that she quit smoking about 6 years ago. She has a 50.00 pack-year smoking history. She has never used smokeless tobacco. She reports that she does not drink alcohol or use drugs.    Pertinent Findings on   Review of Systems   Constitutional: Positive for fatigue.   HENT:   Positive for hearing loss and tinnitus.    Eyes: Negative.    Respiratory: Negative.    Cardiovascular: Negative.    Gastrointestinal: Negative.    Musculoskeletal: Positive for arthralgias. Negative for gait problem.   Skin: Negative.    Neurological: Positive for extremity weakness, light-headedness and numbness. Negative for dizziness, gait problem and seizures.   Hematological: Negative.    Psychiatric/Behavioral: Positive for decreased concentration. The patient is nervous/anxious.  "   :  Vitals:    03/06/20 1757 03/06/20 1948 03/07/20 0421 03/07/20 0700   BP:  94/59 94/60 112/67   BP Location:  Right arm Right arm Right arm   Patient Position:  Lying Lying Sitting   Pulse:  57 78 90   Resp:  18 18 18   Temp:  97.1 °F (36.2 °C) 98 °F (36.7 °C) 97.9 °F (36.6 °C)   TempSrc:  Oral Oral Oral   SpO2:  99% 97% 94%   Weight: 79.6 kg (175 lb 6.4 oz)      Height: 162.6 cm (64\")          Performance Status: (1) Restricted in physically strenuous activity, ambulatory and able to do work of light nature    Pertinent Findings on  Physical Exam:  Gen: awake alert and oriented, no acute distress sitting on side of bed  HEENT: normocephalic  Neck: no lymphadenopathy  Neuro: strength 4/5 bilateral lower and upper extremities, gait normal, decreased sensation over right leg and arm.      Assessment: 74 year old female with stage IV small cell carcinoma of the lung with previous 3 brain metastases treated with resection and srs now with multiple new brain metastases.    Plan:   She is clinically stable, symptoms improved with iv decadron.  I discussed with her my recommendation for palliative whole brain radiation.  I discussed possible side effects of fatigue, nausea, headaches, hair loss and late neurocognitive changes including short term memory loss or difficulty concentrating.  She voiced understanding and wishes to proceed. We will bring her over for CT simulation Monday and deliver the first treatment on Monday, march 9.  I plan to deliver a dose of 30Gy in 10 fractions.        Objective   I spent greater than 50 mins (43154) on the unit and of that time 40 minutes  in counseling and coordination of care, including my review of imaging and pathology; indications, goals, logistics, alternatives and risks - both common and rare - for my recommendations as well as surveillance and potential outcomes.    "

## 2020-03-07 NOTE — PROGRESS NOTES
LOS: 0 days   Patient Care Team:  Lori Zamarripa APRN as PCP - General (Family Medicine)  Js Lewis MD as Consulting Physician (Pulmonary Disease)  Nicolas Mayo MD as Consulting Physician (Radiation Oncology)  Nicolas Katz II, MD as Consulting Physician (Hematology and Oncology)  She Garcia PA-C as Referring Physician (Neurosurgery)    Chief Complaint: Right-sided numbness    Subjective     This patient began having some right-sided numbness yesterday.  She called the office and was ultimately sent for an MRI.  The MRI as discussed below.  Currently she still has some numbness but it is improved on steroids.    Interval History:     History taken from: patient chart family    Objective      She has good movement in all 4 extremities.    Vital Signs  Temp:  [96.9 °F (36.1 °C)-98 °F (36.7 °C)] 97.9 °F (36.6 °C)  Heart Rate:  [57-90] 90  Resp:  [14-18] 18  BP: ()/(59-76) 112/67       Results Review:     I reviewed the patient's new clinical results.  She has multiple new metastases in her brain.  She has 2 fairly large ones that are new as well.  One is on the right in the right temporal lobe and temporal horn.  It may be causing some obstruction of the CSF drainage on the right side but if the temporal horn continues to enlarge it will drain anyway and so it is of no significance.  The second 1 is in the quadrigeminal cistern to the left side compressing the quadrigeminal plate.  This is almost certainly the cause of the right-sided numbness.      Assessment/Plan       Small cell lung cancer (CMS/HCC)    I told the patient and her family that the only reason to do surgery on her would be to prolong quality life.  Since the tumor that seems to be causing most of her symptoms is in a location that will be both difficult to remove and also quite risky I really do not think surgery is a very good idea based on those parameters.  I would agree that palliative radiation would be  a good idea.  I reviewed Dr. Allen's notes and she plans to begin palliative whole brain radiation on Monday.  I would agree with this and had only that if it were possible to do some stereotactic radiosurgery to the 2 larger lesions that may be of some benefit as well.  I will leave this decision making up to Dr. Allen and her team.  We will follow along      Ruben Thibodeaux MD  03/07/20  12:07 PM

## 2020-03-07 NOTE — PLAN OF CARE
PT was at [facility] for follow up PET scan which showed lung cancer MTX to brain, neuro & radiation consulted, holding lovenox per surgery, port accessed for stat IV steroid, fell in hospital last admission (different floor), no c/o pain/N/V

## 2020-03-07 NOTE — CONSULTS
Adult Nutrition  Assessment/PES    Patient Name:  Karen Pineda  YOB: 1945  MRN: [id number]  Admit Date:  3/6/2020    Assessment Date:  3/7/2020    Comments:  Ms Pineda is a 75yo female with lung cancer and mets to the brain. She is on a Regular diet with adequate po intake and no significant wt loss. Will continue to follow as needed.    Reason for Assessment     Row Name 03/07/20 0953          Reason for Assessment    Reason For Assessment  identified at risk by screening criteria     Diagnosis  -- hx lung cancer with mets to the brain, now with c/o numbess on right side due to new lesions         Nutrition/Diet History     Row Name 03/07/20 0955          Nutrition/Diet History    Typical Food/Fluid Intake  appetite has been ok         Anthropometrics     Row Name 03/07/20 0956          Usual Body Weight (UBW)    Weight Loss Time Frame  no major wt lost maybe about 5 lbs        Body Mass Index (BMI)    BMI Assessment  BMI 30-34.9: obesity grade I         Labs/Tests/Procedures/Meds     Row Name 03/07/20 0956          Labs/Procedures/Meds    Lab Results Reviewed  reviewed     Lab Results Comments  glu, platelets, wbc        Diagnostic Tests/Procedures    Diagnostic Test/Procedure Reviewed  reviewed        Medications    Pertinent Medications Reviewed  reviewed     Pertinent Medications Comments  lovenox, keppra         Physical Findings     Row Name 03/07/20 0957          Physical Findings    Overall Physical Appearance  obese     Skin  -- teagan 21           Nutrition Prescription Ordered     Row Name 03/07/20 0957          Nutrition Prescription PO    Current PO Diet  Regular                 Problem/Interventions:  Problem 1     Row Name 03/07/20 0958          Nutrition Diagnoses Problem 1    Problem 1  Nutrition Appropriate for Condition at this Time     Etiology (related to)  Medical Diagnosis     Oncology  Cancer w/mets;Lung cancer;Breast cancer               Intervention Goal      Row Name 03/07/20 0959          Intervention Goal    General  Maintain nutrition     PO  PO intake (%);Maintain intake     PO Intake %  75 %     Weight  No significant weight loss         Nutrition Intervention     Row Name 03/07/20 0959          Nutrition Intervention    RD/Tech Action  Care plan reviewd;Follow Tx progress           Education/Evaluation     Row Name 03/07/20 0959          Education    Education  Will Instruct as appropriate        Monitor/Evaluation    Monitor  Per protocol           Electronically signed by:  Nicolasa Emanuel RD  03/07/20 9:59 AM

## 2020-03-07 NOTE — PROGRESS NOTES
Subjective     CHIEF COMPLAINT:     Extensive stage small cell lung cancer  Brain metastasis      HISTORY OF PRESENT ILLNESS:    Patient reports some improvement in the symptoms. She has mild headache left side of head. She continues to hear noises from the right ear.     Patient reports SOB and cough. The symptoms are stable.     INTERVAL HISTORY:  Patient admitted on 3/6/2020 with right sided weakness and numbness that started a few weeks ago but worsened over the past few days. It started in the right foot and leg then involved the trunk then the right arm and right side of the face. She also reported hearing constant noises in the right ear. She had left sided headaches. MRI brain on 3/6/2020 showed extensive new metastasis involving the supratentorial and infratentorial regions.  There is concern regarding obstructive hydrocephalus and concern regarding possible trapping of the right temporal horn.     Patient was admitted on 3/6/2020 and was started on IV steroids. Neurosurgery and Radiation Oncology were consulted.       REVIEW OF SYSTEMS:  GENERAL:  Negative for fever.   HEME/LYMPH: Negative for easy bruisability.  RESPIRATORY:  Negative for shortness of breath.   CVS:  Negative for chest pain.   GI:  Negative for abdominal pain.         SCHEDULED MEDS:    dexamethasone 6 mg Intravenous Q6H   diatrizoate meglumine-sodium 30 mL Oral Once   enoxaparin 40 mg Subcutaneous Q24H   levETIRAcetam 500 mg Oral Q12H   sodium chloride 10 mL Intravenous Q12H     INFUSIONS:     PRN MEDS:  heparin  •  melatonin  •  sodium chloride  •  sodium chloride       Objective   VITAL SIGNS:  Vitals:    03/06/20 1757 03/06/20 1948 03/07/20 0421 03/07/20 0700   BP:  94/59 94/60 112/67   BP Location:  Right arm Right arm Right arm   Patient Position:  Lying Lying Sitting   Pulse:  57 78 90   Resp:  18 18 18   Temp:  97.1 °F (36.2 °C) 98 °F (36.7 °C) 97.9 °F (36.6 °C)   TempSrc:  Oral Oral Oral   SpO2:  99% 97% 94%   Weight: 79.6 kg  "(175 lb 6.4 oz)      Height: 162.6 cm (64\")          Wt Readings from Last 3 Encounters:   03/06/20 79.6 kg (175 lb 6.4 oz)   03/06/20 79.2 kg (174 lb 8 oz)   03/03/20 79.6 kg (175 lb 6.4 oz)       PHYSICAL EXAMINATION:  GENERAL:  The patient appears in fair general condition, not in acute distress.  SKIN: Warm and dry. No skin rashes. No ecchymosis.   HEAD:  Normocephalic. Alopecia.   EYES:  No Jaundice. No Pallor. Pupils equal. EOMI.  NECK:  Supple. No Thyromegaly. No Masses.  LYMPHATICS:  No cervical or supraclavicular lymphadenopathy.  CHEST: Normal respiratory effort. Lungs clear to auscultation.   CARDIAC:  Normal S1 & S2. No murmurs. No edema.  ABDOMEN:  Non-distended.  EXTREMITIES:  No clubbing. No Calf tenderness.  NEUROLOGICAL:  Strength 5/5 bilaterally in the UE and 4/5 in the RLL and 5/5 in the LLE.           RESULT REVIEW:   Results from last 7 days   Lab Units 03/07/20  0608 03/06/20  1859 03/06/20  1512   WBC 10*3/mm3 2.68* 3.64 4.25   NEUTROS ABS 10*3/mm3 2.44 2.60 2.99   LYMPHS ABS 10*3/mm3 0.19*  --   --    HEMOGLOBIN g/dL 11.8* 11.2* 11.8*   HEMATOCRIT % 34.8 32.7* 36.1   PLATELETS 10*3/mm3 127* 131* 138*     Results from last 7 days   Lab Units 03/07/20  0608 03/06/20  1859 03/06/20  1512   SODIUM mmol/L 142 141 142   POTASSIUM mmol/L 4.0 3.5 3.7   CHLORIDE mmol/L 105 104 103   CO2 mmol/L 24.1 25.1 26.5   BUN mg/dL 11 8 9   CREATININE mg/dL 0.84 0.92 0.95   CALCIUM mg/dL 9.5 9.4 9.9   ALBUMIN g/dL 3.80 4.00 4.40   BILIRUBIN mg/dL 0.2 0.2 0.3   ALK PHOS U/L 81 80 91   ALT (SGPT) U/L 12 12 15   AST (SGOT) U/L 14 14 17     MRI BRAIN WITH AND WITHOUT CONTRAST 3/6/2020:     HISTORY: Lung cancer, brain metastases. Right-sided weakness.     COMPARISON: Comparison is made to multiple prior MRI examinations of the  brain with the most recent examination being that of 11/26/2019.     TECHNIQUE: A MRI examination of the brain was performed before and after  the intravenous administration of contrast " utilizing sagittal T1, axial  diffusion, T1, T2, T2 FLAIR, susceptibility weighted imaging as well as  sagittal, axial and coronal T1 postcontrast weighted sequences.     FINDINGS: A left frontal craniotomy is noted at the vertex. There is no  evidence of restricted diffusion, hydrocephalus or of abnormal  extra-axial fluid.     On the prior MRI examination, a resection cavity involving the medial  aspect of the left frontal lobe was appreciated with marginal  enhancement. The resection cavity has decreased in size since prior  examination and the marginal enhancement appears slightly less  prominent, likely representing postoperative changes.     The prior MRI examination also demonstrated multiple enhancing lesions,  consistent with metastatic disease. However, there has been marked  progression since the prior examination with multiple new supratentorial  and infratentorial  metastatic lesions identified.     There is a 4 mm enhancing lesion involving the white matter of the right  cerebellar hemisphere laterally. A 7 x 7 x 5 mm enhancing lesion  involving the posterior aspect of the fourth ventricle, and 11 mm  enhancing lesion involving the left cerebral hemisphere posteriorly  immediately inferior to the medial aspect of the left transverse sinus,  and a 11 x 11 x 12 mm enhancing lesion involving the posterior aspect of  the midbrain to the left. There is mild mass effect on the sylvian  aqueduct. The third ventricle is not enlarged.     A cortical enhancing lesion involving the right frontal lobe is noted  superolaterally measuring 7 mm in size. A somewhat centrally  hypoenhancing or chronic metastatic lesion is identified involving the  right temporal lobe medially measuring 17 x 14 x 14 mm in size. The  right temporal horn is enlarged and this mass may trap the temporal  horn. There is no evidence of transependymal migration of the temporal  horn. A 4 mm enhancing lesion is appreciated involving the  right  temporal lobe anteriorly and inferiorly and a 4 mm enhancing lesion is  appreciated involving the white matter of the right temporal lobe  posteriorly.     There is a peripherally enhancing lesion measuring 4 mm in size  involving the head of the caudate nucleus on the left. A nodular area of  enhancement involving the left frontal lobe anterior laterally is  appreciated. This previously measured 5 mm in size and now measures 2.5  mm in size.     A 5 mm enhancing lesion involving the dura overlying the left parietal  lobe superolaterally is appreciated, unchanged and likely representing a  small meningioma.     There is expected flow-void in the basilar artery and in the distal  aspect of the internal carotid arteries bilaterally on the axial T2  sequence. There is a small amount of fluid present within the mastoid  air cells bilaterally.     The axial T2 FLAIR sequence demonstrates vasogenic edema adjacent to the  larger of the new metastatic lesions with the most prominent vasogenic  edema involving the midbrain to the left.     IMPRESSION:  1.  The resection cavity continues to decrease in size and the enhancing  lesion involving the left frontal lobe anterior laterally has decreased  in size.  2.  There are multiple new supratentorial and infratentorial enhancing  lesions as described in detail above with the largest lesions involving  the posterior aspect of midbrain to the left where there is  mild-to-moderate adjacent vasogenic edema and mild mass effect on the  sylvian aqueduct. The third ventricle is not enlarged. A metastatic  lesion involving the medial aspect of the right temporal lobe is  appreciated measuring approximately 17 mm in size. There is enlargement  of the right temporal horn. The findings suggest trapping of the right  temporal horn. The right lateral ventricle is not enlarged immediately  posterior to this metastatic lesion suggesting that the temporal horn is  trapped  rather than  the lateral ventricle being expanded by the  metastatic lesion.  3.  Stable appearance of a 5 mm area of enhancement, extra-axial likely  representing a small meningioma overlying the left parietal lobe  superiolaterally.  4.  No evidence of hydrocephalus or of acute infarction.     The above information was called to and discussed with Shelby Grossman on  03/06/2020 at 1455 hours.     I personally reviewed the MRI and concur with the findings of extensive brain metastasis including a 1.1 cm lesion in the left midbrain.    Assessment/Plan    1.  Extensive stage small cell cell lung cancer.  · It is arising from the  RUL.  · Patient initially presented with brain metastasis as below.  · After treatment of the brain metastasis, she started on 9/19/2019 Carboplatin Etoposide and Tecentriq with XRT.  · Patient developed skin rash due to Tecentriq and it was discontinued.  · Last chemotherapy with Carboplatin and Etoposide was on 12/10/2019-12/12/2019.    2.  New brain metastasis.  · Patient was initially found to have 3 brain lesions. One was resected surgically on 8/28/2019 and 2 were treated with stereotactic radiation, completed on 9/18/2019.   · Patient reported right sided weakness and numbness that started in mid February 2020.   · The numbness extended to face and right arm.  · MRI brain on 3/6/2020 showed extensive new metastasis involving the supratentorial and infratentorial regions.  There is concern regarding obstructive hydrocephalus and concern regarding possible trapping of the right temporal horn.   · Patient is on IV Decadron 6 mg Q 6 hours.  · Neurosurgery and Radiation Oncology were consulted.   · I discussed the case with Dr. Thibodeaux. The midbrain lesion is the one causing her symptoms. Surgical resection is considered very risky. ? Stereotactic radiation to the lesion as well as the lesion that is trapping the right temporal horn to be followed by whole brain radiation therapy.    3.  Pancytopenia  secondary to recent chemotherapy treatment.  · Hgb, WBC and Platelets are mildly low.      PLAN:    1.  Await input from Rad Onc.  2.  Continue IV Decadron and PO Keppra.  3.  Add GI prophylaxis  4.  Discontinue Lovenox prophylaxis and change DVT prophylaxis to SCDs.          Socorro Torrez MD  03/07/20

## 2020-03-08 NOTE — PLAN OF CARE
IV steroids continue, port has good blood return, melatonin given at night, no c/o pain, N/V, will start chemo Monday

## 2020-03-08 NOTE — PROGRESS NOTES
Subjective     Nicolas Katz II, MD    Cancer Staging  No matching staging information was found for the patient.     S:   Patient was outside sitting in wheelchair when I saw her today.  States she is feeling better and symptoms are much improved.  She denies any headaches or visual changes.  She states numbness and burining in right extremties and face are better    Review of Systems   Constitutional: Positive for activity change, appetite change and fatigue.   HENT: Positive for hearing loss.    Respiratory: Negative.    Neurological: Negative for seizures, speech difficulty, weakness, light-headedness and headaches.   Psychiatric/Behavioral: Negative.          Past Medical History:   Diagnosis Date   • Anxiety    • Arthritis    • Brain metastases (CMS/HCC) 9/18/2019   • Depression    • GERD (gastroesophageal reflux disease)    • H/O Pulmonary nodule    • Hemoptysis    • History of osteopenia    • Hyperlipidemia    • Nodule of right lung    • Post-menopause    • Sinus problem    • Stress incontinence          Past Surgical History:   Procedure Laterality Date   • BREAST BIOPSY Right    • BRONCHOSCOPY N/A 4/21/2016    Procedure: BRONCHOSCOPY WITH ENDOBRONCHIAL ULTRASOUND, NAVIGATION, BRUSHING,BIOPSIES, LAVAGE, AND TRANSBRONCHIAL NEEDLE ASPIRATION;  Surgeon: Js Lewis MD;  Location: [facility] ENDOSCOPY;  Service:    • COLONOSCOPY     • CRANIOTOMY FOR TUMOR Left 8/28/2019    Procedure: Left frontal craniotomy for tumor;  Surgeon: Ruben Thibodeaux MD;  Location: [facility] OR;  Service: Neurosurgery   • VENOUS ACCESS DEVICE (PORT) INSERTION Right 9/6/2019    Procedure: INSERTION VENOUS ACCESS DEVICE;  Surgeon: Saroj Chacko III, MD;  Location: [facility] OR;  Service: Thoracic         Social History     Socioeconomic History   • Marital status:      Spouse name: Juan   • Number of children: Not on file   • Years of education: Not on file   • Highest education level: Not on file    Occupational History     Employer: RETIRED   Tobacco Use   • Smoking status: Former Smoker     Packs/day: 1.00     Years: 50.00     Pack years: 50.00     Last attempt to quit: 2014     Years since quittin.1   • Smokeless tobacco: Never Used   Substance and Sexual Activity   • Alcohol use: No   • Drug use: No   • Sexual activity: Defer         Family History   Problem Relation Age of Onset   • Heart failure Mother    • Coronary artery disease Mother    • Depression Mother    • Heart disease Mother    • Leukemia Father    • Bone cancer Father    • Cancer Paternal Aunt    • Cancer Paternal Uncle           Objective    Physical Exam   Constitutional: She is oriented to person, place, and time. She appears well-developed and well-nourished.   HENT:   Head: Normocephalic.   Pulmonary/Chest: Effort normal.   Neurological: She is alert and oriented to person, place, and time. Coordination normal.   Psychiatric: Her behavior is normal. Judgment and thought content normal.         No current facility-administered medications on file prior to encounter.      Current Outpatient Medications on File Prior to Encounter   Medication Sig Dispense Refill   • albuterol sulfate  (90 Base) MCG/ACT inhaler Inhale 2 puffs Every 4 (Four) Hours As Needed for Wheezing or Shortness of Air. 1 inhaler 0   • FLUoxetine (PROzac) 20 MG capsule Take 1 capsule by mouth Daily. 90 capsule 1   • levETIRAcetam (KEPPRA) 500 MG tablet Take 1 tablet by mouth Every 12 (Twelve) Hours. 60 tablet 2   • losartan (COZAAR) 50 MG tablet Take 1 tablet by mouth Daily. 90 tablet 1   • pantoprazole (PROTONIX) 40 MG EC tablet Take 1 tablet by mouth Daily. 90 tablet 1   • aluminum-magnesium hydroxide 200-200 MG/5ML suspension, diphenhydrAMINE 12.5 MG/5ML liquid, Lidocaine Viscous HCl 2 % solution, nystatin [id number] UNIT/ML suspension Swish and swallow 10 mL 4 (Four) Times a Day Before Meals & at Bedtime. 400 mL 2   • guaiFENesin (MUCINEX PO) Take  by mouth.    "  • morphine 100 MG/5ML solution concentrated solution Take 0.75 mL by mouth Every 4 (Four) Hours As Needed (as needed for pain). 60 mL 0   • NARCAN 4 MG/0.1ML nasal spray ADMINISTER A SINGLE SPRAY INTRANASALLY INTO ONE NOSTRIL. CALL 911. MAY REPEAT X1.  0       ALLERGIES:    Allergies   Allergen Reactions   • Sulfa Antibiotics Rash       /71 (BP Location: Right arm, Patient Position: Sitting)   Pulse 78   Temp 97.5 °F (36.4 °C) (Oral)   Resp 16   Ht 162.6 cm (64\")   Wt 79.6 kg (175 lb 6.4 oz)   LMP  (LMP Unknown)   SpO2 94%   BMI 30.11 kg/m²      Current Status 3/6/2020   ECOG score 0         Assessment/Plan   74 year old female with metastatic small cell lung cancer with multiple new brain metastases s/p SRS to 2 lesions and SRT to one postoperative bed in past few months.  Symptoms improved on steroids.  We will bring her over in the morning for treatment planning and deliver first treatment tomorrow as well.  Plan for whole brain radiation with possible stereotactic boost after.             Thank you very much for allowing me to participate in the care of this very pleasant patient.    Sincerely,      [facility]     "

## 2020-03-08 NOTE — PLAN OF CARE
Problem: Patient Care Overview  Goal: Plan of Care Review  Outcome: Ongoing (interventions implemented as appropriate)  Flowsheets (Taken 3/8/2020 5543)  Progress: no change  Plan of Care Reviewed With: patient; spouse  Outcome Summary: started folic acid for low iron, has numbness and tingling right side, to begin radiation tomorrow on new brain lesion, gait steady, noc/o

## 2020-03-08 NOTE — PROGRESS NOTES
LOS: 1 day   Patient Care Team:  Lori Zamarripa APRN as PCP - General (Family Medicine)  Js Lewis MD as Consulting Physician (Pulmonary Disease)  Nicolas Mayo MD as Consulting Physician (Radiation Oncology)  Nicolas Katz II, MD as Consulting Physician (Hematology and Oncology)  She Garcia PA-C as Referring Physician (Neurosurgery)    Chief Complaint: Right-sided numbness    Subjective     This patient continues with some right-sided numbness but it is improved over what it was when she came in.    Interval History:     History taken from: patient chart    Objective      She has good movement in all 4 extremities    Vital Signs  Temp:  [96.8 °F (36 °C)-97.5 °F (36.4 °C)] 97.5 °F (36.4 °C)  Heart Rate:  [72-79] 78  Resp:  [16-18] 16  BP: (115-125)/(69-73) 117/71       Results Review:     I reviewed the patient's new clinical results.  I reviewed the XRT plans.      Assessment/Plan       Small cell lung cancer (CMS/HCC)      I told the patient I agreed with whole brain radiation.  We will have to see how she responds to the initial radiation before we can predict prognosis.    Ruben Thibodeaux MD  03/08/20  11:07 AM

## 2020-03-08 NOTE — PROGRESS NOTES
Subjective     CHIEF COMPLAINT:     Extensive stage small cell lung cancer  Brain metastasis      HISTORY OF PRESENT ILLNESS:    The patient reports some improvement in the numbness and weakness of the right leg. She has mild intermittent headache over the left side of the head.     Patient did not have problem with sleep since admission.       INTERVAL HISTORY:  Patient admitted on 3/6/2020 with right sided weakness and numbness that started a few weeks ago but worsened over the past few days. It started in the right foot and leg then involved the trunk then the right arm and right side of the face. She also reported hearing constant noises in the right ear. She had left sided headaches. MRI brain on 3/6/2020 showed extensive new metastasis involving the supratentorial and infratentorial regions.  There is concern regarding obstructive hydrocephalus and concern regarding possible trapping of the right temporal horn.     Patient was admitted on 3/6/2020 and was started on IV steroids. Neurosurgery and Radiation Oncology were consulted.           REVIEW OF SYSTEMS:  GENERAL:  Gen weakness.   HEME/LYMPH: Negative for easy bruisability.  RESPIRATORY:  Negative for shortness of breath.   CVS:  Negative for chest pain.         SCHEDULED MEDS:    dexamethasone 6 mg Intravenous Q6H   FLUoxetine 20 mg Oral Daily   levETIRAcetam 500 mg Oral Q12H   pantoprazole 40 mg Oral Q AM   sodium chloride 10 mL Intravenous Q12H     INFUSIONS:     PRN MEDS:  heparin  •  melatonin  •  sodium chloride  •  sodium chloride       Objective   VITAL SIGNS:  Vitals:    03/07/20 1651 03/07/20 1930 03/08/20 0412 03/08/20 0803   BP: 120/73 125/69 115/69 117/71   BP Location: Right arm Right arm Right arm Right arm   Patient Position: Lying Lying Lying Sitting   Pulse: 72 72 79 78   Resp: 18 18 18 16   Temp: 96.8 °F (36 °C) 96.9 °F (36.1 °C) 97.4 °F (36.3 °C) 97.5 °F (36.4 °C)   TempSrc: Oral Oral Oral Oral   SpO2: 97% 96% 95% 94%   Weight:        Height:           Wt Readings from Last 3 Encounters:   03/06/20 79.6 kg (175 lb 6.4 oz)   03/06/20 79.2 kg (174 lb 8 oz)   03/03/20 79.6 kg (175 lb 6.4 oz)       PHYSICAL EXAMINATION:  GENERAL:  The patient appears in fair general condition, not in acute distress.  SKIN: Warm and dry. No skin rashes. No ecchymosis.   HEAD:  Normocephalic. Alopecia.   EYES:  No Jaundice. No Pallor. Conjunctival injection bilat.  NECK:  Supple. No Thyromegaly. No Masses.  LYMPHATICS:  No cervical or supraclavicular lymphadenopathy.  CHEST: Normal respiratory effort.   CARDIAC:  No edema.  ABDOMEN:  Non-distended.  EXTREMITIES:  No clubbing. No Calf tenderness.  NEUROLOGICAL:  Strength 5/5 bilaterally in the UE and +4/5 in the RLL and 5/5 in the LLE.       RESULT REVIEW:   Results from last 7 days   Lab Units 03/08/20 0622 03/07/20  0608 03/06/20 1859 03/06/20  1512   WBC 10*3/mm3 7.86 2.68* 3.64 4.25   NEUTROS ABS 10*3/mm3 7.25* 2.44 2.60 2.99   LYMPHS ABS 10*3/mm3  --  0.19*  --   --    HEMOGLOBIN g/dL 11.2* 11.8* 11.2* 11.8*   HEMATOCRIT % 33.3* 34.8 32.7* 36.1   PLATELETS 10*3/mm3 147 127* 131* 138*     Results from last 7 days   Lab Units 03/08/20 0622 03/07/20  0608 03/06/20  1859   SODIUM mmol/L 136 142 141   POTASSIUM mmol/L 3.8 4.0 3.5   CHLORIDE mmol/L 100 105 104   CO2 mmol/L 25.1 24.1 25.1   BUN mg/dL 17 11 8   CREATININE mg/dL 0.90 0.84 0.92   CALCIUM mg/dL 9.4 9.5 9.4   ALBUMIN g/dL 3.90 3.80 4.00   BILIRUBIN mg/dL 0.2 0.2 0.2   ALK PHOS U/L 80 81 80   ALT (SGPT) U/L 11 12 12   AST (SGOT) U/L 12 14 14   FERRITIN ng/mL  --  379.00*  --    IRON mcg/dL  --  61  --    TIBC mcg/dL  --  317  --        Component      Latest Ref Rng & Units 3/7/2020 3/8/2020   Iron      37 - 145 mcg/dL 61    Iron Saturation      20 - 50 % 19 (L)    Transferrin      200 - 360 mg/dL 213    TIBC      298 - 536 mcg/dL 317    Ferritin      13.00 - 150.00 ng/mL 379.00 (H)    Vitamin B-12      211 - 946 pg/mL  435   Folate      4.78 - 24.20 ng/mL   2.96 (L)       Assessment/Plan    1.  Extensive stage small cell cell lung cancer.  · It is arising from the  RUL.  · Patient initially presented with brain metastasis as below.  · After treatment of the brain metastasis, she started on 9/19/2019 Carboplatin Etoposide and Tecentriq with XRT.  · Patient developed skin rash due to Tecentriq and it was discontinued.  · Last chemotherapy with Carboplatin and Etoposide was on 12/10/2019-12/12/2019.    2.  New brain metastasis.  · Patient was initially found to have 3 brain lesions. One was resected surgically on 8/28/2019 and 2 were treated with stereotactic radiation, completed on 9/18/2019.   · Patient reported right sided weakness and numbness that started in mid February 2020.   · The numbness extended to face and right arm.  · MRI brain on 3/6/2020 showed extensive new metastasis involving the supratentorial and infratentorial regions.  There is concern regarding obstructive hydrocephalus and concern regarding possible trapping of the right temporal horn.   · Patient is on IV Decadron 6 mg Q 6 hours.  · Seizure prophylaxis with Keppra.   · Neurosurgery and Radiation Oncology were consulted.   · I discussed the case with Dr. Thibodeaux. The midbrain lesion is the one causing her symptoms. Surgical resection is considered very risky. ? Stereotactic radiation to the lesion as well as the lesion that is trapping the right temporal horn to be followed by whole brain radiation therapy.  · Patient seen by Neurosurgery and Rad Onc. Surgery not recommended due to the location of the lesion responsible for the symptoms being in the brainstem. Rad Onc recommended WBRT which will start on 3/9/2020.    3.  Pancytopenia secondary to recent chemotherapy treatment.  · Hgb, WBC and Platelets are mildly low.  · Labs showed folate deficiency.      4.  DVT prophylaxis.   · SCDs  · We will avoid pharmacologic prophylaxis due to the brain mets.     PLAN:    1.  Start folic acid   2.  Obtain  MMA.  3.  Patient is going to start WBRT on 3/9/2020.      Socorro Torrez MD  03/08/20

## 2020-03-09 NOTE — PROGRESS NOTES
Subjective     CHIEF COMPLAINT:     Extensive stage small cell lung cancer  Brain metastasis      HISTORY OF PRESENT ILLNESS:    The patient reports stable burning, tingling and minimal weakness on the right side, most pronounced in the right leg.  She will begin whole brain radiation this afternoon.          INTERVAL HISTORY:  Patient admitted on 3/6/2020 with right sided weakness and numbness that started a few weeks ago but worsened over the past few days. It started in the right foot and leg then involved the trunk then the right arm and right side of the face. She also reported hearing constant noises in the right ear. She had left sided headaches. MRI brain on 3/6/2020 showed extensive new metastasis involving the supratentorial and infratentorial regions.  There is concern regarding obstructive hydrocephalus and concern regarding possible trapping of the right temporal horn.     Patient was admitted on 3/6/2020 and was started on IV steroids. Neurosurgery and Radiation Oncology were consulted.           REVIEW OF SYSTEMS:  GENERAL:  Gen weakness.   HEME/LYMPH: Negative for easy bruisability.  RESPIRATORY:  Negative for shortness of breath.   CVS:  Negative for chest pain.   NEURO: Mild right lower extremity burning, weakness        SCHEDULED MEDS:    dexamethasone 6 mg Intravenous Q6H   FLUoxetine 20 mg Oral Daily   folic acid 1 mg Oral Daily   levETIRAcetam 500 mg Oral Q12H   pantoprazole 40 mg Oral Q AM   sodium chloride 10 mL Intravenous Q12H     INFUSIONS:     PRN MEDS:  •  acetaminophen  •  heparin  •  melatonin  •  sodium chloride  •  sodium chloride       Objective   VITAL SIGNS:  Vitals:    03/08/20 0803 03/08/20 2331 03/09/20 0407 03/09/20 0734   BP: 117/71 124/69 114/72 128/72   BP Location: Right arm Right arm Right arm Right arm   Patient Position: Sitting Lying Lying Sitting   Pulse: 78 70 70 66   Resp: 16 16 18 18   Temp: 97.5 °F (36.4 °C) 97 °F (36.1 °C) 97.8 °F (36.6 °C) 97.1 °F (36.2 °C)    TempSrc: Oral Oral Oral Oral   SpO2: 94% 97% 96% 97%   Weight:       Height:           Wt Readings from Last 3 Encounters:   03/06/20 79.6 kg (175 lb 6.4 oz)   03/06/20 79.2 kg (174 lb 8 oz)   03/03/20 79.6 kg (175 lb 6.4 oz)       PHYSICAL EXAMINATION:  GENERAL:  The patient appears in fair general condition, not in acute distress.  SKIN: Warm and dry. No skin rashes. No ecchymosis.   HEAD:  Normocephalic. Alopecia.   EYES:  No Jaundice. No Pallor.    LYMPHATICS:  No cervical or supraclavicular lymphadenopathy.  CHEST: Normal respiratory effort.   CARDIAC:  No edema.  ABDOMEN:  Non-distended.  Soft.  EXTREMITIES:  No clubbing. No Calf tenderness.  NEUROLOGICAL:  Strength 5/5 bilaterally in the UE and +4/5 in the RLL and 5/5 in the LLE.       RESULT REVIEW:   Results from last 7 days   Lab Units 03/09/20 0627 03/08/20  0622 03/07/20  0608 03/06/20  1859 03/06/20  1512   WBC 10*3/mm3 8.49 7.86 2.68* 3.64 4.25   NEUTROS ABS 10*3/mm3 7.90* 7.25* 2.44 2.60 2.99   LYMPHS ABS 10*3/mm3  --   --  0.19*  --   --    HEMOGLOBIN g/dL 11.2* 11.2* 11.8* 11.2* 11.8*   HEMATOCRIT % 32.7* 33.3* 34.8 32.7* 36.1   PLATELETS 10*3/mm3 132* 147 127* 131* 138*     Results from last 7 days   Lab Units 03/09/20  0627 03/08/20  0622 03/07/20  0608   SODIUM mmol/L 141 136 142   POTASSIUM mmol/L 4.0 3.8 4.0   CHLORIDE mmol/L 105 100 105   CO2 mmol/L 25.0 25.1 24.1   BUN mg/dL 22 17 11   CREATININE mg/dL 0.92 0.90 0.84   CALCIUM mg/dL 9.1 9.4 9.5   ALBUMIN g/dL 3.70 3.90 3.80   BILIRUBIN mg/dL 0.2 0.2 0.2   ALK PHOS U/L 75 80 81   ALT (SGPT) U/L 14 11 12   AST (SGOT) U/L 13 12 14   FERRITIN ng/mL  --   --  379.00*   IRON mcg/dL  --   --  61   TIBC mcg/dL  --   --  317       Component      Latest Ref Rng & Units 3/7/2020 3/8/2020   Iron      37 - 145 mcg/dL 61    Iron Saturation      20 - 50 % 19 (L)    Transferrin      200 - 360 mg/dL 213    TIBC      298 - 536 mcg/dL 317    Ferritin      13.00 - 150.00 ng/mL 379.00 (H)    Vitamin B-12       211 - 946 pg/mL  435   Folate      4.78 - 24.20 ng/mL  2.96 (L)       Assessment/Plan    1.  Extensive stage small cell cell lung cancer.  · It is arising from the  RUL.  · Patient initially presented with brain metastasis as below.  · After treatment of the brain metastasis, she started on 9/19/2019 Carboplatin Etoposide and Tecentriq with XRT.  · Patient developed skin rash due to Tecentriq and it was discontinued.  · Last chemotherapy with Carboplatin and Etoposide was on 12/10/2019-12/12/2019.  · CT chest, abdomen, pelvis 3/7/2020 showed fibrotic scar in the right upper lobe, no chest lymphadenopathy, no evidence of metastatic disease in the abdomen or pelvis    2.  New brain metastasis.  · Patient was initially found to have 3 brain lesions. One was resected surgically on 8/28/2019 and 2 were treated with stereotactic radiation, completed on 9/18/2019.   · Patient reported right sided weakness and numbness that started in mid February 2020.   · The numbness extended to face and right arm.  · MRI brain on 3/6/2020 showed extensive new metastasis involving the supratentorial and infratentorial regions.  There is concern regarding obstructive hydrocephalus and concern regarding possible trapping of the right temporal horn.   · Patient is on IV Decadron 6 mg Q 6 hours.  · Seizure prophylaxis with Keppra.   · Neurosurgery and Radiation Oncology were consulted.   ·  The midbrain lesion is the one causing her symptoms. Surgical resection is considered very risky. ? Stereotactic radiation to the lesion as well as the lesion that is trapping the right temporal horn to be followed by whole brain radiation therapy.  · Patient seen by Neurosurgery and Rad Onc. Surgery not recommended due to the location of the lesion responsible for the symptoms being in the brainstem. Rad Onc recommended WBRT which will start on today    3.  Pancytopenia secondary to recent chemotherapy treatment.  · Hgb, WBC and Platelets are mildly  low.  · Labs showed folate deficiency; she is on oral folic acid replacement    4.  DVT prophylaxis.   · SCDs  · We will avoid pharmacologic prophylaxis due to the brain mets.     5.  Disposition: The patient is clinically stable and can potentially be discharged home on oral steroids tomorrow with continued outpatient radiation therapy          Nicolas Ross MD  03/09/20

## 2020-03-09 NOTE — PROGRESS NOTES
Discharge Planning Assessment  [facility]     Patient Name: Karen Pineda  MRN: [id number]  Today's Date: 3/9/2020    Admit Date: 3/6/2020    Discharge Needs Assessment     Row Name 03/09/20 1516       Living Environment    Lives With  spouse    Name(s) of Who Lives With Patient  spouse Juan Pineda [phone]     Current Living Arrangements  home/apartment/condo    Primary Care Provided by  self;spouse/significant other    Provides Primary Care For  no one, unable/limited ability to care for self    Family Caregiver if Needed  spouse    Family Caregiver Names  spouse Juan Pineda [phone]     Quality of Family Relationships  helpful;involved;supportive    Able to Return to Prior Arrangements  no       Resource/Environmental Concerns    Resource/Environmental Concerns  home accessibility    Home Accessibility Concerns  stairs to enter home couple of steps with no handrails to enter her single story home with a basement that has 12 steps with 1 handrail.       Transition Planning    Patient/Family Anticipates Transition to  home with family    Patient/Family Anticipated Services at Transition  none    Transportation Anticipated  family or friend will provide       Discharge Needs Assessment    Concerns to be Addressed  denies needs/concerns at this time    Equipment Currently Used at Home  none    Anticipated Changes Related to Illness  none    Equipment Needed After Discharge  none    Current Discharge Risk  physical impairment        Discharge Plan     Row Name 03/09/20 1515       Plan    Plan  Plans home; denies needs.     Provided Post Acute Provider List?  Yes    Post Acute Provider List  Home Health;Nursing Home    Provided Post Acute Provider Quality & Resource List?  N/A    N/A Quality & Resource List Comment  The patient was provided with a HH/SNF list but was not provided with a print out of HH/nursing home compare lists from Medicare.gov as she currently denies any d/c  needs.      Patient/Family in Agreement with Plan  yes    Plan Comments  Met with the patient and her spouse Juan Pineda [phone] at bedside; explained role of CCP, verified facesheet, checked IMM and discussed discharge planning needs.  The patient plans to return home upon d/c with assistance from her spouse, she uses no DME, has a couple of steps with no handrails to enter her single story home with a basement that has 12 steps with 1 handrail.  The patient's PCP is Lori Zamarripa, pharmacy is [facility] on [address] and the patient denies any trouble remembering to take her medication or with affording her medication.  The patient is agreeable to using [facility].  The patient denies any HH/SNF history, has been to [facility] acute rehab and was provided with a HH/SNF list but was not provided with a print out of HH/nursing home compare lists from Medicare.gov as she currently denies any d/c needs.  The patient states that she was on an albuterol inhaler prior to admission.  The patient states that her spouse drives her to her appointments and he will transport her home upon d/c.   The patient is scheduled to being radiation today for new brain lesions but currently denies any d/c needs. CCP will follow to assist with any d/c needs that may arise.  LUCIANO Mazariegos        Destination      Coordination has not been started for this encounter.      Durable Medical Equipment      Coordination has not been started for this encounter.      Dialysis/Infusion      Coordination has not been started for this encounter.      Home Medical Care      Coordination has not been started for this encounter.      Therapy      Coordination has not been started for this encounter.      Community Resources      Coordination has not been started for this encounter.          Demographic Summary     Row Name 03/09/20 8114       General Information    Admission Type  inpatient    Arrived From  home    Referral Source  admission  list    Reason for Consult  discharge planning    Preferred Language  English     Used During This Interaction  no        Functional Status     Row Name 03/09/20 1516       Functional Status    Usual Activity Tolerance  good    Current Activity Tolerance  moderate       Functional Status, IADL    Medications  independent    Meal Preparation  independent    Housekeeping  independent    Laundry  independent    Shopping  independent       Mental Status    General Appearance WDL  WDL       Mental Status Summary    Recent Changes in Mental Status/Cognitive Functioning  no changes        Psychosocial    No documentation.       Abuse/Neglect    No documentation.       Legal    No documentation.       Substance Abuse    No documentation.       Patient Forms    No documentation.           LUCIANO Cruz

## 2020-03-09 NOTE — PLAN OF CARE
Problem: Patient Care Overview  Goal: Plan of Care Review  Outcome: Ongoing (interventions implemented as appropriate)  Flowsheets (Taken 3/9/2020 170)  Progress: improving  Plan of Care Reviewed With: patient  Outcome Summary: Pt has had a good day. Plan to de-access PORT when peripheral IV placed to decrease risk for infection. Possible D/C 3/10. Radiation started this afternoon 1/10 treatments. Up ad fabián. R/A. A/O.     Problem: Patient Care Overview  Goal: Individualization and Mutuality  Outcome: Ongoing (interventions implemented as appropriate)     Problem: Patient Care Overview  Goal: Discharge Needs Assessment  Outcome: Ongoing (interventions implemented as appropriate)     Problem: Patient Care Overview  Goal: Interprofessional Rounds/Family Conf  Outcome: Ongoing (interventions implemented as appropriate)

## 2020-03-09 NOTE — CONSULTS
Spiritual Care: pt was open to pastoral conversation and prayer.  Disappointed to be back in hospital but coping and drawing upon her spiritual resources.   Jaison Dumont

## 2020-03-09 NOTE — PLAN OF CARE
Pt up in chair, port has great blood return, IV steroids, radiation to start Monday for new brain lesions, no c/o pain, right sided numbness at times

## 2020-03-10 NOTE — PROGRESS NOTES
Subjective     CHIEF COMPLAINT:     Extensive stage small cell lung cancer  Brain metastasis      HISTORY OF PRESENT ILLNESS:    The patient reports stable burning, tingling and minimal weakness on the right side, most pronounced in the right leg.  She began whole brain radiation 3/9/20.          INTERVAL HISTORY:  Patient admitted on 3/6/2020 with right sided weakness and numbness that started a few weeks ago but worsened over the past few days. It started in the right foot and leg then involved the trunk then the right arm and right side of the face. She also reported hearing constant noises in the right ear. She had left sided headaches. MRI brain on 3/6/2020 showed extensive new metastasis involving the supratentorial and infratentorial regions.  There is concern regarding obstructive hydrocephalus and concern regarding possible trapping of the right temporal horn.     Patient was admitted on 3/6/2020 and was started on IV steroids. Neurosurgery and Radiation Oncology were consulted.           REVIEW OF SYSTEMS:  GENERAL:  Gen weakness.   HEME/LYMPH: Negative for easy bruisability.  RESPIRATORY:  Negative for shortness of breath.   CVS:  Negative for chest pain.   NEURO: Mild right lower extremity burning, weakness        SCHEDULED MEDS:    dexamethasone 6 mg Intravenous Q6H   FLUoxetine 20 mg Oral Daily   folic acid 1 mg Oral Daily   heparin 500 Units Intracatheter Once   levETIRAcetam 500 mg Oral Q12H   pantoprazole 40 mg Oral Q AM   sodium chloride 10 mL Intravenous Q12H     INFUSIONS:     PRN MEDS:  •  acetaminophen  •  heparin  •  melatonin  •  sodium chloride  •  sodium chloride       Objective   VITAL SIGNS:  Vitals:    03/09/20 0734 03/09/20 1624 03/09/20 1926 03/10/20 0406   BP: 128/72 132/72 115/70 118/70   BP Location: Right arm Right arm Right arm Right arm   Patient Position: Sitting Sitting Lying Lying   Pulse: 66 55 63 61   Resp: 18 18 18 18   Temp: 97.1 °F (36.2 °C)  97 °F (36.1 °C) 97.1 °F (36.2  °C)   TempSrc: Oral  Oral Oral   SpO2: 97% 99% 99% 96%   Weight:       Height:           Wt Readings from Last 3 Encounters:   03/06/20 79.6 kg (175 lb 6.4 oz)   03/06/20 79.2 kg (174 lb 8 oz)   03/03/20 79.6 kg (175 lb 6.4 oz)       PHYSICAL EXAMINATION:  GENERAL:  The patient appears in fair general condition, not in acute distress.  SKIN: Warm and dry. No skin rashes. No ecchymosis.   HEAD:  Normocephalic. Alopecia.   EYES:  No Jaundice. No Pallor.    LYMPHATICS:  No cervical or supraclavicular lymphadenopathy.  CHEST: Normal respiratory effort.   CARDIAC:  No edema.  ABDOMEN:  Non-distended.  Soft.  EXTREMITIES:  No clubbing. No Calf tenderness.  NEUROLOGICAL:  Strength 5/5 bilaterally in the UE and +4/5 in the RLL and 5/5 in the LLE.       RESULT REVIEW:   Results from last 7 days   Lab Units 03/10/20  0431 03/09/20  0627 03/08/20  0622 03/07/20  0608 03/06/20  1859   WBC 10*3/mm3 6.12 8.49 7.86 2.68* 3.64   NEUTROS ABS 10*3/mm3 5.60 7.90* 7.25* 2.44 2.60   LYMPHS ABS 10*3/mm3  --   --   --  0.19*  --    HEMOGLOBIN g/dL 11.7* 11.2* 11.2* 11.8* 11.2*   HEMATOCRIT % 34.2 32.7* 33.3* 34.8 32.7*   PLATELETS 10*3/mm3 131* 132* 147 127* 131*     Results from last 7 days   Lab Units 03/10/20  0632 03/09/20  0627 03/08/20  0622 03/07/20  0608   SODIUM mmol/L 141 141 136 142   POTASSIUM mmol/L 3.9 4.0 3.8 4.0   CHLORIDE mmol/L 106 105 100 105   CO2 mmol/L 23.8 25.0 25.1 24.1   BUN mg/dL 21 22 17 11   CREATININE mg/dL 0.79 0.92 0.90 0.84   CALCIUM mg/dL 8.8 9.1 9.4 9.5   ALBUMIN g/dL 3.50 3.70 3.90 3.80   BILIRUBIN mg/dL 0.2 0.2 0.2 0.2   ALK PHOS U/L 72 75 80 81   ALT (SGPT) U/L 17 14 11 12   AST (SGOT) U/L 11 13 12 14   FERRITIN ng/mL  --   --   --  379.00*   IRON mcg/dL  --   --   --  61   TIBC mcg/dL  --   --   --  317       Component      Latest Ref Rng & Units 3/7/2020 3/8/2020   Iron      37 - 145 mcg/dL 61    Iron Saturation      20 - 50 % 19 (L)    Transferrin      200 - 360 mg/dL 213    TIBC      298 - 536  mcg/dL 317    Ferritin      13.00 - 150.00 ng/mL 379.00 (H)    Vitamin B-12      211 - 946 pg/mL  435   Folate      4.78 - 24.20 ng/mL  2.96 (L)       Assessment/Plan    1.  Extensive stage small cell cell lung cancer.  · It is arising from the  RUL.  · Patient initially presented with brain metastasis as below.  · After treatment of the brain metastasis, she started on 9/19/2019 Carboplatin Etoposide and Tecentriq with XRT.  · Patient developed skin rash due to Tecentriq and it was discontinued.  · Last chemotherapy with Carboplatin and Etoposide was on 12/10/2019-12/12/2019.  · CT chest, abdomen, pelvis 3/7/2020 showed fibrotic scar in the right upper lobe, no chest lymphadenopathy, no evidence of metastatic disease in the abdomen or pelvis    2.  New brain metastasis.  · Patient was initially found to have 3 brain lesions. One was resected surgically on 8/28/2019 and 2 were treated with stereotactic radiation, completed on 9/18/2019.   · Patient reported right sided weakness and numbness that started in mid February 2020.   · The numbness extended to face and right arm.  · MRI brain on 3/6/2020 showed extensive new metastasis involving the supratentorial and infratentorial regions.  There is concern regarding obstructive hydrocephalus and concern regarding possible trapping of the right temporal horn.   · Patient is on IV Decadron 6 mg Q 6 hours.  · Seizure prophylaxis with Keppra.   · Neurosurgery and Radiation Oncology were consulted.   ·  The midbrain lesion is the one causing her symptoms. Surgical resection is considered very risky. ? Stereotactic radiation to the lesion as well as the lesion that is trapping the right temporal horn to be followed by whole brain radiation therapy.  · Patient seen by Neurosurgery and Rad Onc. Surgery not recommended due to the location of the lesion responsible for the symptoms being in the brainstem. Rad Onc recommended WBRT which will start on today    3.  Pancytopenia  secondary to recent chemotherapy treatment.  · Hgb, WBC and Platelets are mildly low.  · Labs showed folate deficiency; she is on oral folic acid replacement    4.  DVT prophylaxis.   · SCDs  · We will avoid pharmacologic prophylaxis due to the brain mets.     5.  Disposition: The patient is clinically stable and can potentially be discharged home on oral steroids tomorrow with continued outpatient radiation therapy          Nicolas Ross MD  03/10/20

## 2020-03-10 NOTE — PROGRESS NOTES
Case Management Discharge Note      Final Note: Home--no needs    Provided Post Acute Provider List?: Yes  Post Acute Provider List: Home Health, Nursing Home  Provided Post Acute Provider Quality & Resource List?: N/A  N/A Quality & Resource List Comment: The patient was provided with a HH/SNF list but was not provided with a print out of HH/nursing home compare lists from Medicare.gov as she currently denies any d/c needs.      Destination      No service has been selected for the patient.      Durable Medical Equipment      No service has been selected for the patient.      Dialysis/Infusion      No service has been selected for the patient.      Home Medical Care      No service has been selected for the patient.      Therapy      No service has been selected for the patient.      Community Resources      No service has been selected for the patient.             Final Discharge Disposition Code: 01 - home or self-care

## 2020-03-10 NOTE — DISCHARGE SUMMARY
"  Date of Discharge:  3/10/2020    Discharge Diagnosis:   1.  Small cell lung cancer with progression of metastatic disease to brain  2.  Folic acid deficiency    Presenting Problem/History of Present Illness  Active Hospital Problems    Diagnosis  POA   • Small cell lung cancer (CMS/HCC) [C34.90]  Yes      Resolved Hospital Problems   No resolved problems to display.      The patient is a 74 y.o. year old female who is here today for a triage visit.  She called our office with reports of right-sided numbness that precipitated 3 weeks ago in her foot and leg.  She has noted burning with new extension to her right upper extremity and face within the last few days.  She has also had associated mild weakness of her right upper and lower extremities.  She is still able to walk without gait abnormality.  She denies any associated headaches or blurred vision.     She denies any slurring of her speech.  She is eating and drinking adequately.  Her bowels and urination are regular. She recently discontinued her Keppra, 500 mg BID per neurology.      Of note, patient did mention hearing changes of her right ear.  Over the last several days that she is heard what sounds like \"someone snoring\" continuously.     We have obtained a stat brain MRI unfortunately demonstrating multiple areas of new metastasis within the supra and infratentorial regions with the largest lesions involving the posterior aspect of the midbrain to the left where there is mild to moderate adjacent vasogenic edema and mild mass-effect on the sylvian aqueduct.  There is a metastatic lesion involving the medial aspect of the right temporal lobe as well as enlargement of the right temporal horn suggesting trapping of the right temporal horn.  This will be further detailed below.       Hospital Course  1.  Extensive stage small cell lung cancer previously treated with carboplatin, etoposide and Tecentriq with skin rash secondary to Tecentriq requiring " discontinuation.  She completed chemotherapy 12/12/2019.  The patient presented with new neurologic symptoms and MRI of the brain on 3/6/2020 showed multiple new supratentorial and infratentorial enhancing lesions with mild to moderate vasogenic edema.  There was enlargement of the right temporal horn.  The patient was started on Keppra and steroids.  She was seen by radiation oncology and neurosurgery.  The patient was felt not to be a surgical candidate secondary to location of tumors and histology.  She initiated whole brain radiation therapy on 3/9/2020.  She will be discharged to continue oral dexamethasone 4 mg every 6 hours- taper to be done by radiation oncology-and Keppra 500 mg every 12 hours.  CT of the chest, abdomen, pelvis performed 3/7/2020 showed no evidence of systemic progression.    2.  Folic acid deficiency: She was started on oral folate    3.  Hematology: The patient has mild anemia and thrombocytopenia.  Hemoglobin 11.7 and platelets 131 at discharge.  The patient has lymphopenia.  If she requires prolonged steroids, would consider PCP prophylaxis.    Procedures Performed    * No procedures listed *  -------------------       Consults:   Consults     Date and Time Order Name Status Description    3/6/2020 1743 Inpatient Radiation Oncology Consult Completed     3/6/2020 1743 Inpatient Neurosurgery Consult            Pertinent Test Results: See imaging results    Condition on Discharge: Stable    Vital Signs  Temp:  [97 °F (36.1 °C)-97.2 °F (36.2 °C)] 97.2 °F (36.2 °C)  Heart Rate:  [55-63] 60  Resp:  [18] 18  BP: (115-133)/(70-80) 133/80    Physical Exam: The patient was seen and examined the day of discharge 3/10/2020  GENERAL:  The patient appears in fair general condition, not in acute distress.  SKIN: Warm and dry. No skin rashes. No ecchymosis.   HEAD:  Normocephalic. Alopecia.   EYES:  No Jaundice. No Pallor.    LYMPHATICS:  No cervical or supraclavicular lymphadenopathy.  CHEST: Normal  respiratory effort.   CARDIAC:  No edema.  ABDOMEN:  Non-distended.  Soft.  EXTREMITIES:  No clubbing. No Calf tenderness.  NEUROLOGICAL:  Strength 5/5 bilaterally in the UE and +4/5 in the RLL and 5/5 in the LLE.    Discharge Disposition  Home or Self Care    Discharge Medications     Discharge Medications      New Medications      Instructions Start Date   dexamethasone 4 MG tablet  Commonly known as:  DECADRON   4 mg, Oral, Every 6 Hours Scheduled      folic acid 1 MG tablet  Commonly known as:  FOLVITE   1 mg, Oral, Daily         Continue These Medications      Instructions Start Date   albuterol sulfate  (90 Base) MCG/ACT inhaler  Commonly known as:  PROVENTIL HFA;VENTOLIN HFA;PROAIR HFA   2 puffs, Inhalation, Every 4 Hours PRN      aluminum-magnesium hydroxide 200-200 MG/5ML suspension, diphenhydrAMINE 12.5 MG/5ML liquid, Lidocaine Viscous HCl 2 % solution, nystatin [id number] UNIT/ML suspension   10 mL, Swish & Swallow, 4 Times Daily Before Meals & Nightly      FLUoxetine 20 MG capsule  Commonly known as:  PROzac   20 mg, Oral, Daily      levETIRAcetam 500 MG tablet  Commonly known as:  KEPPRA   500 mg, Oral, Every 12 Hours Scheduled      losartan 50 MG tablet  Commonly known as:  COZAAR   50 mg, Oral, Daily      melatonin 5 MG tablet tablet   5 mg, Oral      pantoprazole 40 MG EC tablet  Commonly known as:  PROTONIX   40 mg, Oral, Daily         ASK your doctor about these medications      Instructions Start Date   morphine 100 MG/5ML solution concentrated solution   15 mg, Oral, Every 4 Hours PRN      MUCINEX PO   Oral      NARCAN 4 MG/0.1ML nasal spray  Generic drug:  naloxone   ADMINISTER A SINGLE SPRAY INTRANASALLY INTO ONE NOSTRIL. CALL 911. MAY REPEAT X1.             Discharge Diet: Regular    Activity at Discharge: Ad fabián.    Follow-up Appointments  Future Appointments   Date Time Provider Department Center   3/10/2020  9:15 AM  [facility]  [facility]   3/11/2020  9:15 AM  [facility]  [facility]   3/12/2020 12:30 PM Judith Akers MD [facility] None   3/12/2020 12:30 PM [facility]   3/13/2020  3:30 PM [facility]   3/16/2020  3:30 PM [facility]121 [facility]   3/17/2020  3:30 PM [facility]   3/18/2020  3:30 PM [facility]   3/19/2020  3:30 PM [facility]   3/20/2020  3:30 PM [facility]   4/1/2020 10:00 AM ANJEL CT 3 BH [facility] CT [facility]   4/6/2020  1:15 PM IN[facility] PORT CHAIR [facility] INFUS [facility]   4/6/2020  2:00 PM Nicolas Echeverria II, MD [facility]   4/9/2020 10:30 AM Ruben Thibodeaux MD [facility] NS [facility]     Discussed with Dr. echeverria her outpatient oncologist.  We will keep the 4/6/2020 appointment with him.  He will order repeat CT scans 2 months from the ones performed 3/6/2020 to monitor systemic progression.    Test Results Pending at Discharge   Order Current Status    Methylmalonic Acid, Serum In process           Nicolas Ross MD  03/10/20  07:59    Time: I spent 35 minutes discharging the patient including arranging outpatient follow-up, counseling medications, writing prescriptions etc.

## 2020-03-11 NOTE — OUTREACH NOTE
Prep Survey      Responses   [facility] facility patient discharged from?  [place]   Is LACE score < 7 ?  No   Eligibility  Readm Mgmt   Discharge diagnosis  Small cell lung cancer with progression of metastatic disease to brain   Does the patient have one of the following disease processes/diagnoses(primary or secondary)?  Other   Does the patient have Home health ordered?  No   Is there a DME ordered?  No   Prep survey completed?  Yes          Lacie Galaviz RN

## 2020-03-11 NOTE — OUTREACH NOTE
Spoke with pt, doing as well as possible s/p hospital stay where it was diagnosed pt has brain mets and has started radiation. She does continue with Rt side numbness. Her appetite is normal for her. Confirmed receipt and understanding of d/c orders and medcations. Pt does decline TCM [facility] as she is undergoing radiation treatments everyday for now.

## 2020-03-12 NOTE — PROGRESS NOTES
Physician Weekly Management Note    Diagnosis:     Diagnosis Plan   1. Small cell lung cancer (CMS/HCC)     2. Brain metastases (CMS/HCC)         RT Details:  fx 4/10  Whole brain     Notes on Treatment course, Films, Medical progress:  Doing ok, denies headaches, on decadron 4m g q 4 hrs, not sleeping gave rx for restoril 30mg qhs, still having some numbness and burning of right face and leg    Weekly Management:  Medication reviewed?   Yes  New medications given?   Yes  Problemlist reviewed?   Yes  Problem added?   No  Issues raised requiring referral to support services - task assigned to:  na    Technical aspects reviewed:  Weekly OBI approved?   Yes  Weekly port films approved?   Yes  Change requests noted on port film?   No  Patient setup and plan reviewed?   Yes    Chart Reviewed:  Continue current treatment plan?   Yes  Treatment plan change requested?   No  CBC reviewed?   No  Concurrent Chemo?   No    Objective     Toxicities:   none     Review of Systems   Constitutional: Positive for fatigue.   Neurological: Positive for weakness and numbness.   Psychiatric/Behavioral: Negative.           Vitals:    03/12/20 0911   BP: 126/80   Pulse: 70   SpO2: 98%       Current Status 3/6/2020   ECOG score 0       Physical Exam   Constitutional: She appears well-developed and well-nourished.   Psychiatric: She has a normal mood and affect. Her behavior is normal. Thought content normal.           Problem Summary List    Diagnosis:     Diagnosis Plan   1. Small cell lung cancer (CMS/HCC)     2. Brain metastases (CMS/HCC)       Pathology:   Small cell lung c    Past Medical History:   Diagnosis Date   • Anxiety    • Arthritis    • Brain metastases (CMS/HCC) 9/18/2019   • Depression    • GERD (gastroesophageal reflux disease)    • H/O Pulmonary nodule    • Hemoptysis    • History of osteopenia    • Hyperlipidemia    • Nodule of right lung    • Post-menopause    • Sinus problem    • Stress incontinence          Past  Surgical History:   Procedure Laterality Date   • BREAST BIOPSY Right    • BRONCHOSCOPY N/A 4/21/2016    Procedure: BRONCHOSCOPY WITH ENDOBRONCHIAL ULTRASOUND, NAVIGATION, BRUSHING,BIOPSIES, LAVAGE, AND TRANSBRONCHIAL NEEDLE ASPIRATION;  Surgeon: Js Lewis MD;  Location: [facility] ENDOSCOPY;  Service:    • COLONOSCOPY     • CRANIOTOMY FOR TUMOR Left 8/28/2019    Procedure: Left frontal craniotomy for tumor;  Surgeon: Ruben Thibodeaux MD;  Location: [facility] MAIN OR;  Service: Neurosurgery   • VENOUS ACCESS DEVICE (PORT) INSERTION Right 9/6/2019    Procedure: INSERTION VENOUS ACCESS DEVICE;  Surgeon: Saroj Chacko III, MD;  Location: [facility] MAIN OR;  Service: Thoracic         Current Outpatient Medications on File Prior to Visit   Medication Sig Dispense Refill   • acetaminophen (TYLENOL) 325 MG tablet Take 2 tablets by mouth Every 6 (Six) Hours As Needed for Mild Pain , Moderate Pain  or Headache.     • albuterol sulfate  (90 Base) MCG/ACT inhaler Inhale 2 puffs Every 4 (Four) Hours As Needed for Wheezing or Shortness of Air. 1 inhaler 0   • aluminum-magnesium hydroxide 200-200 MG/5ML suspension, diphenhydrAMINE 12.5 MG/5ML liquid, Lidocaine Viscous HCl 2 % solution, nystatin [id number] UNIT/ML suspension Swish and swallow 10 mL 4 (Four) Times a Day Before Meals & at Bedtime. 400 mL 2   • dexamethasone (DECADRON) 4 MG tablet Take 1 tablet by mouth Every 6 (Six) Hours. 120 tablet 0   • FLUoxetine (PROzac) 20 MG capsule Take 1 capsule by mouth Daily. 90 capsule 1   • folic acid (FOLVITE) 1 MG tablet Take 1 tablet by mouth Daily. 30 tablet 2   • levETIRAcetam (KEPPRA) 500 MG tablet Take 1 tablet by mouth Every 12 (Twelve) Hours. 60 tablet 2   • losartan (COZAAR) 50 MG tablet Take 1 tablet by mouth Daily. 90 tablet 1   • melatonin 5 MG tablet tablet Take 5 mg by mouth.     • pantoprazole (PROTONIX) 40 MG EC tablet Take 1 tablet by mouth Daily. 90 tablet 1     No current facility-administered medications on  file prior to visit.        Allergies   Allergen Reactions   • Sulfa Antibiotics Rash         Referring Provider:    No referring provider defined for this encounter.    Oncologist:  No primary care provider on file.      Seen and approved by:  Judith Akers MD  03/12/2020

## 2020-03-17 NOTE — OUTREACH NOTE
Medical Week 1 Survey      Responses   [facility] patient discharged from?  [place]   Does the patient have one of the following disease processes/diagnoses(primary or secondary)?  Other   Is there a successful TCM telephone encounter documented?  Yes [TCM completed 3/11/20]          Bronwyn Ashby RN

## 2020-03-19 NOTE — PROGRESS NOTES
Physician Weekly Management Note    Diagnosis:     Diagnosis Plan   1. Brain metastases (CMS/HCC)         RT Details:  fx 9/10 whole brain    Notes on Treatment course, Films, Medical progress:  Doing ok, but had episodes of dizziness earlier today.  Denies headaches.  Still has burning and numbness in right lower extremitiy and upper extremitiy and decreased hearing on right.  On decadron 4mg qid still.  Gave tapering insturctions.  Will order brain mri for first week in April and see her back one week later to review.  She may still need boost to midbrain lesion but I would like to see mri first    Weekly Management:  Medication reviewed?   Yes  New medications given?   No  Problemlist reviewed?   Yes  Problem added?   No  Issues raised requiring referral to support services - task assigned to:  na    Technical aspects reviewed:  Weekly OBI approved?   Yes  Weekly port films approved?   Yes  Change requests noted on port film?   No  Patient setup and plan reviewed?   Yes    Chart Reviewed:  Continue current treatment plan?   Yes  Treatment plan change requested?   No  CBC reviewed?   No  Concurrent Chemo?   No    Objective     Toxicities:   Fatigue,      Review of Systems       There were no vitals filed for this visit.    Current Status 3/6/2020   ECOG score 0       Physical Exam        Problem Summary List    Diagnosis:     Diagnosis Plan   1. 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    Past Medical History:   Diagnosis Date   • Anxiety    • Arthritis    • Brain metastases (CMS/HCC) 9/18/2019   • Depression    • GERD (gastroesophageal reflux disease)    • H/O Pulmonary nodule    • Hemoptysis    • History of osteopenia    • Hyperlipidemia    • Nodule of right lung    • Post-menopause    • Sinus problem    • Stress incontinence          Past Surgical History:   Procedure Laterality Date   • BREAST BIOPSY Right    • BRONCHOSCOPY N/A 4/21/2016    Procedure: BRONCHOSCOPY  WITH ENDOBRONCHIAL ULTRASOUND, NAVIGATION, BRUSHING,BIOPSIES, LAVAGE, AND TRANSBRONCHIAL NEEDLE ASPIRATION;  Surgeon: Js Lewis MD;  Location: [facility] ENDOSCOPY;  Service:    • COLONOSCOPY     • CRANIOTOMY FOR TUMOR Left 8/28/2019    Procedure: Left frontal craniotomy for tumor;  Surgeon: Ruben Thibodeaux MD;  Location: [facility] MAIN OR;  Service: Neurosurgery   • VENOUS ACCESS DEVICE (PORT) INSERTION Right 9/6/2019    Procedure: INSERTION VENOUS ACCESS DEVICE;  Surgeon: Saroj Chacko III, MD;  Location: [facility] MAIN OR;  Service: Thoracic         Current Outpatient Medications on File Prior to Visit   Medication Sig Dispense Refill   • acetaminophen (TYLENOL) 325 MG tablet Take 2 tablets by mouth Every 6 (Six) Hours As Needed for Mild Pain , Moderate Pain  or Headache.     • albuterol sulfate  (90 Base) MCG/ACT inhaler Inhale 2 puffs Every 4 (Four) Hours As Needed for Wheezing or Shortness of Air. 1 inhaler 0   • aluminum-magnesium hydroxide 200-200 MG/5ML suspension, diphenhydrAMINE 12.5 MG/5ML liquid, Lidocaine Viscous HCl 2 % solution, nystatin 154724 UNIT/ML suspension Swish and swallow 10 mL 4 (Four) Times a Day Before Meals & at Bedtime. 400 mL 2   • dexamethasone (DECADRON) 4 MG tablet Take 1 tablet by mouth Every 6 (Six) Hours. 120 tablet 0   • FLUoxetine (PROzac) 20 MG capsule Take 1 capsule by mouth Daily. 90 capsule 1   • folic acid (FOLVITE) 1 MG tablet Take 1 tablet by mouth Daily. 30 tablet 2   • levETIRAcetam (KEPPRA) 500 MG tablet Take 1 tablet by mouth Every 12 (Twelve) Hours. 60 tablet 2   • losartan (COZAAR) 50 MG tablet Take 1 tablet by mouth Daily. 90 tablet 1   • melatonin 5 MG tablet tablet Take 5 mg by mouth.     • pantoprazole (PROTONIX) 40 MG EC tablet Take 1 tablet by mouth Daily. 90 tablet 1     No current facility-administered medications on file prior to visit.        Allergies   Allergen Reactions   • Sulfa Antibiotics Rash         Referring Provider:    No referring  provider defined for this encounter.    Oncologist:  No primary care provider on file.      Seen and approved by:  Judith Akers MD  03/19/2020

## 2020-03-25 NOTE — TELEPHONE ENCOUNTER
----- Message from Nicolas Katz II, MD sent at 3/25/2020  9:19 AM EDT -----    To nursing:  On the 3/3/2020 office visit from Dr. Chacko, he planned to remove her port and place another when she needs it.  Please ask the patient if the port has been removed.  If it has not been removed, please contact Dr. Chacko's office to see if he feels this should be removed or replaced.  Although currently no evidence of systemic disease, with recent brain metastasis, I suspect she will develop systemic disease within the next few months or so.    To scheduling:  Cancel the upcoming appointment with me due to the coronavirus pandemic.  Next appointment with me should be late May with CT CAP with contrast, CBC, CMP 1 week prior.

## 2020-03-25 NOTE — PROGRESS NOTES
*Brain metastasis discovered 3/6/2020.  Whole brain XRT completed 3/20/2020    Last CT CAP with contrast 3/6/2020: No recurrence    Copy of the note I sent to nursing/scheduling:    To nursing:  On the 3/3/2020 office visit from Dr. Chacko, he planned to remove her port and place another when she needs it.  Please ask the patient if the port has been removed.  If it has not been removed, please contact Dr. Chacko's office to see if he feels this should be removed or replaced.  Although currently no evidence of systemic disease, with recent brain metastasis, I suspect she will develop systemic disease within the next few months or so.    To scheduling:  Cancel the upcoming appointment with me due to the coronavirus pandemic.  Next appointment with me should be late May with CT CAP with contrast, CBC, CMP 1 week prior.

## 2020-03-26 NOTE — TELEPHONE ENCOUNTER
----- Message from Saroj Chacko III, MD sent at 3/26/2020 11:30 AM EDT -----  Port removals have been deemed elective surgery.  With the Covidien 19 emergency we should postpone removal of this port until the band on elective surgery has been lifted.  There is a possibility of precipitating a DVT but this is unlikely.  I am comfortable with waiting a few weeks until the Covid 19 thing ends.  We will contact the patient and schedule the surgery when we are able to.  ----- Message -----  From: Betty Camarena RN  Sent: 3/26/2020  11:03 AM EDT  To: MD Dr. Ricco Lopez III,    Mrs. Pineda still has her port. Dr. Katz is asking if you feel it should be removed or replaced at this time. Although currently no evidence of systemic disease, with recent brain metastasis, Dr. Katz suspect she will develop systemic disease within the next few months or so. Please advise regarding her port.     Thank you,

## 2020-03-26 NOTE — TELEPHONE ENCOUNTER
Pt still has her port. Message was sent to Dr. Chacko regarding pt's port and if it should be removed or replaced. Pt V/U. Waiting to hear back from Dr. Dave office.

## 2020-03-31 NOTE — OUTREACH NOTE
Medical Week 2 Survey      Responses   [facility] patient discharged from?  [place]   Does the patient have one of the following disease processes/diagnoses(primary or secondary)?  Other   Week 2 attempt successful?  Yes   Call start time  0918   Call end time  0924   Meds reviewed with patient/caregiver?  Yes   Is the patient having any side effects they believe may be caused by any medication additions or changes?  No   Does the patient have all medications ordered at discharge?  Yes   Is the patient taking all medications as directed (includes completed medication regime)?  Yes   Does the patient have a primary care provider?   Yes   Has the patient kept scheduled appointments due by today?  Yes   Comments  Has finished round of radiation.   Has home health visited the patient within 72 hours of discharge?  N/A   Psychosocial issues?  No   Did the patient receive a copy of their discharge instructions?  Yes   Nursing interventions  Reviewed instructions with patient, Educated on MyChart   What is the patient's perception of their health status since discharge?  Same   Is the patient/caregiver able to teach back signs and symptoms related to disease process for when to call PCP?  Yes   Is the patient/caregiver able to teach back signs and symptoms related to disease process for when to call 911?  Yes   Is the patient/caregiver able to teach back the hierarchy of who to call/visit for symptoms/problems? PCP, Specialist, Home health nurse, Urgent Care, ED, 911  Yes   Week 2 Call Completed?  Yes   Wrap up additional comments  STates is feeling about the same-states dizziness continues and physician aware. STates appetite good. Using walker due to dizziness. STates has plenty of help at home.          Alexa Gasca RN

## 2020-04-06 NOTE — PROGRESS NOTES
RADIATION THERAPY TREATMENT SUMMARY  Patient: Karen Pineda  YOB: 1945     Diagnosis: 1. Small cell lung cancer (CMS/HCC)     2. Brain metastases (CMS/HCC)           Karen Pineda has recently completed the course of radiation therapy prescribed for the above-mentioned diagnosis.  Below, please find the specifics of the course of treatment delivered:    Radiation Details:    Prescription Name WHOLE BRAIN  Site   Brain  Primary/Boost  PRIMARY  Dose Per Fraction 300 cGy/Frac  Number of Fractions 10  Prescribe To  IsodoseLine 98 % 3000 cGy  300 cGy/Frac  Mode    Photon  Technique  3D CONFORMAL  Frequency  Once Daily  Energy   6X  Prescription Note PRESCRIBED 98% TO CALC PT  Imaging Frequency MV: PreTx: Other Frequency- PORT FILMS DAY ONE AND WEEKLY        Tolerance and Toxicities: she tolerated the treatments well , requiring no treatment breaks. she completed the treatments in 11 elapsed days.    Follow-up Plans:  I have asked the patient to return to see me in approximately 4 weeks with MRI .

## 2020-04-07 NOTE — OUTREACH NOTE
Medical Week 2 Survey      Responses   [facility] patient discharged from?  [place]   Does the patient have one of the following disease processes/diagnoses(primary or secondary)?  Other   Call start time  1602   Discharge diagnosis  Small cell lung cancer with progression of metastatic disease to brain   Call end time  1604   Is patient permission given to speak with other caregiver?  Yes   List who call center can speak with     Meds reviewed with patient/caregiver?  Yes   Is the patient having any side effects they believe may be caused by any medication additions or changes?  No   Is the patient taking all medications as directed (includes completed medication regime)?  Yes   Medication comments  finishing steroids 4-8-2020   Has the patient kept scheduled appointments due by today?  Yes   Psychosocial issues?  No   Comments  Pt reports feeling fatigued.    What is the patient's perception of their health status since discharge?  Improving          Poppy Diaz RN

## 2020-04-07 NOTE — TELEPHONE ENCOUNTER
Called pt spouse to confirm pt appt tomorrow for a follow up with .  Prescreen questions for Covid were negative for both pt and spouse. They understand to call us if any symptoms develop before appointment. He understands that both of their temps will be checked and a mask will be placed on them both.  He is allowed to come with her due to pt physical and mental dependence on him.

## 2020-04-08 NOTE — PROGRESS NOTES
Subjective      History of Present Illness: Karen Pineda is a 74 y.o. female is here today for follow-up via Telephone visit. Ms. Pineda was last seen 12/31/2019. She is now 7 months and 1 week and 4 days out from a Left frontal/ vertex craniotomy with gross total removal of a left medial frontal lobe mass done on 8/28/2019. Her recent Brain MRI was ordered by Dr. Katz.    Today via telephone she states she has had some vision changes, reports her vision is blurry. She denies any headaches or dizziness.    You have chosen to receive care through a telephone visit today. Do you consent to use a telephone visit for your medical care today? Yes    History of Present Illness    This patient says that she is feeling about the same as she did when she was in the hospital last month.  Her right-sided weakness and numbness is about the same but she does not feel any worse either.    The following portions of the patient's history were reviewed and updated as appropriate: allergies, current medications, past family history, past medical history, past social history, past surgical history and problem list.    Review of Systems   Eyes: Positive for visual disturbance (Blurry vision).   Respiratory: Negative for chest tightness and shortness of breath.    Cardiovascular: Negative for chest pain.   Gastrointestinal: Negative for abdominal distention and nausea.   Neurological: Negative for dizziness and headaches.       Objective       Physical Exam   Constitutional: She is oriented to person, place, and time.   Neurological: She is oriented to person, place, and time.     Neurologic Exam     Mental Status   Oriented to person, place, and time.           Assessment/Plan   Independent Review of Radiographic Studies:      I personally reviewed the images from the following studies.    I reviewed her MRI done on 2 April myself.  This shows that all the lesions we had previously seen in her brain are either smaller or  gone.    Medical Decision Making:      I told the patient that from my point of view I think things are looking pretty good at this point with regard to her brain.  I will plan to scan her again in about 3 months and we will get that set up.  We will see her back after that has been done with a report.  I was on the phone for 5 minutes with this patient.    Karen was seen today for follow-up.    Diagnoses and all orders for this visit:    Brain metastases (CMS/HCC)  -     MRI Brain With & Without Contrast; Future      Return in about 3 months (around 7/8/2020).

## 2020-04-08 NOTE — PROGRESS NOTES
Subjective     No ref. provider found    Cancer Staging  No matching staging information was found for the patient.   Chief Complaint   Patient presents with   • Follow-up     S/P XRT to brain    CC; 4 week follow up for small cell lung cancer with brain metastases                              S:  I had the pleasure of seeing Karen Pineda  today in the [facility]iation Center.  She returns today for follow up now 4 weeks out from completion of her radiation therapy to the whole brain.  She completed a dose of 30Gy on 3/20/20.  She had a brain mri on 4/2/20 which showed a decrease in size of all lesions, including the lesion in the midbrain which previously measured 13mm and now measures 5mm.  She is on decadron 2mg with the last dose today.  She reports significant fatigue.  She denies any significant headahcesShe had a phone visit with Dr. Thibodeaux this morning and he reviewed the results of the mri with her as well.     She is also complaining of a new rash on her right lateral chest wall which began yesterday.  She reports a burning pain in this area for one week prior to the rash.    Review of Systems   Constitutional: Positive for fatigue.   Respiratory: Negative.    Skin: Positive for rash.   Neurological: Positive for dizziness, weakness and numbness. Negative for headaches.         Past Medical History:   Diagnosis Date   • Anxiety    • Arthritis    • Brain metastases (CMS/HCC) 9/18/2019   • Depression    • GERD (gastroesophageal reflux disease)    • H/O Pulmonary nodule    • Hemoptysis    • History of osteopenia    • Hyperlipidemia    • Nodule of right lung    • Post-menopause    • Sinus problem    • Stress incontinence          Past Surgical History:   Procedure Laterality Date   • BREAST BIOPSY Right    • BRONCHOSCOPY N/A 4/21/2016    Procedure: BRONCHOSCOPY WITH ENDOBRONCHIAL ULTRASOUND, NAVIGATION, BRUSHING,BIOPSIES, LAVAGE, AND TRANSBRONCHIAL NEEDLE ASPIRATION;  Surgeon: Js Lewis MD;   Location: [facility] ENDOSCOPY;  Service:    • COLONOSCOPY     • CRANIOTOMY FOR TUMOR Left 2019    Procedure: Left frontal craniotomy for tumor;  Surgeon: Ruben Thibodeaux MD;  Location: [facility] MAIN OR;  Service: Neurosurgery   • VENOUS ACCESS DEVICE (PORT) INSERTION Right 2019    Procedure: INSERTION VENOUS ACCESS DEVICE;  Surgeon: Saroj Chacko III, MD;  Location: [facility] MAIN OR;  Service: Thoracic         Social History     Socioeconomic History   • Marital status:      Spouse name: Juan   • Number of children: Not on file   • Years of education: Not on file   • Highest education level: Not on file   Occupational History     Employer: RETIRED   Tobacco Use   • Smoking status: Former Smoker     Packs/day: 1.00     Years: 50.00     Pack years: 50.00     Last attempt to quit:      Years since quittin.2   • Smokeless tobacco: Never Used   Substance and Sexual Activity   • Alcohol use: No   • Drug use: No   • Sexual activity: Defer         Family History   Problem Relation Age of Onset   • Heart failure Mother    • Coronary artery disease Mother    • Depression Mother    • Heart disease Mother    • Leukemia Father    • Bone cancer Father    • Cancer Paternal Aunt    • Cancer Paternal Uncle           Objective    Physical Exam   Constitutional: She is oriented to person, place, and time. She appears well-developed and well-nourished.   HENT:   Head: Normocephalic.   Neurological: She is alert and oriented to person, place, and time.   Skin:        Multiple small erythematous lesions, no vesicular eruption yet but appears consistent with possible shingles   Psychiatric: She has a normal mood and affect. Her behavior is normal. Judgment and thought content normal.         Current Outpatient Medications on File Prior to Visit   Medication Sig Dispense Refill   • acetaminophen (TYLENOL) 325 MG tablet Take 2 tablets by mouth Every 6 (Six) Hours As Needed for Mild Pain , Moderate Pain  or  Headache.     • albuterol sulfate  (90 Base) MCG/ACT inhaler Inhale 2 puffs Every 4 (Four) Hours As Needed for Wheezing or Shortness of Air. 1 inhaler 0   • aluminum-magnesium hydroxide 200-200 MG/5ML suspension, diphenhydrAMINE 12.5 MG/5ML liquid, Lidocaine Viscous HCl 2 % solution, nystatin [id number] UNIT/ML suspension Swish and swallow 10 mL 4 (Four) Times a Day Before Meals & at Bedtime. 400 mL 2   • dexamethasone (DECADRON) 4 MG tablet Take 1 tablet by mouth Every 6 (Six) Hours. 120 tablet 0   • FLUoxetine (PROzac) 20 MG capsule Take 1 capsule by mouth Daily. 90 capsule 1   • folic acid (FOLVITE) 1 MG tablet Take 1 tablet by mouth Daily. 30 tablet 2   • levETIRAcetam (KEPPRA) 500 MG tablet Take 1 tablet by mouth Every 12 (Twelve) Hours. 60 tablet 2   • losartan (COZAAR) 50 MG tablet Take 1 tablet by mouth Daily. 90 tablet 1   • melatonin 5 MG tablet tablet Take 5 mg by mouth.     • pantoprazole (PROTONIX) 40 MG EC tablet Take 1 tablet by mouth Daily. 90 tablet 1     No current facility-administered medications on file prior to visit.        ALLERGIES:    Allergies   Allergen Reactions   • Sulfa Antibiotics Rash       LMP  (LMP Unknown)      Current Status 3/6/2020   ECOG score 0         Assessment/Plan     74 year old female with stage IV small cell lung cancer with multiple brain metastases now 3 weeks out from palliative whole brain radiation therapy.  She has also previously completed stereotactic radiosurgery as well with Dr. Mayo.  She is doing fairly well overall and her symptoms are stable.  Dr. Thibodeaux has ordered a brain mri in 3 months.  I will see her back one week later to review the results.  I will also send in a rx for valtrex for presumed shingles.  She knows to call if her symptoms worsen.           Thank you very much for allowing me to participate in the care of this very pleasant patient.    Sincerely,      Judith Akers MD

## 2020-04-14 NOTE — OUTREACH NOTE
Medical Week 4 Survey      Responses   [facility] patient discharged from?  [place]   Does the patient have one of the following disease processes/diagnoses(primary or secondary)?  Other   Week 4 attempt successful?  Yes   Call start time  1101   Call end time  1107   Discharge diagnosis  Small cell lung cancer with progression of metastatic disease to brain   Meds reviewed with patient/caregiver?  Yes   Is the patient having any side effects they believe may be caused by any medication additions or changes?  No   Is the patient taking all medications as directed (includes completed medication regime)?  Yes   Medication comments  Tapering steroids, has question related to continuing Keppra when steroids done, advised to call neurologistRAMILA.   Psychosocial issues?  No   What is the patient's perception of their health status since discharge?  Same   Week 4 Call Completed?  Yes   Would the patient like one additional call?  No   Graduated  Yes   Did the patient feel the follow up calls were helpful during their recovery period?  Yes          Bronwyn Rosario RN

## 2020-04-15 PROBLEM — I10 ESSENTIAL HYPERTENSION: Status: ACTIVE | Noted: 2020-01-01

## 2020-04-15 NOTE — PROGRESS NOTES
Subjective   Karen Pineda is a 74 y.o. female.     CC: VV for Management of HTN    History of Present Illness     Pt of Lori's seen on VV today to discuss her HTN management. Sadly, she has metastatic lung cancer to the brain and BP done at oncology on 4/8/20 was 94/59. Pt notes some wooziness with standing up and some fatigue.      The following portions of the patient's history were reviewed and updated as appropriate: allergies, current medications, past family history, past medical history, past social history, past surgical history and problem list.    Review of Systems   Constitutional: Positive for fatigue. Negative for activity change, chills and fever.   Respiratory: Negative for cough.    Cardiovascular: Negative for chest pain.   Neurological: Positive for dizziness and weakness.   Psychiatric/Behavioral: Negative for dysphoric mood.       Objective   Physical Exam   Constitutional: She appears well-developed and well-nourished. No distress.   Psychiatric: She has a normal mood and affect. Her behavior is normal. Thought content normal.       Assessment/Plan   Diagnoses and all orders for this visit:    Essential hypertension  -     losartan (COZAAR) 25 MG tablet; Take 1 tablet by mouth Daily.      Spent 15 minutes with chart and interview.

## 2020-04-26 NOTE — THERAPY TREATMENT NOTE
"    Outpatient Physical Therapy Neuro Treatment Note  [facility]     Patient Name: Karen Pineda  : 1945  MRN: [id number]  Today's Date: 10/4/2019      Visit Date: 10/04/2019    Visit Dx:    ICD-10-CM ICD-9-CM   1. Status post craniotomy Z98.890 V45.89   2. Brain tumor (CMS/HCC) D49.6 239.6   3. Hemiparesis of right dominant side due to non-cerebrovascular etiology (CMS/HCC) G81.91 342.91   4. Functional gait abnormality R26.89 781.2       Patient Active Problem List   Diagnosis   • Hemoptysis   • Lung mass   • Cough   • Arthritis   • Surgery follow-up examination   • Brain metastases (CMS/HCC)   • Small cell lung cancer (CMS/HCC)   • High risk medication use   • Encounter for fitting and adjustment of vascular catheter           PT Neuro     Row Name 10/04/19 1019             Subjective Comments    Subjective Comments  When ask if he had been walking without her walker pt and  stated, \"Yes.\" Pt reports her right knee still luis angel. Pt stated, \"Yes. It trimbles a light.\" Pt's  reported her right knee buckled right before PT. PT ask if he had to catch her he reported, \"No.\"   -LB         Precautions and Contraindications    Precautions/Limitations  fall precautions  -LB      Precautions  radiation therapy M-F, chemo therapy started 19 (3 x week) then off   -LB         Subjective Pain    Able to rate subjective pain?  yes  -LB      Pre-Treatment Pain Level  0  -LB      Post-Treatment Pain Level  0  -LB         Vision-Basic Assessment    Current Vision  Wears glasses all the time  -LB         Cognition    Overall Cognitive Status  WFL  -LB      Orientation Level  Oriented to place;Oriented to situation;Oriented to person  -LB      Safety Judgment  Decreased awareness of need for safety  -LB         Posture/Observations    Posture- WNL  Posture is WNL  -LB      Posture/Observations Comments  Pt arrived to PT ambulating without a device with her  standing by.   -LB         " PATIENT ARRIVED FROM ER VIA GURNEY TO ROOM 249 A, PT IS ALERT TO SELF, PLACE, CONFUSED ON 
TIMES AND SITUATION, GENERALIS WEAKNESS NOTED, JAUNDICE NOTED IN PT EYE'S SCLERA, PT HAS 
CONTRERAS CATHETER TO GRAVITY, URINE IS YELLOW, PT HAS DIAPER ON WITH LOOSE BM, DIAPER REMOVED, 
CLEANSE PATIENT'S SKIN WELL, REORIENT PT TO PLACE, TIME, AND PERSON, FALL RISK PRECAUTIONS, 
BED ALARM ACTIVATED Bed Mobility Assessment/Treatment    Comment (Bed Mobility)  not performed  -LB         Transfers    Sit-Stand Bement (Transfers)  conditional independence  -LB      Stand-Sit Bement (Transfers)  conditional independence  -LB      Transfers, Sit-Stand-Sit, Assist Device  other (see comments);straight cane  -LB      Comment (Transfers)  Pt did demonstrate stand to sit without UE's one time with ease.   -LB         Gait/Stairs Assessment/Training    Bement Level (Gait)  stand by assist;contact guard without a device, supervision with a cane  -LB      Assistive Device (Gait)  other (see comments) no device  -LB      Distance in Feet (Gait)  250' x 2 w/o a device, 150' x 3 with offset single point cane  -LB      Deviations/Abnormal Patterns (Gait)  -- L toe drag min 2x when ambulating at faster almita, no LOB  -LB      Bement Level (Stairs)  contact guard  -LB      Assistive Device (Stairs)  cane, straight  -LB      Handrail Location (Stairs)  none  -LB      Number of Steps (Stairs)  4  -LB      Ascending Technique (Stairs)  step-to-step  -LB      Descending Technique (Stairs)  step-to-step  -LB      Stairs, Safety Issues  sequencing ability decreased  -LB      Stairs, Impairments  strength decreased  -LB      Comment (Gait/Stairs)  Curb with a cane with CGA, PT instructed pt to perform step to step technique when using a cane on steps or a curb and to lead with the L LE ascending and descending.  -LB         Balance Skills Training    Gait Balance-Level of Assistance  Contact guard  -LB      Gait Balance Support  No upper extremity supported  -LB      Gait Balance Activities  side-stepping;braiding / front  -LB        User Key  (r) = Recorded By, (t) = Taken By, (c) = Cosigned By    Initials Name Provider Type    LB Ángela Lewis PT Physical Therapist                  PT Assessment/Plan     Row Name 10/04/19 1300          PT Assessment    Assessment Comments  Pt and  report pt has not  "been using her walker at home. They deny any falls. Pt and  reporrt right knee will buckle at times. Pt describing the buckling also like a tremor. Pt did not demonstrate any right knee buckling or tremor during PT. However, when ambulating at a faster almita without a device her demonstrated left toe drag 2 x but no loss of balance noted. Due to pt and  reporting right knee buckling pt was educated on use of an offset single point cane on level surface and on stairs. PT recommended pt purchase an offset cane but to continue using her walker when fatigued, early am and at night.   -LB       User Key  (r) = Recorded By, (t) = Taken By, (c) = Cosigned By    Initials Name Provider Type    Ángela Cramer PT Physical Therapist             OP Exercises     Row Name 10/04/19 1019             Subjective Comments    Subjective Comments  When ask if he had been walking without her walker pt and  stated, \"Yes.\" Pt reports her right knee still luis angel. Pt stated, \"Yes. It trimbles a light.\" Pt's  reported her right knee buckled right before PT. PT ask if he had to catch her he reported, \"No.\"   -LB         Subjective Pain    Able to rate subjective pain?  yes  -LB      Pre-Treatment Pain Level  0  -LB      Post-Treatment Pain Level  0  -LB         Exercise 3    Exercise Name 3  bilateral plantarflexion with heel off edge of 4\" step within the ll bars   -LB      Cueing 3  Verbal  -LB      Reps 3  12  -LB      Additional Comments  Pt reporting she can feel her R calf is weaker.   -LB         Exercise 4    Exercise Name 4  R knee flexion in standing with light UE on the ll bars  -LB      Cueing 4  Verbal;Tactile  -LB      Reps 4  10  -LB        User Key  (r) = Recorded By, (t) = Taken By, (c) = Cosigned By    Initials Name Provider Type    Ángela Cramer PT Physical Therapist                          Therapy Education  Education Details: Instructed pt in proper technique with ambulation with a " cane on level and unlevel surface. Advised pt and  top purchase a cane but to still use her walker when fatigued, early am and at night.   Given: Fall prevention and home safety, Mobility training  How Provided: Verbal, Demonstration  Provided to: Patient, Caregiver  Level of Understanding: Teach back education performed, Verbalized, Demonstrated              Time Calculation:   Start Time: 1019  Stop Time: 1051  Time Calculation (min): 32 min  Total Timed Code Minutes- PT: 32 minute(s)   Therapy Charges for Today     Code Description Service Date Service Provider Modifiers Qty    [id number] HC PT NEUROMUSC RE EDUCATION EA 15 MIN 10/4/2019 Ángela Lewis, PT GP 2                    Ángela Lewis, PT  10/4/2019

## 2020-04-27 NOTE — PROGRESS NOTES
Subjective   Karen Pineda is a 74 y.o. female.   You have chosen to receive care through a telehealth visit.  Do you consent to use a video/audio connection for your medical care today? Yes    History of Present Illness   Patient presents to office to f/u on blood pressure. At last visit her losartan was decreased to 25 mg daily due to low readings in 90s/50s and lightheadedness. Patient states she has been taking as prescribed. She reports feeling well and denies side effects.   She reports BP has been improved.  Yesterday 122/71.  Bp is 130/92 this morning but she is in pain from her shingles. She was started on Valtrex and dexamethasone per Dr. Obando on 4/8. Patient states rash looks slightly better but pain has not improved and it keeps her awake at night. She denies any new crops of lesions.   The following portions of the patient's history were reviewed and updated as appropriate: allergies, current medications, past family history, past medical history, past social history, past surgical history and problem list.    Review of Systems   Eyes: Negative for visual disturbance.   Respiratory: Negative for shortness of breath.    Cardiovascular: Negative for chest pain and palpitations.   Neurological: Negative for dizziness, light-headedness and headaches.   Psychiatric/Behavioral: Negative for behavioral problems.       Objective   Physical Exam   Constitutional: She is oriented to person, place, and time. She appears well-developed and well-nourished.   Pulmonary/Chest: Effort normal.   Neurological: She is alert and oriented to person, place, and time.   Skin: Rash noted. Rash is maculopapular.        Erythematous maculopapular clusters and scabbing noted to left upper abdomen. No vesicles appreciated.    Psychiatric: She has a normal mood and affect. Her behavior is normal. Judgment normal.   Nursing note and vitals reviewed.      Assessment/Plan   Karen was seen today for hypertension and  herpes zoster.    Diagnoses and all orders for this visit:    Essential hypertension    Herpes zoster without complication  -     gabapentin (NEURONTIN) 300 MG capsule; Take 1 capsule at bedtime for 3 days, then twice a day for 3 days, then increase to 3 times per day ongong        Gabapentin for pain from herpes zoster.  Discussed appropriate slow increase with dosing.  Discussed possible dizziness and drowsiness with medication.  Patient verbalized understanding.   F/u in 3-4 weeks for BP recheck or sooner if needed.       This visit has been rescheduled as a phone/video visit to comply with patient safety concerns in accordance with CDC recommendations. Total time of discussion was 10 minutes.

## 2020-05-05 NOTE — TELEPHONE ENCOUNTER
Received a call from this patient regarding episodes of worsening loss of hearing in the right ear.  This has been an ongoing problem that started just over 2 months ago.  Repeat MRI imaging of the brain 3/6/2020 revealed multiple new brain metastases.  2 largest lesions involve the tear aspect of the midbrain to the left and the right temporal lobe with enlargement of the right temporal horn.  No evidence of hydrocephalus.  The patient since completed whole brain radiation.  She had also been on steroids.  She just weaned completely off the steroids about a week ago.  Since that time, she has begun to notice some worsening hearing in her right ear and some occasional issues with her balance as well as dizziness.  She denied any history of falls, seizures, nausea, vomiting or mental status change. She is still taking Keppra at home and was instructed to continue with home dose Protonix as prescribed by her PCP.    I relayed this information to Dr. Thibodeaux, her treating neurosurgeon.  We will go ahead and place the patient back on Decadron 4 mg p.o. twice daily and get a MRI of the brain with and without contrast.  The patient will be contacted with date and time of the MRI and notified by Dr. Thibodeaux via telephone visit to discuss the results.    If any of the above symptoms worsen and particularly if she begins to develop nausea, vomiting, confusion, falls or weakness the patient was instructed to call the office.

## 2020-05-06 NOTE — PROGRESS NOTES
Subjective   Karen Pineda is a 74 y.o. female.   You have chosen to receive care through a telehealth visit.  Do you consent to use a video/audio connection for your medical care today? Yes    History of Present Illness   Patient presents via video visit for continued low BP readings.  Her losartan was decreased to 25 mg due to low readings a few weeks ago. BP seemed to be doing better initially running 110s-130s/70s-80s but has now dropped back down.  Readings today have been 92/67 and 100/73.  She has not taken her medication yet.  Yesterdays readings were 109/71 before medication and 92/61 after.    She does report feeling tired and lightheaded.       She reports her shingles rash has dried up and is fading but the pain has not improved much with gabapentin. She denies any noticeable side effects with gabapentin.   The following portions of the patient's history were reviewed and updated as appropriate: allergies, current medications, past family history, past medical history, past social history, past surgical history and problem list.    Review of Systems   Constitutional: Positive for fatigue. Negative for unexpected weight change.   Respiratory: Negative for shortness of breath.    Cardiovascular: Negative for chest pain and palpitations.   Skin: Positive for rash.   Neurological: Positive for light-headedness.   Psychiatric/Behavioral: Negative for behavioral problems.       Objective   Physical Exam   Constitutional: She is oriented to person, place, and time. She appears well-developed and well-nourished.   Pulmonary/Chest: Effort normal.   Neurological: She is alert and oriented to person, place, and time.   Psychiatric: She has a normal mood and affect. Her behavior is normal. Judgment and thought content normal.   Nursing note and vitals reviewed.      Assessment/Plan   Karen was seen today for low b/p.    Diagnoses and all orders for this visit:    Postherpetic neuralgia    Hypotension,  unspecified hypotension type          Hold losartan and monitor BP.    Increase gabapentin to 600 mg BID.   F/u next week for recheck on both issues or sooner if needed.   F/u visit scheduled.         This visit has been rescheduled as a phone/video visit to comply with patient safety concerns in accordance with CDC recommendations. Total time of discussion was 8 minutes.

## 2020-05-06 NOTE — TELEPHONE ENCOUNTER
Patient scheduled for [facility] for 0745 tomorrow morning. She will arrive at 0715. Patient is aware.

## 2020-05-07 NOTE — PROGRESS NOTES
MRI looks pretty good. Will you be calling her with these results? Maybe the middle ear is the problem. Should she see her ENT?

## 2020-05-08 NOTE — TELEPHONE ENCOUNTER
RX sent to her pharmacy. Patient is aware and would like to see Dr. Moon phillip who's office is located at [facility].    Ruben Thibodeaux MD Tudor, Laura M., APRN; Alejandra Brito MA             Let her know that the MRI looks okay.  I think seeing the ENT would not be a bad idea but I doubt they will do much.  Go ahead and switch her steroids to a 13-day DexPak.    ----- Message -----   From: Ángela Pryor APRN   Sent: 5/7/2020   6:19 PM EDT   To: Ruben Thibodeaux MD     MRI looks pretty good. Will you be calling her with these results? Maybe the middle ear is the problem. Should she see her ENT?

## 2020-05-12 NOTE — PROGRESS NOTES
Subjective   Karen Pineda is a 74 y.o. female.   You have chosen to receive care through a telehealth visit.  Do you consent to use a video/audio connection for your medical care today? Yes  History of Present Illness   Patient is following up today via video visit on her blood pressure. Last week, her losartan was held due to low readings.  She has been checking her blood pressure twice a day and readings have ranged from 120s-130/80s-90s.  She denies feeling any different since stopping medication.  She reports she is always dizzy and fatigued.      Patient has also been taking the increased dose of gabapentin (600 mg BID) since last week.  She reports some improvement in her postherpetic neuralgia pain but states she still feels twinges of pain. Denies any side effects from gabapentin.     She would also like a refill on her Ventolin inhaler to use as needed. It was prescribed in December for cough.    nThe following portions of the patient's history were reviewed and updated as appropriate: allergies, current medications, past family history, past medical history, past social history, past surgical history and problem list.    Review of Systems   Constitutional: Positive for fatigue. Negative for unexpected weight change.   Eyes: Negative for visual disturbance.   Cardiovascular: Negative for chest pain and palpitations.   Neurological: Positive for dizziness. Negative for headaches.   Psychiatric/Behavioral: Negative for behavioral problems.       Objective   Physical Exam   Constitutional: She is oriented to person, place, and time. She appears well-developed and well-nourished.   Pulmonary/Chest: Effort normal.   Neurological: She is alert and oriented to person, place, and time.   Psychiatric: She has a normal mood and affect. Her behavior is normal. Judgment and thought content normal.   Nursing note and vitals reviewed.      Assessment/Plan   Karen was seen today for hypertension.    Diagnoses  and all orders for this visit:    Hypotension, unspecified hypotension type  Comments:  resolved    Cough  -     albuterol sulfate  (90 Base) MCG/ACT inhaler; Inhale 2 puffs Every 4 (Four) Hours As Needed for Wheezing or Shortness of Air.    Herpes zoster without complication  -     gabapentin (NEURONTIN) 300 MG capsule; Take 1 capsule at bedtime for 3 days, then twice a day for 3 days, then increase to 3 times per day ongong      Continue to hold losartan and check blood pressure daily . F/u appt scheduled for next Tuesday to recheck. May need to restart her on 1/2 tablet of losartan 25 mg .       This visit has been rescheduled as a phone/video visit to comply with patient safety concerns in accordance with CDC recommendations. Total time of discussion was 9 minutes.

## 2020-05-13 NOTE — PROGRESS NOTES
.     REASONS FOR FOLLOWUP: Extensive stage small cell lung cancer    HISTORY OF PRESENT ILLNESS:  The patient is a 75 y.o. year old female  who is here for follow-up with the above-mentioned history.    She saw Dr. Laird, ENT, on 5/8/2020 due to the feeling of fluid behind her right ear.  Fullness, associated hearing loss.  He performed a myringotomy with tube tube.  She feels she has only worsened since the tube was placed.  She has not tried any steroid nose sprays.  I recommended she try that.    Denies shortness of breath, nausea, weight loss.  Eating well.  Denies pain other than the ear pressure.    Past Medical History:   Diagnosis Date   • Anxiety    • Arthritis    • Brain metastases (CMS/HCC) 9/18/2019   • Depression    • Essential hypertension 4/15/2020   • GERD (gastroesophageal reflux disease)    • H/O Pulmonary nodule    • Hemoptysis    • History of osteopenia    • Hyperlipidemia    • Nodule of right lung    • Post-menopause    • Sinus problem    • Stress incontinence      Past Surgical History:   Procedure Laterality Date   • BREAST BIOPSY Right    • BRONCHOSCOPY N/A 4/21/2016    Procedure: BRONCHOSCOPY WITH ENDOBRONCHIAL ULTRASOUND, NAVIGATION, BRUSHING,BIOPSIES, LAVAGE, AND TRANSBRONCHIAL NEEDLE ASPIRATION;  Surgeon: Js Lewis MD;  Location: [facility] ENDOSCOPY;  Service:    • COLONOSCOPY     • CRANIOTOMY FOR TUMOR Left 8/28/2019    Procedure: Left frontal craniotomy for tumor;  Surgeon: Ruben Thibodeaux MD;  Location: [facility] OR;  Service: Neurosurgery   • VENOUS ACCESS DEVICE (PORT) INSERTION Right 9/6/2019    Procedure: INSERTION VENOUS ACCESS DEVICE;  Surgeon: Saroj Chacko III, MD;  Location: [facility] OR;  Service: Thoracic       MEDICATIONS    Current Outpatient Medications:   •  acetaminophen (TYLENOL) 325 MG tablet, Take 2 tablets by mouth Every 6 (Six) Hours As Needed for Mild Pain , Moderate Pain  or Headache., Disp: , Rfl:   •  albuterol sulfate  (90 Base)  MCG/ACT inhaler, Inhale 2 puffs Every 4 (Four) Hours As Needed for Wheezing or Shortness of Air., Disp: 1 inhaler, Rfl: 0  •  aluminum-magnesium hydroxide 200-200 MG/5ML suspension, diphenhydrAMINE 12.5 MG/5ML liquid, Lidocaine Viscous HCl 2 % solution, nystatin 242656 UNIT/ML suspension, Swish and swallow 10 mL 4 (Four) Times a Day Before Meals & at Bedtime., Disp: 400 mL, Rfl: 2  •  dexamethasone (DECADRON) 1.5 MG tablet, 3 am 3 pm for 4 day's, 3 am 2 pm for 1 day, 2 am 2 pm for 3 day's, 2 am 1 pm for 1 day, 1 am 1 pm for 3 day's, 1 po am x 1 day only, Disp: 51 tablet, Rfl: 0  •  FLUoxetine (PROzac) 20 MG capsule, Take 1 capsule by mouth Daily., Disp: 90 capsule, Rfl: 1  •  folic acid (FOLVITE) 1 MG tablet, Take 1 tablet by mouth Daily., Disp: 30 tablet, Rfl: 2  •  gabapentin (NEURONTIN) 300 MG capsule, Take 1 capsule at bedtime for 3 days, then twice a day for 3 days, then increase to 3 times per day ongong, Disp: 90 capsule, Rfl: 5  •  levETIRAcetam (KEPPRA) 500 MG tablet, Take 1 tablet by mouth Every 12 (Twelve) Hours., Disp: 60 tablet, Rfl: 2  •  losartan (COZAAR) 25 MG tablet, Take 1 tablet by mouth Daily., Disp: 90 tablet, Rfl: 1  •  melatonin 5 MG tablet tablet, Take 5 mg by mouth., Disp: , Rfl:   •  pantoprazole (PROTONIX) 40 MG EC tablet, Take 1 tablet by mouth Daily., Disp: 90 tablet, Rfl: 1  •  temazepam (RESTORIL) 30 MG capsule, Take 30 mg by mouth every night at bedtime., Disp: , Rfl:     ALLERGIES:     Allergies   Allergen Reactions   • Sulfa Antibiotics Rash       SOCIAL HISTORY:       Social History     Socioeconomic History   • Marital status:      Spouse name: Juan   • Number of children: Not on file   • Years of education: Not on file   • Highest education level: Not on file   Occupational History     Employer: RETIRED   Tobacco Use   • Smoking status: Former Smoker     Packs/day: 1.00     Years: 50.00     Pack years: 50.00     Last attempt to quit:      Years since quittin.3    • Smokeless tobacco: Never Used   Substance and Sexual Activity   • Alcohol use: No   • Drug use: No   • Sexual activity: Defer         FAMILY HISTORY:  Family History   Problem Relation Age of Onset   • Heart failure Mother    • Coronary artery disease Mother    • Depression Mother    • Heart disease Mother    • Leukemia Father    • Bone cancer Father    • Cancer Paternal Aunt    • Cancer Paternal Uncle        REVIEW OF SYSTEMS:  Review of Systems   Constitutional: Negative for activity change.   HENT: Negative for nosebleeds and trouble swallowing.    Respiratory: Negative for shortness of breath and wheezing.    Cardiovascular: Negative for chest pain and palpitations.   Gastrointestinal: Negative for constipation, diarrhea and nausea.   Genitourinary: Negative for dysuria and hematuria.   Musculoskeletal: Negative for arthralgias and myalgias.   Skin: Negative for rash and wound.   Neurological: Negative for seizures and syncope.   Hematological: Negative for adenopathy. Does not bruise/bleed easily.   Psychiatric/Behavioral: Negative for confusion.            There were no vitals filed for this visit.  Current Status 3/6/2020   ECOG score 0        PHYSICAL EXAM:    Patient screened negative for depression based on a PHQ-9 score of 4 on 3/6/2020.       CONSTITUTIONAL:  Vital signs reviewed.    No distress, looks comfortable.  EYES:  Conjunctiva and lids unremarkable.  Extraocular eye movements intact.  EARS,NOSE,MOUTH,THROAT:  Ears and nose appear unremarkable.  Lips, teeth, gums appear unremarkable.  RESPIRATORY:  Normal respiratory effort.    CARDIOVASCULAR:    No significant lower extremity edema.  SKIN: No wounds.  No rashes.  MUSCULOSKELETAL/EXTREMITIES: No clubbing or cyanosis.  No apparent unilateral weakness.  NEURO: CN 2-12 appear intact. No focal neurological deficits noted.  PSYCHIATRIC:  Normal judgment and insight.  Normal mood and affect.      RECENT LABS:        WBC   Date/Time Value Ref Range  Status   05/13/2020 09:47 AM 8.53 3.40 - 10.80 10*3/mm3 Final     Hemoglobin   Date/Time Value Ref Range Status   05/13/2020 09:47 AM 12.1 12.0 - 15.9 g/dL Final     Platelets   Date/Time Value Ref Range Status   05/13/2020 09:47  140 - 450 10*3/mm3 Final       Assessment/Plan   Small cell lung cancer (CMS/HCC)  - CBC & Differential  - Comprehensive Metabolic Panel  - CT Chest With Contrast  - CT Abdomen Pelvis With Contrast    *Extensive stage small cell lung cancer, RUL, 2.4 x 2.3 x 1.7cm, with metastasis to 3 brain lesions (bilateral frontal lobes.)  · RUL mass first seen on CT 3/28/2016, 2 x 1.3 cm.  Decreased to 1.6 cm on 6/27/2016, and decreased again to 1.3 cm on 12/5/2016.  Therefore, this was felt at the time to be a benign resolving nodule.  · During August 2019 hospitalization, found to have brain metastasis from small cell lung cancer.  RUL mass and 3 brain lesions.  No evidence of disease otherwise  · Craniotomy for the medial left frontal lobe lesion (3.2 cm) , 8/28/2019  · Because the final diagnosis is small cell, Dr. Chacko does not plan lung resection as we typically treat this with chemoradiation  · SRS to 2 remaining brain lesions (first was resected) and to resection cavity of the first lesion, completed 9/18/2019.  Dr. Mayo only planning whole brain XRT if future brain recurrence.  · Usually with small cell I would like to start chemo XRT within a couple of weeks.  However, she is recovering from brain surgery.  Furthermore, this lung lesion has been there for years.  Therefore, weighing risks and benefits I think it is in her best interest to wait almost 4 weeks after craniotomy to begin chemo and radiation.  · PET 9/16/2019: 2.5 cm RUL mass with SUV 15.8.  No hypermetabolic LAD or distant disease.  · On 9/18/2019 completed 5/5 XRT to brain metastasis resection cavity and 1/1 XRT intact brain metastasis #1 and 1/1 XRT due to intact brain metastasis #2.  · 9/19/2019, C1D1 lung mass XRT  with  carboplatin, etoposide, Tecentriq, followed by Tecentriq maintenance for extensive stage small cell lung cancer.  Plan a total of 4-6 cycles of chemo depending on tolerability.  · On 10/4/2019, prednisone 0.5 mg/kg/day (40 mg) started due to significant rash from Tecentriq.  4 days later, rash improved but did not resolve.   ·  Plan no more Tecentriq.    · Prednisone tapered off 10/28/2019.  · C2D1 (without Tecentriq) given 10/14/2019.  · MRI brain 10/8/2019 reviewed by Dr. Thibodeaux-he feels this is stable and plan another MRI and appointment with him in 3 months  · Lung mass XRT held the week of 10/28/2019, and again the week of 11/4/2019 due to radiation esophagitis  · Completed lung mass XRT 11/15/2019  · CT CAP 10/29/2019 (after C2): RUL lung mass 2 x 1.3 cm, from 2.5 x 2.5 cm.  · Did not give C3 chemo is planned 11/4/2019 because PLT 91.    · C3 D1 on 11/11/2019 (20% dose reduction of both drugs)  · Did not receive C4 D1 as planned on 12/2/2019 due to ANC 1330 and PLT 67.  Delayed by 1 week and further dose reduction of both drugs by an additional 20%  · (We will only plan 4 cycles of chemo since she is having difficulty tolerating chemo due to cytopenias).  · C4 D1 carbo etoposide, 12/10/2019 (completed first-line chemo).  · CT 12/18/2019 (after C4): No change RUL 2 x 1.3 cm lung mass.   · Radiologist noted there might be a tiny thrombus along the right Mediport catheter tip.  No distant disease found.  · MRI brain 12/26/2019: Left frontal lobe metastasis 5 mm, from 1 cm on 10/8/2019.  Right frontal lobe lesion also smaller.  Left parietal lobe dural nodular enhancement 5 mm, unchanged (radiologist notes this might have been a meningioma since it is unchanged).  · Brain metastasis discovered 3/6/2020.  · Whole brain XRT completed 3/20/2020  · MRI brain 5/7/2020, from 4/2/2020: Further response to treatment.  · CT CAP 5/13/2020: Bandlike radiation fibrosis partially obscuring the primary malignancy.  The  remaining nodular density is 1 cm, from 2 cm previously.  No new abnormalities.  · CT images personally viewed by me  Continue observation    *Goals of care.  · Since the 3 brain lesions were treated with stereotactic radiation and resection of 1 of the lesions and since no disease was found elsewhere, she perhaps has a very slim but possible chance of cure.  However, small cell histology makes the possibility of long-term survival unlikely.  · On 11/4/2019, I reminded her this is highly unlikely curable.  She was having significant issues with radiation esophagitis, limiting nutrition, requiring daily IV fluids.  Radiation will be held an additional week.  She has been miserable and hopefully another week of XRT and a week delay of chemo (PLT <100), and dose reduction of chemo will make further treatment reasonable regarding the importance she is placing on quality of life.     *Neurological deficits due to brain metastasis:  · Some mild confusion and right-sided weakness and urinary incontinence x2 weeks prompted ER visit 8/24/2019.     *Tinnitus and decreased hearing.  Not a cisplatin candidate.    *Port placed 9/6/2019    *Neutropenia due to chemotherapy.  We have not been using Neulasta due to XRT therapy.  Prophylactic Levaquin prescribed 11/25/2019 due to neutropenia.  Added Neulasta with cycle 4  ANC 6680    *Thrombocytopenia due to chemotherapy  · This caused dose delays and dose reductions of C3 and C4 carbo etoposide  ·     *Anemia.  Hb 12.1     *This is Ángela Pryor's mother-in-law (APRN with Dr. Thibodeaux)    *She was started on Keppra in the ER when brain mets were discovered.  She states she did not have any seizures.  The neurology notes just states patient is on Keppra.  They do not recommend continuing or discontinuing Keppra.    I told her I doubt she has a clear indication for Keppra.    · I sent a message to Dr. Thibodeaux to see if he agrees with stopping keppra    *Right ear fullness.  MRI brain  5/7/2020 revealed partially opacified medial right frontal sinus, right frontal recess, subtotal opacification right ethmoid and sphenoid sinus with fluid and mucosal thickening and mild mucosal thickening of inferior maxillary sinus and interval increase in opacification with subtotal opacification of the mastoid air cells and middle ear cavities with fluid bilaterally.  She has seen Dr. Laird of ENT for this and had a ear tube placed.  She states her pressure sensation has only worsened since the tube was placed.  I recommended she try a steroid nose spray.  She would also like to see an allergist.  I referred her to Dr. Lyle Mcbride.     *Migration of the port catheter into the axillary vein.  · Dr. Chacko plans port removal (this was delayed due to the ban on elective surgeries due to the coronavirus pandemic)    Plan  · Dr. Medina appointment scheduled 7/14/2020, with MRI brain scheduled through his office on 7/1/2020.    · MD 2 months.  CT CAP with contrast, 1 week prior (MRI brain already scheduled to Dr. Medina' office)  · Dr. Chacko plans port removal  · Referral to Dr. Lyle Mcbride     assisted with history.    You have chosen to receive care through a telehealth visit.  Do you consent to use a video/audio connection for your medical care today? Yes  avandeo  was used for the video visit.     ADDENDUM 5/19/20    NOTE FROM DR. MEDINA  EEG in December was normal and we weaned her off of Keppra but there were some episodes last month which I questioned and I put her back on it.  We will set her up for a new EEG and call her with results about weaning.

## 2020-05-19 NOTE — PROGRESS NOTES
Subjective   Karen Pineda is a 75 y.o. female.   You have chosen to receive care through a telehealth visit.  Do you consent to use a video/audio connection for your medical care today? Yes  History of Present Illness   Patient presents for BP f/u. She has continued to hold losartan and check BP daily.  Her readings the past few days are as follows:  116/81, 124/72, 124/75.    Patient had myringotomy with tube placed per ENT a couple of weeks ago. She does not feel that this helped her symptoms and feels that her ear fullness seems worse since.  She has been referred to an allergist per Dr. Katz but has not followed up with ENT.      Patient is still taking gabapentin.  She reports still having twinges of pain but feels the gabapentin has helped.   The following portions of the patient's history were reviewed and updated as appropriate: allergies, current medications, past family history, past medical history, past social history, past surgical history and problem list.    Review of Systems   Constitutional: Negative for unexpected weight change.   Respiratory: Negative for shortness of breath.    Cardiovascular: Negative for chest pain and palpitations.   Psychiatric/Behavioral: Negative for behavioral problems.       Objective   Physical Exam   Constitutional: She is oriented to person, place, and time. She appears well-developed and well-nourished.   Pulmonary/Chest: Effort normal.   Neurological: She is alert and oriented to person, place, and time.   Psychiatric: She has a normal mood and affect. Her behavior is normal. Judgment and thought content normal.   Nursing note and vitals reviewed.      Assessment/Plan   Karen was seen today for hypertension.    Diagnoses and all orders for this visit:    Hypotension, unspecified hypotension type          Continue gabapentin.  D/c losartan as BP is doing well off medication.   F/u with ENT.   She will f/u with me in 4 weeks for BP recheck.         This  visit has been rescheduled as a phone/video visit to comply with patient safety concerns in accordance with CDC recommendations. Total time of discussion was 6 minutes.

## 2020-05-19 NOTE — TELEPHONE ENCOUNTER
Patient is aware.    ----- Message from Ruben Thibodeaux MD sent at 5/18/2020  5:57 PM EDT -----  EEG in December was normal and we weaned her off of Keppra but there were some episodes last month which I questioned and I put her back on it.  We will set her up for a new EEG and call her with results about weaning.   ----- Message -----  From: Nicolas Katz II, MD  Sent: 5/18/2020   3:59 PM EDT  To: Ruben Thibodeaux MD, MICHAEL Stephenson  This patient asked me if she can stop her Keppra.    #She was started on Keppra in the ER when brain mets were discovered.  She states she did not have any seizures.  The neurology notes just stated patient is on Keppra.  They did not recommend continuing or discontinuing Keppra.    I told her I doubt she has a clear indication for Keppra.  You agree, can she stop her Keppra now?      
coffee

## 2020-05-27 NOTE — TELEPHONE ENCOUNTER
Received call that patient is experiencing severe oral thrush symptoms. She saw Dr. Laird, ENT for fluid in ears and started on an antibiotic several days ago. She reports a white coating on the tongue and some associated raw patches. Denies fever or chills. No burning or discomfort in the esophagus. These symptoms are not like the discomfort she had when she was going through lung radiation. She has been prescribed a oral solution contained liquid xylocaine by her oncologist. She is out of that medication.   I think this is a simple case of oral candidiasis from antibiotic use. She has also been on a steroid taper,   I have electronically prescribed Nystatin Swish/Swallow to be used 4 times daily until symptoms resolve. If symptoms do not improve, she was instructed to contact her oncologist or primary care provider.

## 2020-06-01 NOTE — H&P
Patient Care Team:  Lori Zamarripa APRN as PCP - General (Family Medicine)  Js Lewis MD as Consulting Physician (Pulmonary Disease)  Nicolas Mayo MD as Consulting Physician (Radiation Oncology)  Nicolas Katz II, MD as Consulting Physician (Hematology and Oncology)  She Garcia PA-C as Referring Physician (Neurosurgery)  Judith Akers MD as Consulting Physician (Radiation Oncology)    No chief complaint on file.      Subjective     History of Present Illness     Patient had a right subclavian venous access port placed for small cell carcinoma of the lung.  Patient was recently on vacation in [place].  She had no specific problems related to the port.  She recently returned from vacation in follow-up x-ray showed migration of the subclavian catheter.  We have discussed with her and with Dr. code either replacement of the catheter or removal of the catheter.  They have elected to remove the port and catheter.  She is here today for that procedure.    Review of Systems   Respiratory: Positive for cough and shortness of breath.    All other systems reviewed and are negative.       Patient Active Problem List   Diagnosis   • Hemoptysis   • Lung mass   • Cough   • Arthritis   • Surgery follow-up examination   • Brain metastases (CMS/HCC)   • Small cell lung cancer (CMS/HCC)   • High risk medication use   • Encounter for fitting and adjustment of vascular catheter   • Rash   • Chemotherapy-induced thrombocytopenia   • Decreased oral intake   • Radiation esophagitis   • Essential hypertension     Past Medical History:   Diagnosis Date   • Anxiety    • Arthritis    • Brain metastases (CMS/HCC) 9/18/2019   • Depression    • Essential hypertension 4/15/2020   • GERD (gastroesophageal reflux disease)    • H/O Pulmonary nodule    • Hemoptysis    • History of osteopenia    • History of transfusion     NO REACTION   • Hyperlipidemia    • Lung cancer (CMS/HCC)    • Nodule of right lung     • Post-menopause    • Sinus problem    • Stress incontinence      Past Surgical History:   Procedure Laterality Date   • BREAST BIOPSY Right    • BRONCHOSCOPY N/A 2016    Procedure: BRONCHOSCOPY WITH ENDOBRONCHIAL ULTRASOUND, NAVIGATION, BRUSHING,BIOPSIES, LAVAGE, AND TRANSBRONCHIAL NEEDLE ASPIRATION;  Surgeon: Js Lewis MD;  Location: [facility] ENDOSCOPY;  Service:    • COLONOSCOPY     • CRANIOTOMY FOR TUMOR Left 2019    Procedure: Left frontal craniotomy for tumor;  Surgeon: Ruben Thibodeaux MD;  Location: [facility] MAIN OR;  Service: Neurosurgery   • VENOUS ACCESS DEVICE (PORT) INSERTION Right 2019    Procedure: INSERTION VENOUS ACCESS DEVICE;  Surgeon: Saroj Chacko III, MD;  Location: [facility] MAIN OR;  Service: Thoracic     Family History   Problem Relation Age of Onset   • Heart failure Mother    • Coronary artery disease Mother    • Depression Mother    • Heart disease Mother    • Leukemia Father    • Bone cancer Father    • Cancer Paternal Aunt    • Cancer Paternal Uncle    • Malig Hyperthermia Neg Hx      Social History     Socioeconomic History   • Marital status:      Spouse name: Juan   • Number of children: Not on file   • Years of education: Not on file   • Highest education level: Not on file   Occupational History     Employer: RETIRED   Tobacco Use   • Smoking status: Former Smoker     Packs/day: 1.00     Years: 50.00     Pack years: 50.00     Types: Cigarettes     Last attempt to quit: 2014     Years since quittin.4   • Smokeless tobacco: Never Used   Substance and Sexual Activity   • Alcohol use: No   • Drug use: No   • Sexual activity: Defer     Medications Prior to Admission   Medication Sig Dispense Refill Last Dose   • acetaminophen (TYLENOL) 325 MG tablet Take 2 tablets by mouth Every 6 (Six) Hours As Needed for Mild Pain , Moderate Pain  or Headache.   Past Week at Unknown time   • albuterol sulfate  (90 Base) MCG/ACT inhaler Inhale 2 puffs Every  4 (Four) Hours As Needed for Wheezing or Shortness of Air. 1 inhaler 0 5/31/2020 at 2200   • aluminum-magnesium hydroxide 200-200 MG/5ML suspension, diphenhydrAMINE 12.5 MG/5ML liquid, Lidocaine Viscous HCl 2 % solution, nystatin [id number] UNIT/ML suspension Swish and swallow 10 mL 4 (Four) Times a Day Before Meals & at Bedtime. 400 mL 2 5/31/2020 at 2200   • Chlorhexidine Gluconate 2 % pads Apply  topically. AS DIRECTED FOR PRE OP   6/1/2020 at 0615   • FLUoxetine (PROzac) 20 MG capsule Take 1 capsule by mouth Daily. (Patient taking differently: Take 20 mg by mouth Every Morning.) 90 capsule 1 6/1/2020 at 0600   • folic acid (FOLVITE) 1 MG tablet Take 1 tablet by mouth Daily. 30 tablet 2 5/31/2020 at 0930   • gabapentin (NEURONTIN) 300 MG capsule Take 600 mg by mouth 2 (Two) Times a Day.   5/31/2020 at 1700   • levETIRAcetam (KEPPRA) 500 MG tablet Take 1 tablet by mouth Every 12 (Twelve) Hours. 60 tablet 2 5/31/2020 at 2200   • melatonin 5 MG tablet tablet Take 10 mg by mouth Every Night.   5/31/2020 at 2200   • pantoprazole (PROTONIX) 40 MG EC tablet Take 1 tablet by mouth Daily. (Patient taking differently: Take 40 mg by mouth Every Morning.) 90 tablet 1 6/1/2020 at 0615     Allergies   Allergen Reactions   • Sulfa Antibiotics Rash   • Tape Itching       Objective      Vital Signs  Temp:  [97.9 °F (36.6 °C)] 97.9 °F (36.6 °C)  Resp:  [20] 20  BP: (110)/(70) 110/70    Physical Exam   Constitutional: She is oriented to person, place, and time. She appears well-developed and well-nourished.   HENT:   Head: Normocephalic.   Eyes: Pupils are equal, round, and reactive to light. Conjunctivae, EOM and lids are normal.   Neck: Trachea normal and normal range of motion. Neck supple. No hepatojugular reflux and no JVD present. Carotid bruit is not present. No thyroid mass and no thyromegaly present.   Cardiovascular: Normal rate, regular rhythm, S1 normal, S2 normal, normal heart sounds and normal pulses.  No extrasystoles  are present. PMI is not displaced.   Pulmonary/Chest: Effort normal. She has decreased breath sounds in the right lower field.   Right subclavian port in good position.  No redness tenderness or other signs of infection.   Abdominal: Soft. Normal appearance and bowel sounds are normal. She exhibits no mass. There is no hepatosplenomegaly. There is no tenderness. No hernia.   Musculoskeletal: Normal range of motion.   Neurological: She is alert and oriented to person, place, and time. She has normal strength and normal reflexes. No cranial nerve deficit or sensory deficit. She displays a negative Romberg sign.   Skin: Skin is warm, dry and intact.   Psychiatric: She has a normal mood and affect. Her speech is normal and behavior is normal. Judgment and thought content normal. Cognition and memory are normal.       Results Review:    I reviewed the patient's new clinical results.  I reviewed the patient's new imaging results and agree with the interpretation.    Imaging Results (Last 24 Hours)     ** No results found for the last 24 hours. **          Lab Results:  Lab Results (last 24 hours)     ** No results found for the last 24 hours. **              Assessment/Plan       Small cell lung cancer (CMS/HCC)    Encounter for fitting and adjustment of vascular catheter      Assessment & Plan    I discussed the patients findings and our recommendations with patient and family     I have explained to the patient the procedure of port removal.  We have discussed the risks and benefits.  I have answered all of her questions to her satisfaction.  She has requested that we proceed.      Saroj Chacko III, MD  [facility]  06/01/20  09:56

## 2020-06-01 NOTE — ANESTHESIA PREPROCEDURE EVALUATION
Anesthesia Evaluation     Patient summary reviewed and Nursing notes reviewed   no history of anesthetic complications:  NPO Solid Status: > 8 hours  NPO Liquid Status: > 2 hours           Airway   Mallampati: II  TM distance: >3 FB  Neck ROM: full  Dental - normal exam     Pulmonary - normal exam   (+) a smoker Former, lung cancer, shortness of breath,   Cardiovascular - normal exam  Exercise tolerance: good (4-7 METS)    ECG reviewed  Rhythm: regular    (+) hypertension, hyperlipidemia,     ROS comment: - NORMAL ECG -  Sinus rhythm  NO PRIOR TRACING AVAILABLE FOR COMPARISON    Neuro/Psych  (+) psychiatric history Anxiety and Depression,     GI/Hepatic/Renal/Endo    (+)  GERD,      Musculoskeletal     Abdominal  - normal exam    Bowel sounds: normal.   Substance History - negative use     OB/GYN negative ob/gyn ROS         Other   arthritis,    history of cancer                    Anesthesia Plan    ASA 3     MAC     intravenous induction     Anesthetic plan, all risks, benefits, and alternatives have been provided, discussed and informed consent has been obtained with: patient.    Plan discussed with CRNA.

## 2020-06-01 NOTE — PERIOPERATIVE NURSING NOTE
Infection prevention notified of pt with shingles. Instructed to place sign ' Do not enter if you have not had chicken pox or vaccine' per instruction standard precautions only needed

## 2020-06-01 NOTE — OP NOTE
REMOVAL VENOUS ACCESS DEVICE  Progress Note    Karen Pineda  6/1/2020    Pre-op Diagnosis:   Encounter for fitting and adjustment of vascular catheter [Z45.2]  Small cell lung cancer (CMS/HCC) [C34.90]       Post-Op Diagnosis Codes:     * Encounter for fitting and adjustment of vascular catheter [Z45.2]     * Small cell lung cancer (CMS/HCC) [C34.90]    Procedure/CPT® Codes:      Procedure(s):  REMOVAL VENOUS ACCESS DEVICE    Surgeon(s):  Saroj Chacko III, MD    Anesthesia: Monitored Anesthesia Care    Staff:   Circulator: Negrito Mclain RN  Scrub Person: Vidhi Mancilla    Estimated Blood Loss: minimal    Urine Voided: * No values recorded between 6/1/2020 10:31 AM and 6/1/2020 11:13 AM *    Specimens:                None    Findings: Port was removed completely and intact.  No signs of infection.    Complications: none    Summary of procedure: Ms Pineda was brought to the operating room and placed on the operating table in the supine position.  Blood pressure EKG pulse oximetry and airway were monitored by the anesthesia service.  Supplemental oxygen and intravenous sedation were administered by anesthesia.  Once the patient was adequately sedated a roll was placed between the shoulders.  The head was turned to the left.  Right neck chest and shoulders were prepped and draped in usual sterile manner.  An area around the port was infiltrated with 1% Xylocaine.  An incision was made through the old incision and carried down through the skin and subcutaneous tissues.  The fibrous pocket holding the port was exposed.  The pocket was opened.  The 2 sutures holding the port in place were removed.  The port was then delivered up out of the pocket and the catheter was removed from the subclavian vein completely and intact.  The insertion site of the catheter was oversewn with 3-0 Vicryl.  The wound was then closed in layers with 3-0 Vicryl.  The skin was closed with a running subcuticular 4-0  Vicryl.  Dermabond was applied.  The patient was awakened and transported to recovery in satisfactory condition having tolerated the procedure well.  Sponge instrument and needle counts were correct.    .        Dictated utilizing Dragon dictation      Saroj Chacko III, MD     Date: 6/1/2020  Time: 11:16

## 2020-06-01 NOTE — ADDENDUM NOTE
Addendum  created 06/01/20 1406 by Ángela Dupont MD    Attestation recorded in Intraprocedure, Intraprocedure Attestations filed

## 2020-06-01 NOTE — ANESTHESIA POSTPROCEDURE EVALUATION
"Patient: Karen Pineda    Procedure Summary     Date:  06/01/20 Room / Location:  [facility] OR  / [facility] MAIN OR    Anesthesia Start:  1041 Anesthesia Stop:  1116    Procedure:  REMOVAL VENOUS ACCESS DEVICE (Right ) Diagnosis:       Encounter for fitting and adjustment of vascular catheter      Small cell lung cancer (CMS/HCC)      (Encounter for fitting and adjustment of vascular catheter [Z45.2])      (Small cell lung cancer (CMS/HCC) [C34.90])    Surgeon:  Saroj Chacko III, MD Provider:  Ángela Dupont MD    Anesthesia Type:  MAC ASA Status:  3          Anesthesia Type: MAC    Vitals  Vitals Value Taken Time   /66 6/1/2020 11:35 AM   Temp 36.5 °C (97.7 °F) 6/1/2020 11:35 AM   Pulse 69 6/1/2020 11:35 AM   Resp 18 6/1/2020 11:35 AM   SpO2 95 % 6/1/2020 11:35 AM           Post Anesthesia Care and Evaluation    Patient location during evaluation: bedside  Patient participation: complete - patient participated  Level of consciousness: awake  Pain management: adequate  Airway patency: patent  Anesthetic complications: No anesthetic complications    Cardiovascular status: acceptable  Respiratory status: acceptable  Hydration status: acceptable    Comments: */66   Pulse 69   Temp 36.5 °C (97.7 °F) (Oral)   Resp 18   Ht 162.6 cm (64\")   Wt 75.6 kg (166 lb 11.2 oz)   LMP  (LMP Unknown)   SpO2 95%   BMI 28.61 kg/m²         "

## 2020-06-15 NOTE — PROGRESS NOTES
Subjective   Karen Pineda is a 75 y.o. female.   You have chosen to receive care through a telephone visit. Do you consent to use a telephone visit for your medical care today? Yes      History of Present Illness   Patient presents for BP recheck. She has been off losartan for a month.   Reports most recent 2 readings were 108/75, 111/69.  She reports feeling well off medication. She is still having dizziness and is following up with ENT for this.  Her aural pressure has improved. Her tympanostomy tube was removed.  She is still draining serous fluid.  She is still using Ciprodex drops.      She also had her port removed since last visit and reports area is healing well.      She states the scabs have healed from her shingles but that she still has some discolored spots on her skin where the lesions were.  She states neuralgia has not improved.    The following portions of the patient's history were reviewed and updated as appropriate: allergies, current medications, past family history, past medical history, past social history, past surgical history and problem list.    Review of Systems   Constitutional: Negative for unexpected weight change.   Respiratory: Negative for shortness of breath.    Cardiovascular: Negative for chest pain and palpitations.   Neurological: Positive for dizziness.   Psychiatric/Behavioral: Negative for behavioral problems.       Objective   Physical Exam   Constitutional: She is oriented to person, place, and time.   Neurological: She is alert and oriented to person, place, and time.   Psychiatric: She has a normal mood and affect. Her behavior is normal. Judgment and thought content normal.   Nursing note and vitals reviewed.      Assessment/Plan   Karen was seen today for hypertension.    Diagnoses and all orders for this visit:    Postherpetic neuralgia  -     gabapentin (NEURONTIN) 600 MG tablet; Take 1 tablet by mouth 3 (Three) Times a Day.          Will stay off  losartan.    Continue to monitor BP.   Increase gabapentin to 600 mg TID.    F/u in 3 months for recheck or sooner if needed.         Unable to complete visit using a video connection to the patient. A phone visit was used to complete this visits. Total time of discussion was 6 minutes.

## 2020-06-17 NOTE — PROGRESS NOTES
Subjective   Patient ID: Karen Pineda is a 75 y.o. female is here today for follow-up.    History of Present Illness  Dear Colleague,  Karen Pineda was seen in our office today for follow-up of right subclavian port removal.  Patient has had no redness tenderness or drainage at the operative site.  She has had no fever chills or night sweats.  She is following up with Dr. echeverria and Dr. Posada for her small cell cancer with brain metastases    The following portions of the patient's history were reviewed and updated as appropriate: allergies, current medications, past family history, past medical history, past social history, past surgical history and problem list.  Review of Systems   Constitution: Negative.   HENT: Negative.    Eyes: Negative.    Cardiovascular: Negative.    Respiratory: Positive for cough.    Endocrine: Negative.    Hematologic/Lymphatic: Negative.    Skin: Negative.    Musculoskeletal: Negative.    Gastrointestinal: Negative.    Genitourinary: Negative.    Neurological: Negative.    Psychiatric/Behavioral: Negative.    Allergic/Immunologic: Negative.      Patient Active Problem List   Diagnosis   • Hemoptysis   • Lung mass   • Cough   • Arthritis   • Surgery follow-up examination   • Brain metastases (CMS/HCC)   • Small cell lung cancer (CMS/HCC)   • High risk medication use   • Encounter for fitting and adjustment of vascular catheter   • Rash   • Chemotherapy-induced thrombocytopenia   • Decreased oral intake   • Radiation esophagitis   • Essential hypertension     Past Medical History:   Diagnosis Date   • Anxiety    • Arthritis    • Brain metastases (CMS/HCC) 9/18/2019   • Depression    • Essential hypertension 4/15/2020   • GERD (gastroesophageal reflux disease)    • H/O Pulmonary nodule    • Hemoptysis    • History of osteopenia    • History of transfusion     NO REACTION   • Hyperlipidemia    • Lung cancer (CMS/HCC)    • Nodule of right lung    • Post-menopause    • Sinus  problem    • Stress incontinence      Past Surgical History:   Procedure Laterality Date   • BREAST BIOPSY Right    • BRONCHOSCOPY N/A 2016    Procedure: BRONCHOSCOPY WITH ENDOBRONCHIAL ULTRASOUND, NAVIGATION, BRUSHING,BIOPSIES, LAVAGE, AND TRANSBRONCHIAL NEEDLE ASPIRATION;  Surgeon: Js Lewis MD;  Location: [facility] ENDOSCOPY;  Service:    • COLONOSCOPY     • CRANIOTOMY FOR TUMOR Left 2019    Procedure: Left frontal craniotomy for tumor;  Surgeon: Ruben Thibodeaux MD;  Location: [facility] MAIN OR;  Service: Neurosurgery   • VENOUS ACCESS DEVICE (PORT) INSERTION Right 2019    Procedure: INSERTION VENOUS ACCESS DEVICE;  Surgeon: Saroj Chacko III, MD;  Location: [facility] MAIN OR;  Service: Thoracic   • VENOUS ACCESS DEVICE (PORT) REMOVAL Right 2020    Procedure: REMOVAL VENOUS ACCESS DEVICE;  Surgeon: Saroj Chacko III, MD;  Location: [facility] MAIN OR;  Service: Thoracic;  Laterality: Right;     Family History   Problem Relation Age of Onset   • Heart failure Mother    • Coronary artery disease Mother    • Depression Mother    • Heart disease Mother    • Leukemia Father    • Bone cancer Father    • Cancer Paternal Aunt    • Cancer Paternal Uncle    • Malig Hyperthermia Neg Hx      Social History     Socioeconomic History   • Marital status:      Spouse name: Juan   • Number of children: Not on file   • Years of education: Not on file   • Highest education level: Not on file   Occupational History     Employer: RETIRED   Tobacco Use   • Smoking status: Former Smoker     Packs/day: 1.00     Years: 50.00     Pack years: 50.00     Types: Cigarettes     Last attempt to quit:      Years since quittin.4   • Smokeless tobacco: Never Used   Substance and Sexual Activity   • Alcohol use: No   • Drug use: No   • Sexual activity: Defer       Current Outpatient Medications:   •  acetaminophen (TYLENOL) 325 MG tablet, Take 2 tablets by mouth Every 6 (Six) Hours As Needed for Mild Pain  , Moderate Pain  or Headache., Disp: , Rfl:   •  albuterol sulfate  (90 Base) MCG/ACT inhaler, Inhale 2 puffs Every 4 (Four) Hours As Needed for Wheezing or Shortness of Air., Disp: 1 inhaler, Rfl: 0  •  FLUoxetine (PROzac) 20 MG capsule, Take 1 capsule by mouth Every Morning., Disp: 30 capsule, Rfl: 0  •  folic acid (FOLVITE) 1 MG tablet, Take 1 tablet by mouth Daily., Disp: 30 tablet, Rfl: 2  •  gabapentin (NEURONTIN) 600 MG tablet, Take 1 tablet by mouth 3 (Three) Times a Day., Disp: 270 tablet, Rfl: 1  •  pantoprazole (PROTONIX) 40 MG EC tablet, Take 1 tablet by mouth Daily. (Patient taking differently: Take 40 mg by mouth Every Morning.), Disp: 90 tablet, Rfl: 1  •  aluminum-magnesium hydroxide 200-200 MG/5ML suspension, diphenhydrAMINE 12.5 MG/5ML liquid, Lidocaine Viscous HCl 2 % solution, nystatin [id number] UNIT/ML suspension, Swish and swallow 10 mL 4 (Four) Times a Day Before Meals & at Bedtime., Disp: 400 mL, Rfl: 2  •  Chlorhexidine Gluconate 2 % pads, Apply  topically. AS DIRECTED FOR PRE OP, Disp: , Rfl:   •  levETIRAcetam (KEPPRA) 500 MG tablet, Take 1 tablet by mouth Every 12 (Twelve) Hours., Disp: 60 tablet, Rfl: 2  •  melatonin 5 MG tablet tablet, Take 10 mg by mouth Every Night., Disp: , Rfl:   Allergies   Allergen Reactions   • Sulfa Antibiotics Rash   • Tape Itching        Objective   Vitals:    06/17/20 1015   BP: 109/62   Pulse: 81   Temp: 97.3 °F (36.3 °C)   SpO2: 98%     Physical Exam   Pulmonary/Chest:   Port site is clean dry and well-healed.       Assessment/Plan     Port has been removed and the site is clean and dry.  Nothing further to offer from a thoracic surgery standpoint.  Available if needed.  Thank you for allowing us to participate in the care of Ms. Pineda.    Diagnoses and all orders for this visit:    Small cell lung cancer (CMS/HCC)

## 2020-07-08 NOTE — PROGRESS NOTES
Subjective   History of Present Illness: Karen Pinead is a 75 y.o. female is here today for follow-up with a new Brain MRI. She has had some falls since her last visit.    Patient and her family is wearing a mask in our office today.      History of Present Illness     This patient returns today.  She is having more trouble with her right arm and her right knee.  She also has more trouble with her memory.    The following portions of the patient's history were reviewed and updated as appropriate: allergies, current medications, past family history, past medical history, past social history, past surgical history and problem list.    Review of Systems   Constitutional: Positive for activity change.   Eyes: Positive for visual disturbance.   Respiratory: Negative for chest tightness and shortness of breath.    Cardiovascular: Negative for chest pain.   Musculoskeletal: Positive for gait problem.   Neurological: Positive for dizziness. Negative for headaches.   All other systems reviewed and are negative.      Objective     Vitals:    07/14/20 1234   BP: 148/65   Pulse: 69   Temp: 98.4 °F (36.9 °C)     There is no height or weight on file to calculate BMI.      Physical Exam   Constitutional: She is oriented to person, place, and time. She appears well-developed and well-nourished.   Neurological: She is oriented to person, place, and time.     Neurologic Exam     Mental Status   Oriented to person, place, and time.           Assessment/Plan   Independent Review of Radiographic Studies:      I personally reviewed the images from the following studies.    I reviewed her MRI from July 1 of this year and compared it to one done on May 7 of this year.  She has several new lesions in her cerebellum and several of the lesions in her brain have gotten a lot bigger.    Medical Decision Making:      I told the patient and her family about the imaging.  I told him that from my point of view I would not recommend any  further surgery.  We could take out some of the larger lesions but some of the others would still be there and so I do not really see any benefit.  If she can possibly have further radiation that would be in her best interest for some different chemo might be a possibility as well.  She is scheduled to see both radiation and medical oncology today and tomorrow.  I told her to call me if anything gets any worse.  We will continue her current Decadron regimen as it stands.    Karen was seen today for follow-up.    Diagnoses and all orders for this visit:    Brain metastases (CMS/HCC)      Return if symptoms worsen or fail to improve.

## 2020-07-09 NOTE — PROGRESS NOTES
Subjective     No ref. provider found    Cancer Staging  No matching staging information was found for the patient.   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  She returns today for follow up now  3 1/2 months out from completion of her radiation therapy to the whole brain.  She completed a dose of 30Gy on 3/20/20.  She had a brain mri on 4/2/20 which showed a decrease in size of all lesions, including the lesion in the midbrain which previously measured 13mm and now measures 5mm.  She is on decadron 2mg with the last dose today.  She reports significant fatigue.  She denies any significant headahcesShe had a phone visit with Dr. Thibodeaux this morning and he reviewed the results of the mri with her as well.      Interval history 7/9/20:  She had a brain mri on 7/1/20 which showed progression of the previously noted  extensive infratentorial and supratentorial metastatic disease with new areas of vasogenic edema at the sites of these foci of metastatic disease. The patient is status post a superior bifrontal craniotomy for the purposes of resection of a metastatic lesion within the medial portionof the left frontal lobe. There is no evidence for recurrent metastatic  disease at this site. A 6 mm dural-based extraaxial lesion abutting the left superior parietal  lobule remains stable and is probably representative of a small incidental meningioma.  many of the previous lesions have increased in size.                           She denies any  Headaches, nausea or vomiting but does report continued dizziness and difficulty with balance as well as numbness down her right face.       Review of Systems   Constitutional: Positive for activity change, appetite change and fatigue.   Neurological: Positive for  dizziness and weakness. Negative for seizures, speech difficulty, light-headedness, numbness and headaches.   Psychiatric/Behavioral: Negative.          Past Medical History:   Diagnosis Date   • Anxiety    • Arthritis    • Brain metastases (CMS/HCC) 9/18/2019   • Depression    • Essential hypertension 4/15/2020   • GERD (gastroesophageal reflux disease)    • H/O Pulmonary nodule    • Hemoptysis    • History of osteopenia    • History of transfusion     NO REACTION   • Hyperlipidemia    • Lung cancer (CMS/HCC)    • Nodule of right lung    • Post-menopause    • Sinus problem    • Stress incontinence          Past Surgical History:   Procedure Laterality Date   • BREAST BIOPSY Right    • BRONCHOSCOPY N/A 4/21/2016    Procedure: BRONCHOSCOPY WITH ENDOBRONCHIAL ULTRASOUND, NAVIGATION, BRUSHING,BIOPSIES, LAVAGE, AND TRANSBRONCHIAL NEEDLE ASPIRATION;  Surgeon: Js Lewis MD;  Location: [facility] ENDOSCOPY;  Service:    • COLONOSCOPY     • CRANIOTOMY FOR TUMOR Left 8/28/2019    Procedure: Left frontal craniotomy for tumor;  Surgeon: Ruben Thibodeaux MD;  Location: [facility] OR;  Service: Neurosurgery   • VENOUS ACCESS DEVICE (PORT) INSERTION Right 9/6/2019    Procedure: INSERTION VENOUS ACCESS DEVICE;  Surgeon: Saroj Chacko III, MD;  Location: [facility] OR;  Service: Thoracic   • VENOUS ACCESS DEVICE (PORT) REMOVAL Right 6/1/2020    Procedure: REMOVAL VENOUS ACCESS DEVICE;  Surgeon: Saroj Chacko III, MD;  Location: [facility] OR;  Service: Thoracic;  Laterality: Right;         Social History     Socioeconomic History   • Marital status:      Spouse name: Juan   • Number of children: Not on file   • Years of education: Not on file   • Highest education level: Not on file   Occupational History     Employer: RETIRED   Tobacco Use   • Smoking status: Former Smoker     Packs/day: 1.00     Years: 50.00     Pack years: 50.00     Types: Cigarettes     Last attempt to quit: 2014     Years since  quittin.5   • Smokeless tobacco: Never Used   Substance and Sexual Activity   • Alcohol use: No   • Drug use: No   • Sexual activity: Defer         Family History   Problem Relation Age of Onset   • Heart failure Mother    • Coronary artery disease Mother    • Depression Mother    • Heart disease Mother    • Leukemia Father    • Bone cancer Father    • Cancer Paternal Aunt    • Cancer Paternal Uncle    • Malig Hyperthermia Neg Hx           Objective    Physical Exam   Constitutional: She is oriented to person, place, and time. She appears well-developed and well-nourished.   HENT:   Head: Normocephalic.   Eyes: EOM are normal.   Neurological: She is alert and oriented to person, place, and time.   Difficulty with balance   Psychiatric: Her behavior is normal. Judgment and thought content normal.         Current Outpatient Medications on File Prior to Visit   Medication Sig Dispense Refill   • acetaminophen (TYLENOL) 325 MG tablet Take 2 tablets by mouth Every 6 (Six) Hours As Needed for Mild Pain , Moderate Pain  or Headache.     • albuterol sulfate  (90 Base) MCG/ACT inhaler Inhale 2 puffs Every 4 (Four) Hours As Needed for Wheezing or Shortness of Air. 1 inhaler 0   • aluminum-magnesium hydroxide 200-200 MG/5ML suspension, diphenhydrAMINE 12.5 MG/5ML liquid, Lidocaine Viscous HCl 2 % solution, nystatin 120645 UNIT/ML suspension Swish and swallow 10 mL 4 (Four) Times a Day Before Meals & at Bedtime. 400 mL 2   • Chlorhexidine Gluconate 2 % pads Apply  topically. AS DIRECTED FOR PRE OP     • FLUoxetine (PROzac) 20 MG capsule Take 1 capsule by mouth once daily in the morning 30 capsule 0   • folic acid (FOLVITE) 1 MG tablet Take 1 tablet by mouth once daily 90 tablet 0   • gabapentin (NEURONTIN) 600 MG tablet Take 1 tablet by mouth 3 (Three) Times a Day. 270 tablet 1   • levETIRAcetam (KEPPRA) 500 MG tablet Take 1 tablet by mouth Every 12 (Twelve) Hours. 60 tablet 2   • melatonin 5 MG tablet tablet Take 10  mg by mouth Every Night.     • pantoprazole (PROTONIX) 40 MG EC tablet Take 1 tablet by mouth Daily. (Patient taking differently: Take 40 mg by mouth Every Morning.) 90 tablet 1     Current Facility-Administered Medications on File Prior to Visit   Medication Dose Route Frequency Provider Last Rate Last Dose   • [COMPLETED] iopamidol (ISOVUE-300) 61 % injection 100 mL  100 mL Intravenous Once in imaging Code, Nicolas ZIMMERMAN II, MD   85 mL at 07/08/20 0905       ALLERGIES:    Allergies   Allergen Reactions   • Sulfa Antibiotics Rash   • Tape Itching       LMP  (LMP Unknown)      Current Status 3/6/2020   ECOG score 0         Assessment/Plan     75 year old female with stage IV small cell lung cancer with progressive brain mets now   Almost 4 months out from palliative whole brain radiation and 10 months out from stereotactic radiosurgery to 2 lesions.  I reviewed with her the results of the mri.  I discussed with her the possibility of whole brain re-irradiation to a lower dose, focused stereotactic radiosurgery to the cerebellar lesions and midbrain lesion and hospice with quality of life issues.  She is going to see her neurosurgeon next week and would like to wait until after that appointment to make any decisions.  I have scheduled her to return next week.           Thank you very much for allowing me to participate in the care of this very pleasant patient.    Sincerely,      Judith Akers MD

## 2020-07-14 NOTE — PROGRESS NOTES
Subjective     No ref. provider found    Cancer Staging  No matching staging information was found for the patient.   Chief Complaint   Patient presents with   • Follow-up     S/P XRT to brain                                 CC: follow up for small cell lung cancer with brain metastases                              S:  I had the pleasure of seeing Karen Pineda  today in the [facility].  She returns today for follow up now  3 1/2 months out from completion of her radiation therapy to the whole brain.  She completed a dose of 30Gy on 3/20/20.  she also received stereotactic radiosurgery to 2 areas with Dr. Mayo on 9/18/19. She had a brain mri on 4/2/20 which showed a decrease in size of all lesions, including the lesion in the midbrain which previously measured 13mm and now measures 5mm.  She is on decadron 2mg with the last dose today.  She reports significant fatigue.  She denies any significant headahces. She had a phone visit with Dr. Thibodeaux this morning and he reviewed the results of the mri with her as well.      Interval history 7/9/20:  She had a brain mri on 7/1/20 which showed progression of the previously noted  extensive infratentorial and supratentorial metastatic disease.  Accordingly, there are new areas of vasogenic edema at the sites of these foci of metastatic disease.   The patient is status post a superior bifrontal craniotomy for the purposes of resection of a metastatic lesion within the medial portion of the left frontal lobe. There is no evidence for recurrent metastaticdisease at this site.     A 6 mm dural-based extraaxial lesion abutting the left superior parietal lobule remains stable and is probably representative of a small incidental meningioma.  She reports continued weakness and difficulty with her balance but denies any headaches nausea or vomiting.    Interval hx 7/14/20:   She met with neurosurgeons earlier today and of course they do not recommend any surgical  intervention.  She denies any headaches, nausea or vomiting.  Her main complaint is difficulty with her balance.      Review of Systems   Constitutional: Positive for activity change, appetite change and fatigue.   Neurological: Positive for dizziness and weakness. Negative for tremors, speech difficulty, light-headedness, numbness and headaches.   Psychiatric/Behavioral: Negative for behavioral problems and confusion.         Past Medical History:   Diagnosis Date   • Anxiety    • Arthritis    • Brain metastases (CMS/HCC) 9/18/2019   • Depression    • Essential hypertension 4/15/2020   • GERD (gastroesophageal reflux disease)    • H/O Pulmonary nodule    • Hemoptysis    • History of osteopenia    • History of transfusion     NO REACTION   • Hyperlipidemia    • Lung cancer (CMS/HCC)    • Nodule of right lung    • Post-menopause    • Sinus problem    • Stress incontinence          Past Surgical History:   Procedure Laterality Date   • BREAST BIOPSY Right    • BRONCHOSCOPY N/A 4/21/2016    Procedure: BRONCHOSCOPY WITH ENDOBRONCHIAL ULTRASOUND, NAVIGATION, BRUSHING,BIOPSIES, LAVAGE, AND TRANSBRONCHIAL NEEDLE ASPIRATION;  Surgeon: Js Lewis MD;  Location: [facility] ENDOSCOPY;  Service:    • COLONOSCOPY     • CRANIOTOMY FOR TUMOR Left 8/28/2019    Procedure: Left frontal craniotomy for tumor;  Surgeon: Ruben Thibodeaux MD;  Location: [facility] OR;  Service: Neurosurgery   • VENOUS ACCESS DEVICE (PORT) INSERTION Right 9/6/2019    Procedure: INSERTION VENOUS ACCESS DEVICE;  Surgeon: Saroj Chacko III, MD;  Location: [facility] OR;  Service: Thoracic   • VENOUS ACCESS DEVICE (PORT) REMOVAL Right 6/1/2020    Procedure: REMOVAL VENOUS ACCESS DEVICE;  Surgeon: Saroj Chacko III, MD;  Location: [facility] OR;  Service: Thoracic;  Laterality: Right;         Social History     Socioeconomic History   • Marital status:      Spouse name: Juan   • Number of children: Not on file   • Years of education:  Not on file   • Highest education level: Not on file   Occupational History     Employer: RETIRED   Tobacco Use   • Smoking status: Former Smoker     Packs/day: 1.00     Years: 50.00     Pack years: 50.00     Types: Cigarettes     Last attempt to quit:      Years since quittin.5   • Smokeless tobacco: Never Used   Substance and Sexual Activity   • Alcohol use: No   • Drug use: No   • Sexual activity: Defer         Family History   Problem Relation Age of Onset   • Heart failure Mother    • Coronary artery disease Mother    • Depression Mother    • Heart disease Mother    • Leukemia Father    • Bone cancer Father    • Cancer Paternal Aunt    • Cancer Paternal Uncle    • Malig Hyperthermia Neg Hx           Objective    Physical Exam   Constitutional: She is oriented to person, place, and time. She appears well-developed and well-nourished.   Neurological: She is alert and oriented to person, place, and time.   Walking with assistance of walker due to imbalance   Psychiatric: She has a normal mood and affect. Her behavior is normal. Thought content normal.         Current Outpatient Medications on File Prior to Visit   Medication Sig Dispense Refill   • acetaminophen (TYLENOL) 325 MG tablet Take 2 tablets by mouth Every 6 (Six) Hours As Needed for Mild Pain , Moderate Pain  or Headache.     • albuterol sulfate  (90 Base) MCG/ACT inhaler Inhale 2 puffs Every 4 (Four) Hours As Needed for Wheezing or Shortness of Air. 1 inhaler 0   • aluminum-magnesium hydroxide 200-200 MG/5ML suspension, diphenhydrAMINE 12.5 MG/5ML liquid, Lidocaine Viscous HCl 2 % solution, nystatin 062796 UNIT/ML suspension Swish and swallow 10 mL 4 (Four) Times a Day Before Meals & at Bedtime. 400 mL 2   • dexamethasone (DECADRON) 4 MG tablet Take 1 tablet by mouth 2 (Two) Times a Day With Meals. 30 tablet 0   • FLUoxetine (PROzac) 20 MG capsule Take 1 capsule by mouth once daily in the morning 30 capsule 0   • folic acid (FOLVITE) 1 MG  tablet Take 1 tablet by mouth once daily 90 tablet 0   • gabapentin (NEURONTIN) 600 MG tablet Take 1 tablet by mouth 3 (Three) Times a Day. 270 tablet 1   • levETIRAcetam (KEPPRA) 500 MG tablet Take 1 tablet by mouth Every 12 (Twelve) Hours. 60 tablet 2   • melatonin 5 MG tablet tablet Take 10 mg by mouth Every Night.     • ofloxacin (FLOXIN) 0.3 % otic solution INSTILL 5 DROPS IN THE RIGHT EAR TWICE DAILY FOR 14 DAYS     • pantoprazole (PROTONIX) 40 MG EC tablet Take 1 tablet by mouth Daily. (Patient taking differently: Take 40 mg by mouth Every Morning.) 90 tablet 1   • [DISCONTINUED] Chlorhexidine Gluconate 2 % pads Apply  topically. AS DIRECTED FOR PRE OP       No current facility-administered medications on file prior to visit.        ALLERGIES:    Allergies   Allergen Reactions   • Sulfa Antibiotics Rash   • Tape Itching       LMP  (LMP Unknown)      Current Status 3/6/2020   ECOG score 0         Assessment/Plan     75 year old female with stage iV small cell lung cancer with progressive brain mets now   Almost 4 months out from palliative whole brain radiation and 10 months out from stereotactic radiosurgery to 2 lesions.  I again discussed with her the possibility of whole brain re-irradiation to a lower dose, focused stereotactic radiosurgery to the cerebellar lesions and midbrain lesion and hospice.  I discussed possible risks associated with whole brain re-irradiation.  I explained that it would most likely not extend her overall survival.  She is going to meet with her medical oncologist tomorrow and would like to think about her options.  She did not want to proceed with mask fitting today and appears to be leaning towards hospice.  I asked her to call us after she makes her decision.         Thank you very much for allowing me to participate in the care of this very pleasant patient.    Sincerely,      Judith Akers MD

## 2020-07-14 NOTE — PROGRESS NOTES
.     REASONS FOR FOLLOWUP: Extensive stage small cell lung cancer    HISTORY OF PRESENT ILLNESS:  The patient is a 75 y.o. year old female  who is here for follow-up with the above-mentioned history.    She has met with Dr. Allen and Dr. Thibodeaux.    MRI shows progression of brain metastasis.  Dr. Allne told her she does not expect any further radiation to significantly change things.    Patient is having some numbness of her right arm and right leg and some weakness as well.  She is having more difficulty walking.  Denies headache.  Denies nausea.  She is having some blurry vision, both eyes    Past Medical History:   Diagnosis Date   • Anxiety    • Arthritis    • Brain metastases (CMS/HCC) 9/18/2019   • Depression    • Essential hypertension 4/15/2020   • GERD (gastroesophageal reflux disease)    • H/O Pulmonary nodule    • Hemoptysis    • History of osteopenia    • History of transfusion     NO REACTION   • Hyperlipidemia    • Lung cancer (CMS/HCC)    • Nodule of right lung    • Post-menopause    • Sinus problem    • Stress incontinence      Past Surgical History:   Procedure Laterality Date   • BREAST BIOPSY Right    • BRONCHOSCOPY N/A 4/21/2016    Procedure: BRONCHOSCOPY WITH ENDOBRONCHIAL ULTRASOUND, NAVIGATION, BRUSHING,BIOPSIES, LAVAGE, AND TRANSBRONCHIAL NEEDLE ASPIRATION;  Surgeon: Js Lewis MD;  Location: [facility] ENDOSCOPY;  Service:    • COLONOSCOPY     • CRANIOTOMY FOR TUMOR Left 8/28/2019    Procedure: Left frontal craniotomy for tumor;  Surgeon: Ruben Thibodeaux MD;  Location: [facility] OR;  Service: Neurosurgery   • VENOUS ACCESS DEVICE (PORT) INSERTION Right 9/6/2019    Procedure: INSERTION VENOUS ACCESS DEVICE;  Surgeon: Saroj Chacko III, MD;  Location: [facility] OR;  Service: Thoracic   • VENOUS ACCESS DEVICE (PORT) REMOVAL Right 6/1/2020    Procedure: REMOVAL VENOUS ACCESS DEVICE;  Surgeon: Saroj Chacko III, MD;  Location: [facility] OR;  Service: Thoracic;   Laterality: Right;       MEDICATIONS    Current Outpatient Medications:   •  acetaminophen (TYLENOL) 325 MG tablet, Take 2 tablets by mouth Every 6 (Six) Hours As Needed for Mild Pain , Moderate Pain  or Headache., Disp: , Rfl:   •  albuterol sulfate  (90 Base) MCG/ACT inhaler, Inhale 2 puffs Every 4 (Four) Hours As Needed for Wheezing or Shortness of Air., Disp: 1 inhaler, Rfl: 0  •  dexamethasone (DECADRON) 4 MG tablet, Take 1 tablet by mouth 2 (Two) Times a Day With Meals., Disp: 30 tablet, Rfl: 0  •  FLUoxetine (PROzac) 20 MG capsule, Take 1 capsule by mouth once daily in the morning, Disp: 30 capsule, Rfl: 0  •  folic acid (FOLVITE) 1 MG tablet, Take 1 tablet by mouth once daily, Disp: 90 tablet, Rfl: 0  •  gabapentin (NEURONTIN) 600 MG tablet, Take 1 tablet by mouth 3 (Three) Times a Day., Disp: 270 tablet, Rfl: 1  •  levETIRAcetam (KEPPRA) 500 MG tablet, Take 1 tablet by mouth Every 12 (Twelve) Hours., Disp: 60 tablet, Rfl: 2  •  melatonin 5 MG tablet tablet, Take 10 mg by mouth Every Night., Disp: , Rfl:   •  ofloxacin (FLOXIN) 0.3 % otic solution, INSTILL 5 DROPS IN THE RIGHT EAR TWICE DAILY FOR 14 DAYS, Disp: , Rfl:   •  pantoprazole (PROTONIX) 40 MG EC tablet, Take 1 tablet by mouth Daily. (Patient taking differently: Take 40 mg by mouth Every Morning.), Disp: 90 tablet, Rfl: 1  •  aluminum-magnesium hydroxide 200-200 MG/5ML suspension, diphenhydrAMINE 12.5 MG/5ML liquid, Lidocaine Viscous HCl 2 % solution, nystatin 909753 UNIT/ML suspension, Swish and swallow 10 mL 4 (Four) Times a Day Before Meals & at Bedtime., Disp: 400 mL, Rfl: 2    ALLERGIES:     Allergies   Allergen Reactions   • Sulfa Antibiotics Rash   • Tape Itching       SOCIAL HISTORY:       Social History     Socioeconomic History   • Marital status:      Spouse name: Juan   • Number of children: Not on file   • Years of education: Not on file   • Highest education level: Not on file   Occupational History     Employer:  "RETIRED   Tobacco Use   • Smoking status: Former Smoker     Packs/day: 1.00     Years: 50.00     Pack years: 50.00     Types: Cigarettes     Last attempt to quit:      Years since quittin.5   • Smokeless tobacco: Never Used   Substance and Sexual Activity   • Alcohol use: No   • Drug use: No   • Sexual activity: Defer         FAMILY HISTORY:  Family History   Problem Relation Age of Onset   • Heart failure Mother    • Coronary artery disease Mother    • Depression Mother    • Heart disease Mother    • Leukemia Father    • Bone cancer Father    • Cancer Paternal Aunt    • Cancer Paternal Uncle    • Malig Hyperthermia Neg Hx        REVIEW OF SYSTEMS:  Review of Systems   Constitutional: Negative for activity change.   HENT: Negative for nosebleeds and trouble swallowing.    Respiratory: Negative for shortness of breath and wheezing.    Cardiovascular: Negative for chest pain and palpitations.   Gastrointestinal: Negative for constipation, diarrhea and nausea.   Genitourinary: Negative for dysuria and hematuria.   Musculoskeletal: Negative for arthralgias and myalgias.   Skin: Negative for rash and wound.   Neurological: Negative for seizures and syncope.   Hematological: Negative for adenopathy. Does not bruise/bleed easily.   Psychiatric/Behavioral: Negative for confusion.            Vitals:    07/15/20 0919   BP: 158/87   Pulse: 68   Resp: 16   Temp: 97.5 °F (36.4 °C)   TempSrc: Skin   SpO2: 97%   Weight: 73.2 kg (161 lb 6.4 oz)   Height: 162.6 cm (64.02\")   PainSc: 0-No pain     Current Status 7/15/2020   ECOG score 0        PHYSICAL EXAM:      CONSTITUTIONAL:  Vital signs reviewed.  No distress, looks comfortable.  EYES:  Conjunctiva and lids unremarkable.  PERRLA  EARS,NOSE,MOUTH,THROAT:  Ears and nose appear unremarkable.  Lips, teeth, gums appear unremarkable.  RESPIRATORY:  Normal respiratory effort.  Lungs clear to auscultation bilaterally.  CARDIOVASCULAR:  Normal S1, S2.  No murmurs rubs or gallops. "  No significant lower extremity edema.  GASTROINTESTINAL: Abdomen appears unremarkable.  Nontender.  No hepatomegaly.  No splenomegaly.  LYMPHATIC:  No cervical, supraclavicular, axillary lymphadenopathy.  NEURO:  Decreased strength right arm and right leg, more noticeable in the right arm  SKIN:  Warm.  No rashes.  PSYCHIATRIC:  Normal judgment and insight.  Normal mood and affect.   RECENT LABS:        WBC   Date/Time Value Ref Range Status   07/08/2020 09:38 AM 5.12 3.40 - 10.80 10*3/mm3 Final     Hemoglobin   Date/Time Value Ref Range Status   07/08/2020 09:38 AM 11.5 (L) 12.0 - 15.9 g/dL Final     Platelets   Date/Time Value Ref Range Status   07/08/2020 09:38  140 - 450 10*3/mm3 Final       Assessment/Plan   There are no diagnoses linked to this encounter.  *Extensive stage small cell lung cancer, RUL, 2.4 x 2.3 x 1.7cm, with metastasis to 3 brain lesions (bilateral frontal lobes.)  · RUL mass first seen on CT 3/28/2016, 2 x 1.3 cm.  Decreased to 1.6 cm on 6/27/2016, and decreased again to 1.3 cm on 12/5/2016.  Therefore, this was felt at the time to be a benign resolving nodule.  · During August 2019 hospitalization, found to have brain metastasis from small cell lung cancer.  RUL mass and 3 brain lesions.  No evidence of disease otherwise  · Craniotomy for the medial left frontal lobe lesion (3.2 cm) , 8/28/2019  · Because the final diagnosis is small cell, Dr. Chacko does not plan lung resection as we typically treat this with chemoradiation  · SRS to 2 remaining brain lesions (first was resected) and to resection cavity of the first lesion, completed 9/18/2019.  Dr. Mayo only planning whole brain XRT if future brain recurrence.  · Usually with small cell I would like to start chemo XRT within a couple of weeks.  However, she is recovering from brain surgery.  Furthermore, this lung lesion has been there for years.  Therefore, weighing risks and benefits I think it is in her best interest to wait  almost 4 weeks after craniotomy to begin chemo and radiation.  · PET 9/16/2019: 2.5 cm RUL mass with SUV 15.8.  No hypermetabolic LAD or distant disease.  · On 9/18/2019 completed 5/5 XRT to brain metastasis resection cavity and 1/1 XRT intact brain metastasis #1 and 1/1 XRT due to intact brain metastasis #2.  · 9/19/2019, C1D1 lung mass XRT with  carboplatin, etoposide, Tecentriq, followed by Tecentriq maintenance for extensive stage small cell lung cancer.  Plan a total of 4-6 cycles of chemo depending on tolerability.  · On 10/4/2019, prednisone 0.5 mg/kg/day (40 mg) started due to significant rash from Tecentriq.  4 days later, rash improved but did not resolve.   ·  Plan no more Tecentriq.    · Prednisone tapered off 10/28/2019.  · C2D1 (without Tecentriq) given 10/14/2019.  · MRI brain 10/8/2019 reviewed by Dr. Thibodeaux-he feels this is stable and plan another MRI and appointment with him in 3 months  · Lung mass XRT held the week of 10/28/2019, and again the week of 11/4/2019 due to radiation esophagitis  · Completed lung mass XRT 11/15/2019  · CT CAP 10/29/2019 (after C2): RUL lung mass 2 x 1.3 cm, from 2.5 x 2.5 cm.  · Did not give C3 chemo is planned 11/4/2019 because PLT 91.    · C3 D1 on 11/11/2019 (20% dose reduction of both drugs)  · Did not receive C4 D1 as planned on 12/2/2019 due to ANC 1330 and PLT 67.  Delayed by 1 week and further dose reduction of both drugs by an additional 20%  · (We will only plan 4 cycles of chemo since she is having difficulty tolerating chemo due to cytopenias).  · C4 D1 carbo etoposide, 12/10/2019 (completed first-line chemo).  · CT 12/18/2019 (after C4): No change RUL 2 x 1.3 cm lung mass.   · Radiologist noted there might be a tiny thrombus along the right Mediport catheter tip.  No distant disease found.  · MRI brain 12/26/2019: Left frontal lobe metastasis 5 mm, from 1 cm on 10/8/2019.  Right frontal lobe lesion also smaller.  Left parietal lobe dural nodular enhancement  5 mm, unchanged (radiologist notes this might have been a meningioma since it is unchanged).  · Brain metastasis discovered 3/6/2020.  · Whole brain XRT completed 3/20/2020  · MRI brain 5/7/2020, from 4/2/2020: Further response to treatment.  · CT CAP 5/13/2020: Bandlike radiation fibrosis partially obscuring the primary malignancy.  The remaining nodular density is 1 cm, from 2 cm previously.  No new abnormalities.  · CT CAP 7/8/2020: The bandlike radiation fibrosis remaining nodular density is 0.7 cm, from 1 cm.  No signs of recurrence  · MRI brain 7/1/2020: Progression  · Dr. Allen of radiation medicine does not expect further radiation to significantly change things.  She notes the patient is 3-1/2 months out from completion of whole brain radiation.  · I told the patient we could consider trying some systemic therapy but most systemic therapies have a difficult time adequately treating brain metastasis.  Patient has already made up her mind, she does not want to try any systemic therapies.  She wants to focus on comfort with the help of hospice, maximizing days at home.  I think this is a wise decision considering these unfortunate circumstances.    *Goals of care.  · Since the 3 brain lesions were treated with stereotactic radiation and resection of 1 of the lesions and since no disease was found elsewhere, she perhaps has a very slim but possible chance of cure.  However, small cell histology makes the possibility of long-term survival unlikely.  · On 11/4/2019, I reminded her this is highly unlikely curable.  She was having significant issues with radiation esophagitis, limiting nutrition, requiring daily IV fluids.  Radiation will be held an additional week.  She has been miserable and hopefully another week of XRT and a week delay of chemo (PLT <100), and dose reduction of chemo will make further treatment reasonable regarding the importance she is placing on quality of life.      *Port placed  9/6/2019    *Anemia.  Hb 11.5     *This is Ángela Pryor's mother-in-law (APRN with Dr. Thibodeaux)    *Goals of care  She would like comfort care with the help of hospice at home, maximizing days at home.  Life expectancy best measured in months, but not many months.    Plan  Hospice referral     assisted with history.  Today we made a decision to de-escalate care due to poor prognosis.  30minutes.  Face-to-face.  Over half that time counseling about prognosis and goals of care

## 2020-07-15 NOTE — TELEPHONE ENCOUNTER
Torie called and spoke with the patient and explained that we are referring her to [facility] and that they will help her and the family through the process she is currently going through. She will call us if she needs anything at all.

## 2020-07-15 NOTE — TELEPHONE ENCOUNTER
Per Dr. Thibodeaux set the patient up for [facility] services. Called  [phone]. Spoke with Maddison who registered the patient. Gave verbal order to nurse to eval and treat. They will have their doctor as attending. Records will be printed and faxed to [phone]. Cannot sent Dr. Akers's or Dr. Cain records as their notes are not yet complete.

## 2021-11-11 NOTE — PROGRESS NOTES
.     REASONS FOR FOLLOWUP: Extensive stage small cell lung cancer    HISTORY OF PRESENT ILLNESS:  The patient is a 74 y.o. year old female who is here today for a triage visit.  She called our office with reports of right-sided numbness that precipitated 3 weeks ago in her foot and leg.  She has noted burning with new extension to her right upper extremity and face within the last few days.  She has also had associated mild weakness of her right upper and lower extremities.  She is still able to walk without gait abnormality.  She denies any associated headaches or blurred vision.    She denies any slurring of her speech.  She is eating and drinking adequately.  Her bowels and urination are regular.    She has no other concerns.        Past Medical History:   Diagnosis Date   • Anxiety    • Arthritis    • Brain metastases (CMS/HCC) 9/18/2019   • Depression    • GERD (gastroesophageal reflux disease)    • H/O Pulmonary nodule    • Hemoptysis    • History of osteopenia    • Hyperlipidemia    • Nodule of right lung    • Post-menopause    • Sinus problem    • Stress incontinence      Past Surgical History:   Procedure Laterality Date   • BREAST BIOPSY Right    • BRONCHOSCOPY N/A 4/21/2016    Procedure: BRONCHOSCOPY WITH ENDOBRONCHIAL ULTRASOUND, NAVIGATION, BRUSHING,BIOPSIES, LAVAGE, AND TRANSBRONCHIAL NEEDLE ASPIRATION;  Surgeon: Js Lewis MD;  Location: [facility] ENDOSCOPY;  Service:    • COLONOSCOPY     • CRANIOTOMY FOR TUMOR Left 8/28/2019    Procedure: Left frontal craniotomy for tumor;  Surgeon: Ruben Thibodeaux MD;  Location: [facility] OR;  Service: Neurosurgery   • VENOUS ACCESS DEVICE (PORT) INSERTION Right 9/6/2019    Procedure: INSERTION VENOUS ACCESS DEVICE;  Surgeon: Saroj Chacko III, MD;  Location: [facility] OR;  Service: Thoracic       MEDICATIONS    Current Outpatient Medications:   •  albuterol sulfate  (90 Base) MCG/ACT inhaler, Inhale 2 puffs Every 4 (Four) Hours As Needed  for Wheezing or Shortness of Air., Disp: 1 inhaler, Rfl: 0  •  aluminum-magnesium hydroxide 200-200 MG/5ML suspension, diphenhydrAMINE 12.5 MG/5ML liquid, Lidocaine Viscous HCl 2 % solution, nystatin 897137 UNIT/ML suspension, Swish and swallow 10 mL 4 (Four) Times a Day Before Meals & at Bedtime., Disp: 400 mL, Rfl: 2  •  FLUoxetine (PROzac) 20 MG capsule, Take 1 capsule by mouth Daily., Disp: 90 capsule, Rfl: 1  •  guaiFENesin (MUCINEX PO), Take  by mouth., Disp: , Rfl:   •  levETIRAcetam (KEPPRA) 500 MG tablet, Take 1 tablet by mouth Every 12 (Twelve) Hours., Disp: 60 tablet, Rfl: 2  •  losartan (COZAAR) 50 MG tablet, Take 1 tablet by mouth Daily., Disp: 90 tablet, Rfl: 1  •  morphine 100 MG/5ML solution concentrated solution, Take 0.75 mL by mouth Every 4 (Four) Hours As Needed (as needed for pain)., Disp: 60 mL, Rfl: 0  •  NARCAN 4 MG/0.1ML nasal spray, ADMINISTER A SINGLE SPRAY INTRANASALLY INTO ONE NOSTRIL. CALL 911. MAY REPEAT X1., Disp: , Rfl: 0  •  pantoprazole (PROTONIX) 40 MG EC tablet, Take 1 tablet by mouth Daily., Disp: 90 tablet, Rfl: 1    Current Facility-Administered Medications:   •  sodium chloride 0.9 % infusion 1,000 mL, 1,000 mL, Intravenous, Once, Code, Nicolas ZIMMERMAN II, MD    ALLERGIES:     Allergies   Allergen Reactions   • Sulfa Antibiotics Rash       SOCIAL HISTORY:       Social History     Socioeconomic History   • Marital status:      Spouse name: Juan   • Number of children: Not on file   • Years of education: Not on file   • Highest education level: Not on file   Occupational History     Employer: RETIRED   Tobacco Use   • Smoking status: Former Smoker     Packs/day: 1.00     Years: 50.00     Pack years: 50.00     Last attempt to quit:      Years since quittin.1   • Smokeless tobacco: Never Used   Substance and Sexual Activity   • Alcohol use: No   • Drug use: No   • Sexual activity: Defer         FAMILY HISTORY:  Family History   Problem Relation Age of Onset   • Heart  failure Mother    • Coronary artery disease Mother    • Depression Mother    • Heart disease Mother    • Leukemia Father    • Bone cancer Father    • Cancer Paternal Aunt    • Cancer Paternal Uncle        REVIEW OF SYSTEMS:  Review of Systems   Constitutional: Negative for activity change.   HENT: Negative for nosebleeds and trouble swallowing.    Respiratory: Positive for shortness of breath (chronic, stable ). Negative for wheezing.    Cardiovascular: Negative for chest pain and palpitations.   Gastrointestinal: Negative for constipation, diarrhea and nausea.   Genitourinary: Negative for dysuria and hematuria.   Musculoskeletal: Negative for arthralgias and myalgias.   Skin: Negative for rash and wound.   Neurological: Positive for weakness and numbness. Negative for seizures and syncope.        See HPI, right-sided    Hematological: Negative for adenopathy. Does not bruise/bleed easily.   Psychiatric/Behavioral: Negative for confusion.            There were no vitals filed for this visit.  Current Status 12/30/2019   ECOG score 0        PHYSICAL EXAM:    CONSTITUTIONAL:  Vital signs reviewed.  No distress, looks comfortable.  EYES:  Conjunctiva and lids unremarkable.  PERRLA  EARS,NOSE,MOUTH,THROAT:  Ears and nose appear unremarkable.  Lips, teeth, gums appear unremarkable.  RESPIRATORY:  Normal respiratory effort.  Lungs clear to auscultation bilaterally.  CARDIOVASCULAR:  Normal S1, S2.  No murmurs rubs or gallops.  No significant lower extremity edema.  GASTROINTESTINAL: Abdomen appears unremarkable.  Nontender.  No hepatomegaly.  No splenomegaly.  LYMPHATIC:  No cervical, supraclavicular, axillary lymphadenopathy.  SKIN:  Warm.  No rashes.  NEUROLOGICAL:  sensation of burning with palpation extending from her right lower extremity up to her right buccal region, oriented x3, no visual impairment appreciated, muscle tone even bilaterally of upper and lower extremities,   PSYCHIATRIC:  Normal judgment and  insight.  Normal mood and affect.      RECENT LABS:        WBC   Date/Time Value Ref Range Status   01/13/2020 10:13 AM 3.49 3.40 - 10.80 10*3/mm3 Final     Hemoglobin   Date/Time Value Ref Range Status   01/13/2020 10:13 AM 10.3 (L) 12.0 - 15.9 g/dL Final     Platelets   Date/Time Value Ref Range Status   01/13/2020 10:13  140 - 450 10*3/mm3 Final       Assessment/Plan   There are no diagnoses linked to this encounter.  *Extensive stage small cell lung cancer, RUL, 2.4 x 2.3 x 1.7cm, with metastasis to 3 brain lesions (bilateral frontal lobes.)  · RUL mass first seen on CT 3/28/2016, 2 x 1.3 cm.  Decreased to 1.6 cm on 6/27/2016, and decreased again to 1.3 cm on 12/5/2016.  Therefore, this was felt at the time to be a benign resolving nodule.  · During August 2019 hospitalization, found to have brain metastasis from small cell lung cancer.  RUL mass and 3 brain lesions.  No evidence of disease otherwise  · Craniotomy for the medial left frontal lobe lesion (3.2 cm) , 8/28/2019  · Because the final diagnosis is small cell, Dr. Chacko does not plan lung resection as we typically treat this with chemoradiation  · SRS to 2 remaining brain lesions (first was resected) and to resection cavity of the first lesion, completed 9/18/2019.  Dr. Mayo only planning whole brain XRT if future brain recurrence.  · Usually with small cell I would like to start chemo XRT within a couple of weeks.  However, she is recovering from brain surgery.  Furthermore, this lung lesion has been there for years.  Therefore, weighing risks and benefits I think it is in her best interest to wait almost 4 weeks after craniotomy to begin chemo and radiation.  · PET 9/16/2019: 2.5 cm RUL mass with SUV 15.8.  No hypermetabolic LAD or distant disease.  · On 9/18/2019 completed 5/5 XRT to brain metastasis resection cavity and 1/1 XRT intact brain metastasis #1 and 1/1 XRT due to intact brain metastasis #2.  · 9/19/2019, C1D1 lung mass XRT with   carboplatin, etoposide, Tecentriq, followed by Tecentriq maintenance for extensive stage small cell lung cancer.  Plan a total of 4-6 cycles of chemo depending on tolerability.  · On 10/4/2019, prednisone 0.5 mg/kg/day (40 mg) started due to significant rash from Tecentriq.  4 days later, rash improved but did not resolve.   ·  Plan no more Tecentriq.    · Prednisone tapered off 10/28/2019.  · C2D1 (without Tecentriq) given 10/14/2019.  · MRI brain 10/8/2019 reviewed by Dr. Thibodeaux-he feels this is stable and plan another MRI and appointment with him in 3 months  · Lung mass XRT held the week of 10/28/2019, and again the week of 11/4/2019 due to radiation esophagitis  · Completed lung mass XRT 11/15/2019  · CT CAP 10/29/2019 (after C2): RUL lung mass 2 x 1.3 cm, from 2.5 x 2.5 cm.  · Did not give C3 chemo is planned 11/4/2019 because PLT 91.    · C3 D1 on 11/11/2019 (20% dose reduction of both drugs)  · Did not receive C4 D1 as planned on 12/2/2019 due to ANC 1330 and PLT 67.  Delayed by 1 week and further dose reduction of both drugs by an additional 20%  · (We will only plan 4 cycles of chemo since she is having difficulty tolerating chemo due to cytopenias).  · C4 D1 carbo etoposide, 12/10/2019 (completed first-line chemo).  · CT 12/18/2019 (after C4): No change RUL 2 x 1.3 cm lung mass.   · CT images personally viewed by Dr. Katz  ·  Radiologist noted there might be a tiny thrombus along the right Mediport catheter tip.  No distant disease found.  · MRI brain 12/26/2019: Left frontal lobe metastasis 5 mm, from 1 cm on 10/8/2019.  Right frontal lobe lesion also smaller.  Left parietal lobe dural nodular enhancement 5 mm, unchanged (radiologist notes this might have been a meningioma since it is unchanged).  · The patient returns today, March 6, 2020 with reports of right-sided numbness and weakness that precipitated 3 weeks ago.  She has had acute worsening over the last few days with extension of the numbness up  to her face and right arm.  No associated gait abnormalities or headaches.  We have obtained a stat brain MRI._______    *Goals of care.  · Since the 3 brain lesions were treated with stereotactic radiation and resection of 1 of the lesions and since no disease was found elsewhere, she perhaps has a very slim but possible chance of cure.  However, small cell histology makes the possibility of long-term survival unlikely.  · On 11/4/2019, I reminded her this is highly unlikely curable.  She was having significant issues with radiation esophagitis, limiting nutrition, requiring daily IV fluids.  Radiation will be held an additional week.  She has been miserable and hopefully another week of XRT and a week delay of chemo (PLT <100), and dose reduction of chemo will make further treatment reasonable regarding the importance she is placing on quality of life.     *Neurological deficits due to brain metastasis:  · Some mild confusion and right-sided weakness and urinary incontinence x2 weeks prompted ER visit 8/24/2019.  · Please see above.  The patient has had right-sided weakness and numbness over the last 3 weeks for which she is undergone a stat brain MRI for further evaluation.____     *Tinnitus and decreased hearing.  Not a cisplatin candidate.    *Port placed 9/6/2019    *Neutropenia due to chemotherapy.  We have not been using Neulasta due to XRT therapy.  Prophylactic Levaquin prescribed 11/25/2019 due to neutropenia.  Added Neulasta with cycle 4    Total white blood cell count today is ___    *Thrombocytopenia due to chemotherapy  · This caused dose delays and dose reductions of C3 and C4 carbo etoposide  · PLT today is _____    *Anemia.  HGB today is ____.     *This is Ángela Pryor's mother-in-law (APRN with Dr. Thibodeaux)    *She was started on Keppra in the ER when brain mets were discovered.  She states she did not have any seizures.  The neurology notes just states patient is on Keppra.  They do not recommend  Price (Do Not Change): 0.00 continuing or discontinuing Keppra.  For now, we will continue Keppra.  Dr. Katz told her he doubts she has a clear indication for Keppra. He recommended she discuss this with Dr. Thibodeaux, her neurosurgeon to see if he feels she needs any further Keppra.  In the meantime, we will refill her Keppra.     Plan  · The patient proceeded with a stat brain MRI today  ·   · The patient is currently scheduled for repeat CT imaging on April 1, 2020  · The patient will see Dr. katz on April 6, 2024 scan review  · She is currently scheduled for follow-up with Dr. Thibodeaux of neurosurgery on April 9, 2020    -All the above reviewed with Dr. Katz and he is in agreement              Instructions: This plan will send the code FBSD to the PM system.  DO NOT or CHANGE the price. Detail Level: Simple

## 2024-05-22 NOTE — PROGRESS NOTES
"  Physician Stereotactic Management Note    Diagnosis:     1. Brain mass        Doing well pretreatment, no problems. Coming from rehab.    Treatment Number and Dose: 2/3    I was personally present at the machine for the delivery of the above treatment.     I provided direct supervision of the patient's set up, treatment parameters and image guidance. I provided corrections and approval of the above prior to the treatment, in real time. I was present for any adjustments required due to patient motion, target movement or equipment issues.    The ongoing images for localization, tumor tracking, gating and beam's eye view localization images will also be approved.     Notes on treatment course, films, medical progress and plan:    Doing well. Tolerated treatment without difficulty. No problems or questions.    Problem added:  None  Medications added:   None  Ancillary referrals made: None    I have reviewed and marked as \"reviewed\" the current medications, allergies and problem list in the patients EMR.    Patient's Care Team:  Patient Care Team:  Heide Wellington MD as PCP - General  Heide Wellington MD as PCP - Family Medicine  [facility] hazel MD as Consulting Physician (Pulmonary Disease)  Nicolas Mayo MD as Consulting Physician (Radiation Oncology)  Nicolas Katz II, MD as Consulting Physician (Hematology and Oncology)  She Garcia PA-C as Referring Physician (Neurosurgery)      " no concerns

## 2025-04-05 NOTE — TELEPHONE ENCOUNTER
Received a call from patient 6/27/20 regarding persistent yet intermittent tremors in R arm and R leg. Patient had recently discontinued Keppra after EEG showed no evidence of epileptiform activity. Patient was instructed to resume Keppra. She was instructed to take two 500 mg capsules 6/27/20 and then try 500 mg at HS starting 6/28/20. I spoke to patient on 6/29/20 and the tremors have resolved. Patient is to remain on 500 mg q HS and let us know if the tremors return. I spoke with Dr. Thibodeaux about this on 6/30/20. He is in agreement with this plan.   
Zina

## (undated) DEVICE — 3M™ IOBAN™ 2 ANTIMICROBIAL INCISE DRAPE 6650EZ: Brand: IOBAN™ 2

## (undated) DEVICE — NDL HYPO PRECISIONGLIDE REG 20G 1 1/2

## (undated) DEVICE — 2.3MM TAPERED ROUTER

## (undated) DEVICE — SPHR MARKR STEALTH STATION

## (undated) DEVICE — DECANT BG O JET

## (undated) DEVICE — ANTIBACTERIAL UNDYED BRAIDED (POLYGLACTIN 910), SYNTHETIC ABSORBABLE SUTURE: Brand: COATED VICRYL

## (undated) DEVICE — LOU MINOR PROCEDURE: Brand: MEDLINE INDUSTRIES, INC.

## (undated) DEVICE — Device

## (undated) DEVICE — NDL HYPO PRECISIONGLIDE REG 25G 1 1/2

## (undated) DEVICE — APPL DURAPREP IODOPHOR APL 26ML

## (undated) DEVICE — STRAIGHT TIP, UNIVERSAL

## (undated) DEVICE — GLV SURG BIOGEL LTX PF 7

## (undated) DEVICE — GLV SURG PREMIERPRO ORTHO LTX PF SZ8 BRN

## (undated) DEVICE — ADHS SKIN DERMABOND TOP ADVANCED

## (undated) DEVICE — MAYFIELD® DISPOSABLE ADULT SKULL PIN (PLASTIC BASE): Brand: MAYFIELD®

## (undated) DEVICE — DISPOSABLE IRRIGATION BIPOLAR CORD, M1000 TYPE: Brand: KIRWAN

## (undated) DEVICE — SYR LUERLOK 20CC BX/50

## (undated) DEVICE — DRP MICROSCOPE 4 BINOCULAR CV 54X150IN

## (undated) DEVICE — CATHETER,FOLEY,100%SILICONE,16FR,10ML,LF: Brand: MEDLINE

## (undated) DEVICE — APPL CHLORAPREP W/TINT 10.5ML PERC STRL

## (undated) DEVICE — CONN TBG Y 5 IN 1 LF STRL

## (undated) DEVICE — SMOKE EVACUATION TUBING WITH 7/8 IN TO 1/4 IN REDUCER: Brand: BUFFALO FILTER

## (undated) DEVICE — TUBING, SUCTION, 1/4" X 20', STRAIGHT: Brand: MEDLINE INDUSTRIES, INC.

## (undated) DEVICE — DRP C/ARM 41X74IN

## (undated) DEVICE — DISPOSABLE TUBING SET AND EXTENDER FILTER TUBING

## (undated) DEVICE — DURAHOOK 1/4HOOK PK/6

## (undated) DEVICE — PERFORATOR CDM WO/ DURAGUARD 14/11

## (undated) DEVICE — DRSNG SURESITE WNDW 2.38X2.75

## (undated) DEVICE — CVR PROB 96IN LF STRL

## (undated) DEVICE — GLV SURG SENSICARE W/ALOE PF LF 7.5 STRL

## (undated) DEVICE — DRSNG TELFA PAD NONADH STR 1S 3X8IN

## (undated) DEVICE — PK CRANI 40

## (undated) DEVICE — 3M™ IOBAN™ 2 ANTIMICROBIAL INCISE DRAPE 6640EZ: Brand: IOBAN™ 2

## (undated) DEVICE — SOL IRR PHYSIOSOL BTL 1000ML

## (undated) DEVICE — SYR LUERLOK 5CC

## (undated) DEVICE — SUT NUROLON 4/0 TF18 CR8 I8IN C584D

## (undated) DEVICE — MARKR SKIN W/RULR AND LBL

## (undated) DEVICE — DRSNG TELFA PAD NONADH STR 1S 3X4IN

## (undated) DEVICE — SUT SILK 2/0 SH CR5 18IN C0125